# Patient Record
Sex: MALE | Race: WHITE | Employment: OTHER | URBAN - NONMETROPOLITAN AREA
[De-identification: names, ages, dates, MRNs, and addresses within clinical notes are randomized per-mention and may not be internally consistent; named-entity substitution may affect disease eponyms.]

---

## 2017-12-12 ENCOUNTER — APPOINTMENT (OUTPATIENT)
Dept: GENERAL RADIOLOGY | Age: 82
End: 2017-12-12
Payer: MEDICARE

## 2017-12-12 ENCOUNTER — HOSPITAL ENCOUNTER (EMERGENCY)
Age: 82
Discharge: HOME | End: 2017-12-12
Attending: FAMILY MEDICINE
Payer: MEDICARE

## 2017-12-12 VITALS
TEMPERATURE: 95.7 F | HEART RATE: 59 BPM | HEIGHT: 69 IN | RESPIRATION RATE: 16 BRPM | WEIGHT: 170 LBS | BODY MASS INDEX: 25.18 KG/M2 | OXYGEN SATURATION: 100 %

## 2017-12-12 DIAGNOSIS — K59.00 CONSTIPATION, UNSPECIFIED CONSTIPATION TYPE: Primary | ICD-10-CM

## 2017-12-12 LAB — HEMOCCULT STL QL: NEGATIVE

## 2017-12-12 PROCEDURE — 74020 XR ABDOMEN STANDARD: CPT

## 2017-12-12 PROCEDURE — 99283 EMERGENCY DEPT VISIT LOW MDM: CPT

## 2017-12-12 PROCEDURE — 6370000000 HC RX 637 (ALT 250 FOR IP): Performed by: FAMILY MEDICINE

## 2017-12-12 PROCEDURE — 82272 OCCULT BLD FECES 1-3 TESTS: CPT

## 2017-12-12 RX ORDER — POLYETHYLENE GLYCOL 3350 17 G/17G
17 POWDER, FOR SOLUTION ORAL ONCE
Status: COMPLETED | OUTPATIENT
Start: 2017-12-12 | End: 2017-12-12

## 2017-12-12 RX ORDER — SODIUM PHOSPHATE, DIBASIC AND SODIUM PHOSPHATE, MONOBASIC 7; 19 G/133ML; G/133ML
1 ENEMA RECTAL
Status: COMPLETED | OUTPATIENT
Start: 2017-12-12 | End: 2017-12-12

## 2017-12-12 RX ADMIN — Medication 1 ENEMA: at 13:16

## 2017-12-12 RX ADMIN — POLYETHYLENE GLYCOL 3350 17 G: 17 POWDER, FOR SOLUTION ORAL at 13:56

## 2017-12-12 NOTE — ED NOTES
Patient presents to ED for constipation. He states that he hasn't had a bowel movement in over 1 week. He also states that when he had that bowel movement he had to use his finger to help it come out.       Reva Bahena LPN  70/83/62 [id number]

## 2017-12-12 NOTE — ED NOTES
Patient placed on left side for enema.  Commode at bedside, call light within reach     Yuli Blanca RN  12/12/17 [id number]

## 2017-12-12 NOTE — ED PROVIDER NOTES
[facility] EMERGENCY DEPT      CHIEF COMPLAINT       Chief Complaint   Patient presents with    Constipation       Nurses Notes reviewed and I agree except as noted in the HPI. HISTORY OF PRESENT ILLNESS    Zeinab Anne is a 80 y.o. male who presents to the emergency department with constipation. The patient is a poor historian but apparently he usually moves his bowels once a day. His last BM was 3-4 days ago, and was small and hard. The patient does not know why he is suddenly constipated, but states that he used to take a medicine which seemed to keep his bowels loose, but turned his stools black. He was taken off of that medicine, but he cannot verbalize why. The patient also complains of some lower abdominal pain, that feels like pressure. He has treated himself in the last 1-2 days with a little orange pill that he gets over-the-counter. HIstory from the pt's cousin Jesse Flores is not much more useful. He states that the patient recently moved back from several years in [place]. He does not have a list of the patient's medications or medical conditions. Neither the patient or his cousin can recall why he has a surgical scar in his lower abdomen. REVIEW OF SYSTEMS     Constitutional:  Denies fever, chills, weight loss  HENT:  Denies bleeding from nose or in his mouth   Respiratory:  Denies shortness of breath  Cardiovascular:  Denies chest pain or palpitations  GI:  Denies pain, nausea, vomiting,  diarrhea, blood in the stool, gas   : denies dysuria, hematuria  Musculoskeletal:  Denies back pain,     PAST MEDICAL HISTORY    has no past medical history on file. SURGICAL HISTORY      has no past surgical history on file. CURRENT MEDICATIONS       There are no discharge medications for this patient. ALLERGIES     has No Known Allergies. FAMILY HISTORY     has no family status information on file. family history is not on file.     SOCIAL HISTORY          PHYSICAL EXAM INITIAL VITALS:  height is 5' 9\" (1.753 m) and weight is 170 lb (77.1 kg). His oral temperature is 95.7 °F (35.4 °C). His pulse is 59. His respiration is 16 and oxygen saturation is 100%. Constitutional:  Obese elderly man Well developed, Well nourished, No acute distress, Non-toxic appearance. HENT:  Normocephalic, Atraumatic, Bilateral external ears normal, nares clear, oropharynx moist, No oral or tonsillar exudates, airway clear. Neck: Supple, normal range of motion, No posterior midline bony tenderness, no adenopathy  Eyes:  PERRL, EOMI, Conjunctiva normal, No discharge. Respiratory:  Breathing is non-labored on room air, normal breath sounds, no wheezing, rhonchi, rales or cough. Cardiovascular:  Normal heart rate, Normal rhythm, No murmurs, No rubs, No gallops. GI:  Obese contour with well-healed lower abdominal surgical scar, Bowel sounds normal, Soft, mildly tender diffusely without guarding, rebound or masses. No inguinal adenopathy or tenderness. Rectal: No external lesions. No internal masses. No stool is felt in the vault. Smear is grossly negative for blood. Musculoskeletal: extremities have no edema or focal tenderness. Back: No midline bony tenderness or step-off, no CVAT. Integument:  Warm, Dry,  No rash (on exposed areas), no bruising. Neurologic:  Alert & Age-appropriate, cranial nerves 2-12 grossly intact, speech fluent and cogent, was all 4 extremities     DIFFERENTIAL DIAGNOSIS:   Slow transit versus functional constipation, less likely bowel obstruction. DIAGNOSTIC RESULTS     RADIOLOGY: non-plain film images(s) such as CT, Ultrasound and MRI are read by the radiologist.  Plain radiographic images are visualized and preliminarily interpreted by the emergency physician unless otherwise stated below. XR ABDOMEN (complete, including decubitus and/or erect views)   Final Result      There is a nonspecific, nonobstructive bowel gas pattern.  A large amount of stool is

## 2017-12-15 ENCOUNTER — OFFICE VISIT (OUTPATIENT)
Dept: CARDIOLOGY CLINIC | Age: 82
End: 2017-12-15
Payer: MEDICARE

## 2017-12-15 VITALS
SYSTOLIC BLOOD PRESSURE: 188 MMHG | BODY MASS INDEX: 26.22 KG/M2 | HEIGHT: 69 IN | HEART RATE: 53 BPM | WEIGHT: 177 LBS | DIASTOLIC BLOOD PRESSURE: 82 MMHG

## 2017-12-15 DIAGNOSIS — I73.9 PAD (PERIPHERAL ARTERY DISEASE) (HCC): Primary | ICD-10-CM

## 2017-12-15 DIAGNOSIS — Z98.890 HISTORY OF OPEN HEART SURGERY: ICD-10-CM

## 2017-12-15 DIAGNOSIS — R60.0 BILATERAL LOWER EXTREMITY EDEMA: ICD-10-CM

## 2017-12-15 PROCEDURE — G8484 FLU IMMUNIZE NO ADMIN: HCPCS | Performed by: INTERNAL MEDICINE

## 2017-12-15 PROCEDURE — 1123F ACP DISCUSS/DSCN MKR DOCD: CPT | Performed by: INTERNAL MEDICINE

## 2017-12-15 PROCEDURE — G8419 CALC BMI OUT NRM PARAM NOF/U: HCPCS | Performed by: INTERNAL MEDICINE

## 2017-12-15 PROCEDURE — G8427 DOCREV CUR MEDS BY ELIG CLIN: HCPCS | Performed by: INTERNAL MEDICINE

## 2017-12-15 PROCEDURE — 93000 ELECTROCARDIOGRAM COMPLETE: CPT | Performed by: INTERNAL MEDICINE

## 2017-12-15 PROCEDURE — 99205 OFFICE O/P NEW HI 60 MIN: CPT | Performed by: INTERNAL MEDICINE

## 2017-12-15 PROCEDURE — 1036F TOBACCO NON-USER: CPT | Performed by: INTERNAL MEDICINE

## 2017-12-15 PROCEDURE — 4040F PNEUMOC VAC/ADMIN/RCVD: CPT | Performed by: INTERNAL MEDICINE

## 2017-12-15 RX ORDER — GLIPIZIDE 5 MG/1
5 TABLET ORAL DAILY
COMMUNITY

## 2017-12-15 RX ORDER — POTASSIUM CHLORIDE 20 MEQ/1
40 TABLET, EXTENDED RELEASE ORAL DAILY
COMMUNITY
End: 2017-12-15 | Stop reason: SDUPTHER

## 2017-12-15 RX ORDER — FUROSEMIDE 20 MG/1
20 TABLET ORAL 2 TIMES DAILY
COMMUNITY
End: 2017-12-27 | Stop reason: SDUPTHER

## 2017-12-15 RX ORDER — FUROSEMIDE 40 MG/1
40 TABLET ORAL 2 TIMES DAILY
Qty: 60 TABLET | Refills: 3 | Status: SHIPPED | OUTPATIENT
Start: 2017-12-15 | End: 2017-12-15 | Stop reason: SDUPTHER

## 2017-12-15 RX ORDER — OXYCODONE AND ACETAMINOPHEN 7.5; 325 MG/1; MG/1
1 TABLET ORAL EVERY 8 HOURS PRN
Status: ON HOLD | COMMUNITY
End: 2018-06-08

## 2017-12-15 RX ORDER — NEBIVOLOL 10 MG/1
10 TABLET ORAL DAILY
Qty: 90 TABLET | Refills: 1 | Status: SHIPPED | OUTPATIENT
Start: 2017-12-15 | End: 2018-01-01 | Stop reason: SDUPTHER

## 2017-12-15 RX ORDER — ISOSORBIDE MONONITRATE 10 MG/1
10 TABLET ORAL DAILY
COMMUNITY
End: 2017-12-15 | Stop reason: SDUPTHER

## 2017-12-15 RX ORDER — ISOSORBIDE MONONITRATE 10 MG/1
10 TABLET ORAL DAILY
Qty: 90 TABLET | Refills: 1 | Status: ON HOLD | OUTPATIENT
Start: 2017-12-15 | End: 2019-01-01 | Stop reason: HOSPADM

## 2017-12-15 RX ORDER — OMEPRAZOLE 40 MG/1
40 CAPSULE, DELAYED RELEASE ORAL 2 TIMES DAILY
COMMUNITY
End: 2019-01-01 | Stop reason: ALTCHOICE

## 2017-12-15 RX ORDER — FUROSEMIDE 40 MG/1
TABLET ORAL
Qty: 180 TABLET | Refills: 0 | Status: SHIPPED | OUTPATIENT
Start: 2017-12-15 | End: 2017-12-27 | Stop reason: ALTCHOICE

## 2017-12-15 RX ORDER — ACETAMINOPHEN 500 MG/1
500 TABLET, FILM COATED ORAL EVERY 6 HOURS PRN
Status: ON HOLD | COMMUNITY
End: 2018-01-01 | Stop reason: HOSPADM

## 2017-12-15 RX ORDER — CLOPIDOGREL BISULFATE 75 MG/1
75 TABLET ORAL DAILY
COMMUNITY

## 2017-12-15 RX ORDER — RANOLAZINE 1000 MG/1
500 TABLET, EXTENDED RELEASE ORAL 2 TIMES DAILY
COMMUNITY
End: 2017-12-15 | Stop reason: SDUPTHER

## 2017-12-15 RX ORDER — ATORVASTATIN CALCIUM 20 MG/1
20 TABLET, FILM COATED ORAL DAILY
COMMUNITY

## 2017-12-15 RX ORDER — MAGNESIUM OXIDE 400 MG/1
400 TABLET ORAL 2 TIMES DAILY
COMMUNITY

## 2017-12-15 RX ORDER — RANOLAZINE 1000 MG/1
500 TABLET, EXTENDED RELEASE ORAL 2 TIMES DAILY
Qty: 180 TABLET | Refills: 1 | Status: SHIPPED | OUTPATIENT
Start: 2017-12-15 | End: 2018-01-01 | Stop reason: SDUPTHER

## 2017-12-15 RX ORDER — NEBIVOLOL 10 MG/1
10 TABLET ORAL DAILY
COMMUNITY
End: 2017-12-15 | Stop reason: SDUPTHER

## 2017-12-15 ASSESSMENT — ENCOUNTER SYMPTOMS
EYE DISCHARGE: 0
BLOOD IN STOOL: 0
SORE THROAT: 0
ABDOMINAL DISTENTION: 0
CONSTIPATION: 0
BACK PAIN: 0
APNEA: 0
EYE ITCHING: 0
CHEST TIGHTNESS: 0
NAUSEA: 0
SINUS PRESSURE: 0
EYE REDNESS: 0
ABDOMINAL PAIN: 0
DIARRHEA: 0
COUGH: 0
SHORTNESS OF BREATH: 0
EYE PAIN: 0

## 2017-12-15 NOTE — PROGRESS NOTES
(TYLENOL) 500 MG tablet, Take 500 mg by mouth every 6 hours as needed for Pain, Disp: , Rfl:     ranolazine (RANEXA) 1000 MG extended release tablet, Take 500 mg by mouth 2 times daily, Disp: , Rfl:     Past Medical History  Artie Cabot  has a past medical history of Hyperlipidemia; Hypertension; Peripheral vascular disease ([facility] [address] and Type 2 diabetes mellitus without complication ([facility] [address] Social History  Artie Cabot  reports that he has never smoked. He has never used smokeless tobacco. He reports that he does not drink alcohol or use drugs. Family History  Artie Cabot family history is not on file. There is no family history of bicuspid aortic valve, aneurysms, heart transplant, pacemakers, defibrillators, or sudden cardiac death. Past Surgical History   Past Surgical History:   Procedure Laterality Date    CORONARY ARTERY BYPASS GRAFT         Subjective:     Review of Systems   Constitutional: Negative for activity change, appetite change, chills and fatigue. HENT: Negative for congestion, sinus pressure, sneezing and sore throat. Eyes: Negative for pain, discharge, redness and itching. Respiratory: Negative for apnea, cough, chest tightness and shortness of breath. Gastrointestinal: Negative for abdominal distention, abdominal pain, blood in stool, constipation, diarrhea and nausea. Endocrine: Negative for cold intolerance, heat intolerance, polydipsia and polyphagia. Genitourinary: Negative for dysuria, enuresis, flank pain and hematuria. Musculoskeletal: Negative for arthralgias, back pain, joint swelling and neck pain. Neurological: Negative for dizziness, syncope, numbness and headaches. Psychiatric/Behavioral: Negative for agitation, confusion, decreased concentration and dysphoric mood.        Objective:     BP (!) 188/82   Pulse 53   Ht 5' 9\" (1.753 m)   Wt 177 lb (80.3 kg)   BMI 26.14 kg/m²     Wt Readings from Last 3 Encounters:   12/15/17 177 lb (80.3 kg)   12/12/17 170 lb anatomy, requesting OSH records. Continue ASA/Plavix/Lipitor/Ismo/Nebivolol/Ranolazine. No current cardiac symptoms. LE swelling/HTN - OSH records, and TTE for baseline; increase Lasix to 40 mg PO BID. CHF RN to f/u 1 week with labs; salt restriction/water restriction    PAD - Claudication is life-limiting; cannot walk 100-200 feet without pain in legs, L > R. Needs OSH records and JERSON/PVR evaluation. May need angiography to evaluate. Disposition:  1-2 week with CHF RN, then 2-4 weeks with myself.          Electronically signed by Dunia Alvarado MD   12/15/2017 at 10:51 AM

## 2017-12-16 RX ORDER — POTASSIUM CHLORIDE 20 MEQ/1
40 TABLET, EXTENDED RELEASE ORAL DAILY
Qty: 60 TABLET | Refills: 1 | Status: SHIPPED | OUTPATIENT
Start: 2017-12-16 | End: 2017-12-17 | Stop reason: SDUPTHER

## 2017-12-18 RX ORDER — POTASSIUM CHLORIDE 20 MEQ/1
40 TABLET, EXTENDED RELEASE ORAL DAILY
Qty: 180 TABLET | Refills: 1 | Status: SHIPPED | OUTPATIENT
Start: 2017-12-18 | End: 2017-12-27 | Stop reason: SDUPTHER

## 2017-12-26 ENCOUNTER — OFFICE VISIT (OUTPATIENT)
Dept: CARDIOLOGY CLINIC | Age: 82
End: 2017-12-26
Payer: MEDICARE

## 2017-12-26 ENCOUNTER — HOSPITAL ENCOUNTER (OUTPATIENT)
Age: 82
Discharge: HOME | End: 2017-12-26
Payer: MEDICARE

## 2017-12-26 ENCOUNTER — HOSPITAL ENCOUNTER (OUTPATIENT)
Dept: NON INVASIVE DIAGNOSTICS | Age: 82
Discharge: HOME | End: 2017-12-26
Payer: MEDICARE

## 2017-12-26 ENCOUNTER — HOSPITAL ENCOUNTER (OUTPATIENT)
Dept: INTERVENTIONAL RADIOLOGY/VASCULAR | Age: 82
Discharge: HOME | End: 2017-12-26
Payer: MEDICARE

## 2017-12-26 VITALS
HEIGHT: 69 IN | BODY MASS INDEX: 26.66 KG/M2 | HEART RATE: 51 BPM | OXYGEN SATURATION: 100 % | SYSTOLIC BLOOD PRESSURE: 184 MMHG | WEIGHT: 180 LBS | DIASTOLIC BLOOD PRESSURE: 90 MMHG

## 2017-12-26 DIAGNOSIS — I73.9 PAD (PERIPHERAL ARTERY DISEASE) (HCC): ICD-10-CM

## 2017-12-26 DIAGNOSIS — Z98.890 HISTORY OF OPEN HEART SURGERY: ICD-10-CM

## 2017-12-26 DIAGNOSIS — I50.9 CHRONIC CONGESTIVE HEART FAILURE, UNSPECIFIED CONGESTIVE HEART FAILURE TYPE: Primary | ICD-10-CM

## 2017-12-26 LAB
ANION GAP SERPL CALCULATED.3IONS-SCNC: 11 MEQ/L (ref 8–16)
BUN BLDV-MCNC: 32 MG/DL (ref 7–22)
CALCIUM SERPL-MCNC: 9.6 MG/DL (ref 8.5–10.5)
CHLORIDE BLD-SCNC: 103 MEQ/L (ref 98–111)
CO2: 28 MEQ/L (ref 23–33)
CREAT SERPL-MCNC: 1.4 MG/DL (ref 0.4–1.2)
GFR SERPL CREATININE-BSD FRML MDRD: 48 ML/MIN/1.73M2
GLUCOSE BLD-MCNC: 146 MG/DL (ref 70–108)
LV EF: 58 %
LVEF MODALITY: NORMAL
POTASSIUM SERPL-SCNC: 5.2 MEQ/L (ref 3.5–5.2)
SODIUM BLD-SCNC: 142 MEQ/L (ref 135–145)

## 2017-12-26 PROCEDURE — 93922 UPR/L XTREMITY ART 2 LEVELS: CPT

## 2017-12-26 PROCEDURE — 80048 BASIC METABOLIC PNL TOTAL CA: CPT

## 2017-12-26 PROCEDURE — 93306 TTE W/DOPPLER COMPLETE: CPT

## 2017-12-26 PROCEDURE — 99213 OFFICE O/P EST LOW 20 MIN: CPT | Performed by: NURSE PRACTITIONER

## 2017-12-26 PROCEDURE — 36415 COLL VENOUS BLD VENIPUNCTURE: CPT

## 2017-12-26 ASSESSMENT — ENCOUNTER SYMPTOMS
COUGH: 0
NAUSEA: 0
WHEEZING: 0
SHORTNESS OF BREATH: 0
COLOR CHANGE: 1
APNEA: 0
ABDOMINAL DISTENTION: 0
ABDOMINAL PAIN: 0
CHEST TIGHTNESS: 0

## 2017-12-26 NOTE — PATIENT INSTRUCTIONS
Continue:  · Continue current medications  · Daily weights and record  · Fluid restriction of 2 Liters per day  · Limit sodium in diet to around 1500 mg/day  · Monitor BP  · Activity as tolerated     Call the Heart Failure Clinic for any of the following symptoms: [phone]  Weight gain of 2-3 pounds in 1 day or 5 pounds in 1 week  Increased shortness of breath  Shortness of breath while laying down  Cough  Chest pain  Swelling in feet, ankles or legs  Tenderness or bloating in the abdomen  Fatigue   Decreased appetite or nausea   Confusion

## 2017-12-26 NOTE — PROGRESS NOTES
Heart Failure Clinic       Visit Date: 12/26/2017  Cardiologist:  Dr. Leonidas Garces  Primary Care Physician: Dr. Chato Tafoya, CNP    Conrad Cruz is a 80 y.o. male who presents today for:  Chief Complaint   Patient presents with    Congestive Heart Failure       HPI:   Conrad Cruz is a 80 y.o. male who presents to the office for new patient visit in the heart failure clinic. He recently moved here from [place] with the help of his 2 nephews who are with him today. He lives alone, uses a walker. He has meals on wheels for one meal a day. He usually eats soup and a sandwich for lunch, ham and cheese. We looked up how much sodium was in a can of Chicken Noodle soup and found it to be 860 mg per serving. He can perform basic ADLs without feeling SOB. He can walk short distances without dyspnea. He does, however, complain of pain in his legs with ambulation. He sleeps in a bed with 2 pillows. He saw Dr Leonidas Garces on 12/15/17 and had a BP of 188/82. He had doubled his Lasix to 40 mg BID. His BP is still elevated today at 180/76. I retook his BP at the end of the visit and it was still elevated at 184/90. He states he did not take his medications yet today. He denied any headache or dizziness. He was to get a BMP prior to this visit but is actually getting it today as well as JERSON and ECHO. Patient has:  Chest Pain: no  Worsening SOB/orthopnea/PND: no  Edema: yes  Weight gain: no  Compliant checking home weight: yes  Fatigue: no  Abdominal bloating: sometimes  Appetite: good  Difficulty sleeping: no  Cough: no  Compliant checking blood pressure: no  Any refills on CHF medications needed at this time: no    Past Medical History:   Diagnosis Date    Hyperlipidemia     Hypertension     Peripheral vascular disease ([facility] [address]     Type 2 diabetes mellitus without complication ([facility] [address]      Past Surgical History:   Procedure Laterality Date    CORONARY ARTERY BYPASS GRAFT       History reviewed.  No pertinent family pain and nausea. Genitourinary: Negative for difficulty urinating and dysuria. Musculoskeletal: Positive for gait problem (uses a rolling walker). Negative for arthralgias. Leg pain   Skin: Positive for color change. Chronic skin changes and open blisters on both shins   Neurological: Negative for dizziness, numbness and headaches. Psychiatric/Behavioral: Negative for agitation, confusion and sleep disturbance. The patient is not nervous/anxious. OBJECTIVE:   Today's Vitals:  BP (!) 180/76   Pulse 51   Ht 5' 9\" (1.753 m)   Wt 180 lb (81.6 kg)   SpO2 100%   BMI 26.58 kg/m²     Physical Exam   Constitutional: He is oriented to person, place, and time. He appears well-developed and well-nourished. HENT:   Head: Normocephalic and atraumatic. Eyes: Pupils are equal, round, and reactive to light. Neck: Normal range of motion. No JVD present. Cardiovascular: Normal rate and normal heart sounds. No murmur heard. Pulmonary/Chest: Effort normal. No respiratory distress. He has no rales. Abdominal: Soft. He exhibits no distension. There is no tenderness. Musculoskeletal: He exhibits edema (1+ shins bilaterally, chronic skin changes, open round blister on left shin, approx 2 inchx 0.25 inch on right). He exhibits no tenderness. Neurological: He is alert and oriented to person, place, and time. Skin: Skin is warm and dry. Psychiatric: He has a normal mood and affect. His behavior is normal.   Vitals reviewed. Wt Readings from Last 3 Encounters:   12/26/17 180 lb (81.6 kg)   12/15/17 177 lb (80.3 kg)   12/12/17 170 lb (77.1 kg)     BP Readings from Last 3 Encounters:   12/26/17 (!) 180/76   12/15/17 (!) 188/82     Pulse Readings from Last 3 Encounters:   12/26/17 51   12/15/17 53   12/12/17 59     Body mass index is 26.58 kg/m².     ECHO:   12/26/17 - Pending    Results reviewed:  BNP: No results found for: BNP  CBC:   Lab Results   Component Value Date    WBC 7.6 understanding of plan of care using teach back method, and is agreeable to the treatment plan.        Electronically signed by Maria Isabel Kim CNP on 12/26/2017 at 1:25 PM

## 2017-12-27 ENCOUNTER — TELEPHONE (OUTPATIENT)
Dept: CARDIOLOGY CLINIC | Age: 82
End: 2017-12-27

## 2017-12-27 DIAGNOSIS — I50.32 CHRONIC DIASTOLIC CONGESTIVE HEART FAILURE (HCC): Primary | ICD-10-CM

## 2017-12-27 RX ORDER — FUROSEMIDE 20 MG/1
20 TABLET ORAL 2 TIMES DAILY
Qty: 60 TABLET | Refills: 3 | Status: SHIPPED | OUTPATIENT
Start: 2017-12-27 | End: 2018-01-01 | Stop reason: SDUPTHER

## 2017-12-27 RX ORDER — HYDRALAZINE HYDROCHLORIDE 10 MG/1
10 TABLET, FILM COATED ORAL 3 TIMES DAILY
Qty: 90 TABLET | Refills: 3 | Status: ON HOLD | OUTPATIENT
Start: 2017-12-27 | End: 2018-01-05 | Stop reason: HOSPADM

## 2017-12-27 RX ORDER — POTASSIUM CHLORIDE 20 MEQ/1
10 TABLET, EXTENDED RELEASE ORAL DAILY
Qty: 180 TABLET | Refills: 1 | Status: SHIPPED | OUTPATIENT
Start: 2017-12-27 | End: 2018-04-16 | Stop reason: SDUPTHER

## 2017-12-28 ENCOUNTER — TELEPHONE (OUTPATIENT)
Dept: CARDIOLOGY CLINIC | Age: 82
End: 2017-12-28

## 2018-01-01 ENCOUNTER — TELEPHONE (OUTPATIENT)
Dept: UROLOGY | Age: 83
End: 2018-01-01

## 2018-01-01 ENCOUNTER — APPOINTMENT (OUTPATIENT)
Dept: GENERAL RADIOLOGY | Age: 83
End: 2018-01-01
Payer: MEDICARE

## 2018-01-01 ENCOUNTER — APPOINTMENT (OUTPATIENT)
Dept: GENERAL RADIOLOGY | Age: 83
DRG: 690 | End: 2018-01-01
Payer: MEDICARE

## 2018-01-01 ENCOUNTER — OFFICE VISIT (OUTPATIENT)
Dept: CARDIOLOGY CLINIC | Age: 83
End: 2018-01-01
Payer: MEDICARE

## 2018-01-01 ENCOUNTER — APPOINTMENT (OUTPATIENT)
Dept: CT IMAGING | Age: 83
End: 2018-01-01
Payer: MEDICARE

## 2018-01-01 ENCOUNTER — HOSPITAL ENCOUNTER (INPATIENT)
Age: 83
LOS: 7 days | Discharge: NURSING, REHAB OR LONG-TERM CARE | DRG: 690 | End: 2018-11-26
Attending: INTERNAL MEDICINE | Admitting: INTERNAL MEDICINE
Payer: MEDICARE

## 2018-01-01 ENCOUNTER — TELEPHONE (OUTPATIENT)
Dept: CARDIOLOGY CLINIC | Age: 83
End: 2018-01-01

## 2018-01-01 ENCOUNTER — PROCEDURE VISIT (OUTPATIENT)
Dept: UROLOGY | Age: 83
End: 2018-01-01
Payer: MEDICARE

## 2018-01-01 ENCOUNTER — APPOINTMENT (OUTPATIENT)
Dept: NUCLEAR MEDICINE | Age: 83
DRG: 682 | End: 2018-01-01
Payer: MEDICARE

## 2018-01-01 ENCOUNTER — HOSPITAL ENCOUNTER (OUTPATIENT)
Dept: NON INVASIVE DIAGNOSTICS | Age: 83
Discharge: HOME | End: 2018-10-17
Payer: MEDICARE

## 2018-01-01 ENCOUNTER — APPOINTMENT (OUTPATIENT)
Dept: CT IMAGING | Age: 83
DRG: 682 | End: 2018-01-01
Payer: MEDICARE

## 2018-01-01 ENCOUNTER — HOSPITAL ENCOUNTER (OUTPATIENT)
Age: 83
Setting detail: OBSERVATION
Discharge: HOME | End: 2018-08-09
Attending: INTERNAL MEDICINE | Admitting: INTERNAL MEDICINE
Payer: MEDICARE

## 2018-01-01 ENCOUNTER — NURSE ONLY (OUTPATIENT)
Dept: CARDIOLOGY CLINIC | Age: 83
End: 2018-01-01
Payer: MEDICARE

## 2018-01-01 ENCOUNTER — APPOINTMENT (OUTPATIENT)
Dept: ULTRASOUND IMAGING | Age: 83
DRG: 682 | End: 2018-01-01
Payer: MEDICARE

## 2018-01-01 ENCOUNTER — TELEPHONE (OUTPATIENT)
Dept: AUDIOLOGY | Age: 83
End: 2018-01-01

## 2018-01-01 ENCOUNTER — APPOINTMENT (OUTPATIENT)
Dept: ULTRASOUND IMAGING | Age: 83
DRG: 690 | End: 2018-01-01
Payer: MEDICARE

## 2018-01-01 ENCOUNTER — APPOINTMENT (OUTPATIENT)
Dept: GENERAL RADIOLOGY | Age: 83
DRG: 682 | End: 2018-01-01
Payer: MEDICARE

## 2018-01-01 ENCOUNTER — APPOINTMENT (OUTPATIENT)
Dept: ULTRASOUND IMAGING | Age: 83
End: 2018-01-01
Payer: MEDICARE

## 2018-01-01 ENCOUNTER — HOSPITAL ENCOUNTER (INPATIENT)
Age: 83
LOS: 4 days | Discharge: NURSING, REHAB OR LONG-TERM CARE | DRG: 682 | End: 2018-09-06
Attending: INTERNAL MEDICINE | Admitting: INTERNAL MEDICINE
Payer: MEDICARE

## 2018-01-01 ENCOUNTER — HOSPITAL ENCOUNTER (OUTPATIENT)
Dept: AUDIOLOGY | Age: 83
Discharge: HOME | End: 2018-08-07

## 2018-01-01 ENCOUNTER — APPOINTMENT (OUTPATIENT)
Dept: CT IMAGING | Age: 83
DRG: 690 | End: 2018-01-01
Payer: MEDICARE

## 2018-01-01 ENCOUNTER — OFFICE VISIT (OUTPATIENT)
Dept: SURGERY | Age: 83
End: 2018-01-01
Payer: MEDICARE

## 2018-01-01 ENCOUNTER — HOSPITAL ENCOUNTER (INPATIENT)
Age: 83
LOS: 5 days | Discharge: NURSING, REHAB OR LONG-TERM CARE | DRG: 682 | End: 2018-08-21
Attending: EMERGENCY MEDICINE | Admitting: INTERNAL MEDICINE
Payer: MEDICARE

## 2018-01-01 ENCOUNTER — APPOINTMENT (OUTPATIENT)
Dept: INTERVENTIONAL RADIOLOGY/VASCULAR | Age: 83
DRG: 682 | End: 2018-01-01
Payer: MEDICARE

## 2018-01-01 ENCOUNTER — HOSPITAL ENCOUNTER (OUTPATIENT)
Age: 83
Setting detail: OBSERVATION
Discharge: HOME | End: 2018-09-13
Attending: EMERGENCY MEDICINE | Admitting: INTERNAL MEDICINE
Payer: MEDICARE

## 2018-01-01 ENCOUNTER — HOSPITAL ENCOUNTER (OUTPATIENT)
Dept: AUDIOLOGY | Age: 83
Discharge: HOME | End: 2018-09-24

## 2018-01-01 VITALS
RESPIRATION RATE: 20 BRPM | TEMPERATURE: 97.5 F | OXYGEN SATURATION: 99 % | SYSTOLIC BLOOD PRESSURE: 144 MMHG | BODY MASS INDEX: 24.9 KG/M2 | HEART RATE: 63 BPM | WEIGHT: 168.13 LBS | DIASTOLIC BLOOD PRESSURE: 65 MMHG | HEIGHT: 69 IN

## 2018-01-01 VITALS
TEMPERATURE: 97.6 F | HEIGHT: 69 IN | DIASTOLIC BLOOD PRESSURE: 60 MMHG | HEART RATE: 65 BPM | WEIGHT: 155.8 LBS | SYSTOLIC BLOOD PRESSURE: 126 MMHG | RESPIRATION RATE: 17 BRPM | BODY MASS INDEX: 23.08 KG/M2 | OXYGEN SATURATION: 100 %

## 2018-01-01 VITALS
DIASTOLIC BLOOD PRESSURE: 65 MMHG | OXYGEN SATURATION: 98 % | HEART RATE: 86 BPM | RESPIRATION RATE: 16 BRPM | HEIGHT: 69 IN | WEIGHT: 164 LBS | TEMPERATURE: 97.5 F | BODY MASS INDEX: 24.29 KG/M2 | SYSTOLIC BLOOD PRESSURE: 148 MMHG

## 2018-01-01 VITALS
HEIGHT: 69 IN | HEART RATE: 60 BPM | SYSTOLIC BLOOD PRESSURE: 126 MMHG | WEIGHT: 166.2 LBS | DIASTOLIC BLOOD PRESSURE: 54 MMHG | OXYGEN SATURATION: 97 % | BODY MASS INDEX: 24.62 KG/M2

## 2018-01-01 VITALS
HEIGHT: 69 IN | HEART RATE: 60 BPM | WEIGHT: 169 LBS | OXYGEN SATURATION: 99 % | BODY MASS INDEX: 25.03 KG/M2 | DIASTOLIC BLOOD PRESSURE: 70 MMHG | SYSTOLIC BLOOD PRESSURE: 130 MMHG

## 2018-01-01 VITALS
BODY MASS INDEX: 24.29 KG/M2 | HEIGHT: 69 IN | DIASTOLIC BLOOD PRESSURE: 64 MMHG | WEIGHT: 164 LBS | HEART RATE: 61 BPM | SYSTOLIC BLOOD PRESSURE: 122 MMHG | OXYGEN SATURATION: 99 %

## 2018-01-01 VITALS
DIASTOLIC BLOOD PRESSURE: 72 MMHG | HEART RATE: 60 BPM | OXYGEN SATURATION: 98 % | HEIGHT: 69 IN | RESPIRATION RATE: 17 BRPM | TEMPERATURE: 97.9 F | BODY MASS INDEX: 24.48 KG/M2 | SYSTOLIC BLOOD PRESSURE: 142 MMHG | WEIGHT: 165.3 LBS

## 2018-01-01 VITALS
BODY MASS INDEX: 24.12 KG/M2 | DIASTOLIC BLOOD PRESSURE: 60 MMHG | HEART RATE: 62 BPM | RESPIRATION RATE: 20 BRPM | HEIGHT: 70 IN | OXYGEN SATURATION: 98 % | SYSTOLIC BLOOD PRESSURE: 120 MMHG | TEMPERATURE: 97.8 F

## 2018-01-01 VITALS
OXYGEN SATURATION: 97 % | HEIGHT: 69 IN | SYSTOLIC BLOOD PRESSURE: 162 MMHG | HEART RATE: 60 BPM | RESPIRATION RATE: 16 BRPM | DIASTOLIC BLOOD PRESSURE: 74 MMHG | BODY MASS INDEX: 24.97 KG/M2 | WEIGHT: 168.6 LBS | TEMPERATURE: 98 F

## 2018-01-01 VITALS
WEIGHT: 173 LBS | HEART RATE: 64 BPM | SYSTOLIC BLOOD PRESSURE: 122 MMHG | BODY MASS INDEX: 25.62 KG/M2 | DIASTOLIC BLOOD PRESSURE: 62 MMHG | HEIGHT: 69 IN

## 2018-01-01 VITALS
SYSTOLIC BLOOD PRESSURE: 118 MMHG | DIASTOLIC BLOOD PRESSURE: 64 MMHG | WEIGHT: 172 LBS | BODY MASS INDEX: 25.48 KG/M2 | HEIGHT: 69 IN | HEART RATE: 63 BPM

## 2018-01-01 VITALS
BODY MASS INDEX: 24.44 KG/M2 | DIASTOLIC BLOOD PRESSURE: 60 MMHG | SYSTOLIC BLOOD PRESSURE: 130 MMHG | WEIGHT: 165 LBS | HEIGHT: 69 IN

## 2018-01-01 DIAGNOSIS — R41.82 ALTERED MENTAL STATUS, UNSPECIFIED ALTERED MENTAL STATUS TYPE: Primary | ICD-10-CM

## 2018-01-01 DIAGNOSIS — Z95.0 PACEMAKER: Primary | Chronic | ICD-10-CM

## 2018-01-01 DIAGNOSIS — N39.0 URINARY TRACT INFECTION ASSOCIATED WITH INDWELLING URETHRAL CATHETER, INITIAL ENCOUNTER (HCC): ICD-10-CM

## 2018-01-01 DIAGNOSIS — N17.9 ACUTE KIDNEY INJURY SUPERIMPOSED ON CHRONIC KIDNEY DISEASE (HCC): Primary | ICD-10-CM

## 2018-01-01 DIAGNOSIS — L03.119 CELLULITIS OF LOWER EXTREMITY, UNSPECIFIED LATERALITY: Primary | ICD-10-CM

## 2018-01-01 DIAGNOSIS — Z01.818 PRE-OP TESTING: Primary | ICD-10-CM

## 2018-01-01 DIAGNOSIS — T83.89XA EROSION OF URETHRA DUE TO CATHETERIZATION OF URINARY TRACT, INITIAL ENCOUNTER (HCC): ICD-10-CM

## 2018-01-01 DIAGNOSIS — E87.20 LACTIC ACIDOSIS: ICD-10-CM

## 2018-01-01 DIAGNOSIS — C85.98 LYMPHOMA OF LYMPH NODES OF MULTIPLE REGIONS, UNSPECIFIED LYMPHOMA TYPE (HCC): Primary | ICD-10-CM

## 2018-01-01 DIAGNOSIS — K80.20 CALCULUS OF GALLBLADDER WITHOUT CHOLECYSTITIS WITHOUT OBSTRUCTION: Primary | ICD-10-CM

## 2018-01-01 DIAGNOSIS — R33.9 URINARY RETENTION: ICD-10-CM

## 2018-01-01 DIAGNOSIS — R33.9 URINARY RETENTION: Primary | ICD-10-CM

## 2018-01-01 DIAGNOSIS — I50.32 CHF (CONGESTIVE HEART FAILURE), NYHA CLASS II, CHRONIC, DIASTOLIC (HCC): Primary | ICD-10-CM

## 2018-01-01 DIAGNOSIS — Z01.818 PRE-OP TESTING: ICD-10-CM

## 2018-01-01 DIAGNOSIS — R50.9 FEVER, UNSPECIFIED FEVER CAUSE: ICD-10-CM

## 2018-01-01 DIAGNOSIS — N17.9 ACUTE RENAL FAILURE SUPERIMPOSED ON CHRONIC KIDNEY DISEASE, UNSPECIFIED CKD STAGE, UNSPECIFIED ACUTE RENAL FAILURE TYPE (HCC): Primary | ICD-10-CM

## 2018-01-01 DIAGNOSIS — R60.0 BILATERAL LOWER EXTREMITY EDEMA: ICD-10-CM

## 2018-01-01 DIAGNOSIS — T83.511A URINARY TRACT INFECTION ASSOCIATED WITH INDWELLING URETHRAL CATHETER, INITIAL ENCOUNTER (HCC): ICD-10-CM

## 2018-01-01 DIAGNOSIS — E11.22 TYPE 2 DIABETES MELLITUS WITH STAGE 3 CHRONIC KIDNEY DISEASE, WITHOUT LONG-TERM CURRENT USE OF INSULIN (HCC): Chronic | ICD-10-CM

## 2018-01-01 DIAGNOSIS — R11.2 NAUSEA AND VOMITING, INTRACTABILITY OF VOMITING NOT SPECIFIED, UNSPECIFIED VOMITING TYPE: ICD-10-CM

## 2018-01-01 DIAGNOSIS — R41.82 ALTERED MENTAL STATUS, UNSPECIFIED ALTERED MENTAL STATUS TYPE: ICD-10-CM

## 2018-01-01 DIAGNOSIS — I50.32 CHRONIC DIASTOLIC (CONGESTIVE) HEART FAILURE (HCC): ICD-10-CM

## 2018-01-01 DIAGNOSIS — E87.5 HYPERKALEMIA: ICD-10-CM

## 2018-01-01 DIAGNOSIS — I73.9 PAD (PERIPHERAL ARTERY DISEASE) (HCC): ICD-10-CM

## 2018-01-01 DIAGNOSIS — N36.8 EROSION OF URETHRA DUE TO CATHETERIZATION OF URINARY TRACT, INITIAL ENCOUNTER (HCC): ICD-10-CM

## 2018-01-01 DIAGNOSIS — N18.30 TYPE 2 DIABETES MELLITUS WITH STAGE 3 CHRONIC KIDNEY DISEASE, WITHOUT LONG-TERM CURRENT USE OF INSULIN (HCC): Chronic | ICD-10-CM

## 2018-01-01 DIAGNOSIS — R06.09 DOE (DYSPNEA ON EXERTION): ICD-10-CM

## 2018-01-01 DIAGNOSIS — N18.9 ACUTE KIDNEY INJURY SUPERIMPOSED ON CHRONIC KIDNEY DISEASE (HCC): Primary | ICD-10-CM

## 2018-01-01 DIAGNOSIS — R61 DIAPHORESIS: ICD-10-CM

## 2018-01-01 DIAGNOSIS — N18.9 ACUTE RENAL FAILURE SUPERIMPOSED ON CHRONIC KIDNEY DISEASE, UNSPECIFIED CKD STAGE, UNSPECIFIED ACUTE RENAL FAILURE TYPE (HCC): Primary | ICD-10-CM

## 2018-01-01 DIAGNOSIS — R55 SYNCOPE, UNSPECIFIED SYNCOPE TYPE: Primary | ICD-10-CM

## 2018-01-01 LAB
ABO: NORMAL
AEROBIC CULTURE: ABNORMAL
AEROBIC CULTURE: ABNORMAL
ALBUMIN SERPL-MCNC: 2.6 G/DL (ref 3.5–5.1)
ALBUMIN SERPL-MCNC: 2.6 G/DL (ref 3.5–5.1)
ALBUMIN SERPL-MCNC: 2.8 G/DL (ref 3.5–5.1)
ALBUMIN SERPL-MCNC: 2.9 G/DL (ref 3.5–5.1)
ALBUMIN SERPL-MCNC: 3.2 G/DL (ref 3.5–5.1)
ALBUMIN SERPL-MCNC: 3.3 G/DL (ref 3.5–5.1)
ALBUMIN SERPL-MCNC: 3.5 G/DL (ref 3.5–5.1)
ALBUMIN SERPL-MCNC: 3.6 G/DL (ref 3.5–5.1)
ALLEN TEST: ABNORMAL
ALLEN TEST: POSITIVE
ALP BLD-CCNC: 135 U/L (ref 38–126)
ALP BLD-CCNC: 197 U/L (ref 38–126)
ALP BLD-CCNC: 223 U/L (ref 38–126)
ALP BLD-CCNC: 302 U/L (ref 38–126)
ALP BLD-CCNC: 48 U/L (ref 38–126)
ALP BLD-CCNC: 586 U/L (ref 38–126)
ALP BLD-CCNC: 602 U/L (ref 38–126)
ALP BLD-CCNC: 619 U/L (ref 38–126)
ALP BLD-CCNC: 62 U/L (ref 38–126)
ALP BLD-CCNC: 98 U/L (ref 38–126)
ALT SERPL-CCNC: 101 U/L (ref 11–66)
ALT SERPL-CCNC: 130 U/L (ref 11–66)
ALT SERPL-CCNC: 14 U/L (ref 11–66)
ALT SERPL-CCNC: 141 U/L (ref 11–66)
ALT SERPL-CCNC: 145 U/L (ref 11–66)
ALT SERPL-CCNC: 22 U/L (ref 11–66)
ALT SERPL-CCNC: 37 U/L (ref 11–66)
ALT SERPL-CCNC: 6 U/L (ref 11–66)
ALT SERPL-CCNC: 81 U/L (ref 11–66)
ALT SERPL-CCNC: 83 U/L (ref 11–66)
AMMONIA: 33 UMOL/L (ref 11–60)
AMMONIA: 35 UMOL/L (ref 11–60)
AMMONIA: 43 UMOL/L (ref 11–60)
AMORPHOUS: ABNORMAL
AMORPHOUS: ABNORMAL
AMYLASE: 53 U/L (ref 20–104)
ANA SCREEN, REFLEXED: < 1
ANA SCREEN: DETECTED
ANAEROBIC CULTURE: ABNORMAL
ANION GAP SERPL CALCULATED.3IONS-SCNC: 11 MEQ/L (ref 8–16)
ANION GAP SERPL CALCULATED.3IONS-SCNC: 12 MEQ/L (ref 8–16)
ANION GAP SERPL CALCULATED.3IONS-SCNC: 13 MEQ/L (ref 8–16)
ANION GAP SERPL CALCULATED.3IONS-SCNC: 14 MEQ/L (ref 8–16)
ANION GAP SERPL CALCULATED.3IONS-SCNC: 16 MEQ/L (ref 8–16)
ANION GAP SERPL CALCULATED.3IONS-SCNC: 16 MEQ/L (ref 8–16)
ANION GAP SERPL CALCULATED.3IONS-SCNC: 17 MEQ/L (ref 8–16)
ANION GAP SERPL CALCULATED.3IONS-SCNC: 19 MEQ/L (ref 8–16)
ANION GAP SERPL CALCULATED.3IONS-SCNC: 8 MEQ/L (ref 8–16)
ANTIBODY SCREEN: NORMAL
APTT: 20.7 SECONDS (ref 22–38)
AST SERPL-CCNC: 108 U/L (ref 5–40)
AST SERPL-CCNC: 15 U/L (ref 5–40)
AST SERPL-CCNC: 16 U/L (ref 5–40)
AST SERPL-CCNC: 17 U/L (ref 5–40)
AST SERPL-CCNC: 23 U/L (ref 5–40)
AST SERPL-CCNC: 24 U/L (ref 5–40)
AST SERPL-CCNC: 26 U/L (ref 5–40)
AST SERPL-CCNC: 27 U/L (ref 5–40)
AST SERPL-CCNC: 79 U/L (ref 5–40)
AST SERPL-CCNC: 82 U/L (ref 5–40)
BACTERIA: ABNORMAL
BACTERIA: ABNORMAL /HPF
BACTERIA: ABNORMAL /HPF
BACTERIA: NORMAL
BASE EXCESS (CALCULATED): -3.6 MMOL/L (ref -2.5–2.5)
BASE EXCESS (CALCULATED): -4.5 MMOL/L (ref -2.5–2.5)
BASOPHILS # BLD: 0 %
BASOPHILS # BLD: 0.2 %
BASOPHILS # BLD: 0.3 %
BASOPHILS # BLD: 0.3 %
BASOPHILS # BLD: 0.4 %
BASOPHILS # BLD: 0.4 %
BASOPHILS # BLD: 0.6 %
BASOPHILS # BLD: 0.7 %
BASOPHILS # BLD: 0.8 %
BASOPHILS ABSOLUTE: 0 THOU/MM3 (ref 0–0.1)
BETA-HYDROXYBUTYRATE: 3.29 MG/DL (ref 0.2–2.81)
BILIRUB SERPL-MCNC: 0.4 MG/DL (ref 0.3–1.2)
BILIRUB SERPL-MCNC: 0.5 MG/DL (ref 0.3–1.2)
BILIRUBIN DIRECT: < 0.2 MG/DL (ref 0–0.3)
BILIRUBIN URINE: NEGATIVE
BLOOD CULTURE, ROUTINE: NORMAL
BLOOD URINE, POC: ABNORMAL
BLOOD, URINE: ABNORMAL
BLOOD, URINE: NEGATIVE
BLOOD, URINE: NEGATIVE
BUN BLDV-MCNC: 11 MG/DL (ref 7–22)
BUN BLDV-MCNC: 12 MG/DL
BUN BLDV-MCNC: 15 MG/DL (ref 7–22)
BUN BLDV-MCNC: 21 MG/DL (ref 7–22)
BUN BLDV-MCNC: 24 MG/DL (ref 7–22)
BUN BLDV-MCNC: 25 MG/DL (ref 7–22)
BUN BLDV-MCNC: 27 MG/DL (ref 7–22)
BUN BLDV-MCNC: 27 MG/DL (ref 7–22)
BUN BLDV-MCNC: 30 MG/DL (ref 7–22)
BUN BLDV-MCNC: 32 MG/DL (ref 7–22)
BUN BLDV-MCNC: 32 MG/DL (ref 7–22)
BUN BLDV-MCNC: 33 MG/DL (ref 7–22)
BUN BLDV-MCNC: 33 MG/DL (ref 7–22)
BUN BLDV-MCNC: 34 MG/DL (ref 7–22)
BUN BLDV-MCNC: 35 MG/DL (ref 7–22)
BUN BLDV-MCNC: 36 MG/DL (ref 7–22)
BUN BLDV-MCNC: 36 MG/DL (ref 7–22)
BUN BLDV-MCNC: 38 MG/DL (ref 7–22)
BUN BLDV-MCNC: 40 MG/DL (ref 7–22)
BUN BLDV-MCNC: 40 MG/DL (ref 7–22)
BUN BLDV-MCNC: 45 MG/DL (ref 7–22)
BUN BLDV-MCNC: 50 MG/DL (ref 7–22)
BUN BLDV-MCNC: 65 MG/DL (ref 7–22)
BUN BLDV-MCNC: 74 MG/DL (ref 7–22)
BUN BLDV-MCNC: 87 MG/DL (ref 7–22)
CALCIUM IONIZED: 1.49 MMOL/L (ref 1.12–1.32)
CALCIUM SERPL-MCNC: 10 MG/DL (ref 8.5–10.5)
CALCIUM SERPL-MCNC: 10.1 MG/DL (ref 8.5–10.5)
CALCIUM SERPL-MCNC: 10.2 MG/DL (ref 8.5–10.5)
CALCIUM SERPL-MCNC: 10.7 MG/DL (ref 8.5–10.5)
CALCIUM SERPL-MCNC: 11.2 MG/DL (ref 8.5–10.5)
CALCIUM SERPL-MCNC: 11.2 MG/DL (ref 8.5–10.5)
CALCIUM SERPL-MCNC: 11.4 MG/DL (ref 8.5–10.5)
CALCIUM SERPL-MCNC: 11.6 MG/DL (ref 8.5–10.5)
CALCIUM SERPL-MCNC: 8.4 MG/DL (ref 8.5–10.5)
CALCIUM SERPL-MCNC: 8.5 MG/DL (ref 8.5–10.5)
CALCIUM SERPL-MCNC: 8.5 MG/DL (ref 8.5–10.5)
CALCIUM SERPL-MCNC: 8.6 MG/DL (ref 8.5–10.5)
CALCIUM SERPL-MCNC: 8.8 MG/DL (ref 8.5–10.5)
CALCIUM SERPL-MCNC: 8.9 MG/DL
CALCIUM SERPL-MCNC: 8.9 MG/DL (ref 8.5–10.5)
CALCIUM SERPL-MCNC: 8.9 MG/DL (ref 8.5–10.5)
CALCIUM SERPL-MCNC: 9.1 MG/DL (ref 8.5–10.5)
CALCIUM SERPL-MCNC: 9.2 MG/DL (ref 8.5–10.5)
CALCIUM SERPL-MCNC: 9.2 MG/DL (ref 8.5–10.5)
CALCIUM SERPL-MCNC: 9.3 MG/DL (ref 8.5–10.5)
CALCIUM SERPL-MCNC: 9.4 MG/DL (ref 8.5–10.5)
CALCIUM SERPL-MCNC: 9.5 MG/DL (ref 8.5–10.5)
CASTS 2: ABNORMAL /LPF
CASTS 2: ABNORMAL /LPF
CASTS UA: ABNORMAL /LPF
CASTS UA: ABNORMAL /LPF
CASTS: ABNORMAL /LPF
CASTS: NORMAL /LPF
CASTS: NORMAL /LPF
CERULOPLASMIN: 36 MG/DL (ref 17–54)
CHARACTER, URINE: ABNORMAL
CHARACTER, URINE: CLEAR
CHARACTER, URINE: NORMAL
CHLORIDE BLD-SCNC: 100 MEQ/L (ref 98–111)
CHLORIDE BLD-SCNC: 101 MEQ/L (ref 98–111)
CHLORIDE BLD-SCNC: 102 MEQ/L (ref 98–111)
CHLORIDE BLD-SCNC: 102 MMOL/L
CHLORIDE BLD-SCNC: 104 MEQ/L (ref 98–111)
CHLORIDE BLD-SCNC: 105 MEQ/L (ref 98–111)
CHLORIDE BLD-SCNC: 106 MEQ/L (ref 98–111)
CHLORIDE BLD-SCNC: 106 MEQ/L (ref 98–111)
CHLORIDE BLD-SCNC: 107 MEQ/L (ref 98–111)
CHLORIDE BLD-SCNC: 108 MEQ/L (ref 98–111)
CHLORIDE BLD-SCNC: 108 MEQ/L (ref 98–111)
CHLORIDE BLD-SCNC: 109 MEQ/L (ref 98–111)
CHLORIDE BLD-SCNC: 95 MEQ/L (ref 98–111)
CHLORIDE BLD-SCNC: 97 MEQ/L (ref 98–111)
CO2: 17 MEQ/L (ref 23–33)
CO2: 18 MEQ/L (ref 23–33)
CO2: 18 MEQ/L (ref 23–33)
CO2: 19 MEQ/L (ref 23–33)
CO2: 20 MEQ/L (ref 23–33)
CO2: 21 MEQ/L (ref 23–33)
CO2: 22 MEQ/L (ref 23–33)
CO2: 22 MEQ/L (ref 23–33)
CO2: 23 MEQ/L (ref 23–33)
CO2: 24 MEQ/L (ref 23–33)
CO2: 25 MMOL/L
COLLECTED BY:: ABNORMAL
COLLECTED BY:: ABNORMAL
COLOR, URINE: YELLOW
COLOR: YELLOW
CREAT SERPL-MCNC: 1 MG/DL (ref 0.4–1.2)
CREAT SERPL-MCNC: 1.1 MG/DL (ref 0.4–1.2)
CREAT SERPL-MCNC: 1.1 MG/DL (ref 0.4–1.2)
CREAT SERPL-MCNC: 1.2 MG/DL (ref 0.4–1.2)
CREAT SERPL-MCNC: 1.3 MG/DL
CREAT SERPL-MCNC: 1.4 MG/DL (ref 0.4–1.2)
CREAT SERPL-MCNC: 1.5 MG/DL (ref 0.4–1.2)
CREAT SERPL-MCNC: 1.5 MG/DL (ref 0.4–1.2)
CREAT SERPL-MCNC: 1.6 MG/DL (ref 0.4–1.2)
CREAT SERPL-MCNC: 1.7 MG/DL (ref 0.4–1.2)
CREAT SERPL-MCNC: 1.8 MG/DL (ref 0.4–1.2)
CREAT SERPL-MCNC: 1.9 MG/DL (ref 0.4–1.2)
CREAT SERPL-MCNC: 2.4 MG/DL (ref 0.4–1.2)
CREAT SERPL-MCNC: 2.6 MG/DL (ref 0.4–1.2)
CREAT SERPL-MCNC: 3.5 MG/DL (ref 0.4–1.2)
CREAT SERPL-MCNC: 4.1 MG/DL (ref 0.4–1.2)
CREATININE URINE: 25 MG/DL
CRYSTALS, UA: ABNORMAL
CRYSTALS, UA: ABNORMAL
CRYSTALS: ABNORMAL
CRYSTALS: ABNORMAL
CRYSTALS: NORMAL
D-DIMER QUANTITATIVE: 730 NG/ML FEU (ref 0–500)
DEVICE: ABNORMAL
DEVICE: ABNORMAL
EKG ATRIAL RATE: 60 BPM
EKG ATRIAL RATE: 60 BPM
EKG ATRIAL RATE: 62 BPM
EKG ATRIAL RATE: 62 BPM
EKG ATRIAL RATE: 68 BPM
EKG P AXIS: -6 DEGREES
EKG P AXIS: 72 DEGREES
EKG P-R INTERVAL: 284 MS
EKG P-R INTERVAL: 296 MS
EKG P-R INTERVAL: 308 MS
EKG P-R INTERVAL: 318 MS
EKG P-R INTERVAL: 338 MS
EKG Q-T INTERVAL: 438 MS
EKG Q-T INTERVAL: 462 MS
EKG Q-T INTERVAL: 524 MS
EKG Q-T INTERVAL: 526 MS
EKG Q-T INTERVAL: 544 MS
EKG QRS DURATION: 114 MS
EKG QRS DURATION: 192 MS
EKG QRS DURATION: 208 MS
EKG QRS DURATION: 208 MS
EKG QRS DURATION: 96 MS
EKG QTC CALCULATION (BAZETT): 465 MS
EKG QTC CALCULATION (BAZETT): 465 MS
EKG QTC CALCULATION (BAZETT): 526 MS
EKG QTC CALCULATION (BAZETT): 531 MS
EKG QTC CALCULATION (BAZETT): 544 MS
EKG R AXIS: -84 DEGREES
EKG R AXIS: -87 DEGREES
EKG R AXIS: -90 DEGREES
EKG R AXIS: 25 DEGREES
EKG T AXIS: -130 DEGREES
EKG T AXIS: 73 DEGREES
EKG T AXIS: 80 DEGREES
EKG T AXIS: 80 DEGREES
EKG T AXIS: 82 DEGREES
EKG VENTRICULAR RATE: 60 BPM
EKG VENTRICULAR RATE: 60 BPM
EKG VENTRICULAR RATE: 61 BPM
EKG VENTRICULAR RATE: 62 BPM
EKG VENTRICULAR RATE: 68 BPM
EOSINOPHIL # BLD: 0 %
EOSINOPHIL # BLD: 0.8 %
EOSINOPHIL # BLD: 7.3 %
EOSINOPHIL SMEAR: NORMAL
EOSINOPHILS ABSOLUTE: 0 THOU/MM3 (ref 0–0.4)
EOSINOPHILS ABSOLUTE: 0.3 THOU/MM3 (ref 0–0.4)
EPITHELIAL CELLS, UA: ABNORMAL /HPF
EPITHELIAL CELLS, UA: NORMAL /HPF
ERYTHROCYTE [DISTWIDTH] IN BLOOD BY AUTOMATED COUNT: 12.1 % (ref 11.5–14.5)
ERYTHROCYTE [DISTWIDTH] IN BLOOD BY AUTOMATED COUNT: 12.4 % (ref 11.5–14.5)
ERYTHROCYTE [DISTWIDTH] IN BLOOD BY AUTOMATED COUNT: 12.4 % (ref 11.5–14.5)
ERYTHROCYTE [DISTWIDTH] IN BLOOD BY AUTOMATED COUNT: 12.9 % (ref 11.5–14.5)
ERYTHROCYTE [DISTWIDTH] IN BLOOD BY AUTOMATED COUNT: 13.1 % (ref 11.5–14.5)
ERYTHROCYTE [DISTWIDTH] IN BLOOD BY AUTOMATED COUNT: 13.2 % (ref 11.5–14.5)
ERYTHROCYTE [DISTWIDTH] IN BLOOD BY AUTOMATED COUNT: 13.3 % (ref 11.5–14.5)
ERYTHROCYTE [DISTWIDTH] IN BLOOD BY AUTOMATED COUNT: 13.3 % (ref 11.5–14.5)
ERYTHROCYTE [DISTWIDTH] IN BLOOD BY AUTOMATED COUNT: 13.4 % (ref 11.5–14.5)
ERYTHROCYTE [DISTWIDTH] IN BLOOD BY AUTOMATED COUNT: 13.4 % (ref 11.5–14.5)
ERYTHROCYTE [DISTWIDTH] IN BLOOD BY AUTOMATED COUNT: 13.5 % (ref 11.5–14.5)
ERYTHROCYTE [DISTWIDTH] IN BLOOD BY AUTOMATED COUNT: 13.5 % (ref 11.5–14.5)
ERYTHROCYTE [DISTWIDTH] IN BLOOD BY AUTOMATED COUNT: 13.6 % (ref 11.5–14.5)
ERYTHROCYTE [DISTWIDTH] IN BLOOD BY AUTOMATED COUNT: 13.7 % (ref 11.5–14.5)
ERYTHROCYTE [DISTWIDTH] IN BLOOD BY AUTOMATED COUNT: 13.9 % (ref 11.5–14.5)
ERYTHROCYTE [DISTWIDTH] IN BLOOD BY AUTOMATED COUNT: 40.5 FL (ref 35–45)
ERYTHROCYTE [DISTWIDTH] IN BLOOD BY AUTOMATED COUNT: 41.1 FL (ref 35–45)
ERYTHROCYTE [DISTWIDTH] IN BLOOD BY AUTOMATED COUNT: 41.7 FL (ref 35–45)
ERYTHROCYTE [DISTWIDTH] IN BLOOD BY AUTOMATED COUNT: 42.5 FL (ref 35–45)
ERYTHROCYTE [DISTWIDTH] IN BLOOD BY AUTOMATED COUNT: 42.5 FL (ref 35–45)
ERYTHROCYTE [DISTWIDTH] IN BLOOD BY AUTOMATED COUNT: 42.9 FL (ref 35–45)
ERYTHROCYTE [DISTWIDTH] IN BLOOD BY AUTOMATED COUNT: 43.2 FL (ref 35–45)
ERYTHROCYTE [DISTWIDTH] IN BLOOD BY AUTOMATED COUNT: 43.8 FL (ref 35–45)
ERYTHROCYTE [DISTWIDTH] IN BLOOD BY AUTOMATED COUNT: 44.2 FL (ref 35–45)
ERYTHROCYTE [DISTWIDTH] IN BLOOD BY AUTOMATED COUNT: 44.4 FL (ref 35–45)
ERYTHROCYTE [DISTWIDTH] IN BLOOD BY AUTOMATED COUNT: 44.8 FL (ref 35–45)
ERYTHROCYTE [DISTWIDTH] IN BLOOD BY AUTOMATED COUNT: 45.2 FL (ref 35–45)
ERYTHROCYTE [DISTWIDTH] IN BLOOD BY AUTOMATED COUNT: 46 FL (ref 35–45)
ERYTHROCYTE [DISTWIDTH] IN BLOOD BY AUTOMATED COUNT: 46.2 FL (ref 35–45)
ERYTHROCYTE [DISTWIDTH] IN BLOOD BY AUTOMATED COUNT: 46.3 FL (ref 35–45)
ERYTHROCYTE [DISTWIDTH] IN BLOOD BY AUTOMATED COUNT: 46.3 FL (ref 35–45)
ERYTHROCYTE [DISTWIDTH] IN BLOOD BY AUTOMATED COUNT: 46.5 FL (ref 35–45)
ERYTHROCYTE [DISTWIDTH] IN BLOOD BY AUTOMATED COUNT: 46.9 FL (ref 35–45)
ERYTHROCYTE [DISTWIDTH] IN BLOOD BY AUTOMATED COUNT: 48.1 FL (ref 35–45)
ERYTHROCYTE [DISTWIDTH] IN BLOOD BY AUTOMATED COUNT: 48.7 FL (ref 35–45)
F-ACTIN AB IGG: 18 UNITS (ref 0–19)
FERRITIN: 933 NG/ML (ref 22–322)
FERRITIN: 943 NG/ML (ref 22–322)
FIBRINOGEN: 396 MG/100ML (ref 155–475)
FOLATE: > 20 NG/ML (ref 4.8–24.2)
GFR CALCULATED: NORMAL
GFR SERPL CREATININE-BSD FRML MDRD: 14 ML/MIN/1.73M2
GFR SERPL CREATININE-BSD FRML MDRD: 17 ML/MIN/1.73M2
GFR SERPL CREATININE-BSD FRML MDRD: 24 ML/MIN/1.73M2
GFR SERPL CREATININE-BSD FRML MDRD: 26 ML/MIN/1.73M2
GFR SERPL CREATININE-BSD FRML MDRD: 34 ML/MIN/1.73M2
GFR SERPL CREATININE-BSD FRML MDRD: 36 ML/MIN/1.73M2
GFR SERPL CREATININE-BSD FRML MDRD: 39 ML/MIN/1.73M2
GFR SERPL CREATININE-BSD FRML MDRD: 41 ML/MIN/1.73M2
GFR SERPL CREATININE-BSD FRML MDRD: 45 ML/MIN/1.73M2
GFR SERPL CREATININE-BSD FRML MDRD: 45 ML/MIN/1.73M2
GFR SERPL CREATININE-BSD FRML MDRD: 48 ML/MIN/1.73M2
GFR SERPL CREATININE-BSD FRML MDRD: 58 ML/MIN/1.73M2
GFR SERPL CREATININE-BSD FRML MDRD: 64 ML/MIN/1.73M2
GFR SERPL CREATININE-BSD FRML MDRD: 64 ML/MIN/1.73M2
GFR SERPL CREATININE-BSD FRML MDRD: 71 ML/MIN/1.73M2
GLUCOSE BLD-MCNC: 100 MG/DL (ref 70–108)
GLUCOSE BLD-MCNC: 102 MG/DL (ref 70–108)
GLUCOSE BLD-MCNC: 104 MG/DL (ref 70–108)
GLUCOSE BLD-MCNC: 108 MG/DL (ref 70–108)
GLUCOSE BLD-MCNC: 112 MG/DL (ref 70–108)
GLUCOSE BLD-MCNC: 115 MG/DL (ref 70–108)
GLUCOSE BLD-MCNC: 118 MG/DL (ref 70–108)
GLUCOSE BLD-MCNC: 119 MG/DL (ref 70–108)
GLUCOSE BLD-MCNC: 125 MG/DL (ref 70–108)
GLUCOSE BLD-MCNC: 125 MG/DL (ref 70–108)
GLUCOSE BLD-MCNC: 126 MG/DL (ref 70–108)
GLUCOSE BLD-MCNC: 131 MG/DL (ref 70–108)
GLUCOSE BLD-MCNC: 136 MG/DL (ref 70–108)
GLUCOSE BLD-MCNC: 138 MG/DL (ref 70–108)
GLUCOSE BLD-MCNC: 138 MG/DL (ref 70–108)
GLUCOSE BLD-MCNC: 139 MG/DL (ref 70–108)
GLUCOSE BLD-MCNC: 144 MG/DL (ref 70–108)
GLUCOSE BLD-MCNC: 147 MG/DL (ref 70–108)
GLUCOSE BLD-MCNC: 148 MG/DL (ref 70–108)
GLUCOSE BLD-MCNC: 149 MG/DL (ref 70–108)
GLUCOSE BLD-MCNC: 151 MG/DL (ref 70–108)
GLUCOSE BLD-MCNC: 151 MG/DL (ref 70–108)
GLUCOSE BLD-MCNC: 152 MG/DL (ref 70–108)
GLUCOSE BLD-MCNC: 156 MG/DL (ref 70–108)
GLUCOSE BLD-MCNC: 156 MG/DL (ref 70–108)
GLUCOSE BLD-MCNC: 163 MG/DL (ref 70–108)
GLUCOSE BLD-MCNC: 164 MG/DL (ref 70–108)
GLUCOSE BLD-MCNC: 164 MG/DL (ref 70–108)
GLUCOSE BLD-MCNC: 167 MG/DL (ref 70–108)
GLUCOSE BLD-MCNC: 169 MG/DL (ref 70–108)
GLUCOSE BLD-MCNC: 169 MG/DL (ref 70–108)
GLUCOSE BLD-MCNC: 172 MG/DL (ref 70–108)
GLUCOSE BLD-MCNC: 173 MG/DL (ref 70–108)
GLUCOSE BLD-MCNC: 174 MG/DL (ref 70–108)
GLUCOSE BLD-MCNC: 176 MG/DL (ref 70–108)
GLUCOSE BLD-MCNC: 177 MG/DL (ref 70–108)
GLUCOSE BLD-MCNC: 177 MG/DL (ref 70–108)
GLUCOSE BLD-MCNC: 178 MG/DL
GLUCOSE BLD-MCNC: 182 MG/DL (ref 70–108)
GLUCOSE BLD-MCNC: 185 MG/DL (ref 70–108)
GLUCOSE BLD-MCNC: 191 MG/DL (ref 70–108)
GLUCOSE BLD-MCNC: 191 MG/DL (ref 70–108)
GLUCOSE BLD-MCNC: 192 MG/DL (ref 70–108)
GLUCOSE BLD-MCNC: 193 MG/DL (ref 70–108)
GLUCOSE BLD-MCNC: 195 MG/DL (ref 70–108)
GLUCOSE BLD-MCNC: 195 MG/DL (ref 70–108)
GLUCOSE BLD-MCNC: 198 MG/DL (ref 70–108)
GLUCOSE BLD-MCNC: 199 MG/DL (ref 70–108)
GLUCOSE BLD-MCNC: 200 MG/DL (ref 70–108)
GLUCOSE BLD-MCNC: 201 MG/DL (ref 70–108)
GLUCOSE BLD-MCNC: 204 MG/DL (ref 70–108)
GLUCOSE BLD-MCNC: 205 MG/DL (ref 70–108)
GLUCOSE BLD-MCNC: 206 MG/DL (ref 70–108)
GLUCOSE BLD-MCNC: 208 MG/DL (ref 70–108)
GLUCOSE BLD-MCNC: 210 MG/DL (ref 70–108)
GLUCOSE BLD-MCNC: 210 MG/DL (ref 70–108)
GLUCOSE BLD-MCNC: 217 MG/DL (ref 70–108)
GLUCOSE BLD-MCNC: 219 MG/DL (ref 70–108)
GLUCOSE BLD-MCNC: 222 MG/DL (ref 70–108)
GLUCOSE BLD-MCNC: 224 MG/DL (ref 70–108)
GLUCOSE BLD-MCNC: 226 MG/DL (ref 70–108)
GLUCOSE BLD-MCNC: 227 MG/DL (ref 70–108)
GLUCOSE BLD-MCNC: 233 MG/DL (ref 70–108)
GLUCOSE BLD-MCNC: 246 MG/DL (ref 70–108)
GLUCOSE BLD-MCNC: 254 MG/DL (ref 70–108)
GLUCOSE BLD-MCNC: 255 MG/DL (ref 70–108)
GLUCOSE BLD-MCNC: 257 MG/DL (ref 70–108)
GLUCOSE BLD-MCNC: 272 MG/DL (ref 70–108)
GLUCOSE BLD-MCNC: 278 MG/DL (ref 70–108)
GLUCOSE BLD-MCNC: 278 MG/DL (ref 70–108)
GLUCOSE BLD-MCNC: 279 MG/DL (ref 70–108)
GLUCOSE BLD-MCNC: 280 MG/DL (ref 70–108)
GLUCOSE BLD-MCNC: 282 MG/DL (ref 70–108)
GLUCOSE BLD-MCNC: 290 MG/DL (ref 70–108)
GLUCOSE BLD-MCNC: 313 MG/DL (ref 70–108)
GLUCOSE BLD-MCNC: 323 MG/DL (ref 70–108)
GLUCOSE BLD-MCNC: 57 MG/DL (ref 70–108)
GLUCOSE BLD-MCNC: 92 MG/DL (ref 70–108)
GLUCOSE BLD-MCNC: 94 MG/DL (ref 70–108)
GLUCOSE BLD-MCNC: 97 MG/DL (ref 70–108)
GLUCOSE BLD-MCNC: 99 MG/DL (ref 70–108)
GLUCOSE URINE: NEGATIVE MG/DL
GLUCOSE, URINE: >= 1000 MG/DL
GLUCOSE, URINE: NEGATIVE MG/DL
GRAM STAIN RESULT: ABNORMAL
HAV AB SERPL IA-ACNC: NEGATIVE
HAV IGM SER IA-ACNC: NEGATIVE
HBV SURFACE AB TITR SER: NEGATIVE {TITER}
HCO3: 20 MMOL/L (ref 23–28)
HCO3: 20 MMOL/L (ref 23–28)
HCT VFR BLD CALC: 23.5 % (ref 42–52)
HCT VFR BLD CALC: 23.7 % (ref 42–52)
HCT VFR BLD CALC: 23.7 % (ref 42–52)
HCT VFR BLD CALC: 24.2 % (ref 42–52)
HCT VFR BLD CALC: 25.3 % (ref 42–52)
HCT VFR BLD CALC: 25.5 % (ref 42–52)
HCT VFR BLD CALC: 26.2 % (ref 42–52)
HCT VFR BLD CALC: 26.4 % (ref 42–52)
HCT VFR BLD CALC: 26.7 % (ref 42–52)
HCT VFR BLD CALC: 26.7 % (ref 42–52)
HCT VFR BLD CALC: 27.1 % (ref 42–52)
HCT VFR BLD CALC: 29 % (ref 42–52)
HCT VFR BLD CALC: 29.6 % (ref 42–52)
HCT VFR BLD CALC: 29.7 % (ref 42–52)
HCT VFR BLD CALC: 30.5 % (ref 42–52)
HCT VFR BLD CALC: 31.7 % (ref 42–52)
HCT VFR BLD CALC: 32 % (ref 42–52)
HCT VFR BLD CALC: 33 % (ref 42–52)
HCT VFR BLD CALC: 33.6 % (ref 42–52)
HCT VFR BLD CALC: 33.7 % (ref 42–52)
HCT VFR BLD CALC: 33.8 % (ref 42–52)
HEMOCCULT STL QL: NEGATIVE
HEMOGLOBIN: 10 GM/DL (ref 14–18)
HEMOGLOBIN: 10.1 GM/DL (ref 14–18)
HEMOGLOBIN: 10.7 GM/DL (ref 14–18)
HEMOGLOBIN: 10.8 GM/DL (ref 14–18)
HEMOGLOBIN: 10.9 GM/DL (ref 14–18)
HEMOGLOBIN: 11.1 GM/DL (ref 14–18)
HEMOGLOBIN: 11.2 GM/DL (ref 14–18)
HEMOGLOBIN: 11.5 GM/DL (ref 14–18)
HEMOGLOBIN: 7.7 GM/DL (ref 14–18)
HEMOGLOBIN: 8 GM/DL (ref 14–18)
HEMOGLOBIN: 8 GM/DL (ref 14–18)
HEMOGLOBIN: 8.3 GM/DL (ref 14–18)
HEMOGLOBIN: 8.6 GM/DL (ref 14–18)
HEMOGLOBIN: 8.7 GM/DL (ref 14–18)
HEMOGLOBIN: 8.8 GM/DL (ref 14–18)
HEMOGLOBIN: 8.9 GM/DL (ref 14–18)
HEMOGLOBIN: 9.1 GM/DL (ref 14–18)
HEMOGLOBIN: 9.3 GM/DL (ref 14–18)
HEMOGLOBIN: 9.4 GM/DL (ref 14–18)
HEMOGLOBIN: 9.4 GM/DL (ref 14–18)
HEMOGLOBIN: 9.5 GM/DL (ref 14–18)
HEPATITIS B CORE IGM ANTIBODY: NEGATIVE
HEPATITIS B SURFACE ANTIGEN: NEGATIVE
HEPATITIS C ANTIBODY: NEGATIVE
IMMATURE GRANS (ABS): 0.01 THOU/MM3 (ref 0–0.07)
IMMATURE GRANS (ABS): 0.02 THOU/MM3 (ref 0–0.07)
IMMATURE GRANS (ABS): 0.04 THOU/MM3 (ref 0–0.07)
IMMATURE GRANS (ABS): 0.06 THOU/MM3 (ref 0–0.07)
IMMATURE GRANS (ABS): 0.16 THOU/MM3 (ref 0–0.07)
IMMATURE GRANULOCYTES: 0.1 %
IMMATURE GRANULOCYTES: 0.2 %
IMMATURE GRANULOCYTES: 0.3 %
IMMATURE GRANULOCYTES: 0.4 %
IMMATURE GRANULOCYTES: 0.6 %
IMMATURE GRANULOCYTES: 1 %
IMMATURE GRANULOCYTES: 1.6 %
INR BLD: 0.97 (ref 0.85–1.13)
INR BLD: 1.02 (ref 0.85–1.13)
INR BLD: 1.07 (ref 0.85–1.13)
IRON SATURATION: 11 % (ref 20–50)
IRON: 15 UG/DL (ref 65–195)
IRON: 18 UG/DL (ref 65–195)
IRON: 33 UG/DL (ref 65–195)
KETONES, URINE: ABNORMAL
KETONES, URINE: NEGATIVE
LACTIC ACID, SEPSIS: 1.4 MMOL/L (ref 0.5–1.9)
LACTIC ACID, SEPSIS: 2.3 MMOL/L (ref 0.5–1.9)
LACTIC ACID, SEPSIS: 2.6 MMOL/L (ref 0.5–1.9)
LACTIC ACID, SEPSIS: 2.9 MMOL/L (ref 0.5–1.9)
LACTIC ACID, SEPSIS: 3.4 MMOL/L (ref 0.5–1.9)
LACTIC ACID: 0.8 MMOL/L (ref 0.5–2.2)
LACTIC ACID: 1.2 MMOL/L (ref 0.5–2.2)
LACTIC ACID: 1.9 MMOL/L (ref 0.5–2.2)
LACTIC ACID: 2.4 MMOL/L (ref 0.5–2.2)
LACTIC ACID: 2.7 MMOL/L (ref 0.5–2.2)
LACTIC ACID: 3.5 MMOL/L (ref 0.5–2.2)
LACTIC ACID: 3.9 MMOL/L (ref 0.5–2.2)
LD: 202 U/L (ref 100–190)
LEUKOCYTE CLUMPS, URINE: ABNORMAL
LEUKOCYTE ESTERASE, URINE: ABNORMAL
LEUKOCYTE ESTERASE, URINE: NEGATIVE
LIPASE: 18.8 U/L (ref 5.6–51.3)
LIPASE: 21.7 U/L (ref 5.6–51.3)
LYMPHOCYTES # BLD: 13.5 %
LYMPHOCYTES # BLD: 14.8 %
LYMPHOCYTES # BLD: 15.3 %
LYMPHOCYTES # BLD: 16 %
LYMPHOCYTES # BLD: 16.9 %
LYMPHOCYTES # BLD: 18.8 %
LYMPHOCYTES # BLD: 20.9 %
LYMPHOCYTES # BLD: 21.7 %
LYMPHOCYTES # BLD: 29.9 %
LYMPHOCYTES # BLD: 5.5 %
LYMPHOCYTES # BLD: 6.5 %
LYMPHOCYTES # BLD: 8.7 %
LYMPHOCYTES ABSOLUTE: 0.3 THOU/MM3 (ref 1–4.8)
LYMPHOCYTES ABSOLUTE: 0.3 THOU/MM3 (ref 1–4.8)
LYMPHOCYTES ABSOLUTE: 0.4 THOU/MM3 (ref 1–4.8)
LYMPHOCYTES ABSOLUTE: 0.5 THOU/MM3 (ref 1–4.8)
LYMPHOCYTES ABSOLUTE: 0.7 THOU/MM3 (ref 1–4.8)
LYMPHOCYTES ABSOLUTE: 0.7 THOU/MM3 (ref 1–4.8)
LYMPHOCYTES ABSOLUTE: 0.8 THOU/MM3 (ref 1–4.8)
LYMPHOCYTES ABSOLUTE: 1.1 THOU/MM3 (ref 1–4.8)
LYMPHOCYTES ABSOLUTE: 1.1 THOU/MM3 (ref 1–4.8)
LYMPHOCYTES ABSOLUTE: 1.7 THOU/MM3 (ref 1–4.8)
LYMPHOCYTES ABSOLUTE: 1.7 THOU/MM3 (ref 1–4.8)
LYMPHOCYTES ABSOLUTE: 1.9 THOU/MM3 (ref 1–4.8)
MAGNESIUM: 1.6 MG/DL (ref 1.6–2.4)
MAGNESIUM: 1.9 MG/DL (ref 1.6–2.4)
MAGNESIUM: 2.1 MG/DL (ref 1.6–2.4)
MAGNESIUM: 2.8 MG/DL (ref 1.6–2.4)
MCH RBC QN AUTO: 29.9 PG (ref 26–33)
MCH RBC QN AUTO: 30.1 PG (ref 26–33)
MCH RBC QN AUTO: 30.1 PG (ref 26–33)
MCH RBC QN AUTO: 30.3 PG (ref 26–33)
MCH RBC QN AUTO: 30.6 PG (ref 26–33)
MCH RBC QN AUTO: 30.7 PG (ref 26–33)
MCH RBC QN AUTO: 30.9 PG (ref 26–33)
MCH RBC QN AUTO: 31 PG (ref 26–33)
MCH RBC QN AUTO: 31.1 PG (ref 26–33)
MCH RBC QN AUTO: 31.1 PG (ref 26–33)
MCH RBC QN AUTO: 31.2 PG (ref 26–33)
MCH RBC QN AUTO: 31.2 PG (ref 26–33)
MCH RBC QN AUTO: 31.3 PG (ref 26–33)
MCH RBC QN AUTO: 31.5 PG (ref 26–33)
MCH RBC QN AUTO: 31.5 PG (ref 26–33)
MCH RBC QN AUTO: 31.6 PG (ref 26–33)
MCH RBC QN AUTO: 31.7 PG (ref 26–33)
MCH RBC QN AUTO: 31.9 PG (ref 26–33)
MCH RBC QN AUTO: 32.1 PG (ref 26–33)
MCH RBC QN AUTO: 32.2 PG (ref 26–33)
MCHC RBC AUTO-ENTMCNC: 31.3 GM/DL (ref 32.2–35.5)
MCHC RBC AUTO-ENTMCNC: 31.8 GM/DL (ref 32.2–35.5)
MCHC RBC AUTO-ENTMCNC: 32.4 GM/DL (ref 32.2–35.5)
MCHC RBC AUTO-ENTMCNC: 32.7 GM/DL (ref 32.2–35.5)
MCHC RBC AUTO-ENTMCNC: 32.8 GM/DL (ref 32.2–35.5)
MCHC RBC AUTO-ENTMCNC: 33 GM/DL (ref 32.2–35.5)
MCHC RBC AUTO-ENTMCNC: 33.1 GM/DL (ref 32.2–35.5)
MCHC RBC AUTO-ENTMCNC: 33.1 GM/DL (ref 32.2–35.5)
MCHC RBC AUTO-ENTMCNC: 33.2 GM/DL (ref 32.2–35.5)
MCHC RBC AUTO-ENTMCNC: 33.2 GM/DL (ref 32.2–35.5)
MCHC RBC AUTO-ENTMCNC: 33.8 GM/DL (ref 32.2–35.5)
MCHC RBC AUTO-ENTMCNC: 33.8 GM/DL (ref 32.2–35.5)
MCHC RBC AUTO-ENTMCNC: 34 GM/DL (ref 32.2–35.5)
MCHC RBC AUTO-ENTMCNC: 34 GM/DL (ref 32.2–35.5)
MCHC RBC AUTO-ENTMCNC: 34.5 GM/DL (ref 32.2–35.5)
MCHC RBC AUTO-ENTMCNC: 34.7 GM/DL (ref 32.2–35.5)
MCHC RBC AUTO-ENTMCNC: 34.9 GM/DL (ref 32.2–35.5)
MCHC RBC AUTO-ENTMCNC: 35 GM/DL (ref 32.2–35.5)
MCHC RBC AUTO-ENTMCNC: 35.1 GM/DL (ref 32.2–35.5)
MCHC RBC AUTO-ENTMCNC: 35.2 GM/DL (ref 32.2–35.5)
MCV RBC AUTO: 88.7 FL (ref 80–94)
MCV RBC AUTO: 89 FL (ref 80–94)
MCV RBC AUTO: 89.2 FL (ref 80–94)
MCV RBC AUTO: 90.1 FL (ref 80–94)
MCV RBC AUTO: 90.5 FL (ref 80–94)
MCV RBC AUTO: 90.7 FL (ref 80–94)
MCV RBC AUTO: 90.9 FL (ref 80–94)
MCV RBC AUTO: 91.9 FL (ref 80–94)
MCV RBC AUTO: 91.9 FL (ref 80–94)
MCV RBC AUTO: 92.1 FL (ref 80–94)
MCV RBC AUTO: 92.7 FL (ref 80–94)
MCV RBC AUTO: 93.3 FL (ref 80–94)
MCV RBC AUTO: 93.3 FL (ref 80–94)
MCV RBC AUTO: 93.7 FL (ref 80–94)
MCV RBC AUTO: 93.8 FL (ref 80–94)
MCV RBC AUTO: 94.4 FL (ref 80–94)
MCV RBC AUTO: 95.3 FL (ref 80–94)
MCV RBC AUTO: 96.3 FL (ref 80–94)
MCV RBC AUTO: 97.7 FL (ref 80–94)
MCV RBC AUTO: 98.7 FL (ref 80–94)
MISCELLANEOUS 2: ABNORMAL
MISCELLANEOUS 2: ABNORMAL
MISCELLANEOUS LAB TEST RESULT: ABNORMAL
MISCELLANEOUS LAB TEST RESULT: ABNORMAL
MISCELLANEOUS LAB TEST RESULT: NORMAL
MITOCHONDRIAL ANTIBODY: 14.9 UNITS (ref 0–20)
MONOCYTES # BLD: 10.2 %
MONOCYTES # BLD: 11.3 %
MONOCYTES # BLD: 13.8 %
MONOCYTES # BLD: 5.1 %
MONOCYTES # BLD: 7.4 %
MONOCYTES # BLD: 7.7 %
MONOCYTES # BLD: 7.9 %
MONOCYTES # BLD: 8 %
MONOCYTES # BLD: 8.1 %
MONOCYTES # BLD: 8.8 %
MONOCYTES # BLD: 9.6 %
MONOCYTES # BLD: 9.8 %
MONOCYTES ABSOLUTE: 0.2 THOU/MM3 (ref 0.4–1.3)
MONOCYTES ABSOLUTE: 0.3 THOU/MM3 (ref 0.4–1.3)
MONOCYTES ABSOLUTE: 0.3 THOU/MM3 (ref 0.4–1.3)
MONOCYTES ABSOLUTE: 0.5 THOU/MM3 (ref 0.4–1.3)
MONOCYTES ABSOLUTE: 0.6 THOU/MM3 (ref 0.4–1.3)
MONOCYTES ABSOLUTE: 0.8 THOU/MM3 (ref 0.4–1.3)
MUCUS: ABNORMAL
MUCUS: ABNORMAL
NITRITE, URINE: NEGATIVE
NUCLEATED RED BLOOD CELLS: 0 /100 WBC
O2 SATURATION: 95 %
O2 SATURATION: 98 %
ORGANISM: ABNORMAL
ORGANISM: ABNORMAL
OSMOLALITY CALCULATION: 280.2 MOSMOL/KG (ref 275–300)
OSMOLALITY CALCULATION: 285.1 MOSMOL/KG (ref 275–300)
OSMOLALITY CALCULATION: 286.7 MOSMOL/KG (ref 275–300)
OSMOLALITY CALCULATION: 289.7 MOSMOL/KG (ref 275–300)
OSMOLALITY CALCULATION: 297.3 MOSMOL/KG (ref 275–300)
PCO2: 32 MMHG (ref 35–45)
PCO2: 33 MMHG (ref 35–45)
PH BLOOD GAS: 7.39 (ref 7.35–7.45)
PH BLOOD GAS: 7.41 (ref 7.35–7.45)
PH UA: 5
PH UA: 5.5
PH UA: 6
PH UA: 7
PH UA: 7
PH UA: 7.5
PH UA: 8.5
PH, URINE: 6
PHOSPHORUS: 3.5 MG/DL (ref 2.4–4.7)
PLATELET # BLD: 120 THOU/MM3 (ref 130–400)
PLATELET # BLD: 120 THOU/MM3 (ref 130–400)
PLATELET # BLD: 143 THOU/MM3 (ref 130–400)
PLATELET # BLD: 161 THOU/MM3 (ref 130–400)
PLATELET # BLD: 162 THOU/MM3 (ref 130–400)
PLATELET # BLD: 167 THOU/MM3 (ref 130–400)
PLATELET # BLD: 173 THOU/MM3 (ref 130–400)
PLATELET # BLD: 179 THOU/MM3 (ref 130–400)
PLATELET # BLD: 192 THOU/MM3 (ref 130–400)
PLATELET # BLD: 197 THOU/MM3 (ref 130–400)
PLATELET # BLD: 214 THOU/MM3 (ref 130–400)
PLATELET # BLD: 224 THOU/MM3 (ref 130–400)
PLATELET # BLD: 244 THOU/MM3 (ref 130–400)
PLATELET # BLD: 245 THOU/MM3 (ref 130–400)
PLATELET # BLD: 253 THOU/MM3 (ref 130–400)
PLATELET # BLD: 263 THOU/MM3 (ref 130–400)
PLATELET # BLD: 276 THOU/MM3 (ref 130–400)
PLATELET # BLD: 83 THOU/MM3 (ref 130–400)
PLATELET # BLD: 94 THOU/MM3 (ref 130–400)
PLATELET # BLD: 98 THOU/MM3 (ref 130–400)
PMV BLD AUTO: 10.2 FL (ref 9.4–12.4)
PMV BLD AUTO: 10.3 FL (ref 9.4–12.4)
PMV BLD AUTO: 8.5 FL (ref 9.4–12.4)
PMV BLD AUTO: 8.8 FL (ref 9.4–12.4)
PMV BLD AUTO: 8.8 FL (ref 9.4–12.4)
PMV BLD AUTO: 8.9 FL (ref 9.4–12.4)
PMV BLD AUTO: 9 FL (ref 9.4–12.4)
PMV BLD AUTO: 9 FL (ref 9.4–12.4)
PMV BLD AUTO: 9.1 FL (ref 9.4–12.4)
PMV BLD AUTO: 9.3 FL (ref 9.4–12.4)
PMV BLD AUTO: 9.4 FL (ref 9.4–12.4)
PMV BLD AUTO: 9.4 FL (ref 9.4–12.4)
PMV BLD AUTO: 9.5 FL (ref 9.4–12.4)
PMV BLD AUTO: 9.5 FL (ref 9.4–12.4)
PMV BLD AUTO: 9.6 FL (ref 9.4–12.4)
PMV BLD AUTO: 9.8 FL (ref 9.4–12.4)
PO2: 102 MMHG (ref 71–104)
PO2: 74 MMHG (ref 71–104)
POST VOID RESIDUAL (PVR): 297 ML
POTASSIUM REFLEX MAGNESIUM: 3.3 MEQ/L (ref 3.5–5.2)
POTASSIUM REFLEX MAGNESIUM: 3.9 MEQ/L (ref 3.5–5.2)
POTASSIUM REFLEX MAGNESIUM: 3.9 MEQ/L (ref 3.5–5.2)
POTASSIUM REFLEX MAGNESIUM: 4 MEQ/L (ref 3.5–5.2)
POTASSIUM REFLEX MAGNESIUM: 4.1 MEQ/L (ref 3.5–5.2)
POTASSIUM REFLEX MAGNESIUM: 4.3 MEQ/L (ref 3.5–5.2)
POTASSIUM REFLEX MAGNESIUM: 4.3 MEQ/L (ref 3.5–5.2)
POTASSIUM REFLEX MAGNESIUM: 4.4 MEQ/L (ref 3.5–5.2)
POTASSIUM REFLEX MAGNESIUM: 4.4 MEQ/L (ref 3.5–5.2)
POTASSIUM REFLEX MAGNESIUM: 4.5 MEQ/L (ref 3.5–5.2)
POTASSIUM REFLEX MAGNESIUM: 4.7 MEQ/L (ref 3.5–5.2)
POTASSIUM SERPL-SCNC: 3.8 MEQ/L (ref 3.5–5.2)
POTASSIUM SERPL-SCNC: 3.9 MEQ/L (ref 3.5–5.2)
POTASSIUM SERPL-SCNC: 4 MEQ/L (ref 3.5–5.2)
POTASSIUM SERPL-SCNC: 4 MEQ/L (ref 3.5–5.2)
POTASSIUM SERPL-SCNC: 4.1 MEQ/L (ref 3.5–5.2)
POTASSIUM SERPL-SCNC: 4.1 MEQ/L (ref 3.5–5.2)
POTASSIUM SERPL-SCNC: 4.3 MEQ/L (ref 3.5–5.2)
POTASSIUM SERPL-SCNC: 4.4 MEQ/L (ref 3.5–5.2)
POTASSIUM SERPL-SCNC: 4.4 MEQ/L (ref 3.5–5.2)
POTASSIUM SERPL-SCNC: 4.6 MEQ/L (ref 3.5–5.2)
POTASSIUM SERPL-SCNC: 4.6 MMOL/L
POTASSIUM SERPL-SCNC: 4.8 MEQ/L (ref 3.5–5.2)
POTASSIUM SERPL-SCNC: 4.8 MEQ/L (ref 3.5–5.2)
POTASSIUM SERPL-SCNC: 5.1 MEQ/L (ref 3.5–5.2)
POTASSIUM SERPL-SCNC: 5.3 MEQ/L (ref 3.5–5.2)
POTASSIUM SERPL-SCNC: 5.9 MEQ/L (ref 3.5–5.2)
PRO-BNP: 1011 PG/ML (ref 0–1800)
PRO-BNP: 1219 PG/ML (ref 0–1800)
PRO-BNP: 3620 PG/ML (ref 0–1800)
PROCALCITONIN: 0.32 NG/ML (ref 0.01–0.09)
PROCALCITONIN: 0.75 NG/ML (ref 0.01–0.09)
PROCALCITONIN: 13.14 NG/ML (ref 0.01–0.09)
PROCALCITONIN: 2.25 NG/ML (ref 0.01–0.09)
PROCALCITONIN: 6.24 NG/ML (ref 0.01–0.09)
PROTEIN UA: 30
PROTEIN UA: 30 MG/DL
PROTEIN UA: 300
PROTEIN UA: 300 MG/DL
PROTEIN UA: NEGATIVE
PROTEIN UA: NEGATIVE MG/DL
PROTEIN UA: NEGATIVE MG/DL
PROTEIN, URINE: 100 MG/DL
RBC # BLD: 2.52 MILL/MM3 (ref 4.7–6.1)
RBC # BLD: 2.54 MILL/MM3 (ref 4.7–6.1)
RBC # BLD: 2.61 MILL/MM3 (ref 4.7–6.1)
RBC # BLD: 2.62 MILL/MM3 (ref 4.7–6.1)
RBC # BLD: 2.7 MILL/MM3 (ref 4.7–6.1)
RBC # BLD: 2.75 MILL/MM3 (ref 4.7–6.1)
RBC # BLD: 2.86 MILL/MM3 (ref 4.7–6.1)
RBC # BLD: 2.9 MILL/MM3 (ref 4.7–6.1)
RBC # BLD: 2.91 MILL/MM3 (ref 4.7–6.1)
RBC # BLD: 2.95 MILL/MM3 (ref 4.7–6.1)
RBC # BLD: 3.01 MILL/MM3 (ref 4.7–6.1)
RBC # BLD: 3.01 MILL/MM3 (ref 4.7–6.1)
RBC # BLD: 3.03 MILL/MM3 (ref 4.7–6.1)
RBC # BLD: 3.25 MILL/MM3 (ref 4.7–6.1)
RBC # BLD: 3.49 MILL/MM3 (ref 4.7–6.1)
RBC # BLD: 3.52 MILL/MM3 (ref 4.7–6.1)
RBC # BLD: 3.56 MILL/MM3 (ref 4.7–6.1)
RBC # BLD: 3.58 MILL/MM3 (ref 4.7–6.1)
RBC # BLD: 3.59 MILL/MM3 (ref 4.7–6.1)
RBC # BLD: 3.74 MILL/MM3 (ref 4.7–6.1)
RBC URINE: > 200 /HPF
RBC URINE: ABNORMAL /HPF
RBC URINE: NORMAL /HPF
RENAL EPITHELIAL, UA: ABNORMAL
RENAL EPITHELIAL, UA: NORMAL
RH FACTOR: NORMAL
SCAN OF BLOOD SMEAR: NORMAL
SCAN OF BLOOD SMEAR: NORMAL
SEDIMENTATION RATE, ERYTHROCYTE: 69 MM/HR (ref 0–10)
SEG NEUTROPHILS: 58.8 %
SEG NEUTROPHILS: 62.2 %
SEG NEUTROPHILS: 66.9 %
SEG NEUTROPHILS: 69.6 %
SEG NEUTROPHILS: 71.1 %
SEG NEUTROPHILS: 73 %
SEG NEUTROPHILS: 75.8 %
SEG NEUTROPHILS: 76.5 %
SEG NEUTROPHILS: 76.8 %
SEG NEUTROPHILS: 80.8 %
SEG NEUTROPHILS: 85.1 %
SEG NEUTROPHILS: 88.8 %
SEGMENTED NEUTROPHILS ABSOLUTE COUNT: 2.3 THOU/MM3 (ref 1.8–7.7)
SEGMENTED NEUTROPHILS ABSOLUTE COUNT: 2.7 THOU/MM3 (ref 1.8–7.7)
SEGMENTED NEUTROPHILS ABSOLUTE COUNT: 2.7 THOU/MM3 (ref 1.8–7.7)
SEGMENTED NEUTROPHILS ABSOLUTE COUNT: 3.3 THOU/MM3 (ref 1.8–7.7)
SEGMENTED NEUTROPHILS ABSOLUTE COUNT: 3.6 THOU/MM3 (ref 1.8–7.7)
SEGMENTED NEUTROPHILS ABSOLUTE COUNT: 3.6 THOU/MM3 (ref 1.8–7.7)
SEGMENTED NEUTROPHILS ABSOLUTE COUNT: 4.1 THOU/MM3 (ref 1.8–7.7)
SEGMENTED NEUTROPHILS ABSOLUTE COUNT: 4.4 THOU/MM3 (ref 1.8–7.7)
SEGMENTED NEUTROPHILS ABSOLUTE COUNT: 5.1 THOU/MM3 (ref 1.8–7.7)
SEGMENTED NEUTROPHILS ABSOLUTE COUNT: 5.4 THOU/MM3 (ref 1.8–7.7)
SEGMENTED NEUTROPHILS ABSOLUTE COUNT: 5.8 THOU/MM3 (ref 1.8–7.7)
SEGMENTED NEUTROPHILS ABSOLUTE COUNT: 7.2 THOU/MM3 (ref 1.8–7.7)
SODIUM BLD-SCNC: 130 MEQ/L (ref 135–145)
SODIUM BLD-SCNC: 133 MEQ/L (ref 135–145)
SODIUM BLD-SCNC: 135 MEQ/L (ref 135–145)
SODIUM BLD-SCNC: 135 MMOL/L
SODIUM BLD-SCNC: 136 MEQ/L (ref 135–145)
SODIUM BLD-SCNC: 137 MEQ/L (ref 135–145)
SODIUM BLD-SCNC: 138 MEQ/L (ref 135–145)
SODIUM BLD-SCNC: 139 MEQ/L (ref 135–145)
SODIUM BLD-SCNC: 140 MEQ/L (ref 135–145)
SODIUM BLD-SCNC: 141 MEQ/L (ref 135–145)
SODIUM BLD-SCNC: 141 MEQ/L (ref 135–145)
SODIUM BLD-SCNC: 142 MEQ/L (ref 135–145)
SODIUM URINE: 107 MEQ/L
SOLUBLE TRANSFERRIN RECEPT: 2.8 MG/L (ref 2.2–5)
SOURCE, BLOOD GAS: ABNORMAL
SOURCE, BLOOD GAS: ABNORMAL
SPECIFIC GRAVITY UA: 1.01 (ref 1–1.03)
SPECIFIC GRAVITY UA: 1.02 (ref 1–1.03)
SPECIFIC GRAVITY, URINE: 1.01 (ref 1–1.03)
SPECIFIC GRAVITY, URINE: 1.02 (ref 1–1.03)
SPECIMEN: NORMAL
TOTAL IRON BINDING CAPACITY: 156 UG/DL (ref 171–450)
TOTAL IRON BINDING CAPACITY: 169 UG/DL (ref 171–450)
TOTAL PROTEIN: 5.6 G/DL (ref 6.1–8)
TOTAL PROTEIN: 6.2 G/DL (ref 6.1–8)
TOTAL PROTEIN: 6.3 G/DL (ref 6.1–8)
TOTAL PROTEIN: 6.5 G/DL (ref 6.1–8)
TOTAL PROTEIN: 6.9 G/DL (ref 6.1–8)
TOTAL PROTEIN: 7.3 G/DL (ref 6.1–8)
TOTAL PROTEIN: 7.8 G/DL (ref 6.1–8)
TOTAL PROTEIN: 8.2 G/DL (ref 6.1–8)
TRANSFERRIN: 132 MG/DL (ref 200–400)
TROPONIN T: 0.02 NG/ML
TROPONIN T: < 0.01 NG/ML
TSH SERPL DL<=0.05 MIU/L-ACNC: 1.39 UIU/ML (ref 0.4–4.2)
URIC ACID: 5.9 MG/DL (ref 3.7–7)
URINE CULTURE REFLEX: ABNORMAL
URINE CULTURE REFLEX: ABNORMAL
URINE CULTURE REFLEX: NORMAL
URINE CULTURE, ROUTINE: NORMAL
URINE CULTURE, ROUTINE: NORMAL
UROBILINOGEN, URINE: 0.2 EU/DL
VANCOMYCIN RANDOM: 6.8 UG/ML (ref 0.1–39.9)
VITAMIN B-12: 463 PG/ML (ref 211–911)
VITAMIN B-12: 496 PG/ML (ref 211–911)
VITAMIN B-12: 681 PG/ML (ref 211–911)
VITAMIN B-12: 764 PG/ML (ref 211–911)
WBC # BLD: 10 THOU/MM3 (ref 4.8–10.8)
WBC # BLD: 3.3 THOU/MM3 (ref 4.8–10.8)
WBC # BLD: 3.3 THOU/MM3 (ref 4.8–10.8)
WBC # BLD: 4.3 THOU/MM3 (ref 4.8–10.8)
WBC # BLD: 4.7 THOU/MM3 (ref 4.8–10.8)
WBC # BLD: 4.9 THOU/MM3 (ref 4.8–10.8)
WBC # BLD: 4.9 THOU/MM3 (ref 4.8–10.8)
WBC # BLD: 5.2 THOU/MM3 (ref 4.8–10.8)
WBC # BLD: 5.3 THOU/MM3 (ref 4.8–10.8)
WBC # BLD: 6 THOU/MM3 (ref 4.8–10.8)
WBC # BLD: 6.2 THOU/MM3 (ref 4.8–10.8)
WBC # BLD: 7.1 THOU/MM3 (ref 4.8–10.8)
WBC # BLD: 7.2 THOU/MM3 (ref 4.8–10.8)
WBC # BLD: 7.3 THOU/MM3 (ref 4.8–10.8)
WBC # BLD: 8.1 THOU/MM3 (ref 4.8–10.8)
WBC # BLD: 8.4 THOU/MM3 (ref 4.8–10.8)
WBC # BLD: 9.8 THOU/MM3 (ref 4.8–10.8)
WBC # BLD: 9.9 THOU/MM3 (ref 4.8–10.8)
WBC UA: > 100 /HPF
WBC UA: > 200 /HPF
WBC UA: ABNORMAL /HPF
WBC UA: NORMAL /HPF
YEAST: ABNORMAL
YEAST: NORMAL

## 2018-01-01 PROCEDURE — 99221 1ST HOSP IP/OBS SF/LOW 40: CPT | Performed by: NURSE PRACTITIONER

## 2018-01-01 PROCEDURE — 81001 URINALYSIS AUTO W/SCOPE: CPT

## 2018-01-01 PROCEDURE — 99222 1ST HOSP IP/OBS MODERATE 55: CPT | Performed by: INTERNAL MEDICINE

## 2018-01-01 PROCEDURE — 80048 BASIC METABOLIC PNL TOTAL CA: CPT

## 2018-01-01 PROCEDURE — 82948 REAGENT STRIP/BLOOD GLUCOSE: CPT

## 2018-01-01 PROCEDURE — 86039 ANTINUCLEAR ANTIBODIES (ANA): CPT

## 2018-01-01 PROCEDURE — 97162 PT EVAL MOD COMPLEX 30 MIN: CPT

## 2018-01-01 PROCEDURE — 96374 THER/PROPH/DIAG INJ IV PUSH: CPT

## 2018-01-01 PROCEDURE — 92610 EVALUATE SWALLOWING FUNCTION: CPT

## 2018-01-01 PROCEDURE — 2580000003 HC RX 258: Performed by: INTERNAL MEDICINE

## 2018-01-01 PROCEDURE — 85027 COMPLETE CBC AUTOMATED: CPT

## 2018-01-01 PROCEDURE — 3430000000 HC RX DIAGNOSTIC RADIOPHARMACEUTICAL: Performed by: INTERNAL MEDICINE

## 2018-01-01 PROCEDURE — 51702 INSERT TEMP BLADDER CATH: CPT

## 2018-01-01 PROCEDURE — G0378 HOSPITAL OBSERVATION PER HR: HCPCS

## 2018-01-01 PROCEDURE — 2580000003 HC RX 258: Performed by: NURSE PRACTITIONER

## 2018-01-01 PROCEDURE — 4040F PNEUMOC VAC/ADMIN/RCVD: CPT | Performed by: NURSE PRACTITIONER

## 2018-01-01 PROCEDURE — 6360000002 HC RX W HCPCS: Performed by: NURSE PRACTITIONER

## 2018-01-01 PROCEDURE — 36415 COLL VENOUS BLD VENIPUNCTURE: CPT

## 2018-01-01 PROCEDURE — 99285 EMERGENCY DEPT VISIT HI MDM: CPT

## 2018-01-01 PROCEDURE — 6370000000 HC RX 637 (ALT 250 FOR IP): Performed by: INTERNAL MEDICINE

## 2018-01-01 PROCEDURE — 82272 OCCULT BLD FECES 1-3 TESTS: CPT

## 2018-01-01 PROCEDURE — 6370000000 HC RX 637 (ALT 250 FOR IP): Performed by: FAMILY MEDICINE

## 2018-01-01 PROCEDURE — 99213 OFFICE O/P EST LOW 20 MIN: CPT | Performed by: NURSE PRACTITIONER

## 2018-01-01 PROCEDURE — 81003 URINALYSIS AUTO W/O SCOPE: CPT | Performed by: UROLOGY

## 2018-01-01 PROCEDURE — 93017 CV STRESS TEST TRACING ONLY: CPT

## 2018-01-01 PROCEDURE — G8598 ASA/ANTIPLAT THER USED: HCPCS | Performed by: INTERNAL MEDICINE

## 2018-01-01 PROCEDURE — 84145 PROCALCITONIN (PCT): CPT

## 2018-01-01 PROCEDURE — 89190 NASAL SMEAR FOR EOSINOPHILS: CPT

## 2018-01-01 PROCEDURE — 84484 ASSAY OF TROPONIN QUANT: CPT

## 2018-01-01 PROCEDURE — 1111F DSCHRG MED/CURRENT MED MERGE: CPT | Performed by: NURSE PRACTITIONER

## 2018-01-01 PROCEDURE — 1111F DSCHRG MED/CURRENT MED MERGE: CPT | Performed by: SURGERY

## 2018-01-01 PROCEDURE — 87086 URINE CULTURE/COLONY COUNT: CPT

## 2018-01-01 PROCEDURE — 1101F PT FALLS ASSESS-DOCD LE1/YR: CPT | Performed by: INTERNAL MEDICINE

## 2018-01-01 PROCEDURE — 6360000004 HC RX CONTRAST MEDICATION: Performed by: INTERNAL MEDICINE

## 2018-01-01 PROCEDURE — 85018 HEMOGLOBIN: CPT

## 2018-01-01 PROCEDURE — G8997 SWALLOW GOAL STATUS: HCPCS

## 2018-01-01 PROCEDURE — 1200000003 HC TELEMETRY R&B

## 2018-01-01 PROCEDURE — 6370000000 HC RX 637 (ALT 250 FOR IP): Performed by: NURSE PRACTITIONER

## 2018-01-01 PROCEDURE — G8987 SELF CARE CURRENT STATUS: HCPCS

## 2018-01-01 PROCEDURE — 85025 COMPLETE CBC W/AUTO DIFF WBC: CPT

## 2018-01-01 PROCEDURE — 85730 THROMBOPLASTIN TIME PARTIAL: CPT

## 2018-01-01 PROCEDURE — 74176 CT ABD & PELVIS W/O CONTRAST: CPT

## 2018-01-01 PROCEDURE — 1101F PT FALLS ASSESS-DOCD LE1/YR: CPT | Performed by: NURSE PRACTITIONER

## 2018-01-01 PROCEDURE — 93005 ELECTROCARDIOGRAM TRACING: CPT | Performed by: EMERGENCY MEDICINE

## 2018-01-01 PROCEDURE — 6360000002 HC RX W HCPCS: Performed by: EMERGENCY MEDICINE

## 2018-01-01 PROCEDURE — G8988 SELF CARE GOAL STATUS: HCPCS

## 2018-01-01 PROCEDURE — 71045 X-RAY EXAM CHEST 1 VIEW: CPT

## 2018-01-01 PROCEDURE — 97161 PT EVAL LOW COMPLEX 20 MIN: CPT

## 2018-01-01 PROCEDURE — 6360000002 HC RX W HCPCS: Performed by: INTERNAL MEDICINE

## 2018-01-01 PROCEDURE — 96372 THER/PROPH/DIAG INJ SC/IM: CPT

## 2018-01-01 PROCEDURE — 80053 COMPREHEN METABOLIC PANEL: CPT

## 2018-01-01 PROCEDURE — 2709999900 HC NON-CHARGEABLE SUPPLY

## 2018-01-01 PROCEDURE — G8979 MOBILITY GOAL STATUS: HCPCS

## 2018-01-01 PROCEDURE — 93280 PM DEVICE PROGR EVAL DUAL: CPT | Performed by: INTERNAL MEDICINE

## 2018-01-01 PROCEDURE — 51798 US URINE CAPACITY MEASURE: CPT

## 2018-01-01 PROCEDURE — 82010 KETONE BODYS QUAN: CPT

## 2018-01-01 PROCEDURE — 85014 HEMATOCRIT: CPT

## 2018-01-01 PROCEDURE — 86038 ANTINUCLEAR ANTIBODIES: CPT

## 2018-01-01 PROCEDURE — 71275 CT ANGIOGRAPHY CHEST: CPT

## 2018-01-01 PROCEDURE — 83735 ASSAY OF MAGNESIUM: CPT

## 2018-01-01 PROCEDURE — 81003 URINALYSIS AUTO W/O SCOPE: CPT

## 2018-01-01 PROCEDURE — 87184 SC STD DISK METHOD PER PLATE: CPT

## 2018-01-01 PROCEDURE — 83690 ASSAY OF LIPASE: CPT

## 2018-01-01 PROCEDURE — 84466 ASSAY OF TRANSFERRIN: CPT

## 2018-01-01 PROCEDURE — 83605 ASSAY OF LACTIC ACID: CPT

## 2018-01-01 PROCEDURE — 82746 ASSAY OF FOLIC ACID SERUM: CPT

## 2018-01-01 PROCEDURE — 87186 SC STD MICRODIL/AGAR DIL: CPT

## 2018-01-01 PROCEDURE — 82248 BILIRUBIN DIRECT: CPT

## 2018-01-01 PROCEDURE — 83880 ASSAY OF NATRIURETIC PEPTIDE: CPT

## 2018-01-01 PROCEDURE — 1111F DSCHRG MED/CURRENT MED MERGE: CPT | Performed by: INTERNAL MEDICINE

## 2018-01-01 PROCEDURE — 87077 CULTURE AEROBIC IDENTIFY: CPT

## 2018-01-01 PROCEDURE — 1036F TOBACCO NON-USER: CPT | Performed by: NURSE PRACTITIONER

## 2018-01-01 PROCEDURE — 82728 ASSAY OF FERRITIN: CPT

## 2018-01-01 PROCEDURE — 96375 TX/PRO/DX INJ NEW DRUG ADDON: CPT

## 2018-01-01 PROCEDURE — 82607 VITAMIN B-12: CPT

## 2018-01-01 PROCEDURE — 83550 IRON BINDING TEST: CPT

## 2018-01-01 PROCEDURE — 2060000000 HC ICU INTERMEDIATE R&B

## 2018-01-01 PROCEDURE — 1123F ACP DISCUSS/DSCN MKR DOCD: CPT | Performed by: SURGERY

## 2018-01-01 PROCEDURE — 99239 HOSP IP/OBS DSCHRG MGMT >30: CPT | Performed by: INTERNAL MEDICINE

## 2018-01-01 PROCEDURE — 82803 BLOOD GASES ANY COMBINATION: CPT

## 2018-01-01 PROCEDURE — 99232 SBSQ HOSP IP/OBS MODERATE 35: CPT | Performed by: FAMILY MEDICINE

## 2018-01-01 PROCEDURE — G8427 DOCREV CUR MEDS BY ELIG CLIN: HCPCS | Performed by: INTERNAL MEDICINE

## 2018-01-01 PROCEDURE — 92526 ORAL FUNCTION THERAPY: CPT

## 2018-01-01 PROCEDURE — 82330 ASSAY OF CALCIUM: CPT

## 2018-01-01 PROCEDURE — 93010 ELECTROCARDIOGRAM REPORT: CPT | Performed by: INTERNAL MEDICINE

## 2018-01-01 PROCEDURE — 36600 WITHDRAWAL OF ARTERIAL BLOOD: CPT

## 2018-01-01 PROCEDURE — 6370000000 HC RX 637 (ALT 250 FOR IP): Performed by: EMERGENCY MEDICINE

## 2018-01-01 PROCEDURE — 99213 OFFICE O/P EST LOW 20 MIN: CPT | Performed by: SURGERY

## 2018-01-01 PROCEDURE — 86901 BLOOD TYPING SEROLOGIC RH(D): CPT

## 2018-01-01 PROCEDURE — 99232 SBSQ HOSP IP/OBS MODERATE 35: CPT | Performed by: INTERNAL MEDICINE

## 2018-01-01 PROCEDURE — 97530 THERAPEUTIC ACTIVITIES: CPT

## 2018-01-01 PROCEDURE — 1036F TOBACCO NON-USER: CPT | Performed by: INTERNAL MEDICINE

## 2018-01-01 PROCEDURE — 85610 PROTHROMBIN TIME: CPT

## 2018-01-01 PROCEDURE — 87070 CULTURE OTHR SPECIMN AEROBIC: CPT

## 2018-01-01 PROCEDURE — 97116 GAIT TRAINING THERAPY: CPT

## 2018-01-01 PROCEDURE — 94760 N-INVAS EAR/PLS OXIMETRY 1: CPT

## 2018-01-01 PROCEDURE — 85379 FIBRIN DEGRADATION QUANT: CPT

## 2018-01-01 PROCEDURE — 74230 X-RAY XM SWLNG FUNCJ C+: CPT

## 2018-01-01 PROCEDURE — 99233 SBSQ HOSP IP/OBS HIGH 50: CPT | Performed by: FAMILY MEDICINE

## 2018-01-01 PROCEDURE — 99225 PR SBSQ OBSERVATION CARE/DAY 25 MINUTES: CPT | Performed by: INTERNAL MEDICINE

## 2018-01-01 PROCEDURE — 97110 THERAPEUTIC EXERCISES: CPT

## 2018-01-01 PROCEDURE — 87040 BLOOD CULTURE FOR BACTERIA: CPT

## 2018-01-01 PROCEDURE — 2709999900 HC NON-CHARGEABLE SUPPLY: Performed by: INTERNAL MEDICINE

## 2018-01-01 PROCEDURE — 78227 HEPATOBIL SYST IMAGE W/DRUG: CPT

## 2018-01-01 PROCEDURE — 87205 SMEAR GRAM STAIN: CPT

## 2018-01-01 PROCEDURE — G8420 CALC BMI NORM PARAMETERS: HCPCS | Performed by: NURSE PRACTITIONER

## 2018-01-01 PROCEDURE — G8996 SWALLOW CURRENT STATUS: HCPCS

## 2018-01-01 PROCEDURE — 0D758DZ DILATION OF ESOPHAGUS WITH INTRALUMINAL DEVICE, VIA NATURAL OR ARTIFICIAL OPENING ENDOSCOPIC: ICD-10-PCS | Performed by: INTERNAL MEDICINE

## 2018-01-01 PROCEDURE — 83615 LACTATE (LD) (LDH) ENZYME: CPT

## 2018-01-01 PROCEDURE — 1123F ACP DISCUSS/DSCN MKR DOCD: CPT | Performed by: NURSE PRACTITIONER

## 2018-01-01 PROCEDURE — 6360000002 HC RX W HCPCS

## 2018-01-01 PROCEDURE — G8598 ASA/ANTIPLAT THER USED: HCPCS | Performed by: NURSE PRACTITIONER

## 2018-01-01 PROCEDURE — APPSS30 APP SPLIT SHARED TIME 16-30 MINUTES: Performed by: NURSE PRACTITIONER

## 2018-01-01 PROCEDURE — G8978 MOBILITY CURRENT STATUS: HCPCS

## 2018-01-01 PROCEDURE — 93010 ELECTROCARDIOGRAM REPORT: CPT | Performed by: NUCLEAR MEDICINE

## 2018-01-01 PROCEDURE — 97166 OT EVAL MOD COMPLEX 45 MIN: CPT

## 2018-01-01 PROCEDURE — 4040F PNEUMOC VAC/ADMIN/RCVD: CPT | Performed by: INTERNAL MEDICINE

## 2018-01-01 PROCEDURE — P9016 RBC LEUKOCYTES REDUCED: HCPCS

## 2018-01-01 PROCEDURE — 85651 RBC SED RATE NONAUTOMATED: CPT

## 2018-01-01 PROCEDURE — 84300 ASSAY OF URINE SODIUM: CPT

## 2018-01-01 PROCEDURE — 52000 CYSTOURETHROSCOPY: CPT | Performed by: UROLOGY

## 2018-01-01 PROCEDURE — 51798 US URINE CAPACITY MEASURE: CPT | Performed by: UROLOGY

## 2018-01-01 PROCEDURE — 2580000003 HC RX 258: Performed by: FAMILY MEDICINE

## 2018-01-01 PROCEDURE — 1200000000 HC SEMI PRIVATE

## 2018-01-01 PROCEDURE — 76705 ECHO EXAM OF ABDOMEN: CPT

## 2018-01-01 PROCEDURE — 99223 1ST HOSP IP/OBS HIGH 75: CPT | Performed by: INTERNAL MEDICINE

## 2018-01-01 PROCEDURE — 99212 OFFICE O/P EST SF 10 MIN: CPT | Performed by: PHYSICIAN ASSISTANT

## 2018-01-01 PROCEDURE — 99220 PR INITIAL OBSERVATION CARE/DAY 70 MINUTES: CPT | Performed by: INTERNAL MEDICINE

## 2018-01-01 PROCEDURE — 99223 1ST HOSP IP/OBS HIGH 75: CPT | Performed by: NURSE PRACTITIONER

## 2018-01-01 PROCEDURE — 97535 SELF CARE MNGMENT TRAINING: CPT

## 2018-01-01 PROCEDURE — G8419 CALC BMI OUT NRM PARAM NOF/U: HCPCS | Performed by: INTERNAL MEDICINE

## 2018-01-01 PROCEDURE — 86900 BLOOD TYPING SEROLOGIC ABO: CPT

## 2018-01-01 PROCEDURE — 76770 US EXAM ABDO BACK WALL COMP: CPT

## 2018-01-01 PROCEDURE — 2500000003 HC RX 250 WO HCPCS: Performed by: INTERNAL MEDICINE

## 2018-01-01 PROCEDURE — A9537 TC99M MEBROFENIN: HCPCS | Performed by: NURSE PRACTITIONER

## 2018-01-01 PROCEDURE — 2580000003 HC RX 258: Performed by: EMERGENCY MEDICINE

## 2018-01-01 PROCEDURE — 82570 ASSAY OF URINE CREATININE: CPT

## 2018-01-01 PROCEDURE — 99152 MOD SED SAME PHYS/QHP 5/>YRS: CPT | Performed by: INTERNAL MEDICINE

## 2018-01-01 PROCEDURE — 1123F ACP DISCUSS/DSCN MKR DOCD: CPT | Performed by: INTERNAL MEDICINE

## 2018-01-01 PROCEDURE — 3609012700 HC EGD DILATION SAVORY: Performed by: INTERNAL MEDICINE

## 2018-01-01 PROCEDURE — 2500000003 HC RX 250 WO HCPCS: Performed by: FAMILY MEDICINE

## 2018-01-01 PROCEDURE — 93970 EXTREMITY STUDY: CPT

## 2018-01-01 PROCEDURE — 99232 SBSQ HOSP IP/OBS MODERATE 35: CPT | Performed by: SURGERY

## 2018-01-01 PROCEDURE — 9990000010 HC NO CHARGE VISIT: Performed by: AUDIOLOGIST

## 2018-01-01 PROCEDURE — 1036F TOBACCO NON-USER: CPT | Performed by: SURGERY

## 2018-01-01 PROCEDURE — G8427 DOCREV CUR MEDS BY ELIG CLIN: HCPCS | Performed by: NURSE PRACTITIONER

## 2018-01-01 PROCEDURE — 73070 X-RAY EXAM OF ELBOW: CPT

## 2018-01-01 PROCEDURE — 71250 CT THORAX DX C-: CPT

## 2018-01-01 PROCEDURE — G8420 CALC BMI NORM PARAMETERS: HCPCS | Performed by: INTERNAL MEDICINE

## 2018-01-01 PROCEDURE — 93005 ELECTROCARDIOGRAM TRACING: CPT | Performed by: NURSE PRACTITIONER

## 2018-01-01 PROCEDURE — 93005 ELECTROCARDIOGRAM TRACING: CPT | Performed by: PHYSICIAN ASSISTANT

## 2018-01-01 PROCEDURE — 6360000004 HC RX CONTRAST MEDICATION: Performed by: NURSE PRACTITIONER

## 2018-01-01 PROCEDURE — 80074 ACUTE HEPATITIS PANEL: CPT

## 2018-01-01 PROCEDURE — 92611 MOTION FLUOROSCOPY/SWALLOW: CPT

## 2018-01-01 PROCEDURE — G8484 FLU IMMUNIZE NO ADMIN: HCPCS | Performed by: NURSE PRACTITIONER

## 2018-01-01 PROCEDURE — G8598 ASA/ANTIPLAT THER USED: HCPCS | Performed by: SURGERY

## 2018-01-01 PROCEDURE — 87075 CULTR BACTERIA EXCEPT BLOOD: CPT

## 2018-01-01 PROCEDURE — 84550 ASSAY OF BLOOD/URIC ACID: CPT

## 2018-01-01 PROCEDURE — 84443 ASSAY THYROID STIM HORMONE: CPT

## 2018-01-01 PROCEDURE — 99217 PR OBSERVATION CARE DISCHARGE MANAGEMENT: CPT | Performed by: INTERNAL MEDICINE

## 2018-01-01 PROCEDURE — 82140 ASSAY OF AMMONIA: CPT

## 2018-01-01 PROCEDURE — 6360000002 HC RX W HCPCS: Performed by: PHYSICIAN ASSISTANT

## 2018-01-01 PROCEDURE — 78452 HT MUSCLE IMAGE SPECT MULT: CPT

## 2018-01-01 PROCEDURE — 82390 ASSAY OF CERULOPLASMIN: CPT

## 2018-01-01 PROCEDURE — 99999 PR OFFICE/OUTPT VISIT,PROCEDURE ONLY: CPT | Performed by: UROLOGY

## 2018-01-01 PROCEDURE — 95819 EEG AWAKE AND ASLEEP: CPT

## 2018-01-01 PROCEDURE — 86706 HEP B SURFACE ANTIBODY: CPT

## 2018-01-01 PROCEDURE — 3430000000 HC RX DIAGNOSTIC RADIOPHARMACEUTICAL: Performed by: NURSE PRACTITIONER

## 2018-01-01 PROCEDURE — 88112 CYTOPATH CELL ENHANCE TECH: CPT

## 2018-01-01 PROCEDURE — 80076 HEPATIC FUNCTION PANEL: CPT

## 2018-01-01 PROCEDURE — 99999 PR OFFICE/OUTPT VISIT,PROCEDURE ONLY: CPT | Performed by: INTERNAL MEDICINE

## 2018-01-01 PROCEDURE — 4040F PNEUMOC VAC/ADMIN/RCVD: CPT | Performed by: SURGERY

## 2018-01-01 PROCEDURE — 36430 TRANSFUSION BLD/BLD COMPNT: CPT

## 2018-01-01 PROCEDURE — 0DJ08ZZ INSPECTION OF UPPER INTESTINAL TRACT, VIA NATURAL OR ARTIFICIAL OPENING ENDOSCOPIC: ICD-10-PCS | Performed by: INTERNAL MEDICINE

## 2018-01-01 PROCEDURE — 97165 OT EVAL LOW COMPLEX 30 MIN: CPT

## 2018-01-01 PROCEDURE — A6250 SKIN SEAL PROTECT MOISTURIZR: HCPCS

## 2018-01-01 PROCEDURE — 83540 ASSAY OF IRON: CPT

## 2018-01-01 PROCEDURE — 83516 IMMUNOASSAY NONANTIBODY: CPT

## 2018-01-01 PROCEDURE — 99214 OFFICE O/P EST MOD 30 MIN: CPT | Performed by: INTERNAL MEDICINE

## 2018-01-01 PROCEDURE — 99232 SBSQ HOSP IP/OBS MODERATE 35: CPT | Performed by: NURSE PRACTITIONER

## 2018-01-01 PROCEDURE — 70450 CT HEAD/BRAIN W/O DYE: CPT

## 2018-01-01 PROCEDURE — G8428 CUR MEDS NOT DOCUMENT: HCPCS | Performed by: NURSE PRACTITIONER

## 2018-01-01 PROCEDURE — 70490 CT SOFT TISSUE NECK W/O DYE: CPT

## 2018-01-01 PROCEDURE — 99226 PR SBSQ OBSERVATION CARE/DAY 35 MINUTES: CPT | Performed by: INTERNAL MEDICINE

## 2018-01-01 PROCEDURE — A9500 TC99M SESTAMIBI: HCPCS | Performed by: INTERNAL MEDICINE

## 2018-01-01 PROCEDURE — G8427 DOCREV CUR MEDS BY ELIG CLIN: HCPCS | Performed by: SURGERY

## 2018-01-01 PROCEDURE — 86850 RBC ANTIBODY SCREEN: CPT

## 2018-01-01 PROCEDURE — 99221 1ST HOSP IP/OBS SF/LOW 40: CPT | Performed by: SURGERY

## 2018-01-01 PROCEDURE — 80202 ASSAY OF VANCOMYCIN: CPT

## 2018-01-01 PROCEDURE — 2580000003 HC RX 258: Performed by: PHYSICIAN ASSISTANT

## 2018-01-01 PROCEDURE — 1101F PT FALLS ASSESS-DOCD LE1/YR: CPT | Performed by: SURGERY

## 2018-01-01 PROCEDURE — 86708 HEPATITIS A ANTIBODY: CPT

## 2018-01-01 PROCEDURE — G8420 CALC BMI NORM PARAMETERS: HCPCS | Performed by: SURGERY

## 2018-01-01 PROCEDURE — 99213 OFFICE O/P EST LOW 20 MIN: CPT | Performed by: INTERNAL MEDICINE

## 2018-01-01 PROCEDURE — 85385 FIBRINOGEN ANTIGEN: CPT

## 2018-01-01 PROCEDURE — 84238 ASSAY NONENDOCRINE RECEPTOR: CPT

## 2018-01-01 PROCEDURE — 99222 1ST HOSP IP/OBS MODERATE 55: CPT | Performed by: PSYCHIATRY & NEUROLOGY

## 2018-01-01 PROCEDURE — 82150 ASSAY OF AMYLASE: CPT

## 2018-01-01 PROCEDURE — 84100 ASSAY OF PHOSPHORUS: CPT

## 2018-01-01 PROCEDURE — 86923 COMPATIBILITY TEST ELECTRIC: CPT

## 2018-01-01 PROCEDURE — 93005 ELECTROCARDIOGRAM TRACING: CPT | Performed by: INTERNAL MEDICINE

## 2018-01-01 RX ORDER — ACETAMINOPHEN 500 MG
1000 TABLET ORAL 3 TIMES DAILY PRN
Status: DISCONTINUED | OUTPATIENT
Start: 2018-01-01 | End: 2018-01-01 | Stop reason: HOSPADM

## 2018-01-01 RX ORDER — ASPIRIN 81 MG/1
324 TABLET, CHEWABLE ORAL ONCE
Status: COMPLETED | OUTPATIENT
Start: 2018-01-01 | End: 2018-01-01

## 2018-01-01 RX ORDER — POLYETHYLENE GLYCOL 3350 17 G/17G
17 POWDER, FOR SOLUTION ORAL DAILY PRN
COMMUNITY

## 2018-01-01 RX ORDER — ISOSORBIDE MONONITRATE 30 MG/1
30 TABLET, EXTENDED RELEASE ORAL DAILY
Status: DISCONTINUED | OUTPATIENT
Start: 2018-01-01 | End: 2018-01-01 | Stop reason: HOSPADM

## 2018-01-01 RX ORDER — GLIPIZIDE 5 MG/1
5 TABLET ORAL
Status: DISCONTINUED | OUTPATIENT
Start: 2018-01-01 | End: 2018-01-01

## 2018-01-01 RX ORDER — ONDANSETRON 2 MG/ML
4 INJECTION INTRAMUSCULAR; INTRAVENOUS ONCE
Status: COMPLETED | OUTPATIENT
Start: 2018-01-01 | End: 2018-01-01

## 2018-01-01 RX ORDER — SODIUM CHLORIDE 9 MG/ML
INJECTION, SOLUTION INTRAVENOUS CONTINUOUS
Status: DISCONTINUED | OUTPATIENT
Start: 2018-01-01 | End: 2018-01-01

## 2018-01-01 RX ORDER — SODIUM CHLORIDE 9 MG/ML
INJECTION, SOLUTION INTRAVENOUS CONTINUOUS
Status: DISCONTINUED | OUTPATIENT
Start: 2018-01-01 | End: 2018-01-01 | Stop reason: HOSPADM

## 2018-01-01 RX ORDER — IBUPROFEN 200 MG
TABLET ORAL 2 TIMES DAILY
Status: DISCONTINUED | OUTPATIENT
Start: 2018-01-01 | End: 2018-01-01 | Stop reason: HOSPADM

## 2018-01-01 RX ORDER — RANOLAZINE 500 MG/1
1000 TABLET, EXTENDED RELEASE ORAL 2 TIMES DAILY
Status: DISCONTINUED | OUTPATIENT
Start: 2018-01-01 | End: 2018-01-01 | Stop reason: HOSPADM

## 2018-01-01 RX ORDER — GUAIFENESIN 600 MG/1
600 TABLET, EXTENDED RELEASE ORAL 2 TIMES DAILY
Status: DISCONTINUED | OUTPATIENT
Start: 2018-01-01 | End: 2018-01-01 | Stop reason: HOSPADM

## 2018-01-01 RX ORDER — FERROUS SULFATE 325(65) MG
325 TABLET ORAL 2 TIMES DAILY WITH MEALS
Status: DISCONTINUED | OUTPATIENT
Start: 2018-01-01 | End: 2018-01-01 | Stop reason: HOSPADM

## 2018-01-01 RX ORDER — ISOSORBIDE MONONITRATE 30 MG/1
30 TABLET, EXTENDED RELEASE ORAL DAILY
Status: DISCONTINUED | OUTPATIENT
Start: 2018-01-01 | End: 2018-01-01

## 2018-01-01 RX ORDER — NICOTINE POLACRILEX 4 MG
15 LOZENGE BUCCAL PRN
Status: DISCONTINUED | OUTPATIENT
Start: 2018-01-01 | End: 2018-01-01 | Stop reason: HOSPADM

## 2018-01-01 RX ORDER — FAMOTIDINE 20 MG/1
20 TABLET, FILM COATED ORAL DAILY
Status: DISCONTINUED | OUTPATIENT
Start: 2018-01-01 | End: 2018-01-01

## 2018-01-01 RX ORDER — TRAMADOL HYDROCHLORIDE 50 MG/1
50 TABLET ORAL ONCE
Status: COMPLETED | OUTPATIENT
Start: 2018-01-01 | End: 2018-01-01

## 2018-01-01 RX ORDER — ACETAMINOPHEN 500 MG
1000 TABLET ORAL ONCE
Status: COMPLETED | OUTPATIENT
Start: 2018-01-01 | End: 2018-01-01

## 2018-01-01 RX ORDER — LIDOCAINE 50 MG/G
OINTMENT TOPICAL DAILY PRN
Status: DISCONTINUED | OUTPATIENT
Start: 2018-01-01 | End: 2018-01-01 | Stop reason: HOSPADM

## 2018-01-01 RX ORDER — SODIUM CHLORIDE 0.9 % (FLUSH) 0.9 %
10 SYRINGE (ML) INJECTION EVERY 12 HOURS SCHEDULED
Status: DISCONTINUED | OUTPATIENT
Start: 2018-01-01 | End: 2018-01-01 | Stop reason: HOSPADM

## 2018-01-01 RX ORDER — FAMOTIDINE 20 MG/1
20 TABLET, FILM COATED ORAL 2 TIMES DAILY
Status: DISCONTINUED | OUTPATIENT
Start: 2018-01-01 | End: 2018-01-01

## 2018-01-01 RX ORDER — MULTIVITAMIN WITH FOLIC ACID 400 MCG
1 TABLET ORAL DAILY
Status: DISCONTINUED | OUTPATIENT
Start: 2018-01-01 | End: 2018-01-01 | Stop reason: HOSPADM

## 2018-01-01 RX ORDER — SULFAMETHOXAZOLE AND TRIMETHOPRIM 800; 160 MG/1; MG/1
1 TABLET ORAL 2 TIMES DAILY
Qty: 20 TABLET | Refills: 0 | Status: SHIPPED | OUTPATIENT
Start: 2018-01-01 | End: 2018-01-01

## 2018-01-01 RX ORDER — POTASSIUM CHLORIDE 20 MEQ/1
20 TABLET, EXTENDED RELEASE ORAL
Status: DISCONTINUED | OUTPATIENT
Start: 2018-01-01 | End: 2018-01-01 | Stop reason: HOSPADM

## 2018-01-01 RX ORDER — NEBIVOLOL 5 MG/1
15 TABLET ORAL DAILY
Status: DISCONTINUED | OUTPATIENT
Start: 2018-01-01 | End: 2018-01-01 | Stop reason: HOSPADM

## 2018-01-01 RX ORDER — ONDANSETRON 2 MG/ML
4 INJECTION INTRAMUSCULAR; INTRAVENOUS EVERY 6 HOURS
Status: DISCONTINUED | OUTPATIENT
Start: 2018-01-01 | End: 2018-01-01 | Stop reason: SDUPTHER

## 2018-01-01 RX ORDER — GUAIFENESIN 600 MG/1
1200 TABLET, EXTENDED RELEASE ORAL 2 TIMES DAILY PRN
COMMUNITY
End: 2019-01-01

## 2018-01-01 RX ORDER — FAMOTIDINE 20 MG/1
20 TABLET, FILM COATED ORAL DAILY
COMMUNITY

## 2018-01-01 RX ORDER — FUROSEMIDE 40 MG/1
40 TABLET ORAL DAILY
Status: DISCONTINUED | OUTPATIENT
Start: 2018-01-01 | End: 2018-01-01 | Stop reason: HOSPADM

## 2018-01-01 RX ORDER — NEBIVOLOL 5 MG/1
10 TABLET ORAL DAILY
Status: DISCONTINUED | OUTPATIENT
Start: 2018-01-01 | End: 2018-01-01 | Stop reason: HOSPADM

## 2018-01-01 RX ORDER — FERROUS SULFATE 325(65) MG
325 TABLET ORAL 2 TIMES DAILY WITH MEALS
Status: DISCONTINUED | OUTPATIENT
Start: 2018-01-01 | End: 2018-01-01

## 2018-01-01 RX ORDER — OXYCODONE AND ACETAMINOPHEN 7.5; 325 MG/1; MG/1
1 TABLET ORAL EVERY 8 HOURS PRN
COMMUNITY
End: 2018-01-01 | Stop reason: ALTCHOICE

## 2018-01-01 RX ORDER — SODIUM BICARBONATE 650 MG/1
650 TABLET ORAL 2 TIMES DAILY
Status: DISCONTINUED | OUTPATIENT
Start: 2018-01-01 | End: 2018-01-01 | Stop reason: HOSPADM

## 2018-01-01 RX ORDER — POTASSIUM CHLORIDE 20 MEQ/1
40 TABLET, EXTENDED RELEASE ORAL PRN
Status: DISCONTINUED | OUTPATIENT
Start: 2018-01-01 | End: 2018-01-01 | Stop reason: HOSPADM

## 2018-01-01 RX ORDER — NEBIVOLOL 5 MG/1
10 TABLET ORAL DAILY
Status: DISCONTINUED | OUTPATIENT
Start: 2018-01-01 | End: 2018-01-01

## 2018-01-01 RX ORDER — GUAIFENESIN 600 MG/1
600 TABLET, EXTENDED RELEASE ORAL 2 TIMES DAILY
Qty: 30 TABLET | Refills: 0
Start: 2018-01-01 | End: 2018-01-01

## 2018-01-01 RX ORDER — 0.9 % SODIUM CHLORIDE 0.9 %
250 INTRAVENOUS SOLUTION INTRAVENOUS ONCE
Status: COMPLETED | OUTPATIENT
Start: 2018-01-01 | End: 2018-01-01

## 2018-01-01 RX ORDER — PANTOPRAZOLE SODIUM 40 MG/1
40 TABLET, DELAYED RELEASE ORAL
Status: DISCONTINUED | OUTPATIENT
Start: 2018-01-01 | End: 2018-01-01 | Stop reason: HOSPADM

## 2018-01-01 RX ORDER — MECLIZINE HYDROCHLORIDE 25 MG/1
25 TABLET ORAL EVERY 12 HOURS PRN
Status: ON HOLD | COMMUNITY
End: 2019-01-01

## 2018-01-01 RX ORDER — ONDANSETRON 2 MG/ML
4 INJECTION INTRAMUSCULAR; INTRAVENOUS EVERY 6 HOURS PRN
Status: DISCONTINUED | OUTPATIENT
Start: 2018-01-01 | End: 2018-01-01 | Stop reason: HOSPADM

## 2018-01-01 RX ORDER — GREEN TEA/HOODIA GORDONII 315-12.5MG
1 CAPSULE ORAL 2 TIMES DAILY
Qty: 60 TABLET | Refills: 0
Start: 2018-01-01 | End: 2018-01-01

## 2018-01-01 RX ORDER — POTASSIUM CHLORIDE 7.45 MG/ML
10 INJECTION INTRAVENOUS PRN
Status: DISCONTINUED | OUTPATIENT
Start: 2018-01-01 | End: 2018-01-01 | Stop reason: HOSPADM

## 2018-01-01 RX ORDER — AMLODIPINE BESYLATE 10 MG/1
10 TABLET ORAL DAILY
Status: DISCONTINUED | OUTPATIENT
Start: 2018-01-01 | End: 2018-01-01 | Stop reason: HOSPADM

## 2018-01-01 RX ORDER — ACETAMINOPHEN 325 MG/1
650 TABLET ORAL EVERY 4 HOURS PRN
Status: DISCONTINUED | OUTPATIENT
Start: 2018-01-01 | End: 2018-01-01 | Stop reason: HOSPADM

## 2018-01-01 RX ORDER — FENTANYL CITRATE 50 UG/ML
INJECTION, SOLUTION INTRAMUSCULAR; INTRAVENOUS PRN
Status: DISCONTINUED | OUTPATIENT
Start: 2018-01-01 | End: 2018-01-01 | Stop reason: HOSPADM

## 2018-01-01 RX ORDER — SODIUM CHLORIDE 0.9 % (FLUSH) 0.9 %
10 SYRINGE (ML) INJECTION PRN
Status: DISCONTINUED | OUTPATIENT
Start: 2018-01-01 | End: 2018-01-01 | Stop reason: HOSPADM

## 2018-01-01 RX ORDER — FUROSEMIDE 40 MG/1
40 TABLET ORAL 2 TIMES DAILY
Status: DISCONTINUED | OUTPATIENT
Start: 2018-01-01 | End: 2018-01-01 | Stop reason: DRUGHIGH

## 2018-01-01 RX ORDER — TAMSULOSIN HYDROCHLORIDE 0.4 MG/1
0.4 CAPSULE ORAL DAILY
Status: DISCONTINUED | OUTPATIENT
Start: 2018-01-01 | End: 2018-01-01 | Stop reason: HOSPADM

## 2018-01-01 RX ORDER — NEBIVOLOL HYDROCHLORIDE 10 MG/1
10 TABLET ORAL DAILY
Qty: 90 TABLET | Refills: 0 | Status: ON HOLD | OUTPATIENT
Start: 2018-01-01 | End: 2019-01-01 | Stop reason: HOSPADM

## 2018-01-01 RX ORDER — GLIPIZIDE 5 MG/1
5 TABLET ORAL DAILY
Status: DISCONTINUED | OUTPATIENT
Start: 2018-01-01 | End: 2018-01-01 | Stop reason: HOSPADM

## 2018-01-01 RX ORDER — AMLODIPINE BESYLATE 10 MG/1
10 TABLET ORAL DAILY
Status: DISCONTINUED | OUTPATIENT
Start: 2018-01-01 | End: 2018-01-01 | Stop reason: DRUGHIGH

## 2018-01-01 RX ORDER — POTASSIUM CHLORIDE 20 MEQ/1
10 TABLET, EXTENDED RELEASE ORAL DAILY
Status: DISCONTINUED | OUTPATIENT
Start: 2018-01-01 | End: 2018-01-01

## 2018-01-01 RX ORDER — GABAPENTIN 100 MG/1
100 CAPSULE ORAL 3 TIMES DAILY
Status: DISCONTINUED | OUTPATIENT
Start: 2018-01-01 | End: 2018-01-01 | Stop reason: HOSPADM

## 2018-01-01 RX ORDER — ONDANSETRON 4 MG/1
4 TABLET, ORALLY DISINTEGRATING ORAL EVERY 6 HOURS PRN
Status: DISCONTINUED | OUTPATIENT
Start: 2018-01-01 | End: 2018-01-01 | Stop reason: HOSPADM

## 2018-01-01 RX ORDER — ATORVASTATIN CALCIUM 20 MG/1
20 TABLET, FILM COATED ORAL NIGHTLY
Status: DISCONTINUED | OUTPATIENT
Start: 2018-01-01 | End: 2018-01-01

## 2018-01-01 RX ORDER — FUROSEMIDE 20 MG/1
40 TABLET ORAL 2 TIMES DAILY
Qty: 60 TABLET | Refills: 3 | Status: ON HOLD | OUTPATIENT
Start: 2018-01-01 | End: 2018-01-01 | Stop reason: HOSPADM

## 2018-01-01 RX ORDER — ONDANSETRON 2 MG/ML
4 INJECTION INTRAMUSCULAR; INTRAVENOUS EVERY 6 HOURS PRN
Status: DISCONTINUED | OUTPATIENT
Start: 2018-01-01 | End: 2018-01-01 | Stop reason: CLARIF

## 2018-01-01 RX ORDER — ASPIRIN 81 MG/1
81 TABLET ORAL DAILY
Status: DISCONTINUED | OUTPATIENT
Start: 2018-01-01 | End: 2018-01-01 | Stop reason: HOSPADM

## 2018-01-01 RX ORDER — TAMSULOSIN HYDROCHLORIDE 0.4 MG/1
0.4 CAPSULE ORAL DAILY
Qty: 30 CAPSULE | Refills: 3 | Status: SHIPPED | OUTPATIENT
Start: 2018-01-01

## 2018-01-01 RX ORDER — POLYVINYL ALCOHOL 14 MG/ML
1 SOLUTION/ DROPS OPHTHALMIC PRN
Status: DISCONTINUED | OUTPATIENT
Start: 2018-01-01 | End: 2018-01-01 | Stop reason: HOSPADM

## 2018-01-01 RX ORDER — POLYVINYL ALCOHOL 14 MG/ML
1 SOLUTION/ DROPS OPHTHALMIC PRN
Qty: 1 BOTTLE | Refills: 4
Start: 2018-01-01 | End: 2018-01-01

## 2018-01-01 RX ORDER — TORSEMIDE 10 MG/1
10 TABLET ORAL 2 TIMES DAILY
Qty: 60 TABLET | Refills: 3 | Status: ON HOLD
Start: 2018-01-01 | End: 2019-01-01 | Stop reason: HOSPADM

## 2018-01-01 RX ORDER — IBUPROFEN 200 MG
TABLET ORAL 2 TIMES DAILY
Status: DISCONTINUED | OUTPATIENT
Start: 2018-01-01 | End: 2018-01-01 | Stop reason: SDUPTHER

## 2018-01-01 RX ORDER — AMLODIPINE BESYLATE 5 MG/1
5 TABLET ORAL DAILY
Status: DISCONTINUED | OUTPATIENT
Start: 2018-01-01 | End: 2018-01-01 | Stop reason: HOSPADM

## 2018-01-01 RX ORDER — POLYMYXIN B SULFATE AND TRIMETHOPRIM 1; 10000 MG/ML; [USP'U]/ML
1 SOLUTION OPHTHALMIC
Status: DISCONTINUED | OUTPATIENT
Start: 2018-01-01 | End: 2018-01-01

## 2018-01-01 RX ORDER — NEBIVOLOL 5 MG/1
5 TABLET ORAL DAILY
Status: DISCONTINUED | OUTPATIENT
Start: 2018-01-01 | End: 2018-01-01 | Stop reason: HOSPADM

## 2018-01-01 RX ORDER — PANTOPRAZOLE SODIUM 40 MG/1
40 TABLET, DELAYED RELEASE ORAL
Status: DISCONTINUED | OUTPATIENT
Start: 2018-01-01 | End: 2018-01-01

## 2018-01-01 RX ORDER — GLIPIZIDE 2.5 MG/1
5 TABLET, EXTENDED RELEASE ORAL
Status: DISCONTINUED | OUTPATIENT
Start: 2018-01-01 | End: 2018-01-01 | Stop reason: CLARIF

## 2018-01-01 RX ORDER — SODIUM CHLORIDE 0.9 % (FLUSH) 0.9 %
10 SYRINGE (ML) INJECTION EVERY 12 HOURS SCHEDULED
Status: DISCONTINUED | OUTPATIENT
Start: 2018-01-01 | End: 2018-01-01

## 2018-01-01 RX ORDER — ISOSORBIDE MONONITRATE 30 MG/1
15 TABLET, EXTENDED RELEASE ORAL DAILY
Status: DISCONTINUED | OUTPATIENT
Start: 2018-01-01 | End: 2018-01-01 | Stop reason: HOSPADM

## 2018-01-01 RX ORDER — FUROSEMIDE 40 MG/1
40 TABLET ORAL 2 TIMES DAILY
Status: DISCONTINUED | OUTPATIENT
Start: 2018-01-01 | End: 2018-01-01

## 2018-01-01 RX ORDER — GABAPENTIN 100 MG/1
100 CAPSULE ORAL 3 TIMES DAILY
COMMUNITY

## 2018-01-01 RX ORDER — GUAIFENESIN 100 MG/5ML
200 SOLUTION ORAL EVERY 4 HOURS PRN
Status: DISCONTINUED | OUTPATIENT
Start: 2018-01-01 | End: 2018-01-01 | Stop reason: HOSPADM

## 2018-01-01 RX ORDER — ATORVASTATIN CALCIUM 20 MG/1
20 TABLET, FILM COATED ORAL DAILY
Status: DISCONTINUED | OUTPATIENT
Start: 2018-01-01 | End: 2018-01-01 | Stop reason: HOSPADM

## 2018-01-01 RX ORDER — ASPIRIN 81 MG/1
81 TABLET, CHEWABLE ORAL DAILY
Status: DISCONTINUED | OUTPATIENT
Start: 2018-01-01 | End: 2018-01-01 | Stop reason: HOSPADM

## 2018-01-01 RX ORDER — CEFDINIR 300 MG/1
300 CAPSULE ORAL 2 TIMES DAILY
Qty: 14 CAPSULE | Refills: 0
Start: 2018-01-01 | End: 2018-01-01

## 2018-01-01 RX ORDER — TAMSULOSIN HYDROCHLORIDE 0.4 MG/1
0.4 CAPSULE ORAL DAILY
Status: DISCONTINUED | OUTPATIENT
Start: 2018-01-01 | End: 2018-01-01

## 2018-01-01 RX ORDER — DOCUSATE SODIUM 100 MG/1
100 CAPSULE, LIQUID FILLED ORAL 2 TIMES DAILY
Status: ON HOLD | COMMUNITY
End: 2019-01-01 | Stop reason: HOSPADM

## 2018-01-01 RX ORDER — SODIUM CHLORIDE 0.9 % (FLUSH) 0.9 %
10 SYRINGE (ML) INJECTION PRN
Status: DISCONTINUED | OUTPATIENT
Start: 2018-01-01 | End: 2018-01-01

## 2018-01-01 RX ORDER — CLOPIDOGREL BISULFATE 75 MG/1
75 TABLET ORAL DAILY
Status: DISCONTINUED | OUTPATIENT
Start: 2018-01-01 | End: 2018-01-01 | Stop reason: ALTCHOICE

## 2018-01-01 RX ORDER — SODIUM BICARBONATE 650 MG/1
650 TABLET ORAL 2 TIMES DAILY
Qty: 60 TABLET | Refills: 1
Start: 2018-01-01 | End: 2019-01-01

## 2018-01-01 RX ORDER — DEXTROSE MONOHYDRATE 25 G/50ML
12.5 INJECTION, SOLUTION INTRAVENOUS PRN
Status: DISCONTINUED | OUTPATIENT
Start: 2018-01-01 | End: 2018-01-01 | Stop reason: HOSPADM

## 2018-01-01 RX ORDER — ATORVASTATIN CALCIUM 20 MG/1
20 TABLET, FILM COATED ORAL NIGHTLY
Status: DISCONTINUED | OUTPATIENT
Start: 2018-01-01 | End: 2018-01-01 | Stop reason: HOSPADM

## 2018-01-01 RX ORDER — MULTIVITAMIN/IRON/FOLIC ACID 18MG-0.4MG
750 TABLET ORAL 2 TIMES DAILY
Status: DISCONTINUED | OUTPATIENT
Start: 2018-01-01 | End: 2018-01-01 | Stop reason: HOSPADM

## 2018-01-01 RX ORDER — HYDROCODONE BITARTRATE AND ACETAMINOPHEN 5; 325 MG/1; MG/1
1 TABLET ORAL EVERY 6 HOURS PRN
Status: DISCONTINUED | OUTPATIENT
Start: 2018-01-01 | End: 2018-01-01 | Stop reason: HOSPADM

## 2018-01-01 RX ORDER — DEXTROSE MONOHYDRATE 50 MG/ML
100 INJECTION, SOLUTION INTRAVENOUS PRN
Status: DISCONTINUED | OUTPATIENT
Start: 2018-01-01 | End: 2018-01-01 | Stop reason: HOSPADM

## 2018-01-01 RX ORDER — 0.9 % SODIUM CHLORIDE 0.9 %
1000 INTRAVENOUS SOLUTION INTRAVENOUS ONCE
Status: COMPLETED | OUTPATIENT
Start: 2018-01-01 | End: 2018-01-01

## 2018-01-01 RX ORDER — ACETAMINOPHEN 325 MG/1
650 TABLET ORAL EVERY 6 HOURS PRN
Status: DISCONTINUED | OUTPATIENT
Start: 2018-01-01 | End: 2018-01-01 | Stop reason: HOSPADM

## 2018-01-01 RX ORDER — 0.9 % SODIUM CHLORIDE 0.9 %
355 INTRAVENOUS SOLUTION INTRAVENOUS ONCE
Status: COMPLETED | OUTPATIENT
Start: 2018-01-01 | End: 2018-01-01

## 2018-01-01 RX ORDER — MAGNESIUM OXIDE 400 MG/1
400 TABLET ORAL 2 TIMES DAILY
Status: DISCONTINUED | OUTPATIENT
Start: 2018-01-01 | End: 2018-01-01 | Stop reason: SDUPTHER

## 2018-01-01 RX ORDER — GLIPIZIDE 5 MG/1
5 TABLET ORAL
Status: DISCONTINUED | OUTPATIENT
Start: 2018-01-01 | End: 2018-01-01 | Stop reason: HOSPADM

## 2018-01-01 RX ORDER — ASPIRIN 81 MG/1
81 TABLET, CHEWABLE ORAL DAILY
Status: DISCONTINUED | OUTPATIENT
Start: 2018-01-01 | End: 2018-01-01 | Stop reason: ALTCHOICE

## 2018-01-01 RX ORDER — FUROSEMIDE 40 MG/1
40 TABLET ORAL 2 TIMES DAILY
Status: DISCONTINUED | OUTPATIENT
Start: 2018-01-01 | End: 2018-01-01 | Stop reason: HOSPADM

## 2018-01-01 RX ORDER — CLOPIDOGREL BISULFATE 75 MG/1
75 TABLET ORAL DAILY
Status: DISCONTINUED | OUTPATIENT
Start: 2018-01-01 | End: 2018-01-01 | Stop reason: HOSPADM

## 2018-01-01 RX ORDER — POLYETHYLENE GLYCOL 3350 17 G/17G
17 POWDER, FOR SOLUTION ORAL DAILY
Status: DISCONTINUED | OUTPATIENT
Start: 2018-01-01 | End: 2018-01-01 | Stop reason: HOSPADM

## 2018-01-01 RX ORDER — SODIUM POLYSTYRENE SULFONATE 15 G/60ML
15 SUSPENSION ORAL; RECTAL ONCE
Status: COMPLETED | OUTPATIENT
Start: 2018-01-01 | End: 2018-01-01

## 2018-01-01 RX ORDER — MAGNESIUM OXIDE 400 MG/1
800 TABLET ORAL 2 TIMES DAILY
Status: DISCONTINUED | OUTPATIENT
Start: 2018-01-01 | End: 2018-01-01 | Stop reason: ALTCHOICE

## 2018-01-01 RX ORDER — MIDAZOLAM HYDROCHLORIDE 1 MG/ML
INJECTION INTRAMUSCULAR; INTRAVENOUS PRN
Status: DISCONTINUED | OUTPATIENT
Start: 2018-01-01 | End: 2018-01-01 | Stop reason: HOSPADM

## 2018-01-01 RX ORDER — HYDROCODONE BITARTRATE AND ACETAMINOPHEN 5; 325 MG/1; MG/1
1 TABLET ORAL EVERY 8 HOURS PRN
Status: ON HOLD | COMMUNITY
End: 2018-01-01 | Stop reason: HOSPADM

## 2018-01-01 RX ORDER — 0.9 % SODIUM CHLORIDE 0.9 %
2000 INTRAVENOUS SOLUTION INTRAVENOUS ONCE
Status: COMPLETED | OUTPATIENT
Start: 2018-01-01 | End: 2018-01-01

## 2018-01-01 RX ORDER — POTASSIUM CHLORIDE 20MEQ/15ML
40 LIQUID (ML) ORAL PRN
Status: DISCONTINUED | OUTPATIENT
Start: 2018-01-01 | End: 2018-01-01 | Stop reason: HOSPADM

## 2018-01-01 RX ORDER — FAMOTIDINE 20 MG/1
20 TABLET, FILM COATED ORAL 2 TIMES DAILY
Status: DISCONTINUED | OUTPATIENT
Start: 2018-01-01 | End: 2018-01-01 | Stop reason: HOSPADM

## 2018-01-01 RX ORDER — RANOLAZINE 1000 MG/1
TABLET, FILM COATED, EXTENDED RELEASE ORAL
Qty: 180 TABLET | Refills: 0 | Status: SHIPPED | OUTPATIENT
Start: 2018-01-01

## 2018-01-01 RX ORDER — LEVOFLOXACIN 5 MG/ML
750 INJECTION, SOLUTION INTRAVENOUS ONCE
Status: COMPLETED | OUTPATIENT
Start: 2018-01-01 | End: 2018-01-01

## 2018-01-01 RX ORDER — FUROSEMIDE 20 MG/1
20 TABLET ORAL 2 TIMES DAILY
Status: DISCONTINUED | OUTPATIENT
Start: 2018-01-01 | End: 2018-01-01 | Stop reason: HOSPADM

## 2018-01-01 RX ORDER — NEBIVOLOL 5 MG/1
5 TABLET ORAL DAILY
Status: DISCONTINUED | OUTPATIENT
Start: 2018-01-01 | End: 2018-01-01

## 2018-01-01 RX ORDER — 0.9 % SODIUM CHLORIDE 0.9 %
30 INTRAVENOUS SOLUTION INTRAVENOUS ONCE
Status: COMPLETED | OUTPATIENT
Start: 2018-01-01 | End: 2018-01-01

## 2018-01-01 RX ORDER — MORPHINE SULFATE 2 MG/ML
2 INJECTION, SOLUTION INTRAMUSCULAR; INTRAVENOUS ONCE
Status: COMPLETED | OUTPATIENT
Start: 2018-01-01 | End: 2018-01-01

## 2018-01-01 RX ORDER — POLYETHYLENE GLYCOL 3350 17 G/17G
17 POWDER, FOR SOLUTION ORAL DAILY
Status: DISCONTINUED | OUTPATIENT
Start: 2018-01-01 | End: 2018-01-01

## 2018-01-01 RX ORDER — ONDANSETRON 2 MG/ML
4 INJECTION INTRAMUSCULAR; INTRAVENOUS EVERY 6 HOURS
Status: DISCONTINUED | OUTPATIENT
Start: 2018-01-01 | End: 2018-01-01

## 2018-01-01 RX ORDER — POTASSIUM CHLORIDE 20 MEQ/1
10 TABLET, EXTENDED RELEASE ORAL DAILY
Status: DISCONTINUED | OUTPATIENT
Start: 2018-01-01 | End: 2018-01-01 | Stop reason: HOSPADM

## 2018-01-01 RX ORDER — ACETAMINOPHEN 500 MG
500 TABLET ORAL EVERY 8 HOURS PRN
COMMUNITY

## 2018-01-01 RX ORDER — CLOPIDOGREL BISULFATE 75 MG/1
75 TABLET ORAL DAILY
Status: DISCONTINUED | OUTPATIENT
Start: 2018-01-01 | End: 2018-01-01

## 2018-01-01 RX ORDER — CEFDINIR 300 MG/1
300 CAPSULE ORAL 2 TIMES DAILY
COMMUNITY
End: 2019-01-01 | Stop reason: ALTCHOICE

## 2018-01-01 RX ORDER — MECLIZINE HCL 12.5 MG/1
25 TABLET ORAL 2 TIMES DAILY PRN
Status: DISCONTINUED | OUTPATIENT
Start: 2018-01-01 | End: 2018-01-01 | Stop reason: HOSPADM

## 2018-01-01 RX ORDER — ONDANSETRON 2 MG/ML
4 INJECTION INTRAMUSCULAR; INTRAVENOUS ONCE
Status: DISCONTINUED | OUTPATIENT
Start: 2018-01-01 | End: 2018-01-01 | Stop reason: HOSPADM

## 2018-01-01 RX ORDER — DEXTROSE MONOHYDRATE 25 G/50ML
12.5 INJECTION, SOLUTION INTRAVENOUS ONCE
Status: COMPLETED | OUTPATIENT
Start: 2018-01-01 | End: 2018-01-01

## 2018-01-01 RX ORDER — ONDANSETRON 4 MG/1
4 TABLET, FILM COATED ORAL EVERY 8 HOURS PRN
COMMUNITY

## 2018-01-01 RX ORDER — 0.9 % SODIUM CHLORIDE 0.9 %
500 INTRAVENOUS SOLUTION INTRAVENOUS ONCE
Status: COMPLETED | OUTPATIENT
Start: 2018-01-01 | End: 2018-01-01

## 2018-01-01 RX ADMIN — RANOLAZINE 1000 MG: 500 TABLET, FILM COATED, EXTENDED RELEASE ORAL at 20:45

## 2018-01-01 RX ADMIN — BACITRACIN ZINC NEOMYCIN SULFATE POLYMYXIN B SULFATE: 400; 3.5; 5 OINTMENT TOPICAL at 20:35

## 2018-01-01 RX ADMIN — FUROSEMIDE 20 MG: 20 TABLET ORAL at 08:35

## 2018-01-01 RX ADMIN — FUROSEMIDE 20 MG: 20 TABLET ORAL at 20:19

## 2018-01-01 RX ADMIN — SODIUM CHLORIDE 1000 ML: 9 INJECTION, SOLUTION INTRAVENOUS at 14:25

## 2018-01-01 RX ADMIN — ASPIRIN 81 MG 81 MG: 81 TABLET ORAL at 12:22

## 2018-01-01 RX ADMIN — POTASSIUM CHLORIDE 10 MEQ: 20 TABLET, EXTENDED RELEASE ORAL at 09:27

## 2018-01-01 RX ADMIN — SODIUM BICARBONATE 650 MG: 650 TABLET ORAL at 09:00

## 2018-01-01 RX ADMIN — Medication 500000 UNITS: at 12:39

## 2018-01-01 RX ADMIN — Medication 10 ML: at 20:34

## 2018-01-01 RX ADMIN — ATORVASTATIN CALCIUM 20 MG: 20 TABLET, FILM COATED ORAL at 08:06

## 2018-01-01 RX ADMIN — POLYVINYL ALCOHOL 1 DROP: 14 SOLUTION/ DROPS OPHTHALMIC at 03:52

## 2018-01-01 RX ADMIN — SODIUM CHLORIDE 250 ML: 9 INJECTION, SOLUTION INTRAVENOUS at 12:40

## 2018-01-01 RX ADMIN — GABAPENTIN 100 MG: 100 CAPSULE ORAL at 09:23

## 2018-01-01 RX ADMIN — ASPIRIN 81 MG 81 MG: 81 TABLET ORAL at 09:34

## 2018-01-01 RX ADMIN — Medication 500000 UNITS: at 17:18

## 2018-01-01 RX ADMIN — POLYMYXIN B SULFATE, TRIMETHOPRIM SULFATE 1 DROP: 10000; 1 SOLUTION/ DROPS OPHTHALMIC at 00:05

## 2018-01-01 RX ADMIN — DIAPER RASH SKIN PROTECTENT: at 21:57

## 2018-01-01 RX ADMIN — CLOPIDOGREL BISULFATE 75 MG: 75 TABLET ORAL at 08:44

## 2018-01-01 RX ADMIN — TAMSULOSIN HYDROCHLORIDE 0.4 MG: 0.4 CAPSULE ORAL at 08:35

## 2018-01-01 RX ADMIN — INSULIN LISPRO 4 UNITS: 100 INJECTION, SOLUTION INTRAVENOUS; SUBCUTANEOUS at 17:01

## 2018-01-01 RX ADMIN — DIAPER RASH SKIN PROTECTENT: at 10:35

## 2018-01-01 RX ADMIN — CLOPIDOGREL BISULFATE 75 MG: 75 TABLET ORAL at 12:22

## 2018-01-01 RX ADMIN — POLYMYXIN B SULFATE, TRIMETHOPRIM SULFATE 1 DROP: 10000; 1 SOLUTION/ DROPS OPHTHALMIC at 20:17

## 2018-01-01 RX ADMIN — GLIPIZIDE 5 MG: 5 TABLET ORAL at 11:48

## 2018-01-01 RX ADMIN — THERA TABS 1 TABLET: TAB at 10:07

## 2018-01-01 RX ADMIN — INSULIN LISPRO 1 UNITS: 100 INJECTION, SOLUTION INTRAVENOUS; SUBCUTANEOUS at 21:51

## 2018-01-01 RX ADMIN — TAMSULOSIN HYDROCHLORIDE 0.4 MG: 0.4 CAPSULE ORAL at 11:43

## 2018-01-01 RX ADMIN — Medication 5 ML: at 10:17

## 2018-01-01 RX ADMIN — SODIUM BICARBONATE 650 MG: 650 TABLET ORAL at 20:35

## 2018-01-01 RX ADMIN — FERROUS SULFATE TAB 325 MG (65 MG ELEMENTAL FE) 325 MG: 325 (65 FE) TAB at 18:58

## 2018-01-01 RX ADMIN — FERROUS SULFATE TAB 325 MG (65 MG ELEMENTAL FE) 325 MG: 325 (65 FE) TAB at 09:00

## 2018-01-01 RX ADMIN — RANOLAZINE 1000 MG: 500 TABLET, FILM COATED, EXTENDED RELEASE ORAL at 08:34

## 2018-01-01 RX ADMIN — GABAPENTIN 100 MG: 100 CAPSULE ORAL at 21:28

## 2018-01-01 RX ADMIN — MAGNESIUM GLUCONATE 500 MG ORAL TABLET 400 MG: 500 TABLET ORAL at 10:06

## 2018-01-01 RX ADMIN — FERROUS SULFATE TAB 325 MG (65 MG ELEMENTAL FE) 325 MG: 325 (65 FE) TAB at 10:09

## 2018-01-01 RX ADMIN — ATORVASTATIN CALCIUM 20 MG: 20 TABLET, FILM COATED ORAL at 10:31

## 2018-01-01 RX ADMIN — FERROUS SULFATE TAB 325 MG (65 MG ELEMENTAL FE) 325 MG: 325 (65 FE) TAB at 18:08

## 2018-01-01 RX ADMIN — FERROUS SULFATE TAB 325 MG (65 MG ELEMENTAL FE) 325 MG: 325 (65 FE) TAB at 08:51

## 2018-01-01 RX ADMIN — AMLODIPINE BESYLATE 5 MG: 5 TABLET ORAL at 08:36

## 2018-01-01 RX ADMIN — INSULIN LISPRO 12 UNITS: 100 INJECTION, SOLUTION INTRAVENOUS; SUBCUTANEOUS at 12:35

## 2018-01-01 RX ADMIN — FERROUS SULFATE TAB 325 MG (65 MG ELEMENTAL FE) 325 MG: 325 (65 FE) TAB at 08:40

## 2018-01-01 RX ADMIN — AMLODIPINE BESYLATE 10 MG: 10 TABLET ORAL at 09:09

## 2018-01-01 RX ADMIN — GLIPIZIDE 5 MG: 5 TABLET ORAL at 08:02

## 2018-01-01 RX ADMIN — GLIPIZIDE 5 MG: 5 TABLET ORAL at 10:15

## 2018-01-01 RX ADMIN — RANOLAZINE 1000 MG: 500 TABLET, FILM COATED, EXTENDED RELEASE ORAL at 22:09

## 2018-01-01 RX ADMIN — RANOLAZINE 1000 MG: 500 TABLET, FILM COATED, EXTENDED RELEASE ORAL at 08:35

## 2018-01-01 RX ADMIN — SODIUM CHLORIDE 1000 ML: 9 INJECTION, SOLUTION INTRAVENOUS at 02:56

## 2018-01-01 RX ADMIN — Medication 10 ML: at 22:09

## 2018-01-01 RX ADMIN — POTASSIUM CHLORIDE 20 MEQ: 1500 TABLET, EXTENDED RELEASE ORAL at 08:35

## 2018-01-01 RX ADMIN — SODIUM CHLORIDE: 9 INJECTION, SOLUTION INTRAVENOUS at 15:37

## 2018-01-01 RX ADMIN — ASPIRIN 81 MG 81 MG: 81 TABLET ORAL at 10:14

## 2018-01-01 RX ADMIN — AMLODIPINE BESYLATE 5 MG: 5 TABLET ORAL at 08:02

## 2018-01-01 RX ADMIN — THERA TABS 1 TABLET: TAB at 09:23

## 2018-01-01 RX ADMIN — TRAMADOL HYDROCHLORIDE 50 MG: 50 TABLET, FILM COATED ORAL at 21:28

## 2018-01-01 RX ADMIN — GUAIFENESIN 600 MG: 600 TABLET, EXTENDED RELEASE ORAL at 12:45

## 2018-01-01 RX ADMIN — ISOSORBIDE MONONITRATE 30 MG: 30 TABLET ORAL at 08:35

## 2018-01-01 RX ADMIN — ASPIRIN 81 MG: 81 TABLET, COATED ORAL at 08:35

## 2018-01-01 RX ADMIN — INSULIN LISPRO 2 UNITS: 100 INJECTION, SOLUTION INTRAVENOUS; SUBCUTANEOUS at 20:35

## 2018-01-01 RX ADMIN — BACITRACIN, NEOMYCIN, POLYMYXIN B 1 G: 400; 3.5; 5 OINTMENT TOPICAL at 08:50

## 2018-01-01 RX ADMIN — RANOLAZINE 1000 MG: 500 TABLET, FILM COATED, EXTENDED RELEASE ORAL at 01:31

## 2018-01-01 RX ADMIN — FERROUS SULFATE TAB 325 MG (65 MG ELEMENTAL FE) 325 MG: 325 (65 FE) TAB at 18:56

## 2018-01-01 RX ADMIN — CEFTRIAXONE SODIUM 1 G: 1 INJECTION, POWDER, FOR SOLUTION INTRAMUSCULAR; INTRAVENOUS at 22:09

## 2018-01-01 RX ADMIN — MAGNESIUM GLUCONATE 500 MG ORAL TABLET 400 MG: 500 TABLET ORAL at 20:50

## 2018-01-01 RX ADMIN — AMLODIPINE BESYLATE 10 MG: 10 TABLET ORAL at 09:23

## 2018-01-01 RX ADMIN — MAGNESIUM GLUCONATE 500 MG ORAL TABLET 800 MG: 500 TABLET ORAL at 10:10

## 2018-01-01 RX ADMIN — THERA TABS 1 TABLET: TAB at 08:04

## 2018-01-01 RX ADMIN — FERROUS SULFATE TAB 325 MG (65 MG ELEMENTAL FE) 325 MG: 325 (65 FE) TAB at 08:50

## 2018-01-01 RX ADMIN — ATORVASTATIN CALCIUM 20 MG: 20 TABLET, FILM COATED ORAL at 21:50

## 2018-01-01 RX ADMIN — FUROSEMIDE 40 MG: 40 TABLET ORAL at 10:11

## 2018-01-01 RX ADMIN — MAGNESIUM GLUCONATE 500 MG ORAL TABLET 400 MG: 500 TABLET ORAL at 11:48

## 2018-01-01 RX ADMIN — ISOSORBIDE MONONITRATE 15 MG: 30 TABLET ORAL at 10:18

## 2018-01-01 RX ADMIN — Medication: at 11:00

## 2018-01-01 RX ADMIN — CLOPIDOGREL BISULFATE 75 MG: 75 TABLET ORAL at 08:55

## 2018-01-01 RX ADMIN — RANOLAZINE 1000 MG: 500 TABLET, FILM COATED, EXTENDED RELEASE ORAL at 20:58

## 2018-01-01 RX ADMIN — INSULIN LISPRO 3 UNITS: 100 INJECTION, SOLUTION INTRAVENOUS; SUBCUTANEOUS at 16:52

## 2018-01-01 RX ADMIN — DEXTROSE MONOHYDRATE 12.5 G: 25 INJECTION, SOLUTION INTRAVENOUS at 14:23

## 2018-01-01 RX ADMIN — POLYETHYLENE GLYCOL 3350 17 G: 17 POWDER, FOR SOLUTION ORAL at 09:28

## 2018-01-01 RX ADMIN — FERROUS SULFATE TAB 325 MG (65 MG ELEMENTAL FE) 325 MG: 325 (65 FE) TAB at 23:31

## 2018-01-01 RX ADMIN — POLYMYXIN B SULFATE, TRIMETHOPRIM SULFATE 1 DROP: 10000; 1 SOLUTION/ DROPS OPHTHALMIC at 08:42

## 2018-01-01 RX ADMIN — ENOXAPARIN SODIUM 30 MG: 30 INJECTION SUBCUTANEOUS at 20:20

## 2018-01-01 RX ADMIN — FERROUS SULFATE TAB 325 MG (65 MG ELEMENTAL FE) 325 MG: 325 (65 FE) TAB at 08:44

## 2018-01-01 RX ADMIN — Medication: at 10:17

## 2018-01-01 RX ADMIN — SODIUM CHLORIDE: 9 INJECTION, SOLUTION INTRAVENOUS at 21:35

## 2018-01-01 RX ADMIN — FERROUS SULFATE TAB 325 MG (65 MG ELEMENTAL FE) 325 MG: 325 (65 FE) TAB at 09:35

## 2018-01-01 RX ADMIN — AMLODIPINE BESYLATE 5 MG: 5 TABLET ORAL at 09:00

## 2018-01-01 RX ADMIN — NEBIVOLOL HYDROCHLORIDE 10 MG: 5 TABLET ORAL at 08:51

## 2018-01-01 RX ADMIN — GUAIFENESIN 600 MG: 600 TABLET, EXTENDED RELEASE ORAL at 15:01

## 2018-01-01 RX ADMIN — MAGNESIUM GLUCONATE 500 MG ORAL TABLET 800 MG: 500 TABLET ORAL at 20:34

## 2018-01-01 RX ADMIN — BARIUM SULFATE 60 ML: 0.81 POWDER, FOR SUSPENSION ORAL at 09:37

## 2018-01-01 RX ADMIN — ATORVASTATIN CALCIUM 20 MG: 20 TABLET, FILM COATED ORAL at 09:01

## 2018-01-01 RX ADMIN — Medication 10 ML: at 10:16

## 2018-01-01 RX ADMIN — CLOPIDOGREL BISULFATE 75 MG: 75 TABLET ORAL at 10:15

## 2018-01-01 RX ADMIN — Medication 500000 UNITS: at 09:04

## 2018-01-01 RX ADMIN — Medication 750 MG: at 13:12

## 2018-01-01 RX ADMIN — POTASSIUM CHLORIDE 20 MEQ: 1500 TABLET, EXTENDED RELEASE ORAL at 10:11

## 2018-01-01 RX ADMIN — CEFTRIAXONE SODIUM 1 G: 1 INJECTION, POWDER, FOR SOLUTION INTRAMUSCULAR; INTRAVENOUS at 21:28

## 2018-01-01 RX ADMIN — POLYMYXIN B SULFATE, TRIMETHOPRIM SULFATE 1 DROP: 10000; 1 SOLUTION/ DROPS OPHTHALMIC at 23:23

## 2018-01-01 RX ADMIN — THERA TABS 1 TABLET: TAB at 09:36

## 2018-01-01 RX ADMIN — AMLODIPINE BESYLATE 10 MG: 10 TABLET ORAL at 10:09

## 2018-01-01 RX ADMIN — RANOLAZINE 1000 MG: 500 TABLET, FILM COATED, EXTENDED RELEASE ORAL at 20:50

## 2018-01-01 RX ADMIN — BACITRACIN ZINC NEOMYCIN SULFATE POLYMYXIN B SULFATE: 400; 3.5; 5 OINTMENT TOPICAL at 10:32

## 2018-01-01 RX ADMIN — BACITRACIN, NEOMYCIN, POLYMYXIN B 1 G: 400; 3.5; 5 OINTMENT TOPICAL at 15:01

## 2018-01-01 RX ADMIN — SODIUM CHLORIDE 1.42 MCG: 9 INJECTION, SOLUTION INTRAVENOUS at 08:45

## 2018-01-01 RX ADMIN — FERROUS SULFATE TAB 325 MG (65 MG ELEMENTAL FE) 325 MG: 325 (65 FE) TAB at 17:05

## 2018-01-01 RX ADMIN — ATORVASTATIN CALCIUM 20 MG: 20 TABLET, FILM COATED ORAL at 11:49

## 2018-01-01 RX ADMIN — Medication 500000 UNITS: at 09:37

## 2018-01-01 RX ADMIN — POTASSIUM CHLORIDE 10 MEQ: 20 TABLET, EXTENDED RELEASE ORAL at 11:49

## 2018-01-01 RX ADMIN — Medication 500000 UNITS: at 14:41

## 2018-01-01 RX ADMIN — GUAIFENESIN 200 MG: 200 SOLUTION ORAL at 14:20

## 2018-01-01 RX ADMIN — FERROUS SULFATE TAB 325 MG (65 MG ELEMENTAL FE) 325 MG: 325 (65 FE) TAB at 08:33

## 2018-01-01 RX ADMIN — FERROUS SULFATE TAB 325 MG (65 MG ELEMENTAL FE) 325 MG: 325 (65 FE) TAB at 09:09

## 2018-01-01 RX ADMIN — POLYETHYLENE GLYCOL 3350 17 G: 17 POWDER, FOR SOLUTION ORAL at 11:47

## 2018-01-01 RX ADMIN — FUROSEMIDE 40 MG: 40 TABLET ORAL at 08:52

## 2018-01-01 RX ADMIN — FUROSEMIDE 40 MG: 40 TABLET ORAL at 09:23

## 2018-01-01 RX ADMIN — CEFTRIAXONE SODIUM 1 G: 1 INJECTION, POWDER, FOR SOLUTION INTRAMUSCULAR; INTRAVENOUS at 15:01

## 2018-01-01 RX ADMIN — Medication 500000 UNITS: at 13:03

## 2018-01-01 RX ADMIN — SODIUM CHLORIDE: 9 INJECTION, SOLUTION INTRAVENOUS at 23:23

## 2018-01-01 RX ADMIN — CLOPIDOGREL BISULFATE 75 MG: 75 TABLET ORAL at 09:10

## 2018-01-01 RX ADMIN — CEFTRIAXONE SODIUM 1 G: 1 INJECTION, POWDER, FOR SOLUTION INTRAMUSCULAR; INTRAVENOUS at 20:35

## 2018-01-01 RX ADMIN — RANOLAZINE 1000 MG: 500 TABLET, FILM COATED, EXTENDED RELEASE ORAL at 21:46

## 2018-01-01 RX ADMIN — AMLODIPINE BESYLATE 5 MG: 5 TABLET ORAL at 10:15

## 2018-01-01 RX ADMIN — Medication: at 21:29

## 2018-01-01 RX ADMIN — GUAIFENESIN 600 MG: 600 TABLET, EXTENDED RELEASE ORAL at 09:36

## 2018-01-01 RX ADMIN — ACETAMINOPHEN 650 MG: 325 TABLET ORAL at 04:49

## 2018-01-01 RX ADMIN — MAGNESIUM GLUCONATE 500 MG ORAL TABLET 400 MG: 500 TABLET ORAL at 00:47

## 2018-01-01 RX ADMIN — CLOPIDOGREL BISULFATE 75 MG: 75 TABLET ORAL at 08:40

## 2018-01-01 RX ADMIN — Medication 750 MG: at 03:06

## 2018-01-01 RX ADMIN — ACETAMINOPHEN 650 MG: 325 TABLET ORAL at 08:52

## 2018-01-01 RX ADMIN — Medication 500000 UNITS: at 16:42

## 2018-01-01 RX ADMIN — POTASSIUM CHLORIDE 20 MEQ: 1500 TABLET, EXTENDED RELEASE ORAL at 08:02

## 2018-01-01 RX ADMIN — FAMOTIDINE 20 MG: 20 TABLET ORAL at 09:10

## 2018-01-01 RX ADMIN — NEBIVOLOL HYDROCHLORIDE 5 MG: 5 TABLET ORAL at 18:01

## 2018-01-01 RX ADMIN — FERROUS SULFATE TAB 325 MG (65 MG ELEMENTAL FE) 325 MG: 325 (65 FE) TAB at 17:01

## 2018-01-01 RX ADMIN — RANOLAZINE 1000 MG: 500 TABLET, FILM COATED, EXTENDED RELEASE ORAL at 08:59

## 2018-01-01 RX ADMIN — RANOLAZINE 1000 MG: 500 TABLET, FILM COATED, EXTENDED RELEASE ORAL at 08:04

## 2018-01-01 RX ADMIN — Medication 10 ML: at 20:20

## 2018-01-01 RX ADMIN — Medication 1 UNITS: at 01:16

## 2018-01-01 RX ADMIN — MAGNESIUM GLUCONATE 500 MG ORAL TABLET 800 MG: 500 TABLET ORAL at 08:02

## 2018-01-01 RX ADMIN — SODIUM CHLORIDE: 9 INJECTION, SOLUTION INTRAVENOUS at 16:45

## 2018-01-01 RX ADMIN — PANTOPRAZOLE SODIUM 40 MG: 40 TABLET, DELAYED RELEASE ORAL at 10:14

## 2018-01-01 RX ADMIN — NEBIVOLOL HYDROCHLORIDE 5 MG: 5 TABLET ORAL at 18:36

## 2018-01-01 RX ADMIN — METFORMIN HYDROCHLORIDE 1500 MG: 500 TABLET ORAL at 10:10

## 2018-01-01 RX ADMIN — Medication 500000 UNITS: at 20:37

## 2018-01-01 RX ADMIN — Medication 35 MILLICURIE: at 14:20

## 2018-01-01 RX ADMIN — ASPIRIN 81 MG 81 MG: 81 TABLET ORAL at 09:01

## 2018-01-01 RX ADMIN — Medication: at 20:57

## 2018-01-01 RX ADMIN — METFORMIN HYDROCHLORIDE 1500 MG: 500 TABLET ORAL at 10:07

## 2018-01-01 RX ADMIN — INSULIN LISPRO 1 UNITS: 100 INJECTION, SOLUTION INTRAVENOUS; SUBCUTANEOUS at 21:29

## 2018-01-01 RX ADMIN — Medication 4 UNITS: at 12:46

## 2018-01-01 RX ADMIN — ACETAMINOPHEN 1000 MG: 500 TABLET, FILM COATED ORAL at 18:24

## 2018-01-01 RX ADMIN — FUROSEMIDE 20 MG: 20 TABLET ORAL at 08:36

## 2018-01-01 RX ADMIN — GLIPIZIDE 5 MG: 5 TABLET ORAL at 10:07

## 2018-01-01 RX ADMIN — RANOLAZINE 1000 MG: 500 TABLET, FILM COATED, EXTENDED RELEASE ORAL at 10:07

## 2018-01-01 RX ADMIN — SODIUM CHLORIDE: 9 INJECTION, SOLUTION INTRAVENOUS at 16:41

## 2018-01-01 RX ADMIN — ISOSORBIDE MONONITRATE 30 MG: 30 TABLET ORAL at 11:48

## 2018-01-01 RX ADMIN — Medication 10 ML: at 11:44

## 2018-01-01 RX ADMIN — Medication 500000 UNITS: at 12:43

## 2018-01-01 RX ADMIN — Medication 10 ML: at 08:02

## 2018-01-01 RX ADMIN — BACITRACIN ZINC NEOMYCIN SULFATE POLYMYXIN B SULFATE: 400; 3.5; 5 OINTMENT TOPICAL at 09:34

## 2018-01-01 RX ADMIN — ASPIRIN 81 MG 81 MG: 81 TABLET ORAL at 10:15

## 2018-01-01 RX ADMIN — CEFEPIME HYDROCHLORIDE 2 G: 2 INJECTION, POWDER, FOR SOLUTION INTRAVENOUS at 21:13

## 2018-01-01 RX ADMIN — METFORMIN HYDROCHLORIDE 1500 MG: 500 TABLET ORAL at 08:35

## 2018-01-01 RX ADMIN — SODIUM BICARBONATE 650 MG: 650 TABLET ORAL at 22:09

## 2018-01-01 RX ADMIN — TAMSULOSIN HYDROCHLORIDE 0.4 MG: 0.4 CAPSULE ORAL at 08:04

## 2018-01-01 RX ADMIN — ATORVASTATIN CALCIUM 20 MG: 20 TABLET, FILM COATED ORAL at 20:53

## 2018-01-01 RX ADMIN — ASPIRIN 81 MG: 81 TABLET, COATED ORAL at 09:05

## 2018-01-01 RX ADMIN — DIAPER RASH SKIN PROTECTENT: at 17:17

## 2018-01-01 RX ADMIN — SODIUM CHLORIDE: 9 INJECTION, SOLUTION INTRAVENOUS at 04:31

## 2018-01-01 RX ADMIN — Medication 2 UNITS: at 10:07

## 2018-01-01 RX ADMIN — SODIUM CHLORIDE 500 ML: 9 INJECTION, SOLUTION INTRAVENOUS at 12:37

## 2018-01-01 RX ADMIN — POLYVINYL ALCOHOL 1 DROP: 14 SOLUTION/ DROPS OPHTHALMIC at 08:29

## 2018-01-01 RX ADMIN — Medication 3 UNITS: at 17:23

## 2018-01-01 RX ADMIN — ASPIRIN 81 MG: 81 TABLET, COATED ORAL at 09:08

## 2018-01-01 RX ADMIN — POLYMYXIN B SULFATE, TRIMETHOPRIM SULFATE 1 DROP: 10000; 1 SOLUTION/ DROPS OPHTHALMIC at 05:04

## 2018-01-01 RX ADMIN — TAMSULOSIN HYDROCHLORIDE 0.4 MG: 0.4 CAPSULE ORAL at 10:05

## 2018-01-01 RX ADMIN — FERROUS SULFATE TAB 325 MG (65 MG ELEMENTAL FE) 325 MG: 325 (65 FE) TAB at 16:21

## 2018-01-01 RX ADMIN — NEBIVOLOL HYDROCHLORIDE 5 MG: 5 TABLET ORAL at 18:16

## 2018-01-01 RX ADMIN — THERA TABS 1 TABLET: TAB at 08:50

## 2018-01-01 RX ADMIN — MAGNESIUM GLUCONATE 500 MG ORAL TABLET 400 MG: 500 TABLET ORAL at 09:23

## 2018-01-01 RX ADMIN — SODIUM BICARBONATE 650 MG: 650 TABLET ORAL at 09:35

## 2018-01-01 RX ADMIN — ATORVASTATIN CALCIUM 20 MG: 20 TABLET, FILM COATED ORAL at 23:31

## 2018-01-01 RX ADMIN — SODIUM CHLORIDE: 9 INJECTION, SOLUTION INTRAVENOUS at 00:49

## 2018-01-01 RX ADMIN — FERROUS SULFATE TAB 325 MG (65 MG ELEMENTAL FE) 325 MG: 325 (65 FE) TAB at 11:49

## 2018-01-01 RX ADMIN — RANOLAZINE 1000 MG: 500 TABLET, FILM COATED, EXTENDED RELEASE ORAL at 10:31

## 2018-01-01 RX ADMIN — CEFEPIME HYDROCHLORIDE 2 G: 2 INJECTION, POWDER, FOR SOLUTION INTRAVENOUS at 21:48

## 2018-01-01 RX ADMIN — RANOLAZINE 1000 MG: 500 TABLET, FILM COATED, EXTENDED RELEASE ORAL at 09:08

## 2018-01-01 RX ADMIN — POLYVINYL ALCOHOL 1 DROP: 14 SOLUTION/ DROPS OPHTHALMIC at 09:34

## 2018-01-01 RX ADMIN — GUAIFENESIN 600 MG: 600 TABLET, EXTENDED RELEASE ORAL at 08:34

## 2018-01-01 RX ADMIN — RANOLAZINE 1000 MG: 500 TABLET, FILM COATED, EXTENDED RELEASE ORAL at 11:47

## 2018-01-01 RX ADMIN — AMLODIPINE BESYLATE 10 MG: 10 TABLET ORAL at 08:50

## 2018-01-01 RX ADMIN — THERA TABS 1 TABLET: TAB at 10:31

## 2018-01-01 RX ADMIN — FERROUS SULFATE TAB 325 MG (65 MG ELEMENTAL FE) 325 MG: 325 (65 FE) TAB at 10:15

## 2018-01-01 RX ADMIN — BACITRACIN ZINC NEOMYCIN SULFATE POLYMYXIN B SULFATE: 400; 3.5; 5 OINTMENT TOPICAL at 08:29

## 2018-01-01 RX ADMIN — RANOLAZINE 1000 MG: 500 TABLET, FILM COATED, EXTENDED RELEASE ORAL at 08:51

## 2018-01-01 RX ADMIN — PANTOPRAZOLE SODIUM 40 MG: 40 TABLET, DELAYED RELEASE ORAL at 06:23

## 2018-01-01 RX ADMIN — ATORVASTATIN CALCIUM 20 MG: 20 TABLET, FILM COATED ORAL at 10:09

## 2018-01-01 RX ADMIN — METFORMIN HYDROCHLORIDE 1500 MG: 500 TABLET ORAL at 08:02

## 2018-01-01 RX ADMIN — FUROSEMIDE 40 MG: 40 TABLET ORAL at 11:49

## 2018-01-01 RX ADMIN — FERROUS SULFATE TAB 325 MG (65 MG ELEMENTAL FE) 325 MG: 325 (65 FE) TAB at 08:04

## 2018-01-01 RX ADMIN — FUROSEMIDE 20 MG: 20 TABLET ORAL at 08:44

## 2018-01-01 RX ADMIN — FERROUS SULFATE TAB 325 MG (65 MG ELEMENTAL FE) 325 MG: 325 (65 FE) TAB at 18:43

## 2018-01-01 RX ADMIN — NEBIVOLOL HYDROCHLORIDE 10 MG: 5 TABLET ORAL at 10:06

## 2018-01-01 RX ADMIN — FUROSEMIDE 20 MG: 20 TABLET ORAL at 18:08

## 2018-01-01 RX ADMIN — ASPIRIN 81 MG 324 MG: 81 TABLET ORAL at 16:06

## 2018-01-01 RX ADMIN — POTASSIUM CHLORIDE 10 MEQ: 20 TABLET, EXTENDED RELEASE ORAL at 10:15

## 2018-01-01 RX ADMIN — ISOSORBIDE MONONITRATE 30 MG: 30 TABLET ORAL at 23:32

## 2018-01-01 RX ADMIN — RANOLAZINE 1000 MG: 500 TABLET, FILM COATED, EXTENDED RELEASE ORAL at 20:34

## 2018-01-01 RX ADMIN — FUROSEMIDE 20 MG: 20 TABLET ORAL at 10:15

## 2018-01-01 RX ADMIN — PANTOPRAZOLE SODIUM 40 MG: 40 TABLET, DELAYED RELEASE ORAL at 05:35

## 2018-01-01 RX ADMIN — INSULIN LISPRO 1 UNITS: 100 INJECTION, SOLUTION INTRAVENOUS; SUBCUTANEOUS at 21:27

## 2018-01-01 RX ADMIN — SODIUM CHLORIDE: 9 INJECTION, SOLUTION INTRAVENOUS at 02:06

## 2018-01-01 RX ADMIN — TAMSULOSIN HYDROCHLORIDE 0.4 MG: 0.4 CAPSULE ORAL at 08:36

## 2018-01-01 RX ADMIN — INSULIN LISPRO 9 UNITS: 100 INJECTION, SOLUTION INTRAVENOUS; SUBCUTANEOUS at 12:41

## 2018-01-01 RX ADMIN — CLOPIDOGREL BISULFATE 75 MG: 75 TABLET ORAL at 11:48

## 2018-01-01 RX ADMIN — INSULIN LISPRO 6 UNITS: 100 INJECTION, SOLUTION INTRAVENOUS; SUBCUTANEOUS at 13:11

## 2018-01-01 RX ADMIN — FUROSEMIDE 20 MG: 20 TABLET ORAL at 17:54

## 2018-01-01 RX ADMIN — ONDANSETRON 4 MG: 2 INJECTION INTRAMUSCULAR; INTRAVENOUS at 15:51

## 2018-01-01 RX ADMIN — NEBIVOLOL HYDROCHLORIDE 10 MG: 5 TABLET ORAL at 09:08

## 2018-01-01 RX ADMIN — DIAPER RASH SKIN PROTECTENT: at 20:45

## 2018-01-01 RX ADMIN — MAGNESIUM GLUCONATE 500 MG ORAL TABLET 800 MG: 500 TABLET ORAL at 08:35

## 2018-01-01 RX ADMIN — INSULIN LISPRO 3 UNITS: 100 INJECTION, SOLUTION INTRAVENOUS; SUBCUTANEOUS at 08:30

## 2018-01-01 RX ADMIN — RANOLAZINE 1000 MG: 500 TABLET, FILM COATED, EXTENDED RELEASE ORAL at 21:17

## 2018-01-01 RX ADMIN — ATORVASTATIN CALCIUM 20 MG: 20 TABLET, FILM COATED ORAL at 00:31

## 2018-01-01 RX ADMIN — SODIUM CHLORIDE: 9 INJECTION, SOLUTION INTRAVENOUS at 00:17

## 2018-01-01 RX ADMIN — METFORMIN HYDROCHLORIDE 1500 MG: 500 TABLET ORAL at 09:23

## 2018-01-01 RX ADMIN — SODIUM CHLORIDE: 9 INJECTION, SOLUTION INTRAVENOUS at 15:55

## 2018-01-01 RX ADMIN — SODIUM CHLORIDE 2000 ML: 9 INJECTION, SOLUTION INTRAVENOUS at 15:35

## 2018-01-01 RX ADMIN — PANTOPRAZOLE SODIUM 40 MG: 40 TABLET, DELAYED RELEASE ORAL at 09:08

## 2018-01-01 RX ADMIN — POLYMYXIN B SULFATE, TRIMETHOPRIM SULFATE 1 DROP: 10000; 1 SOLUTION/ DROPS OPHTHALMIC at 04:09

## 2018-01-01 RX ADMIN — GLIPIZIDE 5 MG: 5 TABLET ORAL at 08:35

## 2018-01-01 RX ADMIN — FERROUS SULFATE TAB 325 MG (65 MG ELEMENTAL FE) 325 MG: 325 (65 FE) TAB at 10:05

## 2018-01-01 RX ADMIN — FERROUS SULFATE TAB 325 MG (65 MG ELEMENTAL FE) 325 MG: 325 (65 FE) TAB at 21:14

## 2018-01-01 RX ADMIN — RANOLAZINE 1000 MG: 500 TABLET, FILM COATED, EXTENDED RELEASE ORAL at 08:02

## 2018-01-01 RX ADMIN — FERROUS SULFATE TAB 325 MG (65 MG ELEMENTAL FE) 325 MG: 325 (65 FE) TAB at 21:28

## 2018-01-01 RX ADMIN — BACITRACIN ZINC NEOMYCIN SULFATE POLYMYXIN B SULFATE: 400; 3.5; 5 OINTMENT TOPICAL at 09:03

## 2018-01-01 RX ADMIN — FUROSEMIDE 40 MG: 40 TABLET ORAL at 09:00

## 2018-01-01 RX ADMIN — SODIUM CHLORIDE: 9 INJECTION, SOLUTION INTRAVENOUS at 03:16

## 2018-01-01 RX ADMIN — ATORVASTATIN CALCIUM 20 MG: 20 TABLET, FILM COATED ORAL at 09:36

## 2018-01-01 RX ADMIN — RANOLAZINE 1000 MG: 500 TABLET, FILM COATED, EXTENDED RELEASE ORAL at 20:35

## 2018-01-01 RX ADMIN — INSULIN LISPRO 1 UNITS: 100 INJECTION, SOLUTION INTRAVENOUS; SUBCUTANEOUS at 20:46

## 2018-01-01 RX ADMIN — ATORVASTATIN CALCIUM 20 MG: 20 TABLET, FILM COATED ORAL at 21:17

## 2018-01-01 RX ADMIN — ASPIRIN 81 MG: 81 TABLET, COATED ORAL at 08:55

## 2018-01-01 RX ADMIN — POTASSIUM CHLORIDE 20 MEQ: 1500 TABLET, EXTENDED RELEASE ORAL at 09:05

## 2018-01-01 RX ADMIN — SODIUM BICARBONATE 650 MG: 650 TABLET ORAL at 20:57

## 2018-01-01 RX ADMIN — INSULIN LISPRO 3 UNITS: 100 INJECTION, SOLUTION INTRAVENOUS; SUBCUTANEOUS at 08:45

## 2018-01-01 RX ADMIN — TAMSULOSIN HYDROCHLORIDE 0.4 MG: 0.4 CAPSULE ORAL at 10:15

## 2018-01-01 RX ADMIN — NEBIVOLOL HYDROCHLORIDE 10 MG: 5 TABLET ORAL at 12:11

## 2018-01-01 RX ADMIN — FERROUS SULFATE TAB 325 MG (65 MG ELEMENTAL FE) 325 MG: 325 (65 FE) TAB at 16:30

## 2018-01-01 RX ADMIN — GLIPIZIDE 5 MG: 5 TABLET ORAL at 09:23

## 2018-01-01 RX ADMIN — RANOLAZINE 1000 MG: 500 TABLET, FILM COATED, EXTENDED RELEASE ORAL at 10:11

## 2018-01-01 RX ADMIN — NEBIVOLOL HYDROCHLORIDE 10 MG: 5 TABLET ORAL at 08:05

## 2018-01-01 RX ADMIN — SODIUM CHLORIDE: 9 INJECTION, SOLUTION INTRAVENOUS at 23:21

## 2018-01-01 RX ADMIN — Medication 3 UNITS: at 08:51

## 2018-01-01 RX ADMIN — GUAIFENESIN 600 MG: 600 TABLET, EXTENDED RELEASE ORAL at 22:09

## 2018-01-01 RX ADMIN — FERROUS SULFATE TAB 325 MG (65 MG ELEMENTAL FE) 325 MG: 325 (65 FE) TAB at 17:18

## 2018-01-01 RX ADMIN — MORPHINE SULFATE 2 MG: 2 INJECTION, SOLUTION INTRAMUSCULAR; INTRAVENOUS at 15:51

## 2018-01-01 RX ADMIN — HYDROCODONE BITARTRATE AND ACETAMINOPHEN 1 TABLET: 5; 325 TABLET ORAL at 08:35

## 2018-01-01 RX ADMIN — LEVOFLOXACIN 750 MG: 5 INJECTION, SOLUTION INTRAVENOUS at 16:09

## 2018-01-01 RX ADMIN — Medication 500000 UNITS: at 08:33

## 2018-01-01 RX ADMIN — ASPIRIN 81 MG: 81 TABLET, COATED ORAL at 08:02

## 2018-01-01 RX ADMIN — GABAPENTIN 100 MG: 100 CAPSULE ORAL at 11:49

## 2018-01-01 RX ADMIN — ACETAMINOPHEN 1000 MG: 500 TABLET, FILM COATED ORAL at 15:35

## 2018-01-01 RX ADMIN — ASPIRIN 81 MG 81 MG: 81 TABLET ORAL at 08:40

## 2018-01-01 RX ADMIN — BACITRACIN ZINC NEOMYCIN SULFATE POLYMYXIN B SULFATE: 400; 3.5; 5 OINTMENT TOPICAL at 22:09

## 2018-01-01 RX ADMIN — POLYMYXIN B SULFATE, TRIMETHOPRIM SULFATE 1 DROP: 10000; 1 SOLUTION/ DROPS OPHTHALMIC at 18:03

## 2018-01-01 RX ADMIN — AMLODIPINE BESYLATE 10 MG: 10 TABLET ORAL at 08:35

## 2018-01-01 RX ADMIN — SODIUM CHLORIDE: 9 INJECTION, SOLUTION INTRAVENOUS at 18:01

## 2018-01-01 RX ADMIN — FAMOTIDINE 20 MG: 20 TABLET ORAL at 09:34

## 2018-01-01 RX ADMIN — TAMSULOSIN HYDROCHLORIDE 0.4 MG: 0.4 CAPSULE ORAL at 11:47

## 2018-01-01 RX ADMIN — BARIUM SULFATE 60 ML: 400 SUSPENSION ORAL at 09:38

## 2018-01-01 RX ADMIN — ACETAMINOPHEN 650 MG: 325 TABLET ORAL at 18:01

## 2018-01-01 RX ADMIN — FUROSEMIDE 20 MG: 20 TABLET ORAL at 16:45

## 2018-01-01 RX ADMIN — AMLODIPINE BESYLATE 10 MG: 10 TABLET ORAL at 09:01

## 2018-01-01 RX ADMIN — TAMSULOSIN HYDROCHLORIDE 0.4 MG: 0.4 CAPSULE ORAL at 09:00

## 2018-01-01 RX ADMIN — TAMSULOSIN HYDROCHLORIDE 0.4 MG: 0.4 CAPSULE ORAL at 10:33

## 2018-01-01 RX ADMIN — MAGNESIUM GLUCONATE 500 MG ORAL TABLET 800 MG: 500 TABLET ORAL at 21:56

## 2018-01-01 RX ADMIN — VANCOMYCIN HYDROCHLORIDE 1250 MG: 5 INJECTION, POWDER, LYOPHILIZED, FOR SOLUTION INTRAVENOUS at 10:11

## 2018-01-01 RX ADMIN — NEBIVOLOL HYDROCHLORIDE 10 MG: 5 TABLET ORAL at 08:36

## 2018-01-01 RX ADMIN — POLYMYXIN B SULFATE, TRIMETHOPRIM SULFATE 1 DROP: 10000; 1 SOLUTION/ DROPS OPHTHALMIC at 14:41

## 2018-01-01 RX ADMIN — BARIUM SULFATE 20 ML: 400 PASTE ORAL at 09:51

## 2018-01-01 RX ADMIN — CEFTRIAXONE SODIUM 1 G: 1 INJECTION, POWDER, FOR SOLUTION INTRAMUSCULAR; INTRAVENOUS at 20:57

## 2018-01-01 RX ADMIN — Medication 10 ML: at 10:11

## 2018-01-01 RX ADMIN — FUROSEMIDE 40 MG: 40 TABLET ORAL at 21:17

## 2018-01-01 RX ADMIN — Medication 10 ML: at 17:06

## 2018-01-01 RX ADMIN — BARIUM SULFATE 40 ML: 0.81 POWDER, FOR SUSPENSION ORAL at 09:50

## 2018-01-01 RX ADMIN — AMLODIPINE BESYLATE 5 MG: 5 TABLET ORAL at 08:55

## 2018-01-01 RX ADMIN — GLIPIZIDE 5 MG: 5 TABLET ORAL at 04:52

## 2018-01-01 RX ADMIN — GABAPENTIN 100 MG: 100 CAPSULE ORAL at 20:50

## 2018-01-01 RX ADMIN — SODIUM BICARBONATE 650 MG: 650 TABLET ORAL at 08:33

## 2018-01-01 RX ADMIN — BACITRACIN, NEOMYCIN, POLYMYXIN B 1 G: 400; 3.5; 5 OINTMENT TOPICAL at 21:55

## 2018-01-01 RX ADMIN — IOPAMIDOL 80 ML: 755 INJECTION, SOLUTION INTRAVENOUS at 14:13

## 2018-01-01 RX ADMIN — BARIUM SULFATE 40 ML: 400 SUSPENSION ORAL at 09:50

## 2018-01-01 RX ADMIN — ISOSORBIDE MONONITRATE 15 MG: 30 TABLET ORAL at 17:54

## 2018-01-01 RX ADMIN — ASPIRIN 81 MG 81 MG: 81 TABLET ORAL at 08:36

## 2018-01-01 RX ADMIN — THERA TABS 1 TABLET: TAB at 08:35

## 2018-01-01 RX ADMIN — SODIUM CHLORIDE: 9 INJECTION, SOLUTION INTRAVENOUS at 20:00

## 2018-01-01 RX ADMIN — CLOPIDOGREL BISULFATE 75 MG: 75 TABLET ORAL at 10:14

## 2018-01-01 RX ADMIN — FERROUS SULFATE TAB 325 MG (65 MG ELEMENTAL FE) 325 MG: 325 (65 FE) TAB at 17:54

## 2018-01-01 RX ADMIN — CLOPIDOGREL BISULFATE 75 MG: 75 TABLET ORAL at 08:36

## 2018-01-01 RX ADMIN — RANOLAZINE 1000 MG: 500 TABLET, FILM COATED, EXTENDED RELEASE ORAL at 21:50

## 2018-01-01 RX ADMIN — AMLODIPINE BESYLATE 5 MG: 5 TABLET ORAL at 10:11

## 2018-01-01 RX ADMIN — ISOSORBIDE MONONITRATE 30 MG: 30 TABLET ORAL at 10:07

## 2018-01-01 RX ADMIN — Medication 10 ML: at 09:25

## 2018-01-01 RX ADMIN — ISOSORBIDE MONONITRATE 30 MG: 30 TABLET ORAL at 09:22

## 2018-01-01 RX ADMIN — ENOXAPARIN SODIUM 40 MG: 40 INJECTION SUBCUTANEOUS at 21:29

## 2018-01-01 RX ADMIN — METFORMIN HYDROCHLORIDE 1500 MG: 500 TABLET ORAL at 09:02

## 2018-01-01 RX ADMIN — RANOLAZINE 1000 MG: 500 TABLET, FILM COATED, EXTENDED RELEASE ORAL at 21:28

## 2018-01-01 RX ADMIN — TAMSULOSIN HYDROCHLORIDE 0.4 MG: 0.4 CAPSULE ORAL at 09:23

## 2018-01-01 RX ADMIN — MAGNESIUM GLUCONATE 500 MG ORAL TABLET 800 MG: 500 TABLET ORAL at 21:17

## 2018-01-01 RX ADMIN — FERROUS SULFATE TAB 325 MG (65 MG ELEMENTAL FE) 325 MG: 325 (65 FE) TAB at 16:42

## 2018-01-01 RX ADMIN — AMLODIPINE BESYLATE 10 MG: 10 TABLET ORAL at 11:47

## 2018-01-01 RX ADMIN — ISOSORBIDE MONONITRATE 30 MG: 30 TABLET ORAL at 09:00

## 2018-01-01 RX ADMIN — SODIUM CHLORIDE 1000 ML: 9 INJECTION, SOLUTION INTRAVENOUS at 15:43

## 2018-01-01 RX ADMIN — CEFEPIME HYDROCHLORIDE 2 G: 2 INJECTION, POWDER, FOR SOLUTION INTRAVENOUS at 21:54

## 2018-01-01 RX ADMIN — AMLODIPINE BESYLATE 10 MG: 10 TABLET ORAL at 10:31

## 2018-01-01 RX ADMIN — INSULIN LISPRO 6 UNITS: 100 INJECTION, SOLUTION INTRAVENOUS; SUBCUTANEOUS at 17:18

## 2018-01-01 RX ADMIN — SODIUM CHLORIDE: 9 INJECTION, SOLUTION INTRAVENOUS at 08:23

## 2018-01-01 RX ADMIN — CLOPIDOGREL BISULFATE 75 MG: 75 TABLET ORAL at 08:38

## 2018-01-01 RX ADMIN — SODIUM CHLORIDE 355 ML: 9 INJECTION, SOLUTION INTRAVENOUS at 16:45

## 2018-01-01 RX ADMIN — RANOLAZINE 1000 MG: 500 TABLET, FILM COATED, EXTENDED RELEASE ORAL at 09:01

## 2018-01-01 RX ADMIN — GUAIFENESIN 600 MG: 600 TABLET, EXTENDED RELEASE ORAL at 21:50

## 2018-01-01 RX ADMIN — AMLODIPINE BESYLATE 5 MG: 5 TABLET ORAL at 08:44

## 2018-01-01 RX ADMIN — Medication 500000 UNITS: at 18:03

## 2018-01-01 RX ADMIN — FAMOTIDINE 20 MG: 20 TABLET ORAL at 10:31

## 2018-01-01 RX ADMIN — TAMSULOSIN HYDROCHLORIDE 0.4 MG: 0.4 CAPSULE ORAL at 08:50

## 2018-01-01 RX ADMIN — GLIPIZIDE 5 MG: 5 TABLET ORAL at 17:05

## 2018-01-01 RX ADMIN — Medication: at 08:29

## 2018-01-01 RX ADMIN — NEBIVOLOL HYDROCHLORIDE 10 MG: 5 TABLET ORAL at 08:44

## 2018-01-01 RX ADMIN — Medication 2 UNITS: at 08:09

## 2018-01-01 RX ADMIN — AMLODIPINE BESYLATE 10 MG: 10 TABLET ORAL at 09:35

## 2018-01-01 RX ADMIN — Medication 500000 UNITS: at 22:09

## 2018-01-01 RX ADMIN — BACITRACIN ZINC NEOMYCIN SULFATE POLYMYXIN B SULFATE: 400; 3.5; 5 OINTMENT TOPICAL at 20:57

## 2018-01-01 RX ADMIN — Medication: at 20:35

## 2018-01-01 RX ADMIN — GLIPIZIDE 5 MG: 5 TABLET ORAL at 10:30

## 2018-01-01 RX ADMIN — ASPIRIN 81 MG: 81 TABLET, COATED ORAL at 10:11

## 2018-01-01 RX ADMIN — THERA TABS 1 TABLET: TAB at 11:48

## 2018-01-01 RX ADMIN — Medication 10 ML: at 21:50

## 2018-01-01 RX ADMIN — Medication 10 ML: at 10:06

## 2018-01-01 RX ADMIN — MAGNESIUM GLUCONATE 500 MG ORAL TABLET 800 MG: 500 TABLET ORAL at 21:50

## 2018-01-01 RX ADMIN — FERROUS SULFATE TAB 325 MG (65 MG ELEMENTAL FE) 325 MG: 325 (65 FE) TAB at 20:19

## 2018-01-01 RX ADMIN — GLIPIZIDE 5 MG: 5 TABLET ORAL at 08:44

## 2018-01-01 RX ADMIN — NEBIVOLOL HYDROCHLORIDE 10 MG: 5 TABLET ORAL at 09:23

## 2018-01-01 RX ADMIN — Medication 10 ML: at 21:56

## 2018-01-01 RX ADMIN — BACITRACIN, NEOMYCIN, POLYMYXIN B 1 G: 400; 3.5; 5 OINTMENT TOPICAL at 20:45

## 2018-01-01 RX ADMIN — GUAIFENESIN 600 MG: 600 TABLET, EXTENDED RELEASE ORAL at 20:57

## 2018-01-01 RX ADMIN — FUROSEMIDE 40 MG: 40 TABLET ORAL at 08:02

## 2018-01-01 RX ADMIN — MEBROFENIN 9.4 MILLICURIE: 45 INJECTION, POWDER, LYOPHILIZED, FOR SOLUTION INTRAVENOUS at 07:20

## 2018-01-01 RX ADMIN — MAGNESIUM GLUCONATE 500 MG ORAL TABLET 800 MG: 500 TABLET ORAL at 09:00

## 2018-01-01 RX ADMIN — FUROSEMIDE 40 MG: 40 TABLET ORAL at 08:35

## 2018-01-01 RX ADMIN — Medication 10 ML: at 08:35

## 2018-01-01 RX ADMIN — FERROUS SULFATE TAB 325 MG (65 MG ELEMENTAL FE) 325 MG: 325 (65 FE) TAB at 08:35

## 2018-01-01 RX ADMIN — CLOPIDOGREL BISULFATE 75 MG: 75 TABLET ORAL at 09:09

## 2018-01-01 RX ADMIN — ISOSORBIDE MONONITRATE 15 MG: 30 TABLET ORAL at 10:15

## 2018-01-01 RX ADMIN — FERROUS SULFATE TAB 325 MG (65 MG ELEMENTAL FE) 325 MG: 325 (65 FE) TAB at 08:02

## 2018-01-01 RX ADMIN — ENOXAPARIN SODIUM 40 MG: 40 INJECTION SUBCUTANEOUS at 20:51

## 2018-01-01 RX ADMIN — FERROUS SULFATE TAB 325 MG (65 MG ELEMENTAL FE) 325 MG: 325 (65 FE) TAB at 08:36

## 2018-01-01 RX ADMIN — ATORVASTATIN CALCIUM 20 MG: 20 TABLET, FILM COATED ORAL at 09:23

## 2018-01-01 RX ADMIN — GLIPIZIDE 5 MG: 5 TABLET ORAL at 09:09

## 2018-01-01 RX ADMIN — RANOLAZINE 1000 MG: 500 TABLET, FILM COATED, EXTENDED RELEASE ORAL at 21:14

## 2018-01-01 RX ADMIN — Medication 10 ML: at 20:55

## 2018-01-01 RX ADMIN — Medication 2 UNITS: at 13:21

## 2018-01-01 RX ADMIN — ATORVASTATIN CALCIUM 20 MG: 20 TABLET, FILM COATED ORAL at 09:09

## 2018-01-01 RX ADMIN — POLYMYXIN B SULFATE, TRIMETHOPRIM SULFATE 1 DROP: 10000; 1 SOLUTION/ DROPS OPHTHALMIC at 17:01

## 2018-01-01 RX ADMIN — MAGNESIUM GLUCONATE 500 MG ORAL TABLET 800 MG: 500 TABLET ORAL at 23:32

## 2018-01-01 RX ADMIN — BACITRACIN ZINC NEOMYCIN SULFATE POLYMYXIN B SULFATE: 400; 3.5; 5 OINTMENT TOPICAL at 08:05

## 2018-01-01 RX ADMIN — Medication 5 ML: at 09:02

## 2018-01-01 RX ADMIN — CEFTRIAXONE SODIUM 1 G: 1 INJECTION, POWDER, FOR SOLUTION INTRAMUSCULAR; INTRAVENOUS at 16:45

## 2018-01-01 RX ADMIN — ASPIRIN 81 MG 81 MG: 81 TABLET ORAL at 20:19

## 2018-01-01 RX ADMIN — Medication: at 08:42

## 2018-01-01 RX ADMIN — GUAIFENESIN 600 MG: 600 TABLET, EXTENDED RELEASE ORAL at 20:35

## 2018-01-01 RX ADMIN — ATORVASTATIN CALCIUM 20 MG: 20 TABLET, FILM COATED ORAL at 10:05

## 2018-01-01 RX ADMIN — ISOSORBIDE MONONITRATE 30 MG: 30 TABLET ORAL at 08:55

## 2018-01-01 RX ADMIN — POLYMYXIN B SULFATE, TRIMETHOPRIM SULFATE 1 DROP: 10000; 1 SOLUTION/ DROPS OPHTHALMIC at 10:17

## 2018-01-01 RX ADMIN — FERROUS SULFATE TAB 325 MG (65 MG ELEMENTAL FE) 325 MG: 325 (65 FE) TAB at 16:34

## 2018-01-01 RX ADMIN — POLYMYXIN B SULFATE, TRIMETHOPRIM SULFATE 1 DROP: 10000; 1 SOLUTION/ DROPS OPHTHALMIC at 13:11

## 2018-01-01 RX ADMIN — NEBIVOLOL HYDROCHLORIDE 10 MG: 5 TABLET ORAL at 11:45

## 2018-01-01 RX ADMIN — ASPIRIN 81 MG 81 MG: 81 TABLET ORAL at 11:48

## 2018-01-01 RX ADMIN — PANTOPRAZOLE SODIUM 40 MG: 40 TABLET, DELAYED RELEASE ORAL at 06:49

## 2018-01-01 RX ADMIN — Medication 500000 UNITS: at 20:57

## 2018-01-01 RX ADMIN — CEFTRIAXONE SODIUM 1 G: 1 INJECTION, POWDER, FOR SOLUTION INTRAMUSCULAR; INTRAVENOUS at 21:48

## 2018-01-01 RX ADMIN — SODIUM CHLORIDE: 9 INJECTION, SOLUTION INTRAVENOUS at 11:00

## 2018-01-01 RX ADMIN — VANCOMYCIN HYDROCHLORIDE 1000 MG: 1 INJECTION, POWDER, LYOPHILIZED, FOR SOLUTION INTRAVENOUS at 21:02

## 2018-01-01 RX ADMIN — ISOSORBIDE MONONITRATE 30 MG: 30 TABLET ORAL at 09:09

## 2018-01-01 RX ADMIN — ASPIRIN 81 MG 81 MG: 81 TABLET ORAL at 08:44

## 2018-01-01 RX ADMIN — INSULIN LISPRO 5 UNITS: 100 INJECTION, SOLUTION INTRAVENOUS; SUBCUTANEOUS at 20:56

## 2018-01-01 RX ADMIN — ATORVASTATIN CALCIUM 20 MG: 20 TABLET, FILM COATED ORAL at 21:28

## 2018-01-01 RX ADMIN — FERROUS SULFATE TAB 325 MG (65 MG ELEMENTAL FE) 325 MG: 325 (65 FE) TAB at 09:23

## 2018-01-01 RX ADMIN — NEBIVOLOL HYDROCHLORIDE 15 MG: 5 TABLET ORAL at 08:34

## 2018-01-01 RX ADMIN — GLIPIZIDE 5 MG: 5 TABLET ORAL at 08:33

## 2018-01-01 RX ADMIN — BARIUM SULFATE 20 ML: 400 PASTE ORAL at 09:37

## 2018-01-01 RX ADMIN — NEBIVOLOL HYDROCHLORIDE 10 MG: 5 TABLET ORAL at 09:02

## 2018-01-01 RX ADMIN — GUAIFENESIN 600 MG: 600 TABLET, EXTENDED RELEASE ORAL at 21:28

## 2018-01-01 RX ADMIN — ASPIRIN 81 MG 81 MG: 81 TABLET ORAL at 08:38

## 2018-01-01 RX ADMIN — NEBIVOLOL HYDROCHLORIDE 5 MG: 5 TABLET ORAL at 16:21

## 2018-01-01 RX ADMIN — Medication 10.7 MILLICURIE: at 13:00

## 2018-01-01 RX ADMIN — SODIUM CHLORIDE: 9 INJECTION, SOLUTION INTRAVENOUS at 18:20

## 2018-01-01 RX ADMIN — ENOXAPARIN SODIUM 40 MG: 40 INJECTION SUBCUTANEOUS at 01:15

## 2018-01-01 RX ADMIN — PANTOPRAZOLE SODIUM 40 MG: 40 TABLET, DELAYED RELEASE ORAL at 05:24

## 2018-01-01 RX ADMIN — THERA TABS 1 TABLET: TAB at 09:00

## 2018-01-01 RX ADMIN — FUROSEMIDE 40 MG: 40 TABLET ORAL at 20:51

## 2018-01-01 RX ADMIN — SODIUM POLYSTYRENE SULFONATE 15 G: 15 SUSPENSION ORAL; RECTAL at 15:39

## 2018-01-01 RX ADMIN — SODIUM CHLORIDE: 9 INJECTION, SOLUTION INTRAVENOUS at 23:31

## 2018-01-01 RX ADMIN — MAGNESIUM GLUCONATE 500 MG ORAL TABLET 800 MG: 500 TABLET ORAL at 08:51

## 2018-01-01 RX ADMIN — GUAIFENESIN 600 MG: 600 TABLET, EXTENDED RELEASE ORAL at 08:04

## 2018-01-01 RX ADMIN — GABAPENTIN 100 MG: 100 CAPSULE ORAL at 14:53

## 2018-01-01 RX ADMIN — METFORMIN HYDROCHLORIDE 1500 MG: 500 TABLET ORAL at 11:48

## 2018-01-01 RX ADMIN — INSULIN LISPRO 4 UNITS: 100 INJECTION, SOLUTION INTRAVENOUS; SUBCUTANEOUS at 10:34

## 2018-01-01 RX ADMIN — POLYMYXIN B SULFATE, TRIMETHOPRIM SULFATE 1 DROP: 10000; 1 SOLUTION/ DROPS OPHTHALMIC at 20:37

## 2018-01-01 RX ADMIN — GUAIFENESIN 600 MG: 600 TABLET, EXTENDED RELEASE ORAL at 20:45

## 2018-01-01 RX ADMIN — RANOLAZINE 1000 MG: 500 TABLET, FILM COATED, EXTENDED RELEASE ORAL at 09:35

## 2018-01-01 RX ADMIN — RANOLAZINE 1000 MG: 500 TABLET, FILM COATED, EXTENDED RELEASE ORAL at 10:05

## 2018-01-01 RX ADMIN — GUAIFENESIN 600 MG: 600 TABLET, EXTENDED RELEASE ORAL at 10:31

## 2018-01-01 RX ADMIN — FERROUS SULFATE TAB 325 MG (65 MG ELEMENTAL FE) 325 MG: 325 (65 FE) TAB at 10:31

## 2018-01-01 RX ADMIN — NEBIVOLOL HYDROCHLORIDE 10 MG: 5 TABLET ORAL at 10:32

## 2018-01-01 RX ADMIN — ACETAMINOPHEN 650 MG: 325 TABLET ORAL at 23:32

## 2018-01-01 RX ADMIN — ATORVASTATIN CALCIUM 20 MG: 20 TABLET, FILM COATED ORAL at 12:45

## 2018-01-01 RX ADMIN — Medication 10 ML: at 08:39

## 2018-01-01 RX ADMIN — RANOLAZINE 1000 MG: 500 TABLET, FILM COATED, EXTENDED RELEASE ORAL at 09:23

## 2018-01-01 RX ADMIN — AMLODIPINE BESYLATE 10 MG: 10 TABLET ORAL at 08:04

## 2018-01-01 RX ADMIN — INSULIN LISPRO 6 UNITS: 100 INJECTION, SOLUTION INTRAVENOUS; SUBCUTANEOUS at 09:30

## 2018-01-01 RX ADMIN — CLOPIDOGREL BISULFATE 75 MG: 75 TABLET ORAL at 09:34

## 2018-01-01 RX ADMIN — TAMSULOSIN HYDROCHLORIDE 0.4 MG: 0.4 CAPSULE ORAL at 09:35

## 2018-01-01 RX ADMIN — FUROSEMIDE 40 MG: 40 TABLET ORAL at 10:10

## 2018-01-01 RX ADMIN — FERROUS SULFATE TAB 325 MG (65 MG ELEMENTAL FE) 325 MG: 325 (65 FE) TAB at 16:45

## 2018-01-01 RX ADMIN — ATORVASTATIN CALCIUM 20 MG: 20 TABLET, FILM COATED ORAL at 08:35

## 2018-01-01 RX ADMIN — METFORMIN HYDROCHLORIDE 1500 MG: 500 TABLET ORAL at 08:51

## 2018-01-01 RX ADMIN — ISOSORBIDE MONONITRATE 30 MG: 30 TABLET ORAL at 08:02

## 2018-01-01 RX ADMIN — ISOSORBIDE MONONITRATE 30 MG: 30 TABLET ORAL at 10:11

## 2018-01-01 RX ADMIN — Medication: at 10:30

## 2018-01-01 RX ADMIN — Medication 10 ML: at 20:53

## 2018-01-01 RX ADMIN — FUROSEMIDE 40 MG: 40 TABLET ORAL at 21:47

## 2018-01-01 RX ADMIN — AMLODIPINE BESYLATE 5 MG: 5 TABLET ORAL at 08:35

## 2018-01-01 RX ADMIN — GLIPIZIDE 5 MG: 5 TABLET ORAL at 08:36

## 2018-01-01 RX ADMIN — FAMOTIDINE 20 MG: 20 TABLET ORAL at 08:36

## 2018-01-01 RX ADMIN — GABAPENTIN 100 MG: 100 CAPSULE ORAL at 21:46

## 2018-01-01 RX ADMIN — NEBIVOLOL HYDROCHLORIDE 10 MG: 5 TABLET ORAL at 11:48

## 2018-01-01 RX ADMIN — THERA TABS 1 TABLET: TAB at 10:05

## 2018-01-01 RX ADMIN — SODIUM CHLORIDE: 9 INJECTION, SOLUTION INTRAVENOUS at 04:54

## 2018-01-01 RX ADMIN — INSULIN LISPRO 9 UNITS: 100 INJECTION, SOLUTION INTRAVENOUS; SUBCUTANEOUS at 12:58

## 2018-01-01 RX ADMIN — TAMSULOSIN HYDROCHLORIDE 0.4 MG: 0.4 CAPSULE ORAL at 10:06

## 2018-01-01 RX ADMIN — ENOXAPARIN SODIUM 40 MG: 40 INJECTION SUBCUTANEOUS at 21:47

## 2018-01-01 RX ADMIN — ATORVASTATIN CALCIUM 20 MG: 20 TABLET, FILM COATED ORAL at 20:19

## 2018-01-01 RX ADMIN — INSULIN LISPRO 2 UNITS: 100 INJECTION, SOLUTION INTRAVENOUS; SUBCUTANEOUS at 12:23

## 2018-01-01 RX ADMIN — SODIUM CHLORIDE: 9 INJECTION, SOLUTION INTRAVENOUS at 06:06

## 2018-01-01 RX ADMIN — GUAIFENESIN 600 MG: 600 TABLET, EXTENDED RELEASE ORAL at 09:00

## 2018-01-01 RX ADMIN — Medication 1 UNITS: at 13:11

## 2018-01-01 RX ADMIN — RANOLAZINE 1000 MG: 500 TABLET, FILM COATED, EXTENDED RELEASE ORAL at 21:56

## 2018-01-01 RX ADMIN — MAGNESIUM GLUCONATE 500 MG ORAL TABLET 400 MG: 500 TABLET ORAL at 21:46

## 2018-01-01 RX ADMIN — Medication 4 MG: at 12:37

## 2018-01-01 RX ADMIN — BACITRACIN ZINC NEOMYCIN SULFATE POLYMYXIN B SULFATE: 400; 3.5; 5 OINTMENT TOPICAL at 21:28

## 2018-01-01 RX ADMIN — INSULIN LISPRO 1 UNITS: 100 INJECTION, SOLUTION INTRAVENOUS; SUBCUTANEOUS at 20:36

## 2018-01-01 RX ADMIN — ATORVASTATIN CALCIUM 20 MG: 20 TABLET, FILM COATED ORAL at 23:03

## 2018-01-01 RX ADMIN — SODIUM BICARBONATE 650 MG: 650 TABLET ORAL at 12:21

## 2018-01-01 RX ADMIN — PANTOPRAZOLE SODIUM 40 MG: 40 TABLET, DELAYED RELEASE ORAL at 05:05

## 2018-01-01 RX ADMIN — Medication 2 UNITS: at 16:20

## 2018-01-01 ASSESSMENT — ENCOUNTER SYMPTOMS
ABDOMINAL DISTENTION: 1
CONSTIPATION: 0
ABDOMINAL DISTENTION: 0
NAUSEA: 1
CHEST TIGHTNESS: 0
SORE THROAT: 0
SHORTNESS OF BREATH: 0
EYE DISCHARGE: 0
SORE THROAT: 0
ABDOMINAL DISTENTION: 0
COUGH: 0
NAUSEA: 0
ABDOMINAL PAIN: 1
SINUS PRESSURE: 0
ABDOMINAL PAIN: 1
WHEEZING: 0
BLOOD IN STOOL: 0
WHEEZING: 0
BLOOD IN STOOL: 0
DIARRHEA: 0
NAUSEA: 0
EYE ITCHING: 0
RHINORRHEA: 0
STRIDOR: 0
CHEST TIGHTNESS: 0
WHEEZING: 0
APNEA: 0
BACK PAIN: 0
RHINORRHEA: 0
SHORTNESS OF BREATH: 0
COLOR CHANGE: 0
EYE PAIN: 0
ABDOMINAL PAIN: 0
ALLERGIC/IMMUNOLOGIC NEGATIVE: 1
NAUSEA: 0
COUGH: 0
WHEEZING: 0
ABDOMINAL PAIN: 0
PHOTOPHOBIA: 0
EYE REDNESS: 0
VOICE CHANGE: 0
CONSTIPATION: 1
COUGH: 0
COLOR CHANGE: 0
NAUSEA: 0
DIARRHEA: 0
RHINORRHEA: 0
TROUBLE SWALLOWING: 0
CONSTIPATION: 0
FACIAL SWELLING: 0
RHINORRHEA: 0
BACK PAIN: 0
EYE REDNESS: 0
CHOKING: 0
COUGH: 0
COUGH: 0
COUGH: 1
COUGH: 1
WHEEZING: 0
EYE DISCHARGE: 0
WHEEZING: 0
VOMITING: 0
VOMITING: 0
EYE DISCHARGE: 0
SORE THROAT: 0
ABDOMINAL PAIN: 0
EYE REDNESS: 0
ABDOMINAL PAIN: 0
ANAL BLEEDING: 0
ABDOMINAL PAIN: 0
CHEST TIGHTNESS: 0
BACK PAIN: 0
EYE REDNESS: 0
RECTAL PAIN: 0
SHORTNESS OF BREATH: 0
DIARRHEA: 0
EYE DISCHARGE: 0
RHINORRHEA: 0
COLOR CHANGE: 0
SHORTNESS OF BREATH: 0
SHORTNESS OF BREATH: 0
BACK PAIN: 0
CHEST TIGHTNESS: 0
SORE THROAT: 0
ABDOMINAL PAIN: 0
NAUSEA: 0
APNEA: 0
CONSTIPATION: 0
VOMITING: 0
APNEA: 0
DIARRHEA: 1
SORE THROAT: 0
COUGH: 0
COLOR CHANGE: 0
ABDOMINAL DISTENTION: 0
SHORTNESS OF BREATH: 0
DIARRHEA: 0
NAUSEA: 0
CHEST TIGHTNESS: 0
VOMITING: 0
WHEEZING: 0
WHEEZING: 0
SHORTNESS OF BREATH: 0
BACK PAIN: 0
NAUSEA: 0
SHORTNESS OF BREATH: 0
ABDOMINAL DISTENTION: 0
VOMITING: 1
APNEA: 0
DIARRHEA: 0
SHORTNESS OF BREATH: 0

## 2018-01-01 ASSESSMENT — PAIN SCALES - GENERAL
PAINLEVEL_OUTOF10: 0
PAINLEVEL_OUTOF10: 2
PAINLEVEL_OUTOF10: 0
PAINLEVEL_OUTOF10: 0
PAINLEVEL_OUTOF10: 7
PAINLEVEL_OUTOF10: 0
PAINLEVEL_OUTOF10: 8
PAINLEVEL_OUTOF10: 0
PAINLEVEL_OUTOF10: 0
PAINLEVEL_OUTOF10: 6
PAINLEVEL_OUTOF10: 0
PAINLEVEL_OUTOF10: 3
PAINLEVEL_OUTOF10: 3
PAINLEVEL_OUTOF10: 0
PAINLEVEL_OUTOF10: 6
PAINLEVEL_OUTOF10: 0
PAINLEVEL_OUTOF10: 6
PAINLEVEL_OUTOF10: 0
PAINLEVEL_OUTOF10: 3
PAINLEVEL_OUTOF10: 0
PAINLEVEL_OUTOF10: 7
PAINLEVEL_OUTOF10: 0
PAINLEVEL_OUTOF10: 6
PAINLEVEL_OUTOF10: 0
PAINLEVEL_OUTOF10: 6
PAINLEVEL_OUTOF10: 0
PAINLEVEL_OUTOF10: 5
PAINLEVEL_OUTOF10: 0
PAINLEVEL_OUTOF10: 6
PAINLEVEL_OUTOF10: 9

## 2018-01-01 ASSESSMENT — PAIN DESCRIPTION - PAIN TYPE
TYPE: ACUTE PAIN
TYPE_2: ACUTE PAIN
TYPE: ACUTE PAIN
TYPE_2: ACUTE PAIN
TYPE: ACUTE PAIN
TYPE_2: ACUTE PAIN
TYPE_2: ACUTE PAIN
TYPE: CHRONIC PAIN
TYPE: ACUTE PAIN

## 2018-01-01 ASSESSMENT — PAIN SCALES - WONG BAKER
WONGBAKER_NUMERICALRESPONSE: 4
WONGBAKER_NUMERICALRESPONSE: 6
WONGBAKER_NUMERICALRESPONSE: 4

## 2018-01-01 ASSESSMENT — PAIN DESCRIPTION - DESCRIPTORS
DESCRIPTORS: DISCOMFORT
DESCRIPTORS_2: SORE
DESCRIPTORS: SORE
DESCRIPTORS: SHARP
DESCRIPTORS_2: SORE
DESCRIPTORS: SHARP
DESCRIPTORS_2: SORE
DESCRIPTORS: ACHING

## 2018-01-01 ASSESSMENT — PAIN DESCRIPTION - LOCATION
LOCATION_2: THROAT
LOCATION_2: COCCYX
LOCATION: PENIS
LOCATION: KNEE
LOCATION_2: COCCYX
LOCATION: PENIS
LOCATION: HEAD;NECK
LOCATION: PENIS
LOCATION: KNEE
LOCATION: PENIS
LOCATION_2: COCCYX
LOCATION: BUTTOCKS

## 2018-01-01 ASSESSMENT — PAIN DESCRIPTION - INTENSITY
RATING_2: 7
RATING_2: 8

## 2018-01-01 ASSESSMENT — PAIN DESCRIPTION - ONSET: ONSET: ON-GOING

## 2018-01-01 ASSESSMENT — PAIN - FUNCTIONAL ASSESSMENT: PAIN_FUNCTIONAL_ASSESSMENT: 0-10

## 2018-01-01 ASSESSMENT — PAIN DESCRIPTION - ORIENTATION
ORIENTATION: RIGHT;LEFT
ORIENTATION: LEFT

## 2018-01-01 ASSESSMENT — PAIN DESCRIPTION - FREQUENCY: FREQUENCY: CONTINUOUS

## 2018-01-02 ENCOUNTER — TELEPHONE (OUTPATIENT)
Dept: CARDIOLOGY CLINIC | Age: 83
End: 2018-01-02

## 2018-01-02 NOTE — TELEPHONE ENCOUNTER
----- Message from Melissa Pierce MD sent at 1/2/2018  9:06 AM EST -----  Call patient. Hold diuretics. Drink to thirst.  BMP in 3-5 days. F/u next week with Caroline.

## 2018-01-03 ENCOUNTER — APPOINTMENT (OUTPATIENT)
Dept: CT IMAGING | Age: 83
DRG: 641 | End: 2018-01-03
Payer: MEDICARE

## 2018-01-03 ENCOUNTER — APPOINTMENT (OUTPATIENT)
Dept: GENERAL RADIOLOGY | Age: 83
DRG: 641 | End: 2018-01-03
Payer: MEDICARE

## 2018-01-03 ENCOUNTER — HOSPITAL ENCOUNTER (INPATIENT)
Age: 83
LOS: 2 days | Discharge: HOME HEALTH | DRG: 641 | End: 2018-01-05
Attending: FAMILY MEDICINE | Admitting: INTERNAL MEDICINE
Payer: MEDICARE

## 2018-01-03 DIAGNOSIS — R79.89 ELEVATED BRAIN NATRIURETIC PEPTIDE (BNP) LEVEL: ICD-10-CM

## 2018-01-03 DIAGNOSIS — N18.9 CHRONIC KIDNEY DISEASE, UNSPECIFIED CKD STAGE: ICD-10-CM

## 2018-01-03 DIAGNOSIS — R42 DIZZINESS: Primary | ICD-10-CM

## 2018-01-03 DIAGNOSIS — D64.9 ANEMIA, UNSPECIFIED TYPE: ICD-10-CM

## 2018-01-03 DIAGNOSIS — R00.1 BRADYCARDIA: ICD-10-CM

## 2018-01-03 PROBLEM — R55 POSTURAL DIZZINESS WITH NEAR SYNCOPE: Status: ACTIVE | Noted: 2018-01-03

## 2018-01-03 PROBLEM — E11.9 TYPE 2 DIABETES MELLITUS WITHOUT COMPLICATION (HCC): Status: ACTIVE | Noted: 2018-01-03

## 2018-01-03 PROBLEM — I10 HYPERTENSION: Status: ACTIVE | Noted: 2018-01-03

## 2018-01-03 LAB
ABO: NORMAL
ANION GAP SERPL CALCULATED.3IONS-SCNC: 15 MEQ/L (ref 8–16)
ANTIBODY SCREEN: NORMAL
BASOPHILS # BLD: 0.4 %
BASOPHILS ABSOLUTE: 0 THOU/MM3 (ref 0–0.1)
BUN BLDV-MCNC: 52 MG/DL (ref 7–22)
CALCIUM SERPL-MCNC: 9.5 MG/DL (ref 8.5–10.5)
CHLORIDE BLD-SCNC: 102 MEQ/L (ref 98–111)
CO2: 23 MEQ/L (ref 23–33)
CREAT SERPL-MCNC: 1.6 MG/DL (ref 0.4–1.2)
EKG ATRIAL RATE: 44 BPM
EKG P AXIS: 79 DEGREES
EKG P-R INTERVAL: 304 MS
EKG Q-T INTERVAL: 538 MS
EKG QRS DURATION: 102 MS
EKG QTC CALCULATION (BAZETT): 459 MS
EKG R AXIS: 25 DEGREES
EKG T AXIS: 128 DEGREES
EKG VENTRICULAR RATE: 44 BPM
EOSINOPHIL # BLD: 0.2 %
EOSINOPHILS ABSOLUTE: 0 THOU/MM3 (ref 0–0.4)
FLU A ANTIGEN: NEGATIVE
FLU B ANTIGEN: NEGATIVE
GFR SERPL CREATININE-BSD FRML MDRD: 42 ML/MIN/1.73M2
GLUCOSE BLD-MCNC: 164 MG/DL (ref 70–108)
HCT VFR BLD CALC: 29.6 % (ref 42–52)
HEMOCCULT STL QL: NEGATIVE
HEMOGLOBIN: 10 GM/DL (ref 14–18)
INR BLD: 0.97 (ref 0.85–1.13)
LYMPHOCYTES # BLD: 20.2 %
LYMPHOCYTES ABSOLUTE: 1.5 THOU/MM3 (ref 1–4.8)
MCH RBC QN AUTO: 30.8 PG (ref 27–31)
MCHC RBC AUTO-ENTMCNC: 33.9 GM/DL (ref 33–37)
MCV RBC AUTO: 90.8 FL (ref 80–94)
MONOCYTES # BLD: 5.2 %
MONOCYTES ABSOLUTE: 0.4 THOU/MM3 (ref 0.4–1.3)
NUCLEATED RED BLOOD CELLS: 0 /100 WBC
OSMOLALITY CALCULATION: 297.1 MOSMOL/KG (ref 275–300)
PDW BLD-RTO: 13.4 % (ref 11.5–14.5)
PLATELET # BLD: 204 THOU/MM3 (ref 130–400)
PMV BLD AUTO: 7.3 MCM (ref 7.4–10.4)
POTASSIUM SERPL-SCNC: 5.1 MEQ/L (ref 3.5–5.2)
PRO-BNP: 2368 PG/ML (ref 0–1800)
RBC # BLD: 3.26 MILL/MM3 (ref 4.7–6.1)
RH FACTOR: NORMAL
SEG NEUTROPHILS: 74 %
SEGMENTED NEUTROPHILS ABSOLUTE COUNT: 5.6 THOU/MM3 (ref 1.8–7.7)
SODIUM BLD-SCNC: 140 MEQ/L (ref 135–145)
TROPONIN T: < 0.01 NG/ML
TROPONIN T: < 0.01 NG/ML
TSH SERPL DL<=0.05 MIU/L-ACNC: 1.08 UIU/ML (ref 0.4–4.2)
WBC # BLD: 7.6 THOU/MM3 (ref 4.8–10.8)

## 2018-01-03 PROCEDURE — 86900 BLOOD TYPING SEROLOGIC ABO: CPT

## 2018-01-03 PROCEDURE — 2580000003 HC RX 258: Performed by: INTERNAL MEDICINE

## 2018-01-03 PROCEDURE — 84484 ASSAY OF TROPONIN QUANT: CPT

## 2018-01-03 PROCEDURE — 99285 EMERGENCY DEPT VISIT HI MDM: CPT

## 2018-01-03 PROCEDURE — 70450 CT HEAD/BRAIN W/O DYE: CPT

## 2018-01-03 PROCEDURE — 87804 INFLUENZA ASSAY W/OPTIC: CPT

## 2018-01-03 PROCEDURE — 36415 COLL VENOUS BLD VENIPUNCTURE: CPT

## 2018-01-03 PROCEDURE — 6370000000 HC RX 637 (ALT 250 FOR IP): Performed by: INTERNAL MEDICINE

## 2018-01-03 PROCEDURE — 85610 PROTHROMBIN TIME: CPT

## 2018-01-03 PROCEDURE — 2140000000 HC CCU INTERMEDIATE R&B

## 2018-01-03 PROCEDURE — 86901 BLOOD TYPING SEROLOGIC RH(D): CPT

## 2018-01-03 PROCEDURE — 99222 1ST HOSP IP/OBS MODERATE 55: CPT | Performed by: INTERNAL MEDICINE

## 2018-01-03 PROCEDURE — 93005 ELECTROCARDIOGRAM TRACING: CPT

## 2018-01-03 PROCEDURE — 82272 OCCULT BLD FECES 1-3 TESTS: CPT

## 2018-01-03 PROCEDURE — 71045 X-RAY EXAM CHEST 1 VIEW: CPT

## 2018-01-03 PROCEDURE — 84443 ASSAY THYROID STIM HORMONE: CPT

## 2018-01-03 PROCEDURE — 86850 RBC ANTIBODY SCREEN: CPT

## 2018-01-03 PROCEDURE — 83880 ASSAY OF NATRIURETIC PEPTIDE: CPT

## 2018-01-03 PROCEDURE — 80048 BASIC METABOLIC PNL TOTAL CA: CPT

## 2018-01-03 PROCEDURE — 85025 COMPLETE CBC W/AUTO DIFF WBC: CPT

## 2018-01-03 PROCEDURE — 6360000002 HC RX W HCPCS: Performed by: INTERNAL MEDICINE

## 2018-01-03 RX ORDER — ATORVASTATIN CALCIUM 20 MG/1
20 TABLET, FILM COATED ORAL DAILY
Status: DISCONTINUED | OUTPATIENT
Start: 2018-01-04 | End: 2018-01-05 | Stop reason: HOSPADM

## 2018-01-03 RX ORDER — CLOPIDOGREL BISULFATE 75 MG/1
75 TABLET ORAL DAILY
Status: DISCONTINUED | OUTPATIENT
Start: 2018-01-04 | End: 2018-01-05 | Stop reason: HOSPADM

## 2018-01-03 RX ORDER — FUROSEMIDE 20 MG/1
20 TABLET ORAL 2 TIMES DAILY
Status: DISCONTINUED | OUTPATIENT
Start: 2018-01-03 | End: 2018-01-04

## 2018-01-03 RX ORDER — SODIUM CHLORIDE 0.9 % (FLUSH) 0.9 %
10 SYRINGE (ML) INJECTION EVERY 12 HOURS SCHEDULED
Status: DISCONTINUED | OUTPATIENT
Start: 2018-01-03 | End: 2018-01-05 | Stop reason: HOSPADM

## 2018-01-03 RX ORDER — SODIUM CHLORIDE 0.9 % (FLUSH) 0.9 %
10 SYRINGE (ML) INJECTION PRN
Status: DISCONTINUED | OUTPATIENT
Start: 2018-01-03 | End: 2018-01-05 | Stop reason: HOSPADM

## 2018-01-03 RX ORDER — RANOLAZINE 500 MG/1
500 TABLET, EXTENDED RELEASE ORAL 2 TIMES DAILY
Status: DISCONTINUED | OUTPATIENT
Start: 2018-01-03 | End: 2018-01-05 | Stop reason: HOSPADM

## 2018-01-03 RX ORDER — ASPIRIN 81 MG/1
81 TABLET ORAL DAILY
Status: DISCONTINUED | OUTPATIENT
Start: 2018-01-04 | End: 2018-01-05 | Stop reason: HOSPADM

## 2018-01-03 RX ORDER — ISOSORBIDE MONONITRATE 30 MG/1
30 TABLET, EXTENDED RELEASE ORAL DAILY
Status: DISCONTINUED | OUTPATIENT
Start: 2018-01-04 | End: 2018-01-05 | Stop reason: HOSPADM

## 2018-01-03 RX ORDER — HYDRALAZINE HYDROCHLORIDE 10 MG/1
10 TABLET, FILM COATED ORAL 3 TIMES DAILY
Status: DISCONTINUED | OUTPATIENT
Start: 2018-01-03 | End: 2018-01-04

## 2018-01-03 RX ORDER — HYDRALAZINE HYDROCHLORIDE 20 MG/ML
10 INJECTION INTRAMUSCULAR; INTRAVENOUS
Status: DISCONTINUED | OUTPATIENT
Start: 2018-01-03 | End: 2018-01-03

## 2018-01-03 RX ORDER — HYDRALAZINE HYDROCHLORIDE 20 MG/ML
10 INJECTION INTRAMUSCULAR; INTRAVENOUS
Status: DISCONTINUED | OUTPATIENT
Start: 2018-01-03 | End: 2018-01-05 | Stop reason: HOSPADM

## 2018-01-03 RX ORDER — ONDANSETRON 2 MG/ML
4 INJECTION INTRAMUSCULAR; INTRAVENOUS EVERY 6 HOURS PRN
Status: DISCONTINUED | OUTPATIENT
Start: 2018-01-03 | End: 2018-01-05 | Stop reason: HOSPADM

## 2018-01-03 RX ORDER — GLIPIZIDE 5 MG/1
5 TABLET ORAL DAILY
Status: DISCONTINUED | OUTPATIENT
Start: 2018-01-04 | End: 2018-01-05 | Stop reason: HOSPADM

## 2018-01-03 RX ORDER — SODIUM CHLORIDE 450 MG/100ML
INJECTION, SOLUTION INTRAVENOUS CONTINUOUS
Status: DISCONTINUED | OUTPATIENT
Start: 2018-01-03 | End: 2018-01-04

## 2018-01-03 RX ADMIN — SODIUM CHLORIDE: 4.5 INJECTION, SOLUTION INTRAVENOUS at 22:06

## 2018-01-03 RX ADMIN — HYDRALAZINE HYDROCHLORIDE 10 MG: 20 INJECTION INTRAMUSCULAR; INTRAVENOUS at 22:06

## 2018-01-03 RX ADMIN — HYDRALAZINE HYDROCHLORIDE 10 MG: 10 TABLET, FILM COATED ORAL at 22:40

## 2018-01-03 RX ADMIN — RANOLAZINE 500 MG: 500 TABLET, FILM COATED, EXTENDED RELEASE ORAL at 22:40

## 2018-01-03 RX ADMIN — FUROSEMIDE 20 MG: 20 TABLET ORAL at 22:40

## 2018-01-03 RX ADMIN — Medication 800 MG: at 22:40

## 2018-01-03 RX ADMIN — Medication 10 ML: at 22:40

## 2018-01-03 ASSESSMENT — ENCOUNTER SYMPTOMS
EYE DISCHARGE: 0
DIARRHEA: 0
SHORTNESS OF BREATH: 0
SORE THROAT: 0
COUGH: 0
VOMITING: 1
RHINORRHEA: 0
EYE REDNESS: 0
ABDOMINAL PAIN: 0
BACK PAIN: 0
NAUSEA: 0
WHEEZING: 0

## 2018-01-03 NOTE — ED NOTES
Patient resting in bed. Respirations easy and unlabored. No distress noted. Call light within reach.         Keri Lugo RN  01/03/18 1390

## 2018-01-03 NOTE — ED TRIAGE NOTES
Presents to ED with c/o bradycardia and near syncopal episode. Home health nurse was with the patient and he almost passed out. HR was checked and it was 40s. Patient c/o dizziness at this time. Respirations easy and unlabored. HR 44. Dr. Tom Cedillo notified. Patient vomited 2 times in route.

## 2018-01-04 ENCOUNTER — TELEPHONE (OUTPATIENT)
Dept: CARDIOLOGY CLINIC | Age: 83
End: 2018-01-04

## 2018-01-04 ENCOUNTER — APPOINTMENT (OUTPATIENT)
Dept: MRI IMAGING | Age: 83
DRG: 641 | End: 2018-01-04
Payer: MEDICARE

## 2018-01-04 PROBLEM — E86.0 DEHYDRATION: Status: ACTIVE | Noted: 2018-01-04

## 2018-01-04 LAB
ANION GAP SERPL CALCULATED.3IONS-SCNC: 14 MEQ/L (ref 8–16)
BACTERIA: ABNORMAL /HPF
BILIRUBIN URINE: NEGATIVE
BLOOD, URINE: NEGATIVE
BUN BLDV-MCNC: 45 MG/DL (ref 7–22)
CALCIUM SERPL-MCNC: 9.6 MG/DL (ref 8.5–10.5)
CASTS 2: ABNORMAL /LPF
CASTS UA: ABNORMAL /LPF
CHARACTER, URINE: ABNORMAL
CHLORIDE BLD-SCNC: 101 MEQ/L (ref 98–111)
CO2: 24 MEQ/L (ref 23–33)
COLOR: YELLOW
CREAT SERPL-MCNC: 1.3 MG/DL (ref 0.4–1.2)
CRYSTALS, UA: ABNORMAL
EPITHELIAL CELLS, UA: ABNORMAL /HPF
GFR SERPL CREATININE-BSD FRML MDRD: 53 ML/MIN/1.73M2
GLUCOSE BLD-MCNC: 105 MG/DL (ref 70–108)
GLUCOSE URINE: NEGATIVE MG/DL
HCT VFR BLD CALC: 31.1 % (ref 42–52)
HEMOGLOBIN: 11.2 GM/DL (ref 14–18)
KETONES, URINE: NEGATIVE
LEUKOCYTE ESTERASE, URINE: NEGATIVE
MCH RBC QN AUTO: 32.3 PG (ref 27–31)
MCHC RBC AUTO-ENTMCNC: 35.9 GM/DL (ref 33–37)
MCV RBC AUTO: 89.8 FL (ref 80–94)
MISCELLANEOUS 2: ABNORMAL
NITRITE, URINE: NEGATIVE
PDW BLD-RTO: 13.1 % (ref 11.5–14.5)
PH UA: 6
PLATELET # BLD: 207 THOU/MM3 (ref 130–400)
PMV BLD AUTO: 7.3 MCM (ref 7.4–10.4)
POTASSIUM SERPL-SCNC: 4.8 MEQ/L (ref 3.5–5.2)
PROTEIN UA: NEGATIVE
RBC # BLD: 3.46 MILL/MM3 (ref 4.7–6.1)
RBC URINE: ABNORMAL /HPF
RENAL EPITHELIAL, UA: ABNORMAL
SODIUM BLD-SCNC: 139 MEQ/L (ref 135–145)
SPECIFIC GRAVITY, URINE: 1.01 (ref 1–1.03)
TROPONIN T: < 0.01 NG/ML
TROPONIN T: < 0.01 NG/ML
UROBILINOGEN, URINE: 0.2 EU/DL
WBC # BLD: 7.8 THOU/MM3 (ref 4.8–10.8)
WBC UA: ABNORMAL /HPF
YEAST: ABNORMAL

## 2018-01-04 PROCEDURE — 80048 BASIC METABOLIC PNL TOTAL CA: CPT

## 2018-01-04 PROCEDURE — 85027 COMPLETE CBC AUTOMATED: CPT

## 2018-01-04 PROCEDURE — 36415 COLL VENOUS BLD VENIPUNCTURE: CPT

## 2018-01-04 PROCEDURE — 99232 SBSQ HOSP IP/OBS MODERATE 35: CPT | Performed by: INTERNAL MEDICINE

## 2018-01-04 PROCEDURE — 97162 PT EVAL MOD COMPLEX 30 MIN: CPT

## 2018-01-04 PROCEDURE — 6360000002 HC RX W HCPCS: Performed by: INTERNAL MEDICINE

## 2018-01-04 PROCEDURE — 6370000000 HC RX 637 (ALT 250 FOR IP): Performed by: INTERNAL MEDICINE

## 2018-01-04 PROCEDURE — 2580000003 HC RX 258: Performed by: INTERNAL MEDICINE

## 2018-01-04 PROCEDURE — 97110 THERAPEUTIC EXERCISES: CPT

## 2018-01-04 PROCEDURE — G8979 MOBILITY GOAL STATUS: HCPCS

## 2018-01-04 PROCEDURE — 2140000000 HC CCU INTERMEDIATE R&B

## 2018-01-04 PROCEDURE — 81001 URINALYSIS AUTO W/SCOPE: CPT

## 2018-01-04 PROCEDURE — 97165 OT EVAL LOW COMPLEX 30 MIN: CPT

## 2018-01-04 PROCEDURE — 70551 MRI BRAIN STEM W/O DYE: CPT

## 2018-01-04 PROCEDURE — 84484 ASSAY OF TROPONIN QUANT: CPT

## 2018-01-04 PROCEDURE — 97535 SELF CARE MNGMENT TRAINING: CPT

## 2018-01-04 PROCEDURE — 70547 MR ANGIOGRAPHY NECK W/O DYE: CPT

## 2018-01-04 PROCEDURE — G8978 MOBILITY CURRENT STATUS: HCPCS

## 2018-01-04 RX ORDER — OXYCODONE AND ACETAMINOPHEN 7.5; 325 MG/1; MG/1
1 TABLET ORAL EVERY 8 HOURS PRN
Status: DISCONTINUED | OUTPATIENT
Start: 2018-01-04 | End: 2018-01-05 | Stop reason: HOSPADM

## 2018-01-04 RX ORDER — HYDRALAZINE HYDROCHLORIDE 10 MG/1
10 TABLET, FILM COATED ORAL ONCE
Status: COMPLETED | OUTPATIENT
Start: 2018-01-04 | End: 2018-01-04

## 2018-01-04 RX ORDER — HYDRALAZINE HYDROCHLORIDE 25 MG/1
25 TABLET, FILM COATED ORAL EVERY 6 HOURS SCHEDULED
Status: DISCONTINUED | OUTPATIENT
Start: 2018-01-04 | End: 2018-01-05

## 2018-01-04 RX ORDER — ACETAMINOPHEN 325 MG/1
650 TABLET ORAL EVERY 4 HOURS PRN
Status: DISCONTINUED | OUTPATIENT
Start: 2018-01-04 | End: 2018-01-05 | Stop reason: HOSPADM

## 2018-01-04 RX ORDER — FUROSEMIDE 20 MG/1
20 TABLET ORAL DAILY
Status: DISCONTINUED | OUTPATIENT
Start: 2018-01-05 | End: 2018-01-05 | Stop reason: HOSPADM

## 2018-01-04 RX ORDER — HEPARIN SODIUM 5000 [USP'U]/ML
5000 INJECTION, SOLUTION INTRAVENOUS; SUBCUTANEOUS 2 TIMES DAILY
Status: DISCONTINUED | OUTPATIENT
Start: 2018-01-04 | End: 2018-01-05 | Stop reason: HOSPADM

## 2018-01-04 RX ORDER — NEBIVOLOL 5 MG/1
10 TABLET ORAL DAILY
Status: DISCONTINUED | OUTPATIENT
Start: 2018-01-04 | End: 2018-01-05 | Stop reason: HOSPADM

## 2018-01-04 RX ORDER — TRAMADOL HYDROCHLORIDE 50 MG/1
50 TABLET ORAL EVERY 6 HOURS PRN
Status: DISCONTINUED | OUTPATIENT
Start: 2018-01-04 | End: 2018-01-05 | Stop reason: HOSPADM

## 2018-01-04 RX ADMIN — RANOLAZINE 500 MG: 500 TABLET, FILM COATED, EXTENDED RELEASE ORAL at 10:07

## 2018-01-04 RX ADMIN — HYDRALAZINE HYDROCHLORIDE 25 MG: 25 TABLET, FILM COATED ORAL at 05:47

## 2018-01-04 RX ADMIN — HYDRALAZINE HYDROCHLORIDE 10 MG: 10 TABLET, FILM COATED ORAL at 00:40

## 2018-01-04 RX ADMIN — ISOSORBIDE MONONITRATE 30 MG: 30 TABLET ORAL at 10:07

## 2018-01-04 RX ADMIN — HYDRALAZINE HYDROCHLORIDE 25 MG: 25 TABLET, FILM COATED ORAL at 17:11

## 2018-01-04 RX ADMIN — HEPARIN SODIUM 5000 UNITS: 5000 INJECTION, SOLUTION INTRAVENOUS; SUBCUTANEOUS at 20:54

## 2018-01-04 RX ADMIN — Medication 10 ML: at 10:09

## 2018-01-04 RX ADMIN — FUROSEMIDE 20 MG: 20 TABLET ORAL at 10:08

## 2018-01-04 RX ADMIN — SODIUM CHLORIDE: 4.5 INJECTION, SOLUTION INTRAVENOUS at 12:34

## 2018-01-04 RX ADMIN — RANOLAZINE 500 MG: 500 TABLET, FILM COATED, EXTENDED RELEASE ORAL at 20:54

## 2018-01-04 RX ADMIN — HYDRALAZINE HYDROCHLORIDE 10 MG: 20 INJECTION INTRAMUSCULAR; INTRAVENOUS at 04:00

## 2018-01-04 RX ADMIN — CLOPIDOGREL 75 MG: 75 TABLET, FILM COATED ORAL at 10:08

## 2018-01-04 RX ADMIN — Medication 10 ML: at 20:55

## 2018-01-04 RX ADMIN — NEBIVOLOL HYDROCHLORIDE 10 MG: 5 TABLET ORAL at 17:14

## 2018-01-04 RX ADMIN — Medication 800 MG: at 10:07

## 2018-01-04 RX ADMIN — Medication 800 MG: at 20:54

## 2018-01-04 RX ADMIN — HYDRALAZINE HYDROCHLORIDE 10 MG: 20 INJECTION INTRAMUSCULAR; INTRAVENOUS at 20:54

## 2018-01-04 RX ADMIN — HYDRALAZINE HYDROCHLORIDE 25 MG: 25 TABLET, FILM COATED ORAL at 23:31

## 2018-01-04 RX ADMIN — ASPIRIN 81 MG: 81 TABLET, COATED ORAL at 10:07

## 2018-01-04 RX ADMIN — ATORVASTATIN CALCIUM 20 MG: 20 TABLET, FILM COATED ORAL at 20:54

## 2018-01-04 RX ADMIN — GLIPIZIDE 5 MG: 5 TABLET ORAL at 10:08

## 2018-01-04 RX ADMIN — HYDRALAZINE HYDROCHLORIDE 25 MG: 25 TABLET, FILM COATED ORAL at 10:06

## 2018-01-04 RX ADMIN — ACETAMINOPHEN 650 MG: 325 TABLET ORAL at 23:31

## 2018-01-04 ASSESSMENT — PAIN SCALES - GENERAL
PAINLEVEL_OUTOF10: 3
PAINLEVEL_OUTOF10: 6

## 2018-01-04 NOTE — ED NOTES
Patient resting in bed. Alert and oriented with easy respirations. Patient position changed for comfort. Vitals as noted. No needs voiced at this time. Updated on plan of care. Side rails up x2. Call light in reach.         Fredy Rodriges RN  01/03/18 [id number]

## 2018-01-04 NOTE — PLAN OF CARE
Problem: Falls - Risk of  Goal: Absence of falls  Outcome: Ongoing  Patient free of falls. Call light within reach. Bed in lowest position. Nonskid socks on. Bed alarm on. Patient able to ambulate with the assist of one with a walker. Hourly rounding continues. Problem: Discharge Planning:  Goal: Discharged to appropriate level of care  Discharged to appropriate level of care  Outcome: Ongoing  Patient plans to be discharged back home to [facility]. Patient has home health nurse visit him once a day. Appropriate for his level of care. Problem: Cardiac Output - Decreased:  Goal: Hemodynamic stability will improve  Hemodynamic stability will improve  Outcome: Ongoing  Patient's vitals stable. Patient remains in normal sinus/sinus samm. Blood pressure high but slowly decreasing with hydralazine. .45 running at 75ml/hr due to increased creatinine. Will continue to monitor with cardiac telemetry. Problem: Pain:  Goal: Pain level will decrease  Pain level will decrease  Outcome: Ongoing  Patient denies any pain during this shift. Will continue to monitor. Comments: Care plan reviewed with patient. Patient verbalized understanding of the plan of care and contribute to goal setting.

## 2018-01-04 NOTE — PROGRESS NOTES
[facility]  INPATIENT OCCUPATIONAL THERAPY  [facility] CCU-STEPDOWN 3B  EVALUATION    Time:  Time In: 1346  Time Out: 1220  Timed Code Treatment Minutes: 10 Minutes  Minutes: 25          Date: 2018  Patient Name: Cleve Bennett,   Gender: male      MRN: [id number]  : 1935  (80 y.o.)  Referring Practitioner: Dr. Maricarmen Clark   Diagnosis: Postural dizzines near syncope   Additional Pertinent Hx: 80 y.o. male who presents to the Emergency Department via EMS for the evaluation of dizziness. The patient states he started feeling dizzy today when the nurse was evaluating him. He states he feels a little weak, but he is mostly dizzy. Patient states he vomited today and felt better afterwards. He states he can't hear out of his right ear and he has bilateral feet swelling. He denies headache, vision changes, speech difficulty, chest pain, shortness of breath, or abdominal pain. He denies a history of his heart rate being low. He has had bypass surgery, but denies having stents in his heart. The patient states he walks with a walker. CT head negative     Restrictions/Precautions:  Restrictions/Precautions: General Precautions, Fall Risk                      Past Medical History:   Diagnosis Date    Hyperlipidemia     Hypertension     Peripheral vascular disease ([address]     Type 2 diabetes mellitus without complication ([address]      Past Surgical History:   Procedure Laterality Date    CORONARY ARTERY BYPASS GRAFT             Subjective  Chart Reviewed: Yes (Orders, imaging, notes )  Patient assessed for rehabilitation services?: Yes    Subjective: Pt pleasant and cooperative. BP in sitting 145/64. BP upon standing dropped to 116/58. RN notified.      General:  Overall Orientation Status: Within Normal Limits    Vision: Within Functional Limits    Hearing: Within functional limits         Pain:  Pain Assessment  Patient Currently in Pain: Denies       Social/Functional:  Lives With: Alone  Type of Home: Apartment  Home Layout: One level  Home Access: Level entry  Home Equipment: Rolling walker, 4 wheeled walker, Cane     Bathroom Shower/Tub: Walk-in shower, Shower chair with back  [employer]: Handicap height  Bathroom Equipment: Grab bars in shower, Grab bars around toilet       ADL Assistance: Independent  Homemaking Assistance: [address] [place]: Independent  Transfer Assistance: Independent    Active : No  Additional Comments: Pt states he amb with rollator, has nurse that comes out every Wednesday; pt has 2 cousins that live locally that assist with transportation    Objective                                                                                                        RUE Tone: Normotonic  LUE Tone: Normotonic                    ADL  Grooming: Contact guard assistance  LE Dressing: Moderate assistance  Additional Comments: Education provided on sock aid and LHAE avail           Transfers  Sit to stand: Contact guard assistance  Stand to sit: Contact guard assistance  Transfer Comments: cues needed for hand placement   Toilet Transfers  Toilet - Technique: Ambulating  Equipment Used: Standard toilet  Toilet Transfer: Contact guard assistance    Balance  Sitting Balance: Stand by assistance  Standing Balance: Contact guard assistance     Time: 1 min      Functional Mobility  Functional - Mobility Device: Rolling Walker  Activity: To/from bathroom  Assist Level: Contact guard assistance  Functional Mobility Comments: slight recurvatum on R knee       Activity Tolerance:  Activity Tolerance: Patient Tolerated treatment well    Treatment Initiated:  Pt completed toilet transfer and grooming tasks with CGA, tolerating standing at sink with CGA and no LOB. Pt doffed socks with mod A. Education provided on sock aid available and pt interested in trialing. Made plans to complete further education next session with Cintia Weber.      Assessment:     Performance deficits /

## 2018-01-04 NOTE — CARE COORDINATION
1/4/18, 12:42 PM  [facility]       Admitted from: ED 1/3/2018/ [place] day: 1   Location: [address] [place] [address]- Reason for admit: Postural dizziness with near syncope [R42, R55] Status: inpt  Admit order signed?: yes  PMH:  has a past medical history of Hyperlipidemia; Hypertension; Peripheral vascular disease ([facility] [address] and Type 2 diabetes mellitus without complication ([facility] [address]   Medications:  Scheduled Meds:   hydrALAZINE  25 mg Oral 4 times per day    aspirin  81 mg Oral Daily    atorvastatin  20 mg Oral Daily    clopidogrel  75 mg Oral Daily    furosemide  20 mg Oral BID    glipiZIDE  5 mg Oral Daily    isosorbide mononitrate  30 mg Oral Daily    magnesium oxide  800 mg Oral BID    sodium chloride flush  10 mL Intravenous 2 times per day    ranolazine  500 mg Oral BID     Continuous Infusions:   sodium chloride 75 mL/hr at 01/04/18 1234      Pertinent Info/Orders/Treatment Plan: IV fluids, Echo showed EF 55-60%, CT head (-), Flu (-), pt up with walker, PT/OT  Diet: DIET CARB CONTROL; No Added Salt (3-4 GM)   Vital Signs: BP (!) 112/54   Pulse 55   Temp 97.6 °F (36.4 °C) (Oral)   Resp 16   Ht 5' 9\" (1.753 m)   Wt 171 lb 9.6 oz (77.8 kg)   SpO2 99%   BMI 25.34 kg/m²   DVT Prophylaxis: SCDs  Influenza Vaccination Screening Completed: yes  Pneumonia Vaccination Screening Completed: yes  Core measures: monitoring  PCP: Mariely Kowalski CNP  Readmission: no  Risk Score: 22     Discharge Planning  Current Residence:  Assisted living  Living Arrangements:  Alone   Support Systems:  Family Members  Current Services PTA:     Potential Assistance Needed:  Meals On Wheels  Potential Assistance Purchasing Medications:  No  Does patient want to participate in local refill/ meds to beds program?  No  Type of Home Care Services:  Meals on Wheels, Nursing Services  Patient [facility] to be [facility] to:  [facility]  Expected Discharge date:  01/05/18  Follow Up Appointment: Best Day/ Time: Tuesday

## 2018-01-04 NOTE — H&P
History & Physical        Patient:  Juan Nieto  YOB: 1935    MRN: [id number]     Acct: [de-identified]    PCP: Ebony Collins CNP    Date of Admission: 1/3/2018    Date of Service: Pt seen/examined on  and Admitted to Inpatient with expected LOS greater than two midnights due to medical therapy. Chief Complaint:  Dizziness. History Of Present Illness:      80 y.o. male who was noted by the nurse at his nursing home to develop near syncope. Patient only complained of dizziness. He has known bilateral leg swelling and chronically takes Lasix, hydrochlorothiazide and other blood pressure medications. Additionally patient's admits to drinking mostly coffee, and occasionally pop. Patient related that he had an emesis earlier, after which the dizziness seemed to improve somewhat. In the emergency department his blood pressures were noted to be elevated, varying between 160/70 and 190/90. Patient's blood counts showed a hematocrit of 29, WBC of 7000. Pro BNP was greater than 2300, not abnormal.  Patient's serum chemistry had a potassium of 5.1. The BUN was 52 and creatinine 1.6. Less than 2 months ago patient's BUN was 17 and creatinine 0.8. Past Medical History:          Diagnosis Date    Hyperlipidemia     Hypertension     Peripheral vascular disease ([facility] [address]     Type 2 diabetes mellitus without complication ([facility] [address]        Past Surgical History:          Procedure Laterality Date    CORONARY ARTERY BYPASS GRAFT         Medications Prior to Admission:      Prior to Admission medications    Medication Sig Start Date End Date Taking?  Authorizing Provider   hydrALAZINE (APRESOLINE) 10 MG tablet Take 1 tablet by mouth 3 times daily 12/27/17  Yes Caroline Quintero CNP   potassium chloride (KLOR-CON M) 20 MEQ extended release tablet Take 0.5 tablets by mouth daily 12/27/17  Yes Caroline Quintero CNP   furosemide (LASIX) 20 MG tablet Take 1 tablet by mouth 2 times daily 12/27/17

## 2018-01-04 NOTE — PROGRESS NOTES
rhythm with normal S1/S2 without murmurs, rubs or gallops. Abdomen: Soft, non-tender, non-distended with normal bowel sounds. Musculoskeletal: No clubbing, cyanosis or edema bilaterally. Full range of motion without deformity. Skin: Skin color, texture, turgor normal.  No rashes or lesions. Neurologic:  Neurovascularly intact without any focal sensory/motor deficits. Cranial nerves: II-XII intact, grossly non-focal.  Finger to nose nl; no facial assymetry; no drift. Gait steady. No weakness  Psychiatric: Alert and oriented, thought content appropriate, normal insight        Labs:   Recent Labs      01/03/18   1522  01/04/18   0347   WBC  7.6  7.8   HGB  10.0*  11.2*   HCT  29.6*  31.1*   PLT  204  207     Recent Labs      01/03/18   1522  01/04/18   0347   NA  140  139   K  5.1  4.8   CL  102  101   CO2  23  24   BUN  52*  45*   CREATININE  1.6*  1.3*   CALCIUM  9.5  9.6     No results for input(s): AST, ALT, BILIDIR, BILITOT, ALKPHOS in the last 72 hours. Recent Labs      01/03/18   1801   INR  0.97     No results for input(s): Zonia Long in the last 72 hours. Urinalysis:    Lab Results   Component Value Date    NITRU NEGATIVE 01/04/2018    WBCUA NONE SEEN 01/04/2018    BACTERIA NONE 01/04/2018    RBCUA 0-2 01/04/2018    BLOODU NEGATIVE 01/04/2018    GLUCOSEU NEGATIVE 01/04/2018       Radiology:  CT HEAD WO CONTRAST   Final Result      Senescent changes are present without acute intracranial abnormality identified. **This report has been created using voice recognition software. It may contain minor errors which are inherent in voice recognition technology. **      Final report electronically signed by Dr. Stella Lewis on 1/3/2018 4:47 PM      XR CHEST PORTABLE   Final Result   No acute cardiopulmonary disease            **This report has been created using voice recognition software. It may contain minor errors which are inherent in voice recognition technology. **      Final report electronically signed by Dr. Del Yanez on 1/3/2018 2:46 PM      MRI Brain WO Contrast    (Results Pending)   MRA NECK WO CONTRAST    (Results Pending)       Diet: DIET CARB CONTROL; No Added Salt (3-4 GM)    DVT prophylaxis: [] Lovenox                                 [] SCDs                                 [x] SQ Heparin                                 [] Encourage ambulation           [] Already on Anticoagulation     Disposition:    [x] Home       [] TCU       [] Rehab       [] Psych       [] SNF       [] [facility]       [] Other-    Code Status: Full Code    PT/OT Eval Status:  ordered      Assessment/Plan:    Anticipated Discharge in : 24 hr    C/Reece Leung 1106 Problems    Diagnosis Date Noted    Dehydration [E86.0] 01/04/2018    Postural dizziness with near syncope [R42, R55] 01/03/2018    Type 2 diabetes mellitus without complication ([facility] [address] [O26.4] 01/03/2018    Hypertension [I10] 01/03/2018    Prerenal azotemia [R79.89] 01/03/2018       1. Vertigo- will do mri in view of risk of vascular dz  2. PT/OT  3.  Likely home am        Electronically signed by Melly Hodges MD on 1/4/2018 at 2:32 PM

## 2018-01-04 NOTE — PROGRESS NOTES
Characteristics: Pt admitted due to dizziness. Pt tolerates session fair, limited by weakness and impaired endurance. Pt demonstrates decreased strength, bed mobility, transfers, balance, activity tolerance and gait indicating the need for skilled PT services. Decision Making: High Complexitybased on patient assessment and decision making process of determining plan of care and establishing reasonable expectations for measurable functional outcomes    REQUIRES PT FOLLOW UP: Yes  Discharge Recommendations: Continue to assess pending progress, Patient would benefit from continued therapy after discharge    Patient Education:  Patient Education: POC    Equipment Recommendations:  Equipment Needed: No    Safety:  Type of devices: Call light within reach, Patient at risk for falls, Left in chair, All fall risk precautions in place, Gait belt  Restraints  Initially in place: No    Plan:  Times per week: 5 X GM  Times per day: Daily  Specific instructions for Next Treatment: B LE strengthening, bed mobility, transfer training, gait training. Current Treatment Recommendations: Functional Mobility Training, Strengthening, Neuromuscular Re-education, Home Exercise Program, Transfer Training, Balance Training, Endurance Training, Gait Training, Safety Education & Training    Goals:  Patient goals : To go home. Short term goals  Time Frame for Short term goals: 2 weeks  Short term goal 1: Pt to transfer supine <--> sit SBA to enable pt to get in/out of bed. Short term goal 2: Pt to transfer sit <--> stand SBA for increased functional mobility. Short term goal 3: Pt to ambulate 75 feet with RW SBA for household ambulation. Long term goals  Time Frame for Long term goals : NA due to short length of stay. Evaluation Complexity: Based on the findings of patient history, examination, clinical presentation, and decision making during this evaluation, the evaluation of Lincoln Organ  is of medium complexity.     PT

## 2018-01-05 VITALS
WEIGHT: 170.3 LBS | HEART RATE: 55 BPM | RESPIRATION RATE: 12 BRPM | DIASTOLIC BLOOD PRESSURE: 65 MMHG | BODY MASS INDEX: 25.22 KG/M2 | HEIGHT: 69 IN | SYSTOLIC BLOOD PRESSURE: 141 MMHG | OXYGEN SATURATION: 99 % | TEMPERATURE: 98.4 F

## 2018-01-05 LAB
ANION GAP SERPL CALCULATED.3IONS-SCNC: 12 MEQ/L (ref 8–16)
AVERAGE GLUCOSE: 96 MG/DL (ref 70–126)
BUN BLDV-MCNC: 39 MG/DL (ref 7–22)
CALCIUM SERPL-MCNC: 9.1 MG/DL (ref 8.5–10.5)
CHLORIDE BLD-SCNC: 104 MEQ/L (ref 98–111)
CO2: 23 MEQ/L (ref 23–33)
CREAT SERPL-MCNC: 1.3 MG/DL (ref 0.4–1.2)
GFR SERPL CREATININE-BSD FRML MDRD: 53 ML/MIN/1.73M2
GLUCOSE BLD-MCNC: 183 MG/DL (ref 70–108)
HBA1C MFR BLD: 5.2 % (ref 4.4–6.4)
POTASSIUM SERPL-SCNC: 4.1 MEQ/L (ref 3.5–5.2)
SODIUM BLD-SCNC: 139 MEQ/L (ref 135–145)

## 2018-01-05 PROCEDURE — 6370000000 HC RX 637 (ALT 250 FOR IP): Performed by: INTERNAL MEDICINE

## 2018-01-05 PROCEDURE — 99238 HOSP IP/OBS DSCHRG MGMT 30/<: CPT | Performed by: INTERNAL MEDICINE

## 2018-01-05 PROCEDURE — 80048 BASIC METABOLIC PNL TOTAL CA: CPT

## 2018-01-05 PROCEDURE — 97110 THERAPEUTIC EXERCISES: CPT

## 2018-01-05 PROCEDURE — 97116 GAIT TRAINING THERAPY: CPT

## 2018-01-05 PROCEDURE — 97535 SELF CARE MNGMENT TRAINING: CPT

## 2018-01-05 PROCEDURE — 36415 COLL VENOUS BLD VENIPUNCTURE: CPT

## 2018-01-05 PROCEDURE — 6360000002 HC RX W HCPCS: Performed by: INTERNAL MEDICINE

## 2018-01-05 PROCEDURE — 83036 HEMOGLOBIN GLYCOSYLATED A1C: CPT

## 2018-01-05 RX ORDER — AMLODIPINE BESYLATE 5 MG/1
5 TABLET ORAL DAILY
Status: DISCONTINUED | OUTPATIENT
Start: 2018-01-05 | End: 2018-01-05 | Stop reason: HOSPADM

## 2018-01-05 RX ORDER — AMLODIPINE BESYLATE 5 MG/1
5 TABLET ORAL DAILY
Qty: 30 TABLET | Refills: 3 | Status: SHIPPED | OUTPATIENT
Start: 2018-01-06 | End: 2018-01-17 | Stop reason: SDUPTHER

## 2018-01-05 RX ADMIN — ATORVASTATIN CALCIUM 20 MG: 20 TABLET, FILM COATED ORAL at 10:13

## 2018-01-05 RX ADMIN — HYDRALAZINE HYDROCHLORIDE 25 MG: 25 TABLET, FILM COATED ORAL at 06:55

## 2018-01-05 RX ADMIN — NEBIVOLOL HYDROCHLORIDE 10 MG: 5 TABLET ORAL at 10:13

## 2018-01-05 RX ADMIN — FUROSEMIDE 20 MG: 20 TABLET ORAL at 10:13

## 2018-01-05 RX ADMIN — CLOPIDOGREL 75 MG: 75 TABLET, FILM COATED ORAL at 10:13

## 2018-01-05 RX ADMIN — RANOLAZINE 500 MG: 500 TABLET, FILM COATED, EXTENDED RELEASE ORAL at 10:13

## 2018-01-05 RX ADMIN — ISOSORBIDE MONONITRATE 30 MG: 30 TABLET ORAL at 10:13

## 2018-01-05 RX ADMIN — HEPARIN SODIUM 5000 UNITS: 5000 INJECTION, SOLUTION INTRAVENOUS; SUBCUTANEOUS at 10:14

## 2018-01-05 RX ADMIN — ASPIRIN 81 MG: 81 TABLET, COATED ORAL at 10:13

## 2018-01-05 RX ADMIN — AMLODIPINE BESYLATE 5 MG: 5 TABLET ORAL at 14:40

## 2018-01-05 RX ADMIN — HYDRALAZINE HYDROCHLORIDE 10 MG: 20 INJECTION INTRAMUSCULAR; INTRAVENOUS at 11:31

## 2018-01-05 RX ADMIN — GLIPIZIDE 5 MG: 5 TABLET ORAL at 10:13

## 2018-01-05 RX ADMIN — Medication 800 MG: at 10:14

## 2018-01-05 ASSESSMENT — PAIN SCALES - GENERAL: PAINLEVEL_OUTOF10: 7

## 2018-01-05 NOTE — PLAN OF CARE
Problem: Discharge Planning:  Goal: Discharged to appropriate level of care  Discharged to appropriate level of care   Outcome: Ongoing  Patient plans return to [facility] with current services. See SW note for details.

## 2018-01-05 NOTE — DISCHARGE INSTR - DIET

## 2018-01-05 NOTE — PROGRESS NOTES
[address] [address]  INPATIENT PHYSICAL THERAPY  DAILY NOTE  STRZ CCU-STEPDOWN 3B    Time In: 926  Time Out: 1566  Timed Code Treatment Minutes: 24 Minutes  Minutes: 24          Date: 2018  Patient Name: Yakelin Dyer,  Gender:  male        MRN: [id number]  : 1935  (80 y.o.)     Referring Practitioner: Clair Ernandez MD  Diagnosis: Postural dizziness with near syncope  Additional Pertinent Hx: 80 y.o. male who was noted by the nurse at his nursing home to develop near syncope. Patient only complained of dizziness. He has known bilateral leg swelling and chronically takes Lasix, hydrochlorothiazide and other blood pressure medications. Additionally patient's admits to drinking mostly coffee, and occasionally pop. Patient related that he had an emesis earlier, after which the dizziness seemed to improve somewhat. In the emergency department his blood pressures were noted to be elevated, varying between 160/70 and 190/90, HR in 40's. Past Medical History:   Diagnosis Date    Hyperlipidemia     Hypertension     Peripheral vascular disease ([facility] [address]     Type 2 diabetes mellitus without complication ([facility] [address]      Past Surgical History:   Procedure Laterality Date    CORONARY ARTERY BYPASS GRAFT         Restrictions/Precautions:  Restrictions/Precautions: General Precautions, Fall Risk         Subjective:     Subjective: RN approved session. Patient resting in bed, agreeable for therapy. Pain:  Denies.           Social/Functional:  Lives With: Alone  Type of Home: Apartment  Home Layout: One level  Home Access: Level entry  Home Equipment: Rolling walker, 4 wheeled walker, Cane     Objective:  Supine to Sit: Stand by assistance (head of bed slightly elevated, used bedrail)  Sit to Supine: Stand by assistance (patient able to lift BLE onto bed this session)    Transfers  Sit to Stand: Contact guard assistance (from bed, cues for hand placement)  Stand to sit: Contact guard

## 2018-01-05 NOTE — DISCHARGE SUMMARY
stenosis, dissection or aneurysm is identified. **This report has been created using voice recognition software. It may contain minor errors which are inherent in voice recognition technology. **      Final report electronically signed by Dr. Finesse Ribeiro on 1/4/2018 5:15 PM      MRI Brain WO Contrast   Final Result       1. Senescent changes are present without acute intracranial abnormality identified. 2. There is partial fluid opacification of the bilateral mastoid air cells which may relate to mastoid effusions. **This report has been created using voice recognition software. It may contain minor errors which are inherent in voice recognition technology. **      Final report electronically signed by Dr. Finesse Ribeiro on 1/4/2018 5:04 PM      CT HEAD WO CONTRAST   Final Result      Senescent changes are present without acute intracranial abnormality identified. **This report has been created using voice recognition software. It may contain minor errors which are inherent in voice recognition technology. **      Final report electronically signed by Dr. Finesse Ribeiro on 1/3/2018 4:47 PM      XR CHEST PORTABLE   Final Result   No acute cardiopulmonary disease            **This report has been created using voice recognition software. It may contain minor errors which are inherent in voice recognition technology. **      Final report electronically signed by Dr. Angelica Sr on 1/3/2018 2:46 PM             Consults:     IP CONSULT TO SOCIAL WORK    Disposition:    [x] Home       [] TCU       [] Rehab       [] Psych       [] SNF       [] [facility]       [] Other-    Condition at Discharge: Stable    Code Status:  Full Code     Patient Instructions:    Discharge lab work: Activity: activity as tolerated  Diet: DIET CARB CONTROL; No Added Salt (3-4 GM)      Follow-up visits:    [facility], [address] [place] [address]

## 2018-01-05 NOTE — CARE COORDINATION
DISCHARGE BARRIERS  1/5/18, 8:21 AM    Reason for Referral: \"Pt from [facility] [place]\"  Mental Status: Alert and oriented  Decision Making: Makes own decisions  Family/Social/Home Environment: Assessment completed with patient- Patient lives at [facility] alone. Patient states a nurse comes in to set up his medications every Wednesday. Patient states he receives PT/OT at [facility]. Patient states he receives Meals on Wheels weekly. Patient states he is able to ambulate independently with use of a rollator. Current Services: Nursing and PT/OT  Current Equipment: Rolling walker, cane, two wheeled walker, rollator walker, shower chair with back, raised toilet seat, grab bars in bathroom  Payment Source: [employer] Medicare and Medicaid [place]  Concerns or Barriers to Discharge: None  Collabrative List of ECF/HH were provided: Patient current with nursing services and PT/OT    Teach Back Method used with patient regarding care plan and discharge planning  Patient verbalize understanding of the plan of care and contribute to goal setting. Anticipated Needs/Discharge Plan: Patient plans return to [facility] with current services. Patient states his cousin Effie Donnelly will tranport at discharge. Patient states he does not know which State mental [facility] facility agency he is current with. Patient states to discuss with Angelika Soto. Spoke to [address] [place] [address] who states patient is current with [facility]. [address] [place] [address] states his cousin Gali Reardon will transport at discharge. Spoke to Scout Vega at [facility] who states patient is current with skilled nursing, PT/OT, and home health aide. Scout Vega states services will continue at discharge.      Electronically signed by LAURIE Navarro on 1/5/2018 at 8:21 AM

## 2018-01-05 NOTE — PROGRESS NOTES
[address]  INPATIENT OCCUPATIONAL THERAPY  SUSANNE CCU-STEPDOWN 3B  DAILY NOTE    Time:  Time In: 3  Time Out: 4362  Timed Code Treatment Minutes: 32 Minutes  Minutes: 27          Date: 2018  Patient Name: Cleve Bennett,   Gender: male      Room: -33/033-A  MRN: [id number]  : 1935  (80 y.o.)  Referring Practitioner: Dr. Maricarmen Clark   Diagnosis: Postural dizzines near syncope   Additional Pertinent Hx: 80 y.o. male who presents to the Emergency Department via EMS for the evaluation of dizziness. The patient states he started feeling dizzy today when the nurse was evaluating him. He states he feels a little weak, but he is mostly dizzy. Patient states he vomited today and felt better afterwards. He states he can't hear out of his right ear and he has bilateral feet swelling. He denies headache, vision changes, speech difficulty, chest pain, shortness of breath, or abdominal pain. He denies a history of his heart rate being low. He has had bypass surgery, but denies having stents in his heart. The patient states he walks with a walker. CT head negative     Restrictions/Precautions:  Restrictions/Precautions: General Precautions, Fall Risk                      Past Medical History:   Diagnosis Date    Hyperlipidemia     Hypertension     Peripheral vascular disease ([address]     Type 2 diabetes mellitus without complication ([address]      Past Surgical History:   Procedure Laterality Date    CORONARY ARTERY BYPASS GRAFT             Subjective       Subjective: Pt pleasant and cooperative.  SSupine in bed upon arrival, agreeable to OT treatment  Comments: RN ok'd session              Pain:  Pain Assessment  Patient Currently in Pain: Yes  Pain Level: 7  Pre Treatment Pain Screening  Comments / Details: neck pain    Objective  Overall Cognitive Status: WNL       ADL  Grooming: Stand by assistance (close SBA at sink to complete oral care and shave)  LE Dressing: Minimal assistance (Min A to

## 2018-01-05 NOTE — PLAN OF CARE
Problem: Falls - Risk of  Goal: Absence of falls  Outcome: Met This Shift  Patient is without falls for day shift. Problem: Discharge Planning:  Goal: Discharged to appropriate level of care  Discharged to appropriate level of care   Outcome: Ongoing  Patient is planning on returning to the same ECF that he came from . Problem: Cardiac Output - Decreased:  Goal: Hemodynamic stability will improve  Hemodynamic stability will improve   Outcome: Ongoing  Patient's blood pressure seems to be responding to the changes made in his medication    Problem: Pain:  Goal: Pain level will decrease  Pain level will decrease   Outcome: Met This Shift  Patient had pain at the beginning of shift. Patient took tylenol from [facility]. Patient stated that he was satisfied with  The response of pain medication. Comments: Patient participated in care plan and goal setting.

## 2018-01-11 ENCOUNTER — TELEPHONE (OUTPATIENT)
Dept: CARDIOLOGY CLINIC | Age: 83
End: 2018-01-11

## 2018-01-17 ENCOUNTER — OFFICE VISIT (OUTPATIENT)
Dept: CARDIOLOGY CLINIC | Age: 83
End: 2018-01-17
Payer: MEDICARE

## 2018-01-17 VITALS
HEART RATE: 60 BPM | WEIGHT: 177.8 LBS | BODY MASS INDEX: 26.33 KG/M2 | DIASTOLIC BLOOD PRESSURE: 67 MMHG | SYSTOLIC BLOOD PRESSURE: 149 MMHG | HEIGHT: 69 IN

## 2018-01-17 DIAGNOSIS — I73.9 CLAUDICATION, CLASS III (HCC): ICD-10-CM

## 2018-01-17 DIAGNOSIS — I50.32 CHRONIC DIASTOLIC CONGESTIVE HEART FAILURE (HCC): ICD-10-CM

## 2018-01-17 DIAGNOSIS — I10 ESSENTIAL HYPERTENSION: Primary | ICD-10-CM

## 2018-01-17 PROCEDURE — 99214 OFFICE O/P EST MOD 30 MIN: CPT | Performed by: INTERNAL MEDICINE

## 2018-01-17 PROCEDURE — 1036F TOBACCO NON-USER: CPT | Performed by: INTERNAL MEDICINE

## 2018-01-17 PROCEDURE — G8484 FLU IMMUNIZE NO ADMIN: HCPCS | Performed by: INTERNAL MEDICINE

## 2018-01-17 PROCEDURE — 1123F ACP DISCUSS/DSCN MKR DOCD: CPT | Performed by: INTERNAL MEDICINE

## 2018-01-17 PROCEDURE — 1111F DSCHRG MED/CURRENT MED MERGE: CPT | Performed by: INTERNAL MEDICINE

## 2018-01-17 PROCEDURE — G8427 DOCREV CUR MEDS BY ELIG CLIN: HCPCS | Performed by: INTERNAL MEDICINE

## 2018-01-17 PROCEDURE — G8419 CALC BMI OUT NRM PARAM NOF/U: HCPCS | Performed by: INTERNAL MEDICINE

## 2018-01-17 PROCEDURE — 4040F PNEUMOC VAC/ADMIN/RCVD: CPT | Performed by: INTERNAL MEDICINE

## 2018-01-17 RX ORDER — AMLODIPINE BESYLATE 5 MG/1
10 TABLET ORAL DAILY
Qty: 30 TABLET | Refills: 3 | Status: SHIPPED | OUTPATIENT
Start: 2018-01-17 | End: 2018-04-16 | Stop reason: ALTCHOICE

## 2018-01-20 ASSESSMENT — ENCOUNTER SYMPTOMS
DIARRHEA: 0
SINUS PRESSURE: 0
BLOOD IN STOOL: 0
EYE DISCHARGE: 0
COUGH: 0
EYE ITCHING: 0
EYE PAIN: 0
SORE THROAT: 0
BACK PAIN: 0
ABDOMINAL PAIN: 0
ABDOMINAL DISTENTION: 0
CHEST TIGHTNESS: 0
EYE REDNESS: 0
NAUSEA: 0
SHORTNESS OF BREATH: 0
CONSTIPATION: 0
APNEA: 0

## 2018-01-24 ENCOUNTER — TELEPHONE (OUTPATIENT)
Dept: CARDIOLOGY CLINIC | Age: 83
End: 2018-01-24

## 2018-01-25 ENCOUNTER — NURSE ONLY (OUTPATIENT)
Dept: CARDIOLOGY CLINIC | Age: 83
End: 2018-01-25
Payer: MEDICARE

## 2018-01-25 DIAGNOSIS — Z95.0 PACEMAKER: ICD-10-CM

## 2018-01-25 PROCEDURE — 93280 PM DEVICE PROGR EVAL DUAL: CPT | Performed by: INTERNAL MEDICINE

## 2018-01-25 NOTE — PROGRESS NOTES
DR ISIDRO PT  [employer] DUAL PACEMAKER INITIAL CHECK IN OFFICE  WAS IMPLANTED ON 01/12/18 @ [facility]  BATTERY 13.5 YRS REMAINING  A PACED 71%  V PACED 59%  P WAVES 5.5  RV WAVES NOT MEASURED  ATRIAL THRESHOLD 1.0 @ 0.40  RV THRESHOLD 0.7 @ 0.40  ATRIAL IMPEDENCE 695  RV IMPEDENCE 573    DDDR   SITE WITH STERI STRIPS INTACT. OLD ,DRIED SEROUS SANGUINOUS DRAINAGE NOTED TO OLD BANDAGE NOTED  NOTED 2 DIME SIZED AREAS WITH SCABS NOTED. PT SAID THAT IS WHERE THE TAPE WAS LOCATED. REVIEWED ALL POST OP INSTRUCTIONS WITH THIS PT AND GAVE HIM POST CARE INSTRUCTION SHEET.  ALSO MADE HIM A 2 WEEK DARLIN ISIDRO

## 2018-02-02 RX ORDER — SODIUM CHLORIDE 450 MG/100ML
INJECTION, SOLUTION INTRAVENOUS CONTINUOUS
Status: CANCELLED | OUTPATIENT
Start: 2018-02-02

## 2018-02-02 RX ORDER — FENTANYL CITRATE 50 UG/ML
50 INJECTION, SOLUTION INTRAMUSCULAR; INTRAVENOUS ONCE
Status: CANCELLED | OUTPATIENT
Start: 2018-02-02 | End: 2018-02-02

## 2018-02-02 RX ORDER — MIDAZOLAM HYDROCHLORIDE 1 MG/ML
1 INJECTION INTRAMUSCULAR; INTRAVENOUS ONCE
Status: CANCELLED | OUTPATIENT
Start: 2018-02-02 | End: 2018-02-02

## 2018-02-05 ENCOUNTER — HOSPITAL ENCOUNTER (OUTPATIENT)
Dept: INTERVENTIONAL RADIOLOGY/VASCULAR | Age: 83
Discharge: HOME | End: 2018-02-05
Payer: MEDICARE

## 2018-02-05 ENCOUNTER — HOSPITAL ENCOUNTER (OUTPATIENT)
Dept: INPATIENT UNIT | Age: 83
Discharge: HOME | End: 2018-02-05
Attending: INTERNAL MEDICINE | Admitting: INTERNAL MEDICINE
Payer: MEDICARE

## 2018-02-05 ENCOUNTER — TELEPHONE (OUTPATIENT)
Dept: CARDIOLOGY CLINIC | Age: 83
End: 2018-02-05

## 2018-02-05 VITALS
HEIGHT: 69 IN | DIASTOLIC BLOOD PRESSURE: 59 MMHG | TEMPERATURE: 97.8 F | SYSTOLIC BLOOD PRESSURE: 171 MMHG | RESPIRATION RATE: 16 BRPM | BODY MASS INDEX: 25.18 KG/M2 | HEART RATE: 60 BPM | OXYGEN SATURATION: 100 % | WEIGHT: 170 LBS

## 2018-02-05 DIAGNOSIS — I73.9 CLAUDICATION, CLASS III (HCC): ICD-10-CM

## 2018-02-05 DIAGNOSIS — I10 ESSENTIAL HYPERTENSION: ICD-10-CM

## 2018-02-05 LAB
ABO: NORMAL
ANION GAP SERPL CALCULATED.3IONS-SCNC: 15 MEQ/L (ref 8–16)
ANTIBODY SCREEN: NORMAL
BUN BLDV-MCNC: 33 MG/DL (ref 7–22)
CALCIUM SERPL-MCNC: 9.6 MG/DL (ref 8.5–10.5)
CHLORIDE BLD-SCNC: 100 MEQ/L (ref 98–111)
CO2: 24 MEQ/L (ref 23–33)
CREAT SERPL-MCNC: 1.4 MG/DL (ref 0.4–1.2)
CREAT SERPL-MCNC: 1.5 MG/DL (ref 0.4–1.2)
GFR SERPL CREATININE-BSD FRML MDRD: 45 ML/MIN/1.73M2
GFR SERPL CREATININE-BSD FRML MDRD: 48 ML/MIN/1.73M2
GLUCOSE BLD-MCNC: 155 MG/DL (ref 70–108)
HCT VFR BLD CALC: 30.5 % (ref 42–52)
HEMOGLOBIN: 10.4 GM/DL (ref 14–18)
MCH RBC QN AUTO: 30.4 PG (ref 27–31)
MCHC RBC AUTO-ENTMCNC: 34 GM/DL (ref 33–37)
MCV RBC AUTO: 89.5 FL (ref 80–94)
PDW BLD-RTO: 12.9 % (ref 11.5–14.5)
PLATELET # BLD: 224 THOU/MM3 (ref 130–400)
PMV BLD AUTO: 6.7 FL (ref 7.4–10.4)
POTASSIUM REFLEX MAGNESIUM: 4.9 MEQ/L (ref 3.5–5.2)
RBC # BLD: 3.41 MILL/MM3 (ref 4.7–6.1)
RH FACTOR: NORMAL
SODIUM BLD-SCNC: 139 MEQ/L (ref 135–145)
WBC # BLD: 9.5 THOU/MM3 (ref 4.8–10.8)

## 2018-02-05 PROCEDURE — 86900 BLOOD TYPING SEROLOGIC ABO: CPT

## 2018-02-05 PROCEDURE — 2500000003 HC RX 250 WO HCPCS

## 2018-02-05 PROCEDURE — 36415 COLL VENOUS BLD VENIPUNCTURE: CPT

## 2018-02-05 PROCEDURE — 6360000002 HC RX W HCPCS

## 2018-02-05 PROCEDURE — 86850 RBC ANTIBODY SCREEN: CPT

## 2018-02-05 PROCEDURE — 82565 ASSAY OF CREATININE: CPT

## 2018-02-05 PROCEDURE — 86901 BLOOD TYPING SEROLOGIC RH(D): CPT

## 2018-02-05 PROCEDURE — 85027 COMPLETE CBC AUTOMATED: CPT

## 2018-02-05 PROCEDURE — 80048 BASIC METABOLIC PNL TOTAL CA: CPT

## 2018-02-05 PROCEDURE — C1769 GUIDE WIRE: HCPCS

## 2018-02-05 PROCEDURE — 2580000003 HC RX 258: Performed by: RADIOLOGY

## 2018-02-05 PROCEDURE — C1894 INTRO/SHEATH, NON-LASER: HCPCS

## 2018-02-05 RX ORDER — SODIUM CHLORIDE 450 MG/100ML
INJECTION, SOLUTION INTRAVENOUS CONTINUOUS
Status: DISCONTINUED | OUTPATIENT
Start: 2018-02-05 | End: 2018-02-05 | Stop reason: HOSPADM

## 2018-02-05 RX ORDER — SODIUM CHLORIDE 0.9 % (FLUSH) 0.9 %
10 SYRINGE (ML) INJECTION EVERY 12 HOURS SCHEDULED
Status: DISCONTINUED | OUTPATIENT
Start: 2018-02-05 | End: 2018-02-05 | Stop reason: HOSPADM

## 2018-02-05 RX ORDER — FENTANYL CITRATE 50 UG/ML
50 INJECTION, SOLUTION INTRAMUSCULAR; INTRAVENOUS ONCE
Status: DISCONTINUED | OUTPATIENT
Start: 2018-02-05 | End: 2018-02-05 | Stop reason: HOSPADM

## 2018-02-05 RX ORDER — MIDAZOLAM HYDROCHLORIDE 1 MG/ML
1 INJECTION INTRAMUSCULAR; INTRAVENOUS ONCE
Status: DISCONTINUED | OUTPATIENT
Start: 2018-02-05 | End: 2018-02-05 | Stop reason: HOSPADM

## 2018-02-05 RX ORDER — SODIUM CHLORIDE 9 MG/ML
INJECTION, SOLUTION INTRAVENOUS CONTINUOUS
Status: DISCONTINUED | OUTPATIENT
Start: 2018-02-05 | End: 2018-02-05 | Stop reason: HOSPADM

## 2018-02-05 RX ADMIN — SODIUM CHLORIDE: 4.5 INJECTION, SOLUTION INTRAVENOUS at 09:59

## 2018-02-05 NOTE — TELEPHONE ENCOUNTER
Peripheral angiogram rescheduled to 02-16-18 @ 1:00pm  rosalba adams notified. States still has instructions.

## 2018-02-14 ENCOUNTER — OFFICE VISIT (OUTPATIENT)
Dept: CARDIOLOGY CLINIC | Age: 83
End: 2018-02-14
Payer: MEDICARE

## 2018-02-14 VITALS
HEIGHT: 69 IN | HEART RATE: 59 BPM | WEIGHT: 174.2 LBS | BODY MASS INDEX: 25.8 KG/M2 | SYSTOLIC BLOOD PRESSURE: 120 MMHG | DIASTOLIC BLOOD PRESSURE: 62 MMHG

## 2018-02-14 DIAGNOSIS — I73.9 PAD (PERIPHERAL ARTERY DISEASE) (HCC): Primary | ICD-10-CM

## 2018-02-14 PROCEDURE — G8484 FLU IMMUNIZE NO ADMIN: HCPCS | Performed by: INTERNAL MEDICINE

## 2018-02-14 PROCEDURE — G8419 CALC BMI OUT NRM PARAM NOF/U: HCPCS | Performed by: INTERNAL MEDICINE

## 2018-02-14 PROCEDURE — 99212 OFFICE O/P EST SF 10 MIN: CPT | Performed by: INTERNAL MEDICINE

## 2018-02-14 PROCEDURE — 1123F ACP DISCUSS/DSCN MKR DOCD: CPT | Performed by: INTERNAL MEDICINE

## 2018-02-14 PROCEDURE — 4040F PNEUMOC VAC/ADMIN/RCVD: CPT | Performed by: INTERNAL MEDICINE

## 2018-02-14 PROCEDURE — G8427 DOCREV CUR MEDS BY ELIG CLIN: HCPCS | Performed by: INTERNAL MEDICINE

## 2018-02-14 PROCEDURE — 1036F TOBACCO NON-USER: CPT | Performed by: INTERNAL MEDICINE

## 2018-02-14 ASSESSMENT — ENCOUNTER SYMPTOMS
NAUSEA: 0
EYE ITCHING: 0
COUGH: 0
EYE PAIN: 0
EYE REDNESS: 0
CONSTIPATION: 0
DIARRHEA: 0
BACK PAIN: 0
ABDOMINAL DISTENTION: 0
SORE THROAT: 0
APNEA: 0
SINUS PRESSURE: 0
CHEST TIGHTNESS: 0
EYE DISCHARGE: 0
ABDOMINAL PAIN: 0
BLOOD IN STOOL: 0
SHORTNESS OF BREATH: 0

## 2018-02-14 NOTE — PROGRESS NOTES
Wt Readings from Last 3 Encounters:   02/14/18 174 lb 3.2 oz (79 kg)   02/05/18 170 lb (77.1 kg)   01/17/18 177 lb 12.8 oz (80.6 kg)     BP Readings from Last 3 Encounters:   02/14/18 120/62   02/05/18 (!) 171/59   01/17/18 (!) 149/67       Physical Exam   Constitutional: He is oriented to person, place, and time. He appears well-developed and well-nourished. HENT:   Head: Normocephalic and atraumatic. Eyes: EOM are normal. Pupils are equal, round, and reactive to light. Neck: Normal range of motion. Neck supple. No JVD present. Cardiovascular: Normal rate, regular rhythm, normal heart sounds and intact distal pulses. No murmur heard. Pulmonary/Chest: Effort normal and breath sounds normal. No respiratory distress. He has no wheezes. He has no rales. Abdominal: Soft. Bowel sounds are normal. He exhibits no distension. There is no tenderness. Musculoskeletal: Normal range of motion. He exhibits edema. Neurological: He is alert and oriented to person, place, and time. No cranial nerve deficit. Coordination normal.   Skin: Skin is warm and dry. Psychiatric: He has a normal mood and affect. Nursing note and vitals reviewed.       No results found for: CKTOTAL, CKMB, CKMBINDEX    Lab Results   Component Value Date    WBC 9.5 02/05/2018    RBC 3.41 02/05/2018    RBC 4.12 11/07/2011    HGB 10.4 02/05/2018    HCT 30.5 02/05/2018    MCV 89.5 02/05/2018    MCH 30.4 02/05/2018    MCHC 34.0 02/05/2018    RDW 12.9 02/05/2018     02/05/2018    MPV 6.7 02/05/2018       Lab Results   Component Value Date     02/05/2018    K 4.9 02/05/2018     02/05/2018    CO2 24 02/05/2018    BUN 33 02/05/2018    CREATININE 1.5 02/05/2018    CREATININE 1.4 02/05/2018    CALCIUM 9.6 02/05/2018    LABGLOM 45 02/05/2018    LABGLOM 48 02/05/2018    GLUCOSE 155 02/05/2018    GLUCOSE 221 11/07/2011       No results found for: ALKPHOS, ALT, AST, PROT, BILITOT, BILIDIR, IBILI, LABALBU    No results found for: MG    Lab Results   Component Value Date    INR 0.97 01/03/2018         Lab Results   Component Value Date    LABA1C 5.2 01/05/2018       No results found for: TRIG, HDL, LDLCALC, LDLDIRECT, LABVLDL    Lab Results   Component Value Date    TSH 1.080 01/03/2018         Testing Reviewed:      I have individually reviewed the below cardiac tests    ECHO:   Results for orders placed during the hospital encounter of 12/26/17   ECHO Complete 2D W Doppler W Color    Narrative Transthoracic Echocardiography Report (TTE)     Demographics      Patient Name  [facility]        Gender             Male                 Max Valverde      MR #          [id number]         Race                                                     Ethnicity      Account #     [de-identified]         Room Number      Accession     [id number]          Date of Study      12/26/2017   Number      Date of Birth 1935        Referring          Rashad Sy MD                                   Physician          [address] [place] [address] CNP      Age           80 year(s)        Jose Alfredo Blevins, RDCS,                                                      RDMS, RVT                                      Interpreting       Rashad Sy MD                                   Physician     Procedure    Type of Study      TTE procedure:ECHOCARDIOGRAM COMPLETE 2D W DOPPLER W COLOR. Procedure Date  Date: 12/26/2017 Start: 01:24 PM    Study Location: Echo Lab  Technical Quality: Adequate visualization    Indications:Coronary artery disease. Additional Medical History:CAD/CABG, diabetic, PVD, HTN, HLD    Patient Status: Routine    Height: 69 inches Weight: 180 pounds BSA: 1.98 m^2 BMI: 26.58 kg/m^2    BP: 180/76 mmHg     Conclusions      Summary   Technically difficult examination. Ejection fraction was estimated at 55-60%. There were no regional left ventricular wall motion abnormalities and wall   thickness was within normal limits.    Left atrial size was severely dilated. The mitral valve structure demonstrated posterior leaflet calcification   and restriction of motion. There was no mitral stenosis. There was trace to mild mitral regurgitation. There was trace to mild tricuspid regurgitation. IVC size is within normal limits with normal respiratory phasic changes. Assuming an RAP = 5 mmHg, the estimated RVSP = 41 mmHg. Signature      ----------------------------------------------------------------   Electronically signed by Arely Gracia MD (Interpreting   physician) on 12/26/2017 at 06:44 PM   ----------------------------------------------------------------      Findings      Mitral Valve   The mitral valve structure demonstrated posterior leaflet calcification   and restriction of motion. DOPPLER: The transmitral velocity was within   the normal range with no evidence for mitral stenosis. There was trace to   mild mitral regurgitation. Aortic Valve   The aortic valve was trileaflet, thickened, calcified, and demonstrated   reduced excursion. DOPPLER: Transaortic velocity was within the normal   range with no evidence of aortic stenosis. There was trace aortic   regurgitation. Tricuspid Valve   The tricuspid valve structure was normal with normal leaflet separation. DOPPLER: There was no evidence of tricuspid stenosis. There was trace to   mild tricuspid regurgitation. Assuming an RAP = 5 mmHg, the estimated RVSP   = 41 mmHg. Pulmonic Valve   The pulmonic valve leaflets were not well seen. DOPPLER: The transpulmonic   velocity was within the normal range with no evidence for regurgitation. Left Atrium   Left atrial size was severely dilated. Left Ventricle   Normal left ventricle size and systolic function. Ejection fraction was   estimated at 55-60%. There were no regional left ventricular wall motion   abnormalities and wall thickness was within normal limits.       Right Atrium   Right atrial size was normal. Right Ventricle   The right ventricular size was normal with normal systolic function and   wall thickness. Pericardial Effusion   The pericardium was normal in appearance with no evidence of a pericardial   effusion. Pleural Effusion   No evidence of pleural effusion. Aorta / Great Vessels   -Aortic root dimension = 2.9 cm   -IVC size is within normal limits with normal respiratory phasic changes.      M-Mode/2D Measurements & Calculations      LV Diastolic    LV Systolic Dimension:    AV Cusp Separation: 1.5 cmLA   Dimension: 4.8  3.2 cm                    Dimension: 4.6 cmAO Root   cm              LV Volume Diastolic: 838  Dimension: 2.7 cmLA Area: 26.5   LV FS:33.3 %    ml                        cm^2   LV PW           LV Volume Systolic: 41 ml   Diastolic: 1 cm LV EDV/LV EDV Index: 108   Septum          ml/55 m^2LV ESV/LV ESV   Diastolic: 0.9  Index: 41 MB/56 m^2       RV Diastolic Dimension: 2.9 cm   cm              EF Calculated: 62 %                                             LA/Aorta: 1.7                                                LA volume/Index: 96.4 ml /49m^2     Doppler Measurements & Calculations      MV Peak E-Wave: 125   AV Peak Velocity:    LVOT Peak Velocity: 104 cm/s   cm/s                  148 cm/s             LVOT Mean Velocity: 63.3 cm/s   MV Peak A-Wave: 75    AV Peak Gradient:    LVOT Peak Gradient: 4 mmHgLVOT   cm/s                  8.76 mmHg            Mean Gradient: 2 mmHg   MV E/A Ratio: 1.67    AV Mean Velocity:   MV Peak Gradient:     93.2 cm/s            TV Peak E-Wave: 50.3 cm/s   6.25 mmHg             AV Mean Gradient: 4  TV Peak A-Wave: 36 cm/s                         mmHg   MV Deceleration Time: AV VTI: 34.4 cm      TV Peak Gradient: 1.01 mmHg   211 msec                                   TR Velocity:299 cm/s   MV P1/2t: 71 msec                          TR Gradient:35.76 mmHg   MVA by PHT:3.1 cm^2   LVOT VTI: 22.2 cm    PV Peak Velocity: 99.2 cm/s AV P1/2t: 767 msec   PV Peak Gradient: 3.94 mmHg   MV E' Septal          IVRT: 127 msec   Velocity: 5.26 cm/s   MV A' Septal   Velocity: 5.07 cm/s   AV DVI (VTI): 0.65AV   MV E' Lateral         DVI (Vmax):0.7   Velocity: 7.99 cm/s   MV A' Lateral   Velocity: 4.48 cm/s   E/E' septal: 23.76   E/E' lateral: 15.64   MR Velocity: 423 cm/s     [id number]       Assessment/Plan   PAD - angiogram later this week. Continue DAPT and OMT. Continue all medications for chronic problems as previously prescribed.       Disposition:  F/u after angiogram    Electronically signed by Jonathan Pool MD   2/14/2018 at 1:22 PM

## 2018-02-16 ENCOUNTER — HOSPITAL ENCOUNTER (OUTPATIENT)
Dept: INPATIENT UNIT | Age: 83
Discharge: HOME | End: 2018-02-17
Attending: INTERNAL MEDICINE | Admitting: INTERNAL MEDICINE
Payer: MEDICARE

## 2018-02-16 ENCOUNTER — HOSPITAL ENCOUNTER (OUTPATIENT)
Dept: INTERVENTIONAL RADIOLOGY/VASCULAR | Age: 83
Discharge: HOME | End: 2018-02-16
Payer: MEDICARE

## 2018-02-16 ENCOUNTER — PREP FOR PROCEDURE (OUTPATIENT)
Dept: CARDIOLOGY CLINIC | Age: 83
End: 2018-02-16

## 2018-02-16 DIAGNOSIS — I73.9 PVD (PERIPHERAL VASCULAR DISEASE) (HCC): ICD-10-CM

## 2018-02-16 LAB
ABO: NORMAL
ANION GAP SERPL CALCULATED.3IONS-SCNC: 13 MEQ/L (ref 8–16)
ANTIBODY SCREEN: NORMAL
BUN BLDV-MCNC: 37 MG/DL (ref 7–22)
CALCIUM SERPL-MCNC: 8.8 MG/DL (ref 8.5–10.5)
CHLORIDE BLD-SCNC: 105 MEQ/L (ref 98–111)
CO2: 20 MEQ/L (ref 23–33)
CREAT SERPL-MCNC: 1.4 MG/DL (ref 0.4–1.2)
EKG ATRIAL RATE: 60 BPM
EKG P AXIS: 55 DEGREES
EKG P-R INTERVAL: 328 MS
EKG Q-T INTERVAL: 554 MS
EKG QRS DURATION: 214 MS
EKG QTC CALCULATION (BAZETT): 554 MS
EKG R AXIS: -88 DEGREES
EKG T AXIS: 87 DEGREES
EKG VENTRICULAR RATE: 60 BPM
GFR SERPL CREATININE-BSD FRML MDRD: 48 ML/MIN/1.73M2
GLUCOSE BLD-MCNC: 130 MG/DL (ref 70–108)
GLUCOSE BLD-MCNC: 135 MG/DL (ref 70–108)
GLUCOSE BLD-MCNC: 160 MG/DL (ref 70–108)
HCT VFR BLD CALC: 26.6 % (ref 42–52)
HEMOGLOBIN: 9.4 GM/DL (ref 14–18)
INR BLD: 1 (ref 0.85–1.13)
MCH RBC QN AUTO: 31.9 PG (ref 27–31)
MCHC RBC AUTO-ENTMCNC: 35.5 GM/DL (ref 33–37)
MCV RBC AUTO: 90 FL (ref 80–94)
PDW BLD-RTO: 13.5 % (ref 11.5–14.5)
PLATELET # BLD: 204 THOU/MM3 (ref 130–400)
PMV BLD AUTO: 7.3 FL (ref 7.4–10.4)
POTASSIUM REFLEX MAGNESIUM: 4.9 MEQ/L (ref 3.5–5.2)
RBC # BLD: 2.96 MILL/MM3 (ref 4.7–6.1)
RH FACTOR: NORMAL
SODIUM BLD-SCNC: 138 MEQ/L (ref 135–145)
WBC # BLD: 6.4 THOU/MM3 (ref 4.8–10.8)

## 2018-02-16 PROCEDURE — 6360000004 HC RX CONTRAST MEDICATION: Performed by: INTERNAL MEDICINE

## 2018-02-16 PROCEDURE — 75625 CONTRAST EXAM ABDOMINL AORTA: CPT | Performed by: INTERNAL MEDICINE

## 2018-02-16 PROCEDURE — 75716 ARTERY X-RAYS ARMS/LEGS: CPT | Performed by: INTERNAL MEDICINE

## 2018-02-16 PROCEDURE — 93005 ELECTROCARDIOGRAM TRACING: CPT | Performed by: INTERNAL MEDICINE

## 2018-02-16 PROCEDURE — 2580000003 HC RX 258: Performed by: INTERNAL MEDICINE

## 2018-02-16 PROCEDURE — 6370000000 HC RX 637 (ALT 250 FOR IP)

## 2018-02-16 PROCEDURE — 86900 BLOOD TYPING SEROLOGIC ABO: CPT

## 2018-02-16 PROCEDURE — 86901 BLOOD TYPING SEROLOGIC RH(D): CPT

## 2018-02-16 PROCEDURE — C1769 GUIDE WIRE: HCPCS

## 2018-02-16 PROCEDURE — 6370000000 HC RX 637 (ALT 250 FOR IP): Performed by: INTERNAL MEDICINE

## 2018-02-16 PROCEDURE — 80048 BASIC METABOLIC PNL TOTAL CA: CPT

## 2018-02-16 PROCEDURE — 2500000003 HC RX 250 WO HCPCS

## 2018-02-16 PROCEDURE — 36200 PLACE CATHETER IN AORTA: CPT | Performed by: INTERNAL MEDICINE

## 2018-02-16 PROCEDURE — C1894 INTRO/SHEATH, NON-LASER: HCPCS

## 2018-02-16 PROCEDURE — 36415 COLL VENOUS BLD VENIPUNCTURE: CPT

## 2018-02-16 PROCEDURE — 85610 PROTHROMBIN TIME: CPT

## 2018-02-16 PROCEDURE — 6360000002 HC RX W HCPCS

## 2018-02-16 PROCEDURE — 82948 REAGENT STRIP/BLOOD GLUCOSE: CPT

## 2018-02-16 PROCEDURE — 86850 RBC ANTIBODY SCREEN: CPT

## 2018-02-16 PROCEDURE — 6360000002 HC RX W HCPCS: Performed by: INTERNAL MEDICINE

## 2018-02-16 PROCEDURE — 85027 COMPLETE CBC AUTOMATED: CPT

## 2018-02-16 RX ORDER — POTASSIUM CHLORIDE 20 MEQ/1
10 TABLET, EXTENDED RELEASE ORAL DAILY
Status: DISCONTINUED | OUTPATIENT
Start: 2018-02-16 | End: 2018-02-17 | Stop reason: HOSPADM

## 2018-02-16 RX ORDER — SODIUM CHLORIDE 0.9 % (FLUSH) 0.9 %
10 SYRINGE (ML) INJECTION PRN
Status: DISCONTINUED | OUTPATIENT
Start: 2018-02-16 | End: 2018-02-17 | Stop reason: HOSPADM

## 2018-02-16 RX ORDER — SODIUM CHLORIDE 9 MG/ML
INJECTION, SOLUTION INTRAVENOUS CONTINUOUS
Status: DISCONTINUED | OUTPATIENT
Start: 2018-02-16 | End: 2018-02-16

## 2018-02-16 RX ORDER — ACETAMINOPHEN 325 MG/1
650 TABLET ORAL EVERY 4 HOURS PRN
Status: DISCONTINUED | OUTPATIENT
Start: 2018-02-16 | End: 2018-02-17 | Stop reason: HOSPADM

## 2018-02-16 RX ORDER — ASPIRIN 81 MG/1
81 TABLET ORAL DAILY
Status: DISCONTINUED | OUTPATIENT
Start: 2018-02-16 | End: 2018-02-17 | Stop reason: HOSPADM

## 2018-02-16 RX ORDER — ASPIRIN 325 MG
325 TABLET ORAL ONCE
Status: DISCONTINUED | OUTPATIENT
Start: 2018-02-16 | End: 2018-02-16

## 2018-02-16 RX ORDER — ISOSORBIDE MONONITRATE 30 MG/1
30 TABLET, EXTENDED RELEASE ORAL DAILY
Status: DISCONTINUED | OUTPATIENT
Start: 2018-02-16 | End: 2018-02-17 | Stop reason: HOSPADM

## 2018-02-16 RX ORDER — FENTANYL CITRATE 50 UG/ML
50 INJECTION, SOLUTION INTRAMUSCULAR; INTRAVENOUS ONCE
Status: COMPLETED | OUTPATIENT
Start: 2018-02-16 | End: 2018-02-16

## 2018-02-16 RX ORDER — MIDAZOLAM HYDROCHLORIDE 1 MG/ML
1 INJECTION INTRAMUSCULAR; INTRAVENOUS ONCE
Status: COMPLETED | OUTPATIENT
Start: 2018-02-16 | End: 2018-02-16

## 2018-02-16 RX ORDER — ACETAMINOPHEN 500 MG
500 TABLET ORAL EVERY 6 HOURS PRN
Status: DISCONTINUED | OUTPATIENT
Start: 2018-02-16 | End: 2018-02-16 | Stop reason: SDUPTHER

## 2018-02-16 RX ORDER — GLIPIZIDE 5 MG/1
5 TABLET ORAL DAILY
Status: CANCELLED | OUTPATIENT
Start: 2018-02-16

## 2018-02-16 RX ORDER — SODIUM CHLORIDE 0.9 % (FLUSH) 0.9 %
10 SYRINGE (ML) INJECTION EVERY 12 HOURS SCHEDULED
Status: DISCONTINUED | OUTPATIENT
Start: 2018-02-16 | End: 2018-02-17 | Stop reason: HOSPADM

## 2018-02-16 RX ORDER — CLOPIDOGREL BISULFATE 75 MG/1
75 TABLET ORAL DAILY
Status: DISCONTINUED | OUTPATIENT
Start: 2018-02-16 | End: 2018-02-17 | Stop reason: HOSPADM

## 2018-02-16 RX ORDER — FUROSEMIDE 20 MG/1
20 TABLET ORAL 2 TIMES DAILY
Status: DISCONTINUED | OUTPATIENT
Start: 2018-02-16 | End: 2018-02-17 | Stop reason: HOSPADM

## 2018-02-16 RX ORDER — ATORVASTATIN CALCIUM 20 MG/1
20 TABLET, FILM COATED ORAL DAILY
Status: DISCONTINUED | OUTPATIENT
Start: 2018-02-16 | End: 2018-02-17 | Stop reason: HOSPADM

## 2018-02-16 RX ORDER — DIAPER,BRIEF,INFANT-TODD,DISP
EACH MISCELLANEOUS ONCE
Status: COMPLETED | OUTPATIENT
Start: 2018-02-16 | End: 2018-02-16

## 2018-02-16 RX ORDER — SODIUM CHLORIDE 0.9 % (FLUSH) 0.9 %
10 SYRINGE (ML) INJECTION PRN
Status: DISCONTINUED | OUTPATIENT
Start: 2018-02-16 | End: 2018-02-16 | Stop reason: SDUPTHER

## 2018-02-16 RX ORDER — LANOLIN ALCOHOL/MO/W.PET/CERES
800 CREAM (GRAM) TOPICAL 2 TIMES DAILY
Status: DISCONTINUED | OUTPATIENT
Start: 2018-02-16 | End: 2018-02-17 | Stop reason: HOSPADM

## 2018-02-16 RX ORDER — RANOLAZINE 500 MG/1
500 TABLET, EXTENDED RELEASE ORAL 2 TIMES DAILY
Status: DISCONTINUED | OUTPATIENT
Start: 2018-02-16 | End: 2018-02-17 | Stop reason: HOSPADM

## 2018-02-16 RX ORDER — AMLODIPINE BESYLATE 10 MG/1
10 TABLET ORAL DAILY
Status: DISCONTINUED | OUTPATIENT
Start: 2018-02-16 | End: 2018-02-17 | Stop reason: HOSPADM

## 2018-02-16 RX ORDER — SODIUM CHLORIDE 9 MG/ML
INJECTION, SOLUTION INTRAVENOUS CONTINUOUS
Status: DISCONTINUED | OUTPATIENT
Start: 2018-02-16 | End: 2018-02-17 | Stop reason: HOSPADM

## 2018-02-16 RX ORDER — ONDANSETRON 2 MG/ML
4 INJECTION INTRAMUSCULAR; INTRAVENOUS EVERY 6 HOURS PRN
Status: DISCONTINUED | OUTPATIENT
Start: 2018-02-16 | End: 2018-02-17 | Stop reason: HOSPADM

## 2018-02-16 RX ORDER — SODIUM CHLORIDE 0.9 % (FLUSH) 0.9 %
10 SYRINGE (ML) INJECTION EVERY 12 HOURS SCHEDULED
Status: DISCONTINUED | OUTPATIENT
Start: 2018-02-16 | End: 2018-02-16 | Stop reason: SDUPTHER

## 2018-02-16 RX ORDER — NITROGLYCERIN 0.4 MG/1
0.4 TABLET SUBLINGUAL EVERY 5 MIN PRN
Status: DISCONTINUED | OUTPATIENT
Start: 2018-02-16 | End: 2018-02-17 | Stop reason: HOSPADM

## 2018-02-16 RX ORDER — NEBIVOLOL 5 MG/1
10 TABLET ORAL DAILY
Status: DISCONTINUED | OUTPATIENT
Start: 2018-02-16 | End: 2018-02-17 | Stop reason: HOSPADM

## 2018-02-16 RX ORDER — OXYCODONE AND ACETAMINOPHEN 7.5; 325 MG/1; MG/1
1 TABLET ORAL EVERY 8 HOURS PRN
Status: DISCONTINUED | OUTPATIENT
Start: 2018-02-16 | End: 2018-02-17 | Stop reason: HOSPADM

## 2018-02-16 RX ORDER — PANTOPRAZOLE SODIUM 40 MG/1
40 TABLET, DELAYED RELEASE ORAL
Status: DISCONTINUED | OUTPATIENT
Start: 2018-02-17 | End: 2018-02-17 | Stop reason: HOSPADM

## 2018-02-16 RX ORDER — ATROPINE SULFATE 0.4 MG/ML
0.5 AMPUL (ML) INJECTION
Status: ACTIVE | OUTPATIENT
Start: 2018-02-16 | End: 2018-02-16

## 2018-02-16 RX ADMIN — POTASSIUM CHLORIDE 10 MEQ: 1500 TABLET, EXTENDED RELEASE ORAL at 22:28

## 2018-02-16 RX ADMIN — FENTANYL CITRATE 50 MCG: 50 INJECTION INTRAMUSCULAR; INTRAVENOUS at 13:40

## 2018-02-16 RX ADMIN — BACITRACIN ZINC 1 G: 500 OINTMENT TOPICAL at 14:16

## 2018-02-16 RX ADMIN — IOVERSOL 75 ML: 678 INJECTION INTRA-ARTERIAL; INTRAVENOUS at 14:07

## 2018-02-16 RX ADMIN — AMLODIPINE BESYLATE 10 MG: 10 TABLET ORAL at 22:30

## 2018-02-16 RX ADMIN — SODIUM CHLORIDE: 9 INJECTION, SOLUTION INTRAVENOUS at 12:54

## 2018-02-16 RX ADMIN — MIDAZOLAM 1 MG: 1 INJECTION INTRAMUSCULAR; INTRAVENOUS at 13:40

## 2018-02-16 RX ADMIN — Medication 800 MG: at 22:27

## 2018-02-16 RX ADMIN — ATORVASTATIN CALCIUM 20 MG: 20 TABLET, FILM COATED ORAL at 22:24

## 2018-02-16 RX ADMIN — FUROSEMIDE 20 MG: 20 TABLET ORAL at 22:25

## 2018-02-16 ASSESSMENT — PAIN SCALES - GENERAL
PAINLEVEL_OUTOF10: 0

## 2018-02-16 NOTE — H&P
is an appropriate candidate to undergo the planned procedure sedation and anesthesia. (Refer to nursing sedation/analgesia documentation record)  [x]Formulation and discussion of sedation/procedure plan, risks, and expectations with patient and/or responsible adult completed. [x]Patient examined immediately prior to the procedure.  (Refer to nursing sedation/analgesia documentation record)    Melissa Pierce MD   Electronically signed 2/16/2018 at 1:20 PM

## 2018-02-16 NOTE — PLAN OF CARE
Problem: Discharge Planning:  Goal: Participates in care planning  Participates in care planning  Outcome: Ongoing    Goal: Discharged to appropriate level of care  Discharged to appropriate level of care  Outcome: Ongoing      Problem: Airway Clearance - Ineffective:  Goal: Ability to maintain a clear airway will improve  Ability to maintain a clear airway will improve  Outcome: Ongoing      Problem: Tissue Perfusion, Altered:  Goal: Circulatory function within specified parameters  Circulatory function within specified parameters  Outcome: Ongoing

## 2018-02-16 NOTE — PLAN OF CARE
Problem: Discharge Planning:  Goal: Discharged to appropriate level of care  Discharged to appropriate level of care   Transferred to [facility] with belongings.

## 2018-02-17 ENCOUNTER — TELEPHONE (OUTPATIENT)
Dept: CARDIOLOGY CLINIC | Age: 83
End: 2018-02-17

## 2018-02-17 VITALS
SYSTOLIC BLOOD PRESSURE: 164 MMHG | RESPIRATION RATE: 18 BRPM | DIASTOLIC BLOOD PRESSURE: 74 MMHG | TEMPERATURE: 96.5 F | BODY MASS INDEX: 25.79 KG/M2 | WEIGHT: 174.1 LBS | OXYGEN SATURATION: 100 % | HEIGHT: 69 IN | HEART RATE: 61 BPM

## 2018-02-17 LAB — GLUCOSE BLD-MCNC: 95 MG/DL (ref 70–108)

## 2018-02-17 PROCEDURE — 99217 PR OBSERVATION CARE DISCHARGE MANAGEMENT: CPT | Performed by: NURSE PRACTITIONER

## 2018-02-17 PROCEDURE — 96361 HYDRATE IV INFUSION ADD-ON: CPT

## 2018-02-17 PROCEDURE — 82948 REAGENT STRIP/BLOOD GLUCOSE: CPT

## 2018-02-17 PROCEDURE — 96366 THER/PROPH/DIAG IV INF ADDON: CPT

## 2018-02-17 PROCEDURE — 6370000000 HC RX 637 (ALT 250 FOR IP): Performed by: INTERNAL MEDICINE

## 2018-02-17 PROCEDURE — 2580000003 HC RX 258: Performed by: INTERNAL MEDICINE

## 2018-02-17 PROCEDURE — 96360 HYDRATION IV INFUSION INIT: CPT

## 2018-02-17 RX ADMIN — ISOSORBIDE MONONITRATE 10 MG: 30 TABLET, EXTENDED RELEASE ORAL at 08:24

## 2018-02-17 RX ADMIN — PANTOPRAZOLE SODIUM 40 MG: 40 TABLET, DELAYED RELEASE ORAL at 04:43

## 2018-02-17 RX ADMIN — RANOLAZINE 500 MG: 500 TABLET, FILM COATED, EXTENDED RELEASE ORAL at 08:26

## 2018-02-17 RX ADMIN — RANOLAZINE 500 MG: 500 TABLET, FILM COATED, EXTENDED RELEASE ORAL at 00:33

## 2018-02-17 RX ADMIN — CLOPIDOGREL BISULFATE 75 MG: 75 TABLET, FILM COATED ORAL at 08:23

## 2018-02-17 RX ADMIN — SODIUM CHLORIDE: 9 INJECTION, SOLUTION INTRAVENOUS at 03:08

## 2018-02-17 RX ADMIN — ASPIRIN 81 MG: 81 TABLET ORAL at 08:23

## 2018-02-17 RX ADMIN — NEBIVOLOL HYDROCHLORIDE 10 MG: 5 TABLET ORAL at 08:25

## 2018-02-17 RX ADMIN — FUROSEMIDE 20 MG: 20 TABLET ORAL at 08:23

## 2018-02-17 RX ADMIN — Medication 500 MG: at 08:25

## 2018-02-17 ASSESSMENT — PAIN SCALES - GENERAL
PAINLEVEL_OUTOF10: 0
PAINLEVEL_OUTOF10: 0

## 2018-02-17 NOTE — PLAN OF CARE
Problem: Falls - Risk of  Goal: Absence of falls  Outcome: Ongoing  Patient absent of falls this shift. RN visually checks on patient hourly with rounds. Patient was educated on how to use call light to get out of bed to reduce the risk of falls. Call light within reach, bed in lowest position. Problem: Discharge Planning:  Goal: Participates in care planning  Participates in care planning   Outcome: Ongoing  Patient plans to return home at discharge. Patient following plan of care to be discharged at appropriate length of stay. Problem: Airway Clearance - Ineffective:  Goal: Ability to maintain a clear airway will improve  Ability to maintain a clear airway will improve   Outcome: Ongoing  Pt maintains patent airway, RN monitors for complications. Pt has adequate oxygen levels and maintains normal respirations and rate this shift. Problem: Tissue Perfusion, Altered:  Goal: Circulatory function within specified parameters  Circulatory function within specified parameters   Outcome: Ongoing  Pt hemodynamically stable and tolerating PO/IV fluids. BP WNL and NO issues this shift noted. Neurovascular checks WNL    Comments: Care plan reviewed with patient. Patient verbalize understanding of the plan of care and contribute to goal setting.

## 2018-02-17 NOTE — PLAN OF CARE
Problem: Falls - Risk of  Goal: Absence of falls  Outcome: Met This Shift  Free from falls this shift. Continues on falling star program. Side rails up x 2-3. Bed in low and locked position. Bed exit alarm on and call light in reach. Hourly checks in place. Will continue to monitor. Problem: Discharge Planning:  Goal: Participates in care planning  Participates in care planning   Outcome: Met This Shift  Patient is able to participate in care planning. Goal: Discharged to appropriate level of care  Discharged to appropriate level of care   Outcome: Met This Shift  Patient discharged home with cousin per Jess Shabazz CNP. Problem: Airway Clearance - Ineffective:  Goal: Ability to maintain a clear airway will improve  Ability to maintain a clear airway will improve   Outcome: Met This Shift  Patient maintained clear airway. Problem: Tissue Perfusion, Altered:  Goal: Circulatory function within specified parameters  Circulatory function within specified parameters   Outcome: Met This Shift  Patient CPM.     Comments: Care plan reviewed with patient. Patient verbalizes understanding of the plan of care and contribute to goal setting.

## 2018-02-26 ENCOUNTER — OFFICE VISIT (OUTPATIENT)
Dept: CARDIOLOGY CLINIC | Age: 83
End: 2018-02-26
Payer: MEDICARE

## 2018-02-26 VITALS
HEIGHT: 69 IN | DIASTOLIC BLOOD PRESSURE: 66 MMHG | OXYGEN SATURATION: 99 % | BODY MASS INDEX: 24.88 KG/M2 | SYSTOLIC BLOOD PRESSURE: 108 MMHG | HEART RATE: 64 BPM | WEIGHT: 168 LBS

## 2018-02-26 DIAGNOSIS — I50.32 CHF (CONGESTIVE HEART FAILURE), NYHA CLASS II, CHRONIC, DIASTOLIC (HCC): Primary | ICD-10-CM

## 2018-02-26 PROCEDURE — G8427 DOCREV CUR MEDS BY ELIG CLIN: HCPCS | Performed by: NURSE PRACTITIONER

## 2018-02-26 PROCEDURE — 1123F ACP DISCUSS/DSCN MKR DOCD: CPT | Performed by: NURSE PRACTITIONER

## 2018-02-26 PROCEDURE — G8420 CALC BMI NORM PARAMETERS: HCPCS | Performed by: NURSE PRACTITIONER

## 2018-02-26 PROCEDURE — G8484 FLU IMMUNIZE NO ADMIN: HCPCS | Performed by: NURSE PRACTITIONER

## 2018-02-26 PROCEDURE — 4040F PNEUMOC VAC/ADMIN/RCVD: CPT | Performed by: NURSE PRACTITIONER

## 2018-02-26 PROCEDURE — 99213 OFFICE O/P EST LOW 20 MIN: CPT | Performed by: NURSE PRACTITIONER

## 2018-02-26 PROCEDURE — 1036F TOBACCO NON-USER: CPT | Performed by: NURSE PRACTITIONER

## 2018-02-26 ASSESSMENT — ENCOUNTER SYMPTOMS
WHEEZING: 0
ABDOMINAL PAIN: 0
COLOR CHANGE: 0
NAUSEA: 0
APNEA: 0
SHORTNESS OF BREATH: 1
ABDOMINAL DISTENTION: 0
CHEST TIGHTNESS: 0
COUGH: 0

## 2018-02-26 NOTE — PROGRESS NOTES
abdominal distention, abdominal pain and nausea. Genitourinary: Negative for difficulty urinating and dysuria. Musculoskeletal: Positive for arthralgias (back and knee ) and gait problem (rolling walker ). Skin: Negative for color change. Neurological: Negative for dizziness, numbness and headaches. Psychiatric/Behavioral: Negative for agitation, confusion and sleep disturbance. The patient is not nervous/anxious. OBJECTIVE:   Today's Vitals:  /66   Pulse 64   Ht 5' 9\" (1.753 m)   Wt 168 lb (76.2 kg)   SpO2 99%   BMI 24.81 kg/m²     Physical Exam   Constitutional: He is oriented to person, place, and time. He appears well-developed and well-nourished. HENT:   Head: Normocephalic and atraumatic. Eyes: Pupils are equal, round, and reactive to light. Neck: Normal range of motion. No JVD present. Cardiovascular: Normal rate and normal heart sounds. No murmur heard. +PPM placed on the right    Pulmonary/Chest: Effort normal. No respiratory distress. He has no rales. Abdominal: Soft. He exhibits no distension. There is no tenderness. Musculoskeletal: He exhibits edema (+2 left leg. +1 right leg). He exhibits no tenderness. Neurological: He is alert and oriented to person, place, and time. Skin: Skin is warm and dry. Psychiatric: He has a normal mood and affect. His behavior is normal.   Vitals reviewed. Wt Readings from Last 3 Encounters:   02/26/18 168 lb (76.2 kg)   02/17/18 174 lb 1.6 oz (79 kg)   02/14/18 174 lb 3.2 oz (79 kg)     BP Readings from Last 3 Encounters:   02/26/18 108/66   02/17/18 (!) 164/74   02/14/18 120/62     Pulse Readings from Last 3 Encounters:   02/26/18 64   02/17/18 61   02/14/18 59     Body mass index is 24.81 kg/m².     ECHO:   12/26/17  Summary   Technically difficult examination.   Ejection fraction was estimated at 55-60%.   There were no regional left ventricular wall motion abnormalities and wall   thickness was within normal limits.   Left atrial size was severely dilated.   The mitral valve structure demonstrated posterior leaflet calcification   and restriction of motion. There was no mitral stenosis.   There was trace to mild mitral regurgitation.  Katiuska Desanctis was trace to mild tricuspid regurgitation. Mindy Bhat size is within normal limits with normal respiratory phasic changes.   Assuming an RAP = 5 mmHg, the estimated RVSP = 41 mmHg.      Signature      ----------------------------------------------------------------   Electronically signed by Renetta Duong MD (Interpreting  [employer]) on 12/26/2017 at 06:44 PM    Results reviewed:  BNP: No results found for: BNP  CBC:   Lab Results   Component Value Date    WBC 6.4 02/16/2018    RBC 2.96 02/16/2018    RBC 4.12 11/07/2011    HGB 9.4 02/16/2018    HCT 26.6 02/16/2018     02/16/2018     CMP:    Lab Results   Component Value Date     02/16/2018    K 4.9 02/16/2018     02/16/2018    CO2 20 02/16/2018    BUN 37 02/16/2018    CREATININE 1.4 02/16/2018    LABGLOM 48 02/16/2018    GLUCOSE 135 02/16/2018    GLUCOSE 221 11/07/2011    CALCIUM 8.8 02/16/2018     Hepatic Function Panel:  No results found for: ALKPHOS, ALT, AST, PROT, BILITOT, BILIDIR, IBILI, LABALBU  Magnesium:  No results found for: MG  PT/INR:    Lab Results   Component Value Date    INR 1.00 02/16/2018     Lipids:  No results found for: TRIG, HDL, LDLCALC, LDLDIRECT, LABVLDL    ASSESSMENT AND PLAN:   The patient's condition/symptoms are Stable: No clinical evidence of fluid overload today. Continue current medical regimen without changes at present time.     1. CHF (congestive heart failure), NYHA class II, chronic, diastolic (HCC)         Continue:  · Continue current medications - Lasix, Isosorbide, Bystolic (consider changing to Toprol), Norvasc. We again discussed diet in detail as he is still consuming a large amount of higher sodium foods.  He and his cousin state they will start to pay more attention to the labels. We also discussed increasing activity as tolerated. · Daily weights  · Fluid restriction of 2 Liters per day  · Limit sodium in diet to around 1500 mg/day  · Monitor BP  · Activity as tolerated     Patient was instructed to call the [facility] for changes in the following symptoms:   Weight gain of 2-3 pounds in 1 day or 5 pounds in 1 week  Increased shortness of breath  Shortness of breath while laying down  Cough  Chest pain  Swelling in feet, ankles or legs  Tenderness or bloating in the abdomen  Fatigue   Decreased appetite or nausea   Confusion      Return in about 3 months (around 5/26/2018). or sooner if needed     Patient given educational materials - see patient instructions. We discussed the importance of weighing oneself and recording daily. We also discussed the importance of a low sodium diet, higher sodium foods to avoid and better low sodium food options. Patient verbalizes understanding of plan of care using teach back method, and is agreeable to the treatment plan.        Electronically signed by Myron Love CNP on 2/26/2018 at 1:53 PM

## 2018-02-27 NOTE — PROCEDURES
[address] [place] [address]                              CARDIAC CATHETERIZATION    PATIENT NAME: Randy Landaverde              :        1935  MED REC NO:   [id number]                           ROOM:  ACCOUNT NO:   [de-identified]                           ADMIT DATE: 2018  PROVIDER:     Kevin Harley MD    DATE OF PROCEDURE:  2018    INDICATIONS:  Severe life limiting claudication, left lower extremity. PROCEDURE:  After informed consent was obtained from the patient, the  patient was brought to the Interventional Radiology suite and prepped in  sterile fashion. Preprocedure timeout was completed. The patient's  informed consent was signed. A right femoral artery was chosen as the  primary point of access. After infiltration of the right inguinal region  with 2% lidocaine using micropuncture and modified Seldinger technique and  fluoroscopic guidance, we were able to insert a 5-[place] sheath in the  right femoral artery. We used a 5-[place] Visio catheter to perform  diagnostic peripheral angiography. AORTA:  The aorta did not demonstrate any significant aneurysmal disease. There are mild luminal irregularities. RENAL ARTERIES:  Renal arteries are not well passed out, but appeared to be  patent bilaterally. COMMON ILIAC ARTERIES:  Bilateral common iliac arteries are patent without  any significant obstruction. There is right internal iliac artery stenosis  of approximately 80%. Left internal iliac is patent. EXTERNAL ILIAC ARTERIES:  The external iliac arteries are patent  bilaterally. COMMON FEMORAL ARTERY:  Bilateral common femoral arteries are patent. There is a 40% stenosis on the left side, 40% to 50% stenosis on the left  common femoral artery. SUPERFICIAL FEMORAL ARTERY:  Bilateral SFAs are patent.   There is  significant luminal irregularities followed by 40% to 50% stenosis  throughout both SFAs. There is approximately 40% to 50% stenosis in the  mid SFA on the left. The right SFA has a 40% to 50% stenosis as well, but  otherwise is diffusely diseased, but patent overall. Both profunda  arteries bilaterally are patent with diffuse disease. POPLITEAL ARTERY:  Right popliteal artery at the region of the Ja[place]'s  canal has a previously placed stent. There is ISR within the stent  approximately 60% to 70% in 2 focal regions. The left popliteal region has  no significant obstructive disease. There still continued to be mild  luminal irregularities and 30% to 40% disease. BELOW THE KNEE ARTERIES:  Below the knee popliteal is patent, but there is  again moderate disease 40% to 50% below the knee before the  trifurcation, similarly on the other side as well. The AT are  bilaterally occluded. The tibioperoneal trunk is patent, but diffusely  diseased on the left side. The right side tibioperoneal trunk is also  patent, but diffusely diseased. Gives rise to the PT on both sides. All  below the knee vessels are extremely diseased and patent. There is  2-vessel runoff to the foot on the left. There is only single vessel  runoff to the foot on the right. There are multiple collaterals   down to the pedal arch. PEDAL VESSELS:  There is significant collateralization and supply of the  pedal vessels to the foot. There is an arch that reconstitutes as well on  both sides. ACCESS:  The right femoral artery catheter was removed and manual  pressure was used for hemostasis. IMMEDIATE COMPLICATIONS:  None. MEDICATIONS:  See EMR. SUMMARY:  Right SFA/popliteal stent with moderate to severe ISR; left sided below the knee artery disease   that is not amenable for revascularization at this time. PLAN:  1. Continue optimal medical therapy for PAD. 2.  Risk factor management. 3.  Bedrest for the sheath removal.  4.  IV fluids.   5.  Followup with myself in 2 to 4 weeks postprocedure. All of the above was explained to the patient and the patient's family. They were agreeable and amenable to the plan.         Janene Pollack MD    D: 02/27/2018 10:19:50       T: 02/27/2018 11:43:26     [email or web address]  Job#: [id number]     Doc#: [id number]    CC:

## 2018-04-02 ENCOUNTER — OFFICE VISIT (OUTPATIENT)
Dept: CARDIOLOGY CLINIC | Age: 83
End: 2018-04-02
Payer: MEDICARE

## 2018-04-02 VITALS
SYSTOLIC BLOOD PRESSURE: 136 MMHG | HEIGHT: 69 IN | BODY MASS INDEX: 24.11 KG/M2 | WEIGHT: 162.8 LBS | DIASTOLIC BLOOD PRESSURE: 68 MMHG | HEART RATE: 68 BPM

## 2018-04-02 DIAGNOSIS — Z98.890 HISTORY OF OPEN HEART SURGERY: ICD-10-CM

## 2018-04-02 DIAGNOSIS — I10 ESSENTIAL HYPERTENSION: ICD-10-CM

## 2018-04-02 DIAGNOSIS — I50.32 CHF (CONGESTIVE HEART FAILURE), NYHA CLASS II, CHRONIC, DIASTOLIC (HCC): ICD-10-CM

## 2018-04-02 DIAGNOSIS — Z95.0 PACEMAKER: ICD-10-CM

## 2018-04-02 DIAGNOSIS — E78.5 DYSLIPIDEMIA: Primary | ICD-10-CM

## 2018-04-02 DIAGNOSIS — I73.9 PAD (PERIPHERAL ARTERY DISEASE) (HCC): ICD-10-CM

## 2018-04-02 PROCEDURE — G8427 DOCREV CUR MEDS BY ELIG CLIN: HCPCS | Performed by: PHYSICIAN ASSISTANT

## 2018-04-02 PROCEDURE — G8420 CALC BMI NORM PARAMETERS: HCPCS | Performed by: PHYSICIAN ASSISTANT

## 2018-04-02 PROCEDURE — 1036F TOBACCO NON-USER: CPT | Performed by: PHYSICIAN ASSISTANT

## 2018-04-02 PROCEDURE — 99213 OFFICE O/P EST LOW 20 MIN: CPT | Performed by: PHYSICIAN ASSISTANT

## 2018-04-02 PROCEDURE — 1123F ACP DISCUSS/DSCN MKR DOCD: CPT | Performed by: PHYSICIAN ASSISTANT

## 2018-04-02 PROCEDURE — 4040F PNEUMOC VAC/ADMIN/RCVD: CPT | Performed by: PHYSICIAN ASSISTANT

## 2018-04-11 PROBLEM — E86.0 DEHYDRATION: Status: RESOLVED | Noted: 2018-01-04 | Resolved: 2018-04-11

## 2018-04-13 ENCOUNTER — APPOINTMENT (OUTPATIENT)
Dept: GENERAL RADIOLOGY | Age: 83
End: 2018-04-13
Payer: MEDICARE

## 2018-04-13 ENCOUNTER — HOSPITAL ENCOUNTER (EMERGENCY)
Age: 83
Discharge: HOME | End: 2018-04-13
Attending: FAMILY MEDICINE
Payer: MEDICARE

## 2018-04-13 ENCOUNTER — APPOINTMENT (OUTPATIENT)
Dept: CT IMAGING | Age: 83
End: 2018-04-13
Payer: MEDICARE

## 2018-04-13 VITALS
SYSTOLIC BLOOD PRESSURE: 178 MMHG | TEMPERATURE: 97.7 F | HEART RATE: 60 BPM | RESPIRATION RATE: 18 BRPM | WEIGHT: 162 LBS | BODY MASS INDEX: 23.99 KG/M2 | OXYGEN SATURATION: 100 % | HEIGHT: 69 IN | DIASTOLIC BLOOD PRESSURE: 75 MMHG

## 2018-04-13 DIAGNOSIS — S69.91XA INJURY OF RIGHT HAND, INITIAL ENCOUNTER: Primary | ICD-10-CM

## 2018-04-13 PROCEDURE — 73110 X-RAY EXAM OF WRIST: CPT

## 2018-04-13 PROCEDURE — 70486 CT MAXILLOFACIAL W/O DYE: CPT

## 2018-04-13 PROCEDURE — 73130 X-RAY EXAM OF HAND: CPT

## 2018-04-13 PROCEDURE — 72125 CT NECK SPINE W/O DYE: CPT

## 2018-04-13 PROCEDURE — 99284 EMERGENCY DEPT VISIT MOD MDM: CPT

## 2018-04-13 PROCEDURE — 70450 CT HEAD/BRAIN W/O DYE: CPT

## 2018-04-13 ASSESSMENT — ENCOUNTER SYMPTOMS
ABDOMINAL PAIN: 0
CONSTIPATION: 0
NAUSEA: 0
VOMITING: 0
SORE THROAT: 0
RHINORRHEA: 0
BACK PAIN: 0
CHEST TIGHTNESS: 0
COUGH: 0
DIARRHEA: 0
COLOR CHANGE: 1
BLOOD IN STOOL: 0
WHEEZING: 0
SHORTNESS OF BREATH: 0

## 2018-04-13 ASSESSMENT — PAIN DESCRIPTION - PAIN TYPE: TYPE: ACUTE PAIN

## 2018-04-13 ASSESSMENT — PAIN DESCRIPTION - DESCRIPTORS: DESCRIPTORS: ACHING;THROBBING

## 2018-04-13 ASSESSMENT — PAIN DESCRIPTION - LOCATION: LOCATION: HAND;WRIST

## 2018-04-13 ASSESSMENT — PAIN SCALES - GENERAL: PAINLEVEL_OUTOF10: 6

## 2018-04-13 ASSESSMENT — PAIN DESCRIPTION - ORIENTATION: ORIENTATION: RIGHT

## 2018-04-16 ENCOUNTER — TELEPHONE (OUTPATIENT)
Dept: CARDIOLOGY CLINIC | Age: 83
End: 2018-04-16

## 2018-04-16 RX ORDER — AMLODIPINE BESYLATE 10 MG/1
10 TABLET ORAL DAILY
COMMUNITY
End: 2018-04-17 | Stop reason: SDUPTHER

## 2018-04-16 RX ORDER — POTASSIUM CHLORIDE 20 MEQ/1
10 TABLET, EXTENDED RELEASE ORAL DAILY
Qty: 180 TABLET | Refills: 1 | Status: ON HOLD | OUTPATIENT
Start: 2018-04-16 | End: 2019-01-01 | Stop reason: HOSPADM

## 2018-04-17 RX ORDER — AMLODIPINE BESYLATE 10 MG/1
10 TABLET ORAL DAILY
Qty: 30 TABLET | Refills: 11 | Status: SHIPPED | OUTPATIENT
Start: 2018-04-17

## 2018-04-17 RX ORDER — AMLODIPINE BESYLATE 10 MG/1
10 TABLET ORAL DAILY
Qty: 90 TABLET | Refills: 3 | Status: CANCELLED | OUTPATIENT
Start: 2018-04-17

## 2018-04-20 ENCOUNTER — HOSPITAL ENCOUNTER (OUTPATIENT)
Dept: AUDIOLOGY | Age: 83
Discharge: HOME | End: 2018-04-20
Payer: MEDICARE

## 2018-04-20 PROCEDURE — 92567 TYMPANOMETRY: CPT | Performed by: AUDIOLOGIST

## 2018-04-20 PROCEDURE — 92557 COMPREHENSIVE HEARING TEST: CPT | Performed by: AUDIOLOGIST

## 2018-04-25 ENCOUNTER — OFFICE VISIT (OUTPATIENT)
Dept: CARDIOLOGY CLINIC | Age: 83
End: 2018-04-25
Payer: MEDICARE

## 2018-04-25 ENCOUNTER — NURSE ONLY (OUTPATIENT)
Dept: CARDIOLOGY CLINIC | Age: 83
End: 2018-04-25
Payer: MEDICARE

## 2018-04-25 VITALS
BODY MASS INDEX: 24.44 KG/M2 | SYSTOLIC BLOOD PRESSURE: 122 MMHG | WEIGHT: 165 LBS | HEART RATE: 79 BPM | HEIGHT: 69 IN | DIASTOLIC BLOOD PRESSURE: 60 MMHG | OXYGEN SATURATION: 98 %

## 2018-04-25 DIAGNOSIS — Z95.0 PACEMAKER: Primary | ICD-10-CM

## 2018-04-25 DIAGNOSIS — I50.32 CHF (CONGESTIVE HEART FAILURE), NYHA CLASS II, CHRONIC, DIASTOLIC (HCC): Primary | ICD-10-CM

## 2018-04-25 PROCEDURE — 99213 OFFICE O/P EST LOW 20 MIN: CPT | Performed by: NURSE PRACTITIONER

## 2018-04-25 PROCEDURE — G8427 DOCREV CUR MEDS BY ELIG CLIN: HCPCS | Performed by: NURSE PRACTITIONER

## 2018-04-25 PROCEDURE — G8420 CALC BMI NORM PARAMETERS: HCPCS | Performed by: NURSE PRACTITIONER

## 2018-04-25 PROCEDURE — 4040F PNEUMOC VAC/ADMIN/RCVD: CPT | Performed by: NURSE PRACTITIONER

## 2018-04-25 PROCEDURE — 1123F ACP DISCUSS/DSCN MKR DOCD: CPT | Performed by: NURSE PRACTITIONER

## 2018-04-25 PROCEDURE — 93288 INTERROG EVL PM/LDLS PM IP: CPT | Performed by: INTERNAL MEDICINE

## 2018-04-25 PROCEDURE — 1036F TOBACCO NON-USER: CPT | Performed by: NURSE PRACTITIONER

## 2018-04-25 ASSESSMENT — ENCOUNTER SYMPTOMS
SHORTNESS OF BREATH: 0
WHEEZING: 0
COUGH: 0
ABDOMINAL PAIN: 0
APNEA: 0
ABDOMINAL DISTENTION: 0
NAUSEA: 0
CHEST TIGHTNESS: 0
COLOR CHANGE: 0

## 2018-05-01 LAB
BUN BLDV-MCNC: 35 MG/DL
CALCIUM SERPL-MCNC: 9.2 MG/DL
CHLORIDE BLD-SCNC: 99 MMOL/L
CHOLESTEROL, TOTAL: 110 MG/DL
CHOLESTEROL/HDL RATIO: NORMAL
CO2: 23 MMOL/L
CREAT SERPL-MCNC: 1.4 MG/DL
GFR CALCULATED: NORMAL
GLUCOSE BLD-MCNC: 135 MG/DL
HDLC SERPL-MCNC: 58 MG/DL (ref 35–70)
LDL CHOLESTEROL CALCULATED: 30 MG/DL (ref 0–160)
MAGNESIUM: 1.9 MG/DL
POTASSIUM SERPL-SCNC: 4.9 MMOL/L
SODIUM BLD-SCNC: 137 MMOL/L
TRIGL SERPL-MCNC: 112 MG/DL
VLDLC SERPL CALC-MCNC: 22 MG/DL

## 2018-05-10 ENCOUNTER — TELEPHONE (OUTPATIENT)
Dept: AUDIOLOGY | Age: 83
End: 2018-05-10

## 2018-05-17 ENCOUNTER — HOSPITAL ENCOUNTER (OUTPATIENT)
Dept: AUDIOLOGY | Age: 83
Discharge: HOME | End: 2018-05-17
Payer: MEDICAID

## 2018-05-17 PROCEDURE — V5160 DISPENSING FEE BINAURAL: HCPCS | Performed by: AUDIOLOGIST

## 2018-05-17 PROCEDURE — V5261 HEARING AID, DIGIT, BIN, BTE: HCPCS | Performed by: AUDIOLOGIST

## 2018-05-24 ENCOUNTER — HOSPITAL ENCOUNTER (OUTPATIENT)
Dept: AUDIOLOGY | Age: 83
Discharge: HOME | End: 2018-05-24

## 2018-05-24 PROCEDURE — 9990000010 HC NO CHARGE VISIT: Performed by: AUDIOLOGIST

## 2018-06-05 ENCOUNTER — APPOINTMENT (OUTPATIENT)
Dept: GENERAL RADIOLOGY | Age: 83
DRG: 812 | End: 2018-06-05
Payer: MEDICARE

## 2018-06-05 ENCOUNTER — APPOINTMENT (OUTPATIENT)
Dept: CT IMAGING | Age: 83
DRG: 812 | End: 2018-06-05
Payer: MEDICARE

## 2018-06-05 ENCOUNTER — HOSPITAL ENCOUNTER (INPATIENT)
Age: 83
LOS: 3 days | Discharge: NURSING, REHAB OR LONG-TERM CARE | DRG: 812 | End: 2018-06-08
Attending: FAMILY MEDICINE | Admitting: INTERNAL MEDICINE
Payer: MEDICARE

## 2018-06-05 ENCOUNTER — HOSPITAL ENCOUNTER (EMERGENCY)
Age: 83
Discharge: NURSING, REHAB OR LONG-TERM CARE | DRG: 812 | End: 2018-06-05
Attending: INTERNAL MEDICINE
Payer: MEDICARE

## 2018-06-05 VITALS
OXYGEN SATURATION: 100 % | WEIGHT: 170 LBS | BODY MASS INDEX: 25.18 KG/M2 | TEMPERATURE: 97.8 F | SYSTOLIC BLOOD PRESSURE: 117 MMHG | HEIGHT: 69 IN | RESPIRATION RATE: 16 BRPM | DIASTOLIC BLOOD PRESSURE: 56 MMHG | HEART RATE: 60 BPM

## 2018-06-05 DIAGNOSIS — M25.552 LEFT HIP PAIN: ICD-10-CM

## 2018-06-05 DIAGNOSIS — S70.02XA CONTUSION OF LEFT HIP, INITIAL ENCOUNTER: ICD-10-CM

## 2018-06-05 DIAGNOSIS — E87.20 LACTIC ACIDOSIS: Primary | ICD-10-CM

## 2018-06-05 DIAGNOSIS — R50.9 FEVER, UNSPECIFIED FEVER CAUSE: ICD-10-CM

## 2018-06-05 DIAGNOSIS — D64.9 ANEMIA, UNSPECIFIED TYPE: ICD-10-CM

## 2018-06-05 DIAGNOSIS — N18.9 CHRONIC RENAL INSUFFICIENCY, UNSPECIFIED STAGE: ICD-10-CM

## 2018-06-05 DIAGNOSIS — M25.552 LEFT HIP PAIN: Primary | ICD-10-CM

## 2018-06-05 LAB
ALBUMIN SERPL-MCNC: 3.3 G/DL (ref 3.5–5.1)
ALLEN TEST: POSITIVE
ALP BLD-CCNC: 63 U/L (ref 38–126)
ALT SERPL-CCNC: 38 U/L (ref 11–66)
ANION GAP SERPL CALCULATED.3IONS-SCNC: 18 MEQ/L (ref 8–16)
AST SERPL-CCNC: 40 U/L (ref 5–40)
BACTERIA: ABNORMAL /HPF
BASE EXCESS (CALCULATED): -3.9 MMOL/L (ref -2.5–2.5)
BASOPHILS # BLD: 0.5 %
BASOPHILS ABSOLUTE: 0 THOU/MM3 (ref 0–0.1)
BILIRUB SERPL-MCNC: 0.4 MG/DL (ref 0.3–1.2)
BILIRUBIN URINE: NEGATIVE
BLOOD, URINE: NEGATIVE
BUN BLDV-MCNC: 48 MG/DL (ref 7–22)
CALCIUM SERPL-MCNC: 8.6 MG/DL (ref 8.5–10.5)
CASTS 2: ABNORMAL /LPF
CASTS UA: ABNORMAL /LPF
CHARACTER, URINE: CLEAR
CHLORIDE BLD-SCNC: 104 MEQ/L (ref 98–111)
CO2: 18 MEQ/L (ref 23–33)
COLLECTED BY:: ABNORMAL
COLOR: YELLOW
CREAT SERPL-MCNC: 1.7 MG/DL (ref 0.4–1.2)
CRYSTALS, UA: ABNORMAL
DEVICE: ABNORMAL
EKG ATRIAL RATE: 82 BPM
EKG P AXIS: 0 DEGREES
EKG P-R INTERVAL: 174 MS
EKG Q-T INTERVAL: 356 MS
EKG QRS DURATION: 78 MS
EKG QTC CALCULATION (BAZETT): 415 MS
EKG R AXIS: 48 DEGREES
EKG T AXIS: -129 DEGREES
EKG VENTRICULAR RATE: 82 BPM
EOSINOPHIL # BLD: 0.2 %
EOSINOPHILS ABSOLUTE: 0 THOU/MM3 (ref 0–0.4)
EPITHELIAL CELLS, UA: ABNORMAL /HPF
GFR SERPL CREATININE-BSD FRML MDRD: 39 ML/MIN/1.73M2
GLUCOSE BLD-MCNC: 266 MG/DL (ref 70–108)
GLUCOSE URINE: 100 MG/DL
HCO3: 20 MMOL/L (ref 23–28)
HCT VFR BLD CALC: 24.8 % (ref 42–52)
HEMOGLOBIN: 8.4 GM/DL (ref 14–18)
INR BLD: 1.17 (ref 0.85–1.13)
KETONES, URINE: NEGATIVE
LACTIC ACID, SEPSIS: 3.1 MMOL/L (ref 0.5–1.9)
LACTIC ACID: 3.6 MMOL/L (ref 0.5–2.2)
LEUKOCYTE ESTERASE, URINE: NEGATIVE
LYMPHOCYTES # BLD: 7.7 %
LYMPHOCYTES ABSOLUTE: 0.4 THOU/MM3 (ref 1–4.8)
MCH RBC QN AUTO: 30.3 PG (ref 27–31)
MCHC RBC AUTO-ENTMCNC: 33.8 GM/DL (ref 33–37)
MCV RBC AUTO: 89.7 FL (ref 80–94)
MISCELLANEOUS 2: ABNORMAL
MONOCYTES # BLD: 9.8 %
MONOCYTES ABSOLUTE: 0.6 THOU/MM3 (ref 0.4–1.3)
NITRITE, URINE: NEGATIVE
NUCLEATED RED BLOOD CELLS: 0 /100 WBC
O2 SATURATION: 97 %
OSMOLALITY CALCULATION: 301.3 MOSMOL/KG (ref 275–300)
PCO2: 29 MMHG (ref 35–45)
PDW BLD-RTO: 13.8 % (ref 11.5–14.5)
PH BLOOD GAS: 7.44 (ref 7.35–7.45)
PH UA: 6
PLATELET # BLD: 134 THOU/MM3 (ref 130–400)
PMV BLD AUTO: 7.6 FL (ref 7.4–10.4)
PO2: 83 MMHG (ref 71–104)
POTASSIUM SERPL-SCNC: 4.4 MEQ/L (ref 3.5–5.2)
PRO-BNP: 4355 PG/ML (ref 0–1800)
PROTEIN UA: 30
RBC # BLD: 2.77 MILL/MM3 (ref 4.7–6.1)
RBC URINE: ABNORMAL /HPF
RENAL EPITHELIAL, UA: ABNORMAL
SEG NEUTROPHILS: 81.8 %
SEGMENTED NEUTROPHILS ABSOLUTE COUNT: 4.7 THOU/MM3 (ref 1.8–7.7)
SODIUM BLD-SCNC: 140 MEQ/L (ref 135–145)
SOURCE, BLOOD GAS: ABNORMAL
SPECIFIC GRAVITY, URINE: 1.01 (ref 1–1.03)
TOTAL CK: 91 U/L (ref 55–170)
TOTAL PROTEIN: 6.6 G/DL (ref 6.1–8)
TROPONIN T: < 0.01 NG/ML
UROBILINOGEN, URINE: 0.2 EU/DL
WBC # BLD: 5.7 THOU/MM3 (ref 4.8–10.8)
WBC UA: ABNORMAL /HPF
YEAST: ABNORMAL

## 2018-06-05 PROCEDURE — 82550 ASSAY OF CK (CPK): CPT

## 2018-06-05 PROCEDURE — 99284 EMERGENCY DEPT VISIT MOD MDM: CPT

## 2018-06-05 PROCEDURE — G0378 HOSPITAL OBSERVATION PER HR: HCPCS

## 2018-06-05 PROCEDURE — 93005 ELECTROCARDIOGRAM TRACING: CPT | Performed by: INTERNAL MEDICINE

## 2018-06-05 PROCEDURE — 2580000003 HC RX 258: Performed by: INTERNAL MEDICINE

## 2018-06-05 PROCEDURE — 85025 COMPLETE CBC W/AUTO DIFF WBC: CPT

## 2018-06-05 PROCEDURE — 70450 CT HEAD/BRAIN W/O DYE: CPT

## 2018-06-05 PROCEDURE — 96361 HYDRATE IV INFUSION ADD-ON: CPT

## 2018-06-05 PROCEDURE — 36600 WITHDRAWAL OF ARTERIAL BLOOD: CPT

## 2018-06-05 PROCEDURE — 73502 X-RAY EXAM HIP UNI 2-3 VIEWS: CPT

## 2018-06-05 PROCEDURE — 99285 EMERGENCY DEPT VISIT HI MDM: CPT

## 2018-06-05 PROCEDURE — 96360 HYDRATION IV INFUSION INIT: CPT

## 2018-06-05 PROCEDURE — 80053 COMPREHEN METABOLIC PANEL: CPT

## 2018-06-05 PROCEDURE — 1200000000 HC SEMI PRIVATE

## 2018-06-05 PROCEDURE — 72100 X-RAY EXAM L-S SPINE 2/3 VWS: CPT

## 2018-06-05 PROCEDURE — 99222 1ST HOSP IP/OBS MODERATE 55: CPT | Performed by: INTERNAL MEDICINE

## 2018-06-05 PROCEDURE — 73564 X-RAY EXAM KNEE 4 OR MORE: CPT

## 2018-06-05 PROCEDURE — 72125 CT NECK SPINE W/O DYE: CPT

## 2018-06-05 PROCEDURE — 36415 COLL VENOUS BLD VENIPUNCTURE: CPT

## 2018-06-05 PROCEDURE — 83605 ASSAY OF LACTIC ACID: CPT

## 2018-06-05 PROCEDURE — 2580000003 HC RX 258: Performed by: FAMILY MEDICINE

## 2018-06-05 PROCEDURE — 85610 PROTHROMBIN TIME: CPT

## 2018-06-05 PROCEDURE — 84484 ASSAY OF TROPONIN QUANT: CPT

## 2018-06-05 PROCEDURE — 73700 CT LOWER EXTREMITY W/O DYE: CPT

## 2018-06-05 PROCEDURE — 83880 ASSAY OF NATRIURETIC PEPTIDE: CPT

## 2018-06-05 PROCEDURE — 71045 X-RAY EXAM CHEST 1 VIEW: CPT

## 2018-06-05 PROCEDURE — 6370000000 HC RX 637 (ALT 250 FOR IP): Performed by: INTERNAL MEDICINE

## 2018-06-05 PROCEDURE — 87040 BLOOD CULTURE FOR BACTERIA: CPT

## 2018-06-05 PROCEDURE — 82803 BLOOD GASES ANY COMBINATION: CPT

## 2018-06-05 PROCEDURE — 81001 URINALYSIS AUTO W/SCOPE: CPT

## 2018-06-05 RX ORDER — ISOSORBIDE MONONITRATE 60 MG/1
60 TABLET, EXTENDED RELEASE ORAL DAILY
Status: DISCONTINUED | OUTPATIENT
Start: 2018-06-06 | End: 2018-06-08 | Stop reason: HOSPADM

## 2018-06-05 RX ORDER — GLIPIZIDE 5 MG/1
5 TABLET ORAL DAILY
Status: DISCONTINUED | OUTPATIENT
Start: 2018-06-06 | End: 2018-06-08 | Stop reason: HOSPADM

## 2018-06-05 RX ORDER — ACETAMINOPHEN 500 MG
500 TABLET ORAL EVERY 6 HOURS PRN
Status: DISCONTINUED | OUTPATIENT
Start: 2018-06-05 | End: 2018-06-08 | Stop reason: HOSPADM

## 2018-06-05 RX ORDER — CLOPIDOGREL BISULFATE 75 MG/1
75 TABLET ORAL DAILY
Status: DISCONTINUED | OUTPATIENT
Start: 2018-06-06 | End: 2018-06-08 | Stop reason: HOSPADM

## 2018-06-05 RX ORDER — NEBIVOLOL 5 MG/1
10 TABLET ORAL DAILY
Status: DISCONTINUED | OUTPATIENT
Start: 2018-06-06 | End: 2018-06-06

## 2018-06-05 RX ORDER — SODIUM CHLORIDE 0.9 % (FLUSH) 0.9 %
10 SYRINGE (ML) INJECTION PRN
Status: DISCONTINUED | OUTPATIENT
Start: 2018-06-05 | End: 2018-06-08 | Stop reason: HOSPADM

## 2018-06-05 RX ORDER — OXYCODONE AND ACETAMINOPHEN 7.5; 325 MG/1; MG/1
1 TABLET ORAL EVERY 8 HOURS PRN
Status: DISCONTINUED | OUTPATIENT
Start: 2018-06-05 | End: 2018-06-08 | Stop reason: HOSPADM

## 2018-06-05 RX ORDER — DOCUSATE SODIUM 100 MG/1
100 CAPSULE, LIQUID FILLED ORAL 2 TIMES DAILY
Status: DISCONTINUED | OUTPATIENT
Start: 2018-06-06 | End: 2018-06-08 | Stop reason: HOSPADM

## 2018-06-05 RX ORDER — AMLODIPINE BESYLATE 10 MG/1
10 TABLET ORAL DAILY
Status: DISCONTINUED | OUTPATIENT
Start: 2018-06-06 | End: 2018-06-08 | Stop reason: HOSPADM

## 2018-06-05 RX ORDER — HYDROCODONE BITARTRATE AND ACETAMINOPHEN 5; 325 MG/1; MG/1
1 TABLET ORAL EVERY 6 HOURS PRN
Qty: 15 TABLET | Refills: 0 | Status: ON HOLD | OUTPATIENT
Start: 2018-06-05 | End: 2018-06-08

## 2018-06-05 RX ORDER — ATORVASTATIN CALCIUM 20 MG/1
20 TABLET, FILM COATED ORAL DAILY
Status: DISCONTINUED | OUTPATIENT
Start: 2018-06-06 | End: 2018-06-08 | Stop reason: HOSPADM

## 2018-06-05 RX ORDER — SODIUM CHLORIDE 0.9 % (FLUSH) 0.9 %
10 SYRINGE (ML) INJECTION EVERY 12 HOURS SCHEDULED
Status: DISCONTINUED | OUTPATIENT
Start: 2018-06-05 | End: 2018-06-08 | Stop reason: HOSPADM

## 2018-06-05 RX ORDER — PANTOPRAZOLE SODIUM 40 MG/1
40 TABLET, DELAYED RELEASE ORAL
Status: DISCONTINUED | OUTPATIENT
Start: 2018-06-06 | End: 2018-06-06

## 2018-06-05 RX ORDER — HYDROCODONE BITARTRATE AND ACETAMINOPHEN 5; 325 MG/1; MG/1
1 TABLET ORAL EVERY 6 HOURS PRN
Status: DISCONTINUED | OUTPATIENT
Start: 2018-06-05 | End: 2018-06-08 | Stop reason: HOSPADM

## 2018-06-05 RX ORDER — SODIUM CHLORIDE 9 MG/ML
INJECTION, SOLUTION INTRAVENOUS CONTINUOUS
Status: DISCONTINUED | OUTPATIENT
Start: 2018-06-05 | End: 2018-06-06 | Stop reason: DRUGHIGH

## 2018-06-05 RX ORDER — ONDANSETRON 2 MG/ML
4 INJECTION INTRAMUSCULAR; INTRAVENOUS EVERY 6 HOURS PRN
Status: DISCONTINUED | OUTPATIENT
Start: 2018-06-05 | End: 2018-06-08 | Stop reason: HOSPADM

## 2018-06-05 RX ORDER — ASPIRIN 81 MG/1
81 TABLET ORAL DAILY
Status: DISCONTINUED | OUTPATIENT
Start: 2018-06-06 | End: 2018-06-08 | Stop reason: HOSPADM

## 2018-06-05 RX ORDER — 0.9 % SODIUM CHLORIDE 0.9 %
500 INTRAVENOUS SOLUTION INTRAVENOUS ONCE
Status: COMPLETED | OUTPATIENT
Start: 2018-06-05 | End: 2018-06-05

## 2018-06-05 RX ORDER — RANOLAZINE 500 MG/1
500 TABLET, EXTENDED RELEASE ORAL 2 TIMES DAILY
Status: DISCONTINUED | OUTPATIENT
Start: 2018-06-06 | End: 2018-06-08 | Stop reason: HOSPADM

## 2018-06-05 RX ORDER — SODIUM CHLORIDE 9 MG/ML
INJECTION, SOLUTION INTRAVENOUS CONTINUOUS
Status: DISCONTINUED | OUTPATIENT
Start: 2018-06-05 | End: 2018-06-07

## 2018-06-05 RX ORDER — ACETAMINOPHEN 325 MG/1
650 TABLET ORAL ONCE
Status: COMPLETED | OUTPATIENT
Start: 2018-06-05 | End: 2018-06-05

## 2018-06-05 RX ORDER — POTASSIUM CHLORIDE 750 MG/1
10 TABLET, FILM COATED, EXTENDED RELEASE ORAL DAILY
Status: DISCONTINUED | OUTPATIENT
Start: 2018-06-06 | End: 2018-06-08 | Stop reason: HOSPADM

## 2018-06-05 RX ADMIN — SODIUM CHLORIDE: 9 INJECTION, SOLUTION INTRAVENOUS at 19:53

## 2018-06-05 RX ADMIN — SODIUM CHLORIDE 500 ML: 9 INJECTION, SOLUTION INTRAVENOUS at 12:38

## 2018-06-05 RX ADMIN — ACETAMINOPHEN 650 MG: 325 TABLET ORAL at 12:38

## 2018-06-05 ASSESSMENT — PAIN DESCRIPTION - LOCATION
LOCATION: BACK
LOCATION: BACK
LOCATION: HIP

## 2018-06-05 ASSESSMENT — PAIN DESCRIPTION - ORIENTATION
ORIENTATION: LEFT
ORIENTATION: LEFT

## 2018-06-05 ASSESSMENT — ENCOUNTER SYMPTOMS
NAUSEA: 0
ABDOMINAL PAIN: 0
ABDOMINAL PAIN: 0
RHINORRHEA: 0
BACK PAIN: 0
COLOR CHANGE: 1
SHORTNESS OF BREATH: 0
COUGH: 0
EYE REDNESS: 0
DIARRHEA: 0
SHORTNESS OF BREATH: 0
VOMITING: 0
EYE DISCHARGE: 0
WHEEZING: 0
SORE THROAT: 0

## 2018-06-05 ASSESSMENT — PAIN DESCRIPTION - FREQUENCY
FREQUENCY: INTERMITTENT
FREQUENCY: CONTINUOUS

## 2018-06-05 ASSESSMENT — PAIN DESCRIPTION - ONSET: ONSET: ON-GOING

## 2018-06-05 ASSESSMENT — PAIN SCALES - GENERAL
PAINLEVEL_OUTOF10: 6

## 2018-06-05 ASSESSMENT — PAIN DESCRIPTION - PAIN TYPE: TYPE: ACUTE PAIN

## 2018-06-05 ASSESSMENT — PAIN DESCRIPTION - PROGRESSION: CLINICAL_PROGRESSION: NOT CHANGED

## 2018-06-05 ASSESSMENT — PAIN DESCRIPTION - DESCRIPTORS: DESCRIPTORS: ACHING

## 2018-06-06 PROBLEM — D50.9 IRON DEFICIENCY ANEMIA: Status: ACTIVE | Noted: 2018-06-06

## 2018-06-06 LAB
ANION GAP SERPL CALCULATED.3IONS-SCNC: 12 MEQ/L (ref 8–16)
BASOPHILS # BLD: 0.4 %
BASOPHILS ABSOLUTE: 0 THOU/MM3 (ref 0–0.1)
BUN BLDV-MCNC: 38 MG/DL (ref 7–22)
CALCIUM SERPL-MCNC: 8.2 MG/DL (ref 8.5–10.5)
CHLORIDE BLD-SCNC: 107 MEQ/L (ref 98–111)
CO2: 21 MEQ/L (ref 23–33)
CREAT SERPL-MCNC: 1.5 MG/DL (ref 0.4–1.2)
EOSINOPHIL # BLD: 1.1 %
EOSINOPHILS ABSOLUTE: 0.1 THOU/MM3 (ref 0–0.4)
FERRITIN: 263 NG/ML (ref 22–322)
GFR SERPL CREATININE-BSD FRML MDRD: 45 ML/MIN/1.73M2
GLUCOSE BLD-MCNC: 108 MG/DL (ref 70–108)
GLUCOSE BLD-MCNC: 125 MG/DL (ref 70–108)
GLUCOSE BLD-MCNC: 155 MG/DL (ref 70–108)
GLUCOSE BLD-MCNC: 273 MG/DL (ref 70–108)
GLUCOSE BLD-MCNC: 331 MG/DL (ref 70–108)
HCT VFR BLD CALC: 22.9 % (ref 42–52)
HEMOGLOBIN: 7.7 GM/DL (ref 14–18)
IRON: 13 UG/DL (ref 65–195)
LACTIC ACID: 0.9 MMOL/L (ref 0.5–2.2)
LYMPHOCYTES # BLD: 16.1 %
LYMPHOCYTES ABSOLUTE: 0.7 THOU/MM3 (ref 1–4.8)
MCH RBC QN AUTO: 30.1 PG (ref 27–31)
MCHC RBC AUTO-ENTMCNC: 33.7 GM/DL (ref 33–37)
MCV RBC AUTO: 89.4 FL (ref 80–94)
MONOCYTES # BLD: 11.3 %
MONOCYTES ABSOLUTE: 0.5 THOU/MM3 (ref 0.4–1.3)
NUCLEATED RED BLOOD CELLS: 0 /100 WBC
PDW BLD-RTO: 13.7 % (ref 11.5–14.5)
PLATELET # BLD: 109 THOU/MM3 (ref 130–400)
PMV BLD AUTO: 7.4 FL (ref 7.4–10.4)
POTASSIUM REFLEX MAGNESIUM: 3.7 MEQ/L (ref 3.5–5.2)
RBC # BLD: 2.57 MILL/MM3 (ref 4.7–6.1)
RETICULOCYTE ABSOLUTE COUNT: 2.3 % (ref 0.5–2)
SEG NEUTROPHILS: 71.1 %
SEGMENTED NEUTROPHILS ABSOLUTE COUNT: 3.3 THOU/MM3 (ref 1.8–7.7)
SODIUM BLD-SCNC: 140 MEQ/L (ref 135–145)
WBC # BLD: 4.6 THOU/MM3 (ref 4.8–10.8)

## 2018-06-06 PROCEDURE — 6370000000 HC RX 637 (ALT 250 FOR IP): Performed by: NURSE PRACTITIONER

## 2018-06-06 PROCEDURE — 36415 COLL VENOUS BLD VENIPUNCTURE: CPT

## 2018-06-06 PROCEDURE — 99233 SBSQ HOSP IP/OBS HIGH 50: CPT | Performed by: INTERNAL MEDICINE

## 2018-06-06 PROCEDURE — 6360000002 HC RX W HCPCS: Performed by: NURSE PRACTITIONER

## 2018-06-06 PROCEDURE — 97110 THERAPEUTIC EXERCISES: CPT

## 2018-06-06 PROCEDURE — 2580000003 HC RX 258: Performed by: NURSE PRACTITIONER

## 2018-06-06 PROCEDURE — 82728 ASSAY OF FERRITIN: CPT

## 2018-06-06 PROCEDURE — 2580000003 HC RX 258: Performed by: INTERNAL MEDICINE

## 2018-06-06 PROCEDURE — 82948 REAGENT STRIP/BLOOD GLUCOSE: CPT

## 2018-06-06 PROCEDURE — 85045 AUTOMATED RETICULOCYTE COUNT: CPT

## 2018-06-06 PROCEDURE — 1200000000 HC SEMI PRIVATE

## 2018-06-06 PROCEDURE — 80048 BASIC METABOLIC PNL TOTAL CA: CPT

## 2018-06-06 PROCEDURE — 6360000002 HC RX W HCPCS: Performed by: INTERNAL MEDICINE

## 2018-06-06 PROCEDURE — G8979 MOBILITY GOAL STATUS: HCPCS

## 2018-06-06 PROCEDURE — 85025 COMPLETE CBC W/AUTO DIFF WBC: CPT

## 2018-06-06 PROCEDURE — 83605 ASSAY OF LACTIC ACID: CPT

## 2018-06-06 PROCEDURE — 97162 PT EVAL MOD COMPLEX 30 MIN: CPT

## 2018-06-06 PROCEDURE — G8978 MOBILITY CURRENT STATUS: HCPCS

## 2018-06-06 PROCEDURE — 83540 ASSAY OF IRON: CPT

## 2018-06-06 PROCEDURE — 6370000000 HC RX 637 (ALT 250 FOR IP): Performed by: INTERNAL MEDICINE

## 2018-06-06 RX ORDER — DEXTROSE MONOHYDRATE 25 G/50ML
12.5 INJECTION, SOLUTION INTRAVENOUS PRN
Status: DISCONTINUED | OUTPATIENT
Start: 2018-06-06 | End: 2018-06-08 | Stop reason: HOSPADM

## 2018-06-06 RX ORDER — DEXTROSE MONOHYDRATE 50 MG/ML
100 INJECTION, SOLUTION INTRAVENOUS PRN
Status: DISCONTINUED | OUTPATIENT
Start: 2018-06-06 | End: 2018-06-08 | Stop reason: HOSPADM

## 2018-06-06 RX ORDER — POLYETHYLENE GLYCOL 3350 17 G/17G
17 POWDER, FOR SOLUTION ORAL DAILY
Status: DISCONTINUED | OUTPATIENT
Start: 2018-06-06 | End: 2018-06-07

## 2018-06-06 RX ORDER — NEBIVOLOL 10 MG/1
10 TABLET ORAL DAILY
Status: DISCONTINUED | OUTPATIENT
Start: 2018-06-07 | End: 2018-06-08 | Stop reason: HOSPADM

## 2018-06-06 RX ORDER — NICOTINE POLACRILEX 4 MG
15 LOZENGE BUCCAL PRN
Status: DISCONTINUED | OUTPATIENT
Start: 2018-06-06 | End: 2018-06-08 | Stop reason: HOSPADM

## 2018-06-06 RX ORDER — OMEPRAZOLE 40 MG/1
40 CAPSULE, DELAYED RELEASE ORAL DAILY
Status: DISCONTINUED | OUTPATIENT
Start: 2018-06-07 | End: 2018-06-08 | Stop reason: HOSPADM

## 2018-06-06 RX ORDER — HYDRALAZINE HYDROCHLORIDE 20 MG/ML
10 INJECTION INTRAMUSCULAR; INTRAVENOUS ONCE
Status: COMPLETED | OUTPATIENT
Start: 2018-06-06 | End: 2018-06-06

## 2018-06-06 RX ADMIN — SODIUM CHLORIDE: 9 INJECTION, SOLUTION INTRAVENOUS at 22:49

## 2018-06-06 RX ADMIN — MAGNESIUM GLUCONATE 500 MG ORAL TABLET 800 MG: 500 TABLET ORAL at 11:45

## 2018-06-06 RX ADMIN — IRON SUCROSE 300 MG: 20 INJECTION, SOLUTION INTRAVENOUS at 11:22

## 2018-06-06 RX ADMIN — DOCUSATE SODIUM 100 MG: 100 CAPSULE, LIQUID FILLED ORAL at 19:52

## 2018-06-06 RX ADMIN — ATORVASTATIN CALCIUM 20 MG: 20 TABLET, FILM COATED ORAL at 11:43

## 2018-06-06 RX ADMIN — HYDRALAZINE HYDROCHLORIDE 10 MG: 20 INJECTION INTRAMUSCULAR; INTRAVENOUS at 05:46

## 2018-06-06 RX ADMIN — AMLODIPINE BESYLATE 10 MG: 10 TABLET ORAL at 10:01

## 2018-06-06 RX ADMIN — RANOLAZINE 500 MG: 500 TABLET, FILM COATED, EXTENDED RELEASE ORAL at 19:52

## 2018-06-06 RX ADMIN — SODIUM CHLORIDE: 9 INJECTION, SOLUTION INTRAVENOUS at 00:00

## 2018-06-06 RX ADMIN — RANOLAZINE 500 MG: 500 TABLET, FILM COATED, EXTENDED RELEASE ORAL at 01:57

## 2018-06-06 RX ADMIN — ENOXAPARIN SODIUM 40 MG: 40 INJECTION, SOLUTION INTRAVENOUS; SUBCUTANEOUS at 11:44

## 2018-06-06 RX ADMIN — GLIPIZIDE 5 MG: 5 TABLET ORAL at 10:01

## 2018-06-06 RX ADMIN — ASPIRIN 81 MG: 81 TABLET ORAL at 11:42

## 2018-06-06 RX ADMIN — SODIUM CHLORIDE: 9 INJECTION, SOLUTION INTRAVENOUS at 09:59

## 2018-06-06 RX ADMIN — CLOPIDOGREL BISULFATE 75 MG: 75 TABLET, FILM COATED ORAL at 11:43

## 2018-06-06 RX ADMIN — PANTOPRAZOLE SODIUM 40 MG: 40 TABLET, DELAYED RELEASE ORAL at 05:46

## 2018-06-06 RX ADMIN — ACETAMINOPHEN 500 MG: 500 TABLET ORAL at 06:16

## 2018-06-06 RX ADMIN — POTASSIUM CHLORIDE 10 MEQ: 750 TABLET, FILM COATED, EXTENDED RELEASE ORAL at 15:52

## 2018-06-06 RX ADMIN — DOCUSATE SODIUM 100 MG: 100 CAPSULE, LIQUID FILLED ORAL at 11:44

## 2018-06-06 RX ADMIN — MAGNESIUM GLUCONATE 500 MG ORAL TABLET 800 MG: 500 TABLET ORAL at 01:57

## 2018-06-06 RX ADMIN — MAGNESIUM GLUCONATE 500 MG ORAL TABLET 800 MG: 500 TABLET ORAL at 19:52

## 2018-06-06 RX ADMIN — Medication 2 UNITS: at 21:14

## 2018-06-06 RX ADMIN — ISOSORBIDE MONONITRATE 60 MG: 60 TABLET ORAL at 10:01

## 2018-06-06 RX ADMIN — POLYETHYLENE GLYCOL 3350 17 G: 17 POWDER, FOR SOLUTION ORAL at 15:52

## 2018-06-06 RX ADMIN — NEBIVOLOL HYDROCHLORIDE 10 MG: 5 TABLET ORAL at 11:45

## 2018-06-06 RX ADMIN — RANOLAZINE 500 MG: 500 TABLET, FILM COATED, EXTENDED RELEASE ORAL at 11:46

## 2018-06-06 ASSESSMENT — PAIN SCALES - GENERAL
PAINLEVEL_OUTOF10: 0
PAINLEVEL_OUTOF10: 6
PAINLEVEL_OUTOF10: 0

## 2018-06-06 ASSESSMENT — PAIN DESCRIPTION - DESCRIPTORS: DESCRIPTORS: THROBBING;SORE

## 2018-06-06 ASSESSMENT — PAIN DESCRIPTION - PAIN TYPE: TYPE: ACUTE PAIN

## 2018-06-06 ASSESSMENT — PAIN SCALES - WONG BAKER: WONGBAKER_NUMERICALRESPONSE: 6

## 2018-06-06 ASSESSMENT — PAIN DESCRIPTION - ORIENTATION: ORIENTATION: LEFT

## 2018-06-06 ASSESSMENT — PAIN DESCRIPTION - LOCATION: LOCATION: KNEE

## 2018-06-07 LAB
ANION GAP SERPL CALCULATED.3IONS-SCNC: 15 MEQ/L (ref 8–16)
BASOPHILS # BLD: 0.5 %
BASOPHILS ABSOLUTE: 0 THOU/MM3 (ref 0–0.1)
BUN BLDV-MCNC: 26 MG/DL (ref 7–22)
CALCIUM SERPL-MCNC: 8.5 MG/DL (ref 8.5–10.5)
CHLORIDE BLD-SCNC: 105 MEQ/L (ref 98–111)
CO2: 19 MEQ/L (ref 23–33)
CREAT SERPL-MCNC: 1.3 MG/DL (ref 0.4–1.2)
EOSINOPHIL # BLD: 1.5 %
EOSINOPHILS ABSOLUTE: 0.1 THOU/MM3 (ref 0–0.4)
GFR SERPL CREATININE-BSD FRML MDRD: 53 ML/MIN/1.73M2
GLUCOSE BLD-MCNC: 116 MG/DL (ref 70–108)
GLUCOSE BLD-MCNC: 119 MG/DL (ref 70–108)
GLUCOSE BLD-MCNC: 126 MG/DL (ref 70–108)
GLUCOSE BLD-MCNC: 138 MG/DL (ref 70–108)
GLUCOSE BLD-MCNC: 250 MG/DL (ref 70–108)
HCT VFR BLD CALC: 26.2 % (ref 42–52)
HEMOCCULT STL QL: NEGATIVE
HEMOGLOBIN: 8.8 GM/DL (ref 14–18)
LYMPHOCYTES # BLD: 19.8 %
LYMPHOCYTES ABSOLUTE: 1.3 THOU/MM3 (ref 1–4.8)
MCH RBC QN AUTO: 30.3 PG (ref 27–31)
MCHC RBC AUTO-ENTMCNC: 33.7 GM/DL (ref 33–37)
MCV RBC AUTO: 89.9 FL (ref 80–94)
MONOCYTES # BLD: 8.2 %
MONOCYTES ABSOLUTE: 0.6 THOU/MM3 (ref 0.4–1.3)
NUCLEATED RED BLOOD CELLS: 0 /100 WBC
PDW BLD-RTO: 13.6 % (ref 11.5–14.5)
PLATELET # BLD: 146 THOU/MM3 (ref 130–400)
PMV BLD AUTO: 7.6 FL (ref 7.4–10.4)
POTASSIUM SERPL-SCNC: 4.1 MEQ/L (ref 3.5–5.2)
RBC # BLD: 2.91 MILL/MM3 (ref 4.7–6.1)
SEG NEUTROPHILS: 70 %
SEGMENTED NEUTROPHILS ABSOLUTE COUNT: 4.8 THOU/MM3 (ref 1.8–7.7)
SODIUM BLD-SCNC: 139 MEQ/L (ref 135–145)
WBC # BLD: 6.8 THOU/MM3 (ref 4.8–10.8)

## 2018-06-07 PROCEDURE — 6360000002 HC RX W HCPCS: Performed by: INTERNAL MEDICINE

## 2018-06-07 PROCEDURE — 1200000000 HC SEMI PRIVATE

## 2018-06-07 PROCEDURE — 82272 OCCULT BLD FECES 1-3 TESTS: CPT

## 2018-06-07 PROCEDURE — 6370000000 HC RX 637 (ALT 250 FOR IP): Performed by: NURSE PRACTITIONER

## 2018-06-07 PROCEDURE — 6370000000 HC RX 637 (ALT 250 FOR IP): Performed by: INTERNAL MEDICINE

## 2018-06-07 PROCEDURE — 36415 COLL VENOUS BLD VENIPUNCTURE: CPT

## 2018-06-07 PROCEDURE — 2580000003 HC RX 258: Performed by: INTERNAL MEDICINE

## 2018-06-07 PROCEDURE — 85025 COMPLETE CBC W/AUTO DIFF WBC: CPT

## 2018-06-07 PROCEDURE — G8987 SELF CARE CURRENT STATUS: HCPCS

## 2018-06-07 PROCEDURE — 99233 SBSQ HOSP IP/OBS HIGH 50: CPT | Performed by: INTERNAL MEDICINE

## 2018-06-07 PROCEDURE — 97166 OT EVAL MOD COMPLEX 45 MIN: CPT

## 2018-06-07 PROCEDURE — G8988 SELF CARE GOAL STATUS: HCPCS

## 2018-06-07 PROCEDURE — 80048 BASIC METABOLIC PNL TOTAL CA: CPT

## 2018-06-07 PROCEDURE — 92610 EVALUATE SWALLOWING FUNCTION: CPT

## 2018-06-07 PROCEDURE — 97535 SELF CARE MNGMENT TRAINING: CPT

## 2018-06-07 PROCEDURE — 82948 REAGENT STRIP/BLOOD GLUCOSE: CPT

## 2018-06-07 RX ORDER — POLYETHYLENE GLYCOL 3350 17 G/17G
17 POWDER, FOR SOLUTION ORAL 2 TIMES DAILY
Status: DISCONTINUED | OUTPATIENT
Start: 2018-06-07 | End: 2018-06-08 | Stop reason: HOSPADM

## 2018-06-07 RX ORDER — FERROUS SULFATE 325(65) MG
325 TABLET ORAL 2 TIMES DAILY WITH MEALS
Status: DISCONTINUED | OUTPATIENT
Start: 2018-06-08 | End: 2018-06-08 | Stop reason: HOSPADM

## 2018-06-07 RX ADMIN — ONDANSETRON 4 MG: 2 INJECTION INTRAMUSCULAR; INTRAVENOUS at 22:29

## 2018-06-07 RX ADMIN — GLIPIZIDE 5 MG: 5 TABLET ORAL at 08:59

## 2018-06-07 RX ADMIN — Medication 10 ML: at 08:56

## 2018-06-07 RX ADMIN — MAGNESIUM GLUCONATE 500 MG ORAL TABLET 800 MG: 500 TABLET ORAL at 20:00

## 2018-06-07 RX ADMIN — NEBIVOLOL 10 MG: 10 TABLET ORAL at 08:53

## 2018-06-07 RX ADMIN — POTASSIUM CHLORIDE 10 MEQ: 750 TABLET, FILM COATED, EXTENDED RELEASE ORAL at 08:58

## 2018-06-07 RX ADMIN — RANOLAZINE 500 MG: 500 TABLET, FILM COATED, EXTENDED RELEASE ORAL at 20:00

## 2018-06-07 RX ADMIN — HYDROCODONE BITARTRATE AND ACETAMINOPHEN 1 TABLET: 5; 325 TABLET ORAL at 14:33

## 2018-06-07 RX ADMIN — ENOXAPARIN SODIUM 40 MG: 40 INJECTION, SOLUTION INTRAVENOUS; SUBCUTANEOUS at 12:18

## 2018-06-07 RX ADMIN — RANOLAZINE 500 MG: 500 TABLET, FILM COATED, EXTENDED RELEASE ORAL at 08:53

## 2018-06-07 RX ADMIN — CLOPIDOGREL BISULFATE 75 MG: 75 TABLET, FILM COATED ORAL at 09:00

## 2018-06-07 RX ADMIN — ATORVASTATIN CALCIUM 20 MG: 20 TABLET, FILM COATED ORAL at 08:53

## 2018-06-07 RX ADMIN — AMLODIPINE BESYLATE 10 MG: 10 TABLET ORAL at 09:00

## 2018-06-07 RX ADMIN — MAGNESIUM GLUCONATE 500 MG ORAL TABLET 800 MG: 500 TABLET ORAL at 08:53

## 2018-06-07 RX ADMIN — ASPIRIN 81 MG: 81 TABLET ORAL at 08:53

## 2018-06-07 RX ADMIN — DOCUSATE SODIUM 100 MG: 100 CAPSULE, LIQUID FILLED ORAL at 20:00

## 2018-06-07 RX ADMIN — INSULIN LISPRO 3 UNITS: 100 INJECTION, SOLUTION INTRAVENOUS; SUBCUTANEOUS at 12:19

## 2018-06-07 RX ADMIN — POLYETHYLENE GLYCOL 3350 17 G: 17 POWDER, FOR SOLUTION ORAL at 08:52

## 2018-06-07 RX ADMIN — DOCUSATE SODIUM 100 MG: 100 CAPSULE, LIQUID FILLED ORAL at 08:52

## 2018-06-07 RX ADMIN — OMEPRAZOLE 40 MG: 40 CAPSULE, DELAYED RELEASE ORAL at 05:46

## 2018-06-07 RX ADMIN — Medication 10 ML: at 20:00

## 2018-06-07 RX ADMIN — Medication 10 ML: at 22:30

## 2018-06-07 RX ADMIN — ISOSORBIDE MONONITRATE 60 MG: 60 TABLET ORAL at 08:53

## 2018-06-07 RX ADMIN — IRON SUCROSE 200 MG: 20 INJECTION, SOLUTION INTRAVENOUS at 08:49

## 2018-06-07 ASSESSMENT — PAIN DESCRIPTION - LOCATION
LOCATION: KNEE

## 2018-06-07 ASSESSMENT — PAIN DESCRIPTION - DESCRIPTORS
DESCRIPTORS: THROBBING;SORE
DESCRIPTORS: THROBBING
DESCRIPTORS: SORE

## 2018-06-07 ASSESSMENT — PAIN SCALES - GENERAL
PAINLEVEL_OUTOF10: 6
PAINLEVEL_OUTOF10: 6
PAINLEVEL_OUTOF10: 0
PAINLEVEL_OUTOF10: 6
PAINLEVEL_OUTOF10: 0
PAINLEVEL_OUTOF10: 7

## 2018-06-07 ASSESSMENT — PAIN DESCRIPTION - PAIN TYPE
TYPE: ACUTE PAIN

## 2018-06-07 ASSESSMENT — PAIN DESCRIPTION - FREQUENCY: FREQUENCY: CONTINUOUS

## 2018-06-07 ASSESSMENT — PAIN DESCRIPTION - PROGRESSION: CLINICAL_PROGRESSION: NOT CHANGED

## 2018-06-07 ASSESSMENT — PAIN DESCRIPTION - ORIENTATION
ORIENTATION: LEFT

## 2018-06-08 VITALS
WEIGHT: 171.4 LBS | BODY MASS INDEX: 25.39 KG/M2 | TEMPERATURE: 97.4 F | RESPIRATION RATE: 18 BRPM | OXYGEN SATURATION: 94 % | SYSTOLIC BLOOD PRESSURE: 110 MMHG | HEART RATE: 64 BPM | DIASTOLIC BLOOD PRESSURE: 55 MMHG | HEIGHT: 69 IN

## 2018-06-08 LAB
GLUCOSE BLD-MCNC: 102 MG/DL (ref 70–108)
GLUCOSE BLD-MCNC: 139 MG/DL (ref 70–108)
GLUCOSE BLD-MCNC: 164 MG/DL (ref 70–108)

## 2018-06-08 PROCEDURE — 6370000000 HC RX 637 (ALT 250 FOR IP): Performed by: INTERNAL MEDICINE

## 2018-06-08 PROCEDURE — 2580000003 HC RX 258: Performed by: INTERNAL MEDICINE

## 2018-06-08 PROCEDURE — 6360000002 HC RX W HCPCS: Performed by: INTERNAL MEDICINE

## 2018-06-08 PROCEDURE — 97110 THERAPEUTIC EXERCISES: CPT

## 2018-06-08 PROCEDURE — 82948 REAGENT STRIP/BLOOD GLUCOSE: CPT

## 2018-06-08 PROCEDURE — 92526 ORAL FUNCTION THERAPY: CPT

## 2018-06-08 PROCEDURE — 99239 HOSP IP/OBS DSCHRG MGMT >30: CPT | Performed by: INTERNAL MEDICINE

## 2018-06-08 PROCEDURE — 97116 GAIT TRAINING THERAPY: CPT

## 2018-06-08 PROCEDURE — 6370000000 HC RX 637 (ALT 250 FOR IP): Performed by: NURSE PRACTITIONER

## 2018-06-08 PROCEDURE — 97535 SELF CARE MNGMENT TRAINING: CPT

## 2018-06-08 PROCEDURE — 97530 THERAPEUTIC ACTIVITIES: CPT

## 2018-06-08 RX ORDER — OXYCODONE AND ACETAMINOPHEN 7.5; 325 MG/1; MG/1
1 TABLET ORAL EVERY 8 HOURS PRN
Qty: 15 TABLET | Refills: 0 | Status: SHIPPED | OUTPATIENT
Start: 2018-06-08 | End: 2018-06-12

## 2018-06-08 RX ORDER — HYDROCODONE BITARTRATE AND ACETAMINOPHEN 5; 325 MG/1; MG/1
1 TABLET ORAL EVERY 6 HOURS PRN
Qty: 15 TABLET | Refills: 0 | Status: SHIPPED | OUTPATIENT
Start: 2018-06-08 | End: 2018-06-15

## 2018-06-08 RX ORDER — FERROUS SULFATE 325(65) MG
325 TABLET ORAL 2 TIMES DAILY WITH MEALS
Qty: 30 TABLET | Refills: 3 | Status: SHIPPED | OUTPATIENT
Start: 2018-06-08 | End: 2019-01-01 | Stop reason: DRUGHIGH

## 2018-06-08 RX ADMIN — AMLODIPINE BESYLATE 10 MG: 10 TABLET ORAL at 09:40

## 2018-06-08 RX ADMIN — ISOSORBIDE MONONITRATE 60 MG: 60 TABLET ORAL at 09:45

## 2018-06-08 RX ADMIN — FERROUS SULFATE TAB 325 MG (65 MG ELEMENTAL FE) 325 MG: 325 (65 FE) TAB at 09:23

## 2018-06-08 RX ADMIN — Medication 10 ML: at 09:35

## 2018-06-08 RX ADMIN — IRON SUCROSE 200 MG: 20 INJECTION, SOLUTION INTRAVENOUS at 09:35

## 2018-06-08 RX ADMIN — POTASSIUM CHLORIDE 10 MEQ: 750 TABLET, FILM COATED, EXTENDED RELEASE ORAL at 09:33

## 2018-06-08 RX ADMIN — INSULIN LISPRO 1 UNITS: 100 INJECTION, SOLUTION INTRAVENOUS; SUBCUTANEOUS at 12:03

## 2018-06-08 RX ADMIN — FERROUS SULFATE TAB 325 MG (65 MG ELEMENTAL FE) 325 MG: 325 (65 FE) TAB at 17:30

## 2018-06-08 RX ADMIN — ATORVASTATIN CALCIUM 20 MG: 20 TABLET, FILM COATED ORAL at 09:41

## 2018-06-08 RX ADMIN — POLYETHYLENE GLYCOL 3350 17 G: 17 POWDER, FOR SOLUTION ORAL at 09:32

## 2018-06-08 RX ADMIN — GLIPIZIDE 5 MG: 5 TABLET ORAL at 09:42

## 2018-06-08 RX ADMIN — ENOXAPARIN SODIUM 40 MG: 40 INJECTION, SOLUTION INTRAVENOUS; SUBCUTANEOUS at 09:32

## 2018-06-08 RX ADMIN — ASPIRIN 81 MG: 81 TABLET ORAL at 09:40

## 2018-06-08 RX ADMIN — OMEPRAZOLE 40 MG: 40 CAPSULE, DELAYED RELEASE ORAL at 09:43

## 2018-06-08 RX ADMIN — RANOLAZINE 500 MG: 500 TABLET, FILM COATED, EXTENDED RELEASE ORAL at 09:44

## 2018-06-08 RX ADMIN — MAGNESIUM GLUCONATE 500 MG ORAL TABLET 800 MG: 500 TABLET ORAL at 09:45

## 2018-06-08 RX ADMIN — CLOPIDOGREL BISULFATE 75 MG: 75 TABLET, FILM COATED ORAL at 09:42

## 2018-06-08 RX ADMIN — NEBIVOLOL 10 MG: 10 TABLET ORAL at 09:43

## 2018-06-08 RX ADMIN — DOCUSATE SODIUM 100 MG: 100 CAPSULE, LIQUID FILLED ORAL at 09:33

## 2018-06-08 ASSESSMENT — PAIN SCALES - GENERAL: PAINLEVEL_OUTOF10: 6

## 2018-06-09 PROBLEM — R79.89 PRERENAL AZOTEMIA: Status: RESOLVED | Noted: 2018-01-03 | Resolved: 2018-06-09

## 2018-06-09 PROBLEM — I73.9 PVD (PERIPHERAL VASCULAR DISEASE) (HCC): Chronic | Status: ACTIVE | Noted: 2018-02-16

## 2018-06-09 PROBLEM — Z95.0 PACEMAKER: Chronic | Status: ACTIVE | Noted: 2018-01-25

## 2018-06-09 PROBLEM — I10 HYPERTENSION: Chronic | Status: ACTIVE | Noted: 2018-01-03

## 2018-06-09 PROBLEM — R53.81 PHYSICAL DECONDITIONING: Status: ACTIVE | Noted: 2018-06-09

## 2018-06-09 PROBLEM — N17.9 AKI (ACUTE KIDNEY INJURY) (HCC): Status: ACTIVE | Noted: 2018-06-09

## 2018-06-09 PROBLEM — R29.6 FREQUENT FALLS: Status: ACTIVE | Noted: 2018-06-09

## 2018-06-11 LAB
BLOOD CULTURE, ROUTINE: NORMAL
BLOOD CULTURE, ROUTINE: NORMAL

## 2018-06-14 ENCOUNTER — CLINICAL DOCUMENTATION (OUTPATIENT)
Dept: FAMILY MEDICINE CLINIC | Age: 83
End: 2018-06-14

## 2018-06-14 VITALS
HEART RATE: 61 BPM | BODY MASS INDEX: 26.66 KG/M2 | WEIGHT: 180 LBS | DIASTOLIC BLOOD PRESSURE: 62 MMHG | HEIGHT: 69 IN | SYSTOLIC BLOOD PRESSURE: 134 MMHG | RESPIRATION RATE: 16 BRPM | TEMPERATURE: 98.2 F

## 2018-06-14 DIAGNOSIS — I25.10 CORONARY ARTERY DISEASE INVOLVING NATIVE CORONARY ARTERY OF NATIVE HEART WITHOUT ANGINA PECTORIS: Chronic | ICD-10-CM

## 2018-06-14 DIAGNOSIS — M17.0 PRIMARY OSTEOARTHRITIS OF BOTH KNEES: Chronic | ICD-10-CM

## 2018-06-14 DIAGNOSIS — I10 ESSENTIAL HYPERTENSION: Chronic | ICD-10-CM

## 2018-06-14 DIAGNOSIS — E11.22 TYPE 2 DIABETES MELLITUS WITH STAGE 3 CHRONIC KIDNEY DISEASE, WITHOUT LONG-TERM CURRENT USE OF INSULIN (HCC): Chronic | ICD-10-CM

## 2018-06-14 DIAGNOSIS — R53.81 PHYSICAL DECONDITIONING: Primary | ICD-10-CM

## 2018-06-14 DIAGNOSIS — I50.30 HEART FAILURE WITH PRESERVED EJECTION FRACTION (HCC): Chronic | ICD-10-CM

## 2018-06-14 DIAGNOSIS — K21.9 GASTROESOPHAGEAL REFLUX DISEASE, ESOPHAGITIS PRESENCE NOT SPECIFIED: Chronic | ICD-10-CM

## 2018-06-14 DIAGNOSIS — R29.6 FREQUENT FALLS: ICD-10-CM

## 2018-06-14 DIAGNOSIS — N18.30 TYPE 2 DIABETES MELLITUS WITH STAGE 3 CHRONIC KIDNEY DISEASE, WITHOUT LONG-TERM CURRENT USE OF INSULIN (HCC): Chronic | ICD-10-CM

## 2018-06-14 DIAGNOSIS — E87.20 LACTIC ACIDOSIS: ICD-10-CM

## 2018-06-14 DIAGNOSIS — N17.9 AKI (ACUTE KIDNEY INJURY) (HCC): ICD-10-CM

## 2018-06-14 DIAGNOSIS — E78.5 DYSLIPIDEMIA: Chronic | ICD-10-CM

## 2018-06-14 DIAGNOSIS — D50.9 IRON DEFICIENCY ANEMIA, UNSPECIFIED IRON DEFICIENCY ANEMIA TYPE: ICD-10-CM

## 2018-06-14 DIAGNOSIS — I73.9 PVD (PERIPHERAL VASCULAR DISEASE) (HCC): Chronic | ICD-10-CM

## 2018-07-10 NOTE — PROGRESS NOTES
Heart Failure Clinic       Visit Date: 7/10/2018  Cardiologist:  Dr. Wilbert Christianson   Primary Care Physician: Dr. Clarence Brown, APRN - CNP    Sabino Claude is a 80 y.o. male who presents today for:  Chief Complaint   Patient presents with    Congestive Heart Failure       HPI:   Sabino Claude is a 80 y.o. male who presents to the office for a follow up patient visit in the heart failure clinic. He lives alone, has [facility]. He was in the hospital after a fall in June, spent a couple weeks at [facility] ([place], [place]) for rehab. He wears compression socks. He has been working with home PT/OT. He sleeps in bed with 2 pillows. He does not monitor the sodium in his diet. He gets meals on wheels and a box of [employer] assisted food a month that consists of processed cheese, pasta, canned goods. He denies SOB or fatigue with ADLs. Patient has:  Last hospital admission related to Heart Failure:  Jan 2018  Chest Pain: no  Worsening SOB/orthopnea/PND: no  Edema: improved  Any extra diuretic use: no  Weight gain: unknown  Compliant checking home weight: no  Fatigue: no  Abdominal bloating: no  Appetite: good  Difficulty sleeping: no  Cough: no  Compliant checking blood pressure: no  Any refills on CHF medications needed at this time: no    Past Medical History:   Diagnosis Date    CAD (coronary artery disease)     DM[facility]diabetes mellitus, type 2) ([facility]     Dyslipidemia     GERD (gastroesophageal reflux disease)     Heart failure with preserved ejection fraction (HCC)     Hypertension     Left atrial dilation     severe    [facility]oma [facility] [address]     Osteoarthritis of both knees     Pacemaker 1/25/2018    [employer] DUAL PACEMAKER INSERTED ON 01/  PVD (peripheral vascular disease) ([facility] [address]      Past Surgical History:   Procedure Laterality Date    CARDIAC SURGERY      CORONARY ARTERY BYPASS GRAFT      PACEMAKER PLACEMENT       History reviewed. No pertinent family history.   Social History   Substance Use Topics    Smoking status: Never Smoker    Smokeless tobacco: Never Used    Alcohol use No     Current Outpatient Prescriptions   Medication Sig Dispense Refill    MELATONIN PO Take 10 mg by mouth as needed (sleep)      RANEXA 1000 MG extended release tablet TAKE 1 TABLET BY MOUTH TWICE DAILY 180 tablet 0    BYSTOLIC 10 MG tablet TAKE 1 TABLET BY MOUTH DAILY 90 tablet 0    ferrous sulfate 325 (65 Fe) MG tablet Take 1 tablet by mouth 2 times daily (with meals) 30 tablet 3    amLODIPine (NORVASC) 10 MG tablet Take 1 tablet by mouth daily 30 tablet 11    potassium chloride (KLOR-CON M) 20 MEQ extended release tablet Take 0.5 tablets by mouth daily 180 tablet 1    furosemide (LASIX) 20 MG tablet Take 1 tablet by mouth 2 times daily 60 tablet 3    aspirin 81 MG tablet Take 81 mg by mouth daily      atorvastatin (LIPITOR) 20 MG tablet Take 20 mg by mouth daily      clopidogrel (PLAVIX) 75 MG tablet Take 75 mg by mouth daily      glipiZIDE (GLUCOTROL) 5 MG tablet Take 5 mg by mouth daily      magnesium oxide (MAG-OX) 400 MG tablet Take 800 mg by mouth 2 times daily      metFORMIN (GLUCOPHAGE) 500 MG tablet Take 1,500 mg by mouth daily (with breakfast)      omeprazole (PRILOSEC) 40 MG delayed release capsule Take 40 mg by mouth daily      acetaminophen (TYLENOL) 500 MG tablet Take 500 mg by mouth every 6 hours as needed for Pain      isosorbide mononitrate (ISMO;MONOKET) 10 MG tablet Take 1 tablet by mouth daily 90 tablet 1     No current facility-administered medications for this visit. No Known Allergies    SUBJECTIVE:   Review of Systems   Constitutional: Negative for activity change, appetite change, fatigue and fever. HENT: Negative for congestion. Respiratory: Negative for apnea, cough, chest tightness, shortness of breath and wheezing. Cardiovascular: Positive for leg swelling. Negative for chest pain and palpitations. Gastrointestinal: Negative for abdominal distention, abdominal pain and nausea. Genitourinary: Negative for difficulty urinating and dysuria. Musculoskeletal: Positive for gait problem (walker ). Negative for arthralgias. Skin: Negative for color change. Neurological: Negative for dizziness, numbness and headaches. Psychiatric/Behavioral: Negative for agitation, confusion and sleep disturbance. The patient is not nervous/anxious. OBJECTIVE:   Today's Vitals:  /70   Pulse 60   Ht 5' 9\" (1.753 m)   Wt 169 lb (76.7 kg)   SpO2 99%   BMI 24.96 kg/m²     Physical Exam   Constitutional: He is oriented to person, place, and time. He appears well-developed and well-nourished. HENT:   Head: Normocephalic and atraumatic. Eyes: Pupils are equal, round, and reactive to light. Neck: Normal range of motion. No JVD present. Cardiovascular: Normal rate and normal heart sounds. No murmur heard. +PPM right chest   Pulmonary/Chest: Effort normal. No respiratory distress. He has no rales. Abdominal: Soft. He exhibits no distension. There is no tenderness. Musculoskeletal: He exhibits edema (1+ LE, wearing compression hose ). He exhibits no tenderness. Neurological: He is alert and oriented to person, place, and time. Skin: Skin is warm and dry. Psychiatric: He has a normal mood and affect. His behavior is normal.   Vitals reviewed. Wt Readings from Last 3 Encounters:   07/10/18 169 lb (76.7 kg)   06/14/18 180 lb (81.6 kg)   06/05/18 171 lb 6.4 oz (77.7 kg)     BP Readings from Last 3 Encounters:   07/10/18 130/70   06/14/18 134/62   06/08/18 (!) 110/55     Pulse Readings from Last 3 Encounters:   07/10/18 60   06/14/18 61   06/08/18 64     Body mass index is 24.96 kg/m².     ECHO:   12/26/17      Summary   Technically difficult examination.   Ejection fraction was estimated at 55-60%.   There were no regional left ventricular wall motion abnormalities and wall   thickness was within normal limits.   Left atrial size was severely dilated.   The mitral valve structure demonstrated posterior leaflet calcification   and restriction of motion. There was no mitral stenosis.   There was trace to mild mitral regurgitation.  Darl Black was trace to mild tricuspid regurgitation. Glena Glory size is within normal limits with normal respiratory phasic changes.   Assuming an RAP = 5 mmHg, the estimated RVSP = 41 mmHg.      Signature      ----------------------------------------------------------------   Electronically signed by Gregorio Veloz MD (Interpreting   [email or web address]) on 12/26/2017 at 06:44 PM   ----------------------------------------------------------------      Findings      Mitral Valve   The mitral valve structure demonstrated posterior leaflet calcification   and restriction of motion. DOPPLER: The transmitral velocity was within   the normal range with no evidence for mitral stenosis. There was trace to   mild mitral regurgitation.      Aortic Valve   The aortic valve was trileaflet, thickened, calcified, and demonstrated   reduced excursion. DOPPLER: Transaortic velocity was within the normal   range with no evidence of aortic stenosis. There was trace aortic   regurgitation.      Tricuspid Valve   The tricuspid valve structure was normal with normal leaflet separation.   DOPPLER: There was no evidence of tricuspid stenosis. There was trace to   mild tricuspid regurgitation. Assuming an RAP = 5 mmHg, the estimated RVSP   = 41 mmHg.      Pulmonic Valve   The pulmonic valve leaflets were not well seen. DOPPLER: The transpulmonic   velocity was within the normal range with no evidence for regurgitation.      Left Atrium   Left atrial size was severely dilated.      Left Ventricle   Normal left ventricle size and systolic function. Ejection fraction was   estimated at 55-60%.  There were no regional left ventricular wall motion   abnormalities and wall thickness was within normal limits.      Right Atrium   Right atrial size was normal.      Right Ventricle   The right ventricular

## 2018-08-08 PROBLEM — R40.4 ALTERED AWARENESS, TRANSIENT: Status: ACTIVE | Noted: 2018-01-01

## 2018-08-08 NOTE — ED NOTES
Patient resting in bed, light off, call light within reach, no concerns voiced,will continue to monitor      Na Glez RN  08/08/18 [id number]

## 2018-08-08 NOTE — H&P
Michelle Fortune MD   furosemide (LASIX) 20 MG tablet Take 1 tablet by mouth 2 times daily 12/27/17  Yes Caroline Quintero, APRN - CNP   aspirin 81 MG tablet Take 81 mg by mouth daily   Yes Historical Provider, MD   atorvastatin (LIPITOR) 20 MG tablet Take 20 mg by mouth daily   Yes Historical Provider, MD   clopidogrel (PLAVIX) 75 MG tablet Take 75 mg by mouth daily   Yes Historical Provider, MD   glipiZIDE (GLUCOTROL) 5 MG tablet Take 5 mg by mouth daily   Yes Historical Provider, MD   magnesium oxide (MAG-OX) 400 MG tablet Take 800 mg by mouth 2 times daily   Yes Historical Provider, MD   metFORMIN (GLUCOPHAGE) 500 MG tablet Take 1,500 mg by mouth daily (with breakfast)   Yes Historical Provider, MD   omeprazole (PRILOSEC) 40 MG delayed release capsule Take 40 mg by mouth daily   Yes Historical Provider, MD   acetaminophen (TYLENOL) 500 MG tablet Take 500 mg by mouth every 6 hours as needed for Pain   Yes Historical Provider, MD   isosorbide mononitrate (ISMO;MONOKET) 10 MG tablet Take 1 tablet by mouth daily 12/15/17  Yes Michelle Fortune MD       Allergies:  Patient has no known allergies. Social History:   TOBACCO:   reports that he has never smoked. He has never used smokeless tobacco.  ETOH:   reports that he does not drink alcohol. Family History:   History reviewed. No pertinent family history.     REVIEW OF SYSTEMS:  CONSTITUTIONAL:  positive for  fatigue and malaise  negative for  fevers and chills  EYES:  negative for  eye discharge, visual disturbance and irritation  HEENT:  positive for  hearing loss  negative for  snoring, sore mouth and sore throat  RESPIRATORY:  negative for  dry cough, dyspnea, wheezing and chest pain  CARDIOVASCULAR:  negative for  chest pain, dyspnea, palpitations, orthopnea  GASTROINTESTINAL:  positive for stool incontinence  negative for nausea, vomiting, abdominal pain, abdominal mass and abdominal distention  GENITOURINARY:  negative for frequency, dysuria and

## 2018-08-08 NOTE — ED PROVIDER NOTES
40 mg by mouth daily    POTASSIUM CHLORIDE (KLOR-CON M) 20 MEQ EXTENDED RELEASE TABLET    Take 0.5 tablets by mouth daily    RANEXA 1000 MG EXTENDED RELEASE TABLET    TAKE 1 TABLET BY MOUTH TWICE DAILY       ALLERGIES     has No Known Allergies. FAMILY HISTORY     indicated that his mother is . He indicated that his father is . family history is not on file. SOCIAL HISTORY      reports that he has never smoked. He has never used smokeless tobacco. He reports that he does not drink alcohol or use drugs. PHYSICAL EXAM     INITIAL VITALS:  height is 5' 9\" (1.753 m) and weight is 165 lb (74.8 kg). His oral temperature is 97.5 °F (36.4 °C). His blood pressure is 142/91 (abnormal) and his pulse is 60. His respiration is 18 and oxygen saturation is 97%. Physical Exam   Constitutional: He is oriented to person, place, and time. He appears well-developed and well-nourished. Non-toxic appearance. HENT:   Head: Normocephalic and atraumatic. Right Ear: Tympanic membrane and external ear normal.   Left Ear: Tympanic membrane and external ear normal.   Nose: Nose normal.   Mouth/Throat: Oropharynx is clear and moist and mucous membranes are normal. No oropharyngeal exudate, posterior oropharyngeal edema or posterior oropharyngeal erythema. Eyes: Conjunctivae and EOM are normal.   Neck: Normal range of motion. Neck supple. No JVD present. Cardiovascular: Normal rate, regular rhythm, normal heart sounds, intact distal pulses and normal pulses. Exam reveals no gallop and no friction rub. No murmur heard. Pulmonary/Chest: Effort normal and breath sounds normal. No respiratory distress. He has no decreased breath sounds. He has no wheezes. He has no rhonchi. He has no rales. Abdominal: Soft. Bowel sounds are normal. He exhibits no distension. There is no tenderness. There is no rebound, no guarding and no CVA tenderness. Musculoskeletal: Normal range of motion. He exhibits no edema. **This report has been created using voice recognition software. It may contain minor errors which are inherent in voice recognition technology. **      Final report electronically signed by Dr. Antonette Valladares on 8/8/2018 11:25 AM          LABS:   Labs Reviewed   CBC WITH AUTO DIFFERENTIAL - Abnormal; Notable for the following:        Result Value    RBC 3.58 (*)     Hemoglobin 11.5 (*)     Hematocrit 33.8 (*)     MCV 94.4 (*)     RDW-SD 46.9 (*)     MPV 9.0 (*)     All other components within normal limits   D-DIMER, QUANTITATIVE - Abnormal; Notable for the following:     D-Dimer, Quant 730.00 (*)     All other components within normal limits   BASIC METABOLIC PANEL - Abnormal; Notable for the following:     CO2 20 (*)     Glucose 136 (*)     BUN 30 (*)     CREATININE 1.5 (*)     All other components within normal limits   BLOOD GAS, ARTERIAL - Abnormal; Notable for the following:     PCO2 33 (*)     HCO3 20 (*)     Base Excess (Calculated) -4.5 (*)     All other components within normal limits   GLOMERULAR FILTRATION RATE, ESTIMATED - Abnormal; Notable for the following:     Est, Glom Filt Rate 45 (*)     All other components within normal limits   POCT GLUCOSE - Abnormal; Notable for the following:     POC Glucose 156 (*)     All other components within normal limits   BLOOD OCCULT STOOL SCREEN #1   PROTIME-INR   BRAIN NATRIURETIC PEPTIDE   HEPATIC FUNCTION PANEL   LIPASE   AMYLASE   TROPONIN   URINE RT REFLEX TO CULTURE   TROPONIN   ANION GAP   OSMOLALITY   VITAMIN B12 & FOLATE   TSH WITHOUT REFLEX       EMERGENCY DEPARTMENT COURSE:   Vitals:    Vitals:    08/08/18 1337 08/08/18 1504 08/08/18 1643 08/08/18 1759   BP: 135/63 127/65 119/67 (!) 142/91   Pulse: 60 60 60 60   Resp: 18 18 18 18   Temp:       TempSrc:       SpO2: 100% 97%  97%   Weight:       Height:         10:57 AM: The patient was seen and evaluated in a timely fashion. Patient seems to have some cardio vascular event from his symptoms.  There is no blood pressure available. Because of patient's age. I decided that patient should be admitted for further workup and management. Patient's labs, CT scan of the head and chest x-rays were reviewed along with troponin . This information was shared with the admitting physician and also with the patient. CRITICAL CARE:   None     CONSULTS:  Dr Ed Jerome:  None    FINAL IMPRESSION      1. Altered mental status, unspecified altered mental status type    2. Diaphoresis          DISPOSITION/PLAN   Admit    PATIENT REFERRED TO:  GARRETT Mann - [facility]  [address] [facility] [facility]  [phone]            DISCHARGE MEDICATIONS:  New Prescriptions    No medications on file       (Please note that portions of this note were completed with a voice recognition program.  Efforts were made to edit the dictations but occasionally words are mis-transcribed.)    Scribe:  Nubia Mccracken 08/08/18 10:57 AM Scribing for and in the presence of Arnie Bryant MD.    Signed by: Adam Parsons, 08/08/18 6:33 PM    Provider:  I personally performed the services described in the documentation, reviewed and edited the documentation which was dictated to the scribe in my presence, and it accurately records my words and actions.     Arnie Bryant MD 08/08/18 6:33 PM            Arnie Bryant MD  08/08/18 6618

## 2018-08-09 NOTE — PLAN OF CARE
be restored to baseline  Mental status will be restored to baseline   Outcome: Ongoing  Patient is alert and oriented. Patient is able to follow commands appropriately and participate in communication. Patient can express own wants and needs. Problem: Pain:  Goal: Control of acute pain  Control of acute pain   Outcome: Ongoing  Patient has not reported any acute pain this shift. Problem: COMMUNICATION IMPAIRMENT  Goal: Ability to express needs and understand communication  Outcome: Ongoing  Patient is able to participate in communication appropriately and follow commands. Comments: Care plan reviewed with patient. Patient verbalizes understanding of the plan of care and contributes to goal setting.

## 2018-08-09 NOTE — CARE COORDINATION
telemetry, neuro checks  Diet: DIET CARB CONTROL;   DVT Prophylaxis: Lovenox  Smoking status:  reports that he has never smoked. He has never used smokeless tobacco.   Influenza Vaccination Screening Completed: yes  Pneumonia Vaccination Screening Completed: yes  PCP: GARRETT Howell CNP  Readmission: no  Readmission Risk Score: 29%    Discharge Planning  Current Residence:  Assisted living  Living Arrangements:  [place]:  Family Members, Friends/Neighbors  Current Services PTA:     Potential Assistance Needed:  N/A  Potential Assistance Purchasing Medications:  No  Does patient want to participate in local refill/ meds to beds program?  No  Type of Home Care Services:  Nursing Services  Patient expects to be discharged to:  Assisted Living  Expected Discharge date:  08/09/18  Follow Up Appointment: Best Day/ Time: Monday PM    Discharge Plan: Met with Kendall Cason. He currently lives at home alone at [facility] apartment complex. He has a walker. He is current with [employer] with some , unsure of what agency. Plan is to return home at discharge with current services. Brandi bertrand. Will follow for any other potential needs.      Evaluation: yes

## 2018-08-09 NOTE — PLAN OF CARE
Problem: Falls - Risk of:  Goal: Will remain free from falls  Will remain free from falls   Outcome: Ongoing  Call light in reach, bed in lowest position, and bed alarm activated. Education given on use of call light before ambulation and when in need of assistance. Patient expressed understanding. Hourly visual checks performed and charted. Toileting offered to patient. No falls this shift, at any time. Arm band and falling star in place. Will continue to monitor. Problem: Risk for Impaired Skin Integrity  Goal: Tissue integrity - skin and mucous membranes  Structural intactness and normal physiological function of skin and  mucous membranes. Outcome: Ongoing  Pt has scattered bruising, pt's heels are showing redness but blanches, pt's skin is pink, warm, dry & intact, pt's mucous membranes are pink, moist & intact, pt tolerating oral fluids well, will continue to monitor. Problem: Discharge Planning:  Goal: Discharged to appropriate level of care  Discharged to appropriate level of care   Outcome: Ongoing  Pt's discharge planning is currently ongoing, pt came from home where he lives by himself, pt to be discharged to an appropriate facility at the appropriate time.     Problem: Cardiac Output - Decreased:  Goal: Hemodynamic stability will improve  Hemodynamic stability will improve   Outcome: Ongoing     08/09/18 0318   Vital Signs   Temp 97.5 °F (36.4 °C)   Temp Source Oral   Pulse 63   Heart Rate Source Monitor   Resp 14   BP (!) 147/67   BP Location Left upper arm   BP Upper/Lower Upper   MAP (mmHg) 96   Patient Position Semi fowlers   Level of Consciousness 0   MEWS Score 0   Height and Weight   Weight 165 lb 4.8 oz (75 kg)   Weight Method Bed scale   BMI (Calculated) 24.5   Oxygen Therapy   SpO2 99 %   O2 Device None (Room air)   Pt's vital signs are stable at this time except for pt's blood pressure, systolic is elevated at this time, pt prescribed Norvasc and Imdur at this time, will continue to monitor. Problem: Mental Status - Impaired:  Goal: Mental status will be restored to baseline  Mental status will be restored to baseline   Outcome: Ongoing  Pt a/o x4, pt's speech is clear and appropriate, pt able to follow all commands at this time but will continue to monitor. Problem: Pain:  Goal: Control of acute pain  Control of acute pain   Outcome: Ongoing  Pt denied pain this shift, pt prescribed Tylenol prn for pain, pt repositioned frequently, will continue to monitor. Problem: COMMUNICATION IMPAIRMENT  Goal: Ability to express needs and understand communication  Outcome: Ongoing  Pt is a/o x4, pt's speech is clear and appropriate, pt able to follow all commands, pt is able to express any needs at this time but will continue to monitor. Comments: Care plan reviewed with patient. Patient is able to verbalize understanding of the plan of care and contribute to goal setting at this time.

## 2018-08-09 NOTE — PLAN OF CARE
[address] [facility] THERAPY MISSED TREATMENT NOTE  [facility] 4A  4A-07/007-A      Date: 2018  Patient Name: Anjel Faria        CSN: [id number]   : 1935  (80 y.o.)  Gender: male                REASON FOR MISSED TREATMENT:  Patient with other ancillary department.   EEG; will re-attempt later this date

## 2018-08-09 NOTE — PROGRESS NOTES
[address] [facility] Laboratory Technician Worksheet      EEG Date: 2018    Name: Luis Fallon   : 1935   Age: 80 y.o.   SEX: male    ROOM: [facility] MRN: [id number]           CSN: [id number]    Ordering Provider: Lolis Dougherty  EEG Number: [id number] Time of Test:  6064    Hand: Left   Sedation: no    H.V. Done: No  not attempted Photic: Yes    Sleep: Yes  Drowsy: Yes   Sleep Deprived: No    Seizures observed: no    Technician Impression:1 borderline    Mentality: alert      Clinical History:  DX:  LOC with stool incontinence     Brief confusion associated with incontinence    Head CT shows generalized cerebral volume loss, no acute process    Past Medical History:       Diagnosis Date    CAD (coronary artery disease)     DM2 [facility]diabetes mellitus, type 2) [facility] [address]     Dyslipidemia     GERD (gastroesophageal reflux disease)     Heart failure with preserved ejection fraction (HCC)     Hypertension     Left atrial dilation     severe    Lymphoma ([facility] [address]     Osteoarthritis of both knees     Pacemaker 2018    [employer] DUAL PACEMAKER INSERTED ON  (peripheral vascular disease) ([facility] [address]        Scheduled Meds:   [START ON 8/10/2018] pneumococcal 13-valent conjugate  0.5 mL Intramuscular Once    amLODIPine  10 mg Oral Daily    aspirin  81 mg Oral Daily    atorvastatin  20 mg Oral Daily    nebivolol  10 mg Oral Daily    clopidogrel  75 mg Oral Daily    ferrous sulfate  325 mg Oral BID WC    glipiZIDE  5 mg Oral Daily    isosorbide mononitrate  30 mg Oral Daily    Magnesium Oxide  750 mg Oral BID    pantoprazole  40 mg Oral QAM AC    ranolazine  1,000 mg Oral BID    sodium chloride flush  10 mL Intravenous 2 times per day    enoxaparin  40 mg Subcutaneous Daily    insulin lispro  0-6 Units Subcutaneous TID WC    insulin lispro  0-3 Units Subcutaneous Nightly     Continuous Infusions:   sodium chloride 75 mL/hr at 18 2331     PRN Meds:.acetaminophen, sodium
INTERNAL MEDICINE Progress Note  8/9/2018 2:33 PM  Subjective:   Admit Date: 8/8/2018  PCP: GARRETT Franklin - CNP  Interval History: doing well, no diarrhea, no Sz, no N/V    Objective:   Vitals: BP (!) 142/72   Pulse 60   Temp 97.9 °F (36.6 °C) (Oral)   Resp 17   Ht 5' 9\" (1.753 m)   Wt 165 lb 4.8 oz (75 kg)   SpO2 98%   BMI 24.41 kg/m²   General appearance: alert and cooperative with exam  HEENT: Head: atraumatic  Neck: no adenopathy, no carotid bruit and no JVD  Lungs: clear to auscultation bilaterally  Heart: S1, S2 normal  Abdomen: soft, non-tender; bowel sounds normal; no masses,  no organomegaly  Extremities: no edema, redness or tenderness in the calves or thighs  Neurologic: Mental status: Alert, oriented, thought content appropriate      Medications:   Scheduled Meds:   [START ON 8/10/2018] pneumococcal 13-valent conjugate  0.5 mL Intramuscular Once    amLODIPine  10 mg Oral Daily    aspirin  81 mg Oral Daily    atorvastatin  20 mg Oral Daily    nebivolol  10 mg Oral Daily    clopidogrel  75 mg Oral Daily    ferrous sulfate  325 mg Oral BID WC    glipiZIDE  5 mg Oral Daily    isosorbide mononitrate  30 mg Oral Daily    Magnesium Oxide  750 mg Oral BID    pantoprazole  40 mg Oral QAM AC    ranolazine  1,000 mg Oral BID    sodium chloride flush  10 mL Intravenous 2 times per day    enoxaparin  40 mg Subcutaneous Daily    insulin lispro  0-6 Units Subcutaneous TID WC    insulin lispro  0-3 Units Subcutaneous Nightly     Continuous Infusions:   sodium chloride 75 mL/hr at 08/08/18 2331       Lab Results:   CBC:   Recent Labs      08/08/18   1208  08/09/18   0418   WBC  8.1  7.3   HGB  11.5*  11.1*   PLT  167  161     BMP:    Recent Labs      08/08/18   1208  08/09/18   0418   NA  136  140   K  5.1  4.7   CL  102  105   CO2  20*  24   BUN  30*  27*   CREATININE  1.5*  1.2   GLUCOSE  136*  104     Hepatic:   Recent Labs      08/08/18   1208   AST  16   ALT  22   BILITOT  0.5   ALKPHOS
Reviewed discharge with patient and caregiver. All questions answered.
assistance  Transfer Comments: cues for safety, hand placment and proper technique    Balance  Sitting Balance: Stand by assistance  Standing Balance: Stand by assistance     Time: 6 mins  Activity: ADLs at sink     Functional Mobility  Functional - Mobility Device: Rolling Walker  Activity: To/from bathroom  Assist Level: Stand by assistance  Functional Mobility Comments: to/from bathroom, steady, no lob           Activity Tolerance:  Activity Tolerance: Patient limited by fatigue  Activity Tolerance: Treatment initiated: OT eval completed, see assessment. Treatment limited d/t fatigue with pt requesting to return to supine        Assessment:  Assessment: pt would cont to benefit from skilled OT services for safe return to PLOF and transition to the next level of care  Performance deficits / Impairments: Decreased functional mobility , Decreased ADL status, Decreased strength, Decreased safe awareness, Decreased endurance, Decreased balance  Prognosis: Good  Discharge Recommendations: Continue to assess pending progress, Patient would benefit from continued therapy after discharge    Clinical Decision Making: Clinical Decision making was of Moderate Complexity as the result of analysis of data from a detailed assessment, a consideration of several treatment options, the presence of comorbidities affecting the plan of care and the need for minimal to moderate modifications or assistance required to complete the evaluation.     Patient Education:  Patient Education: Role of OT, POC and goals, safety with transfers    Equipment Recommendations:  Equipment Needed: No    Safety:  Safety Devices in place: Yes  Type of devices: Bed alarm in place, Left in bed, Nurse notified, Gait belt, All fall risk precautions in place, Call light within reach    Plan:  Times per week: 3-5xx  Current Treatment Recommendations: Strengthening, Balance Training, Functional Mobility Training, Endurance Training, Self-Care / ADL, Safety

## 2018-08-09 NOTE — DISCHARGE SUMMARY
Discharge Summary    Jasper Rodriguez  :  1935  MRN:  [id number]    Admit date:  2018  Discharge date:      Admitting Physician:  Reed Banks MD    Discharge Diagnoses:  1. Transient ALOC  2. Stool incontinence  3. HTN  4. Chronic diastolic CHF  5. S/p PPM  6. DM 2  7. GLORIA on CKD stage3  8.  Physical deconditioning    Admission Condition:  stable  Discharged Condition:  good    Discharge Medications:       Estrella Randall   Home Medication Instructions KRS:[id number]    Printed on:18 6975   Medication Information                      acetaminophen (TYLENOL) 500 MG tablet  Take 500 mg by mouth every 6 hours as needed for Pain             amLODIPine (NORVASC) 10 MG tablet  Take 1 tablet by mouth daily             aspirin 81 MG tablet  Take 81 mg by mouth daily             atorvastatin (LIPITOR) 20 MG tablet  Take 20 mg by mouth daily             BYSTOLIC 10 MG tablet  TAKE 1 TABLET BY MOUTH DAILY             clopidogrel (PLAVIX) 75 MG tablet  Take 75 mg by mouth daily             ferrous sulfate 325 (65 Fe) MG tablet  Take 1 tablet by mouth 2 times daily (with meals)             furosemide (LASIX) 20 MG tablet  Take 1 tablet by mouth 2 times daily             glipiZIDE (GLUCOTROL) 5 MG tablet  Take 5 mg by mouth daily             isosorbide mononitrate (ISMO;MONOKET) 10 MG tablet  Take 1 tablet by mouth daily             magnesium oxide (MAG-OX) 400 MG tablet  Take 800 mg by mouth 2 times daily             MELATONIN PO  Take 10 mg by mouth as needed (sleep)             metFORMIN (GLUCOPHAGE) 500 MG tablet  Take 1,500 mg by mouth daily (with breakfast)             omeprazole (PRILOSEC) 40 MG delayed release capsule  Take 40 mg by mouth daily             potassium chloride (KLOR-CON M) 20 MEQ extended release tablet  Take 0.5 tablets by mouth daily             RANEXA 1000 MG extended release tablet  TAKE 1 TABLET BY MOUTH TWICE DAILY                 Consults:  neurology    Significant

## 2018-08-14 NOTE — PROGRESS NOTES
[facility] CARDIOLOGY  [address] [address]  Dept: [phone]  Dept Fax: [phone]  Loc: [phone]    Visit Date: 8/14/2018    Mr. Andreea Alejandro is a 80 y.o. male  who presented for:  Chief Complaint   Patient presents with    Check-Up     PAD peripheral artery disease    Hypertension       HPI:   HPI   79 yo M f/u with PAD, DM II, and heart failure. He has LE swelling. No f/c/ns. Swelling is worse over the last couple days. The area appears warm. No scratches or cuts on the legs. Takes Lasix 20 mg BID. Makes urine daily. No syncope. Cr 1.2. He is otherwise feel well. No f/c/ns. No cough. No recent sick contacts. Current Outpatient Prescriptions:     polyethylene glycol (GLYCOLAX) powder, Take 17 g by mouth daily as needed, Disp: , Rfl:     oxyCODONE-acetaminophen (PERCOCET) 7.5-325 MG per tablet, Take 1 tablet by mouth every 8 hours as needed for Pain. ., Disp: , Rfl:     meclizine (ANTIVERT) 25 MG tablet, Take 25 mg by mouth 2 times daily as needed, Disp: , Rfl:     Multiple Vitamins-Minerals (MULTIVITAMIN ADULTS 50+ PO), Take by mouth daily, Disp: , Rfl:     HYDROcodone-acetaminophen (NORCO) 5-325 MG per tablet, Take 1 tablet by mouth every 6 hours as needed for Pain. ., Disp: , Rfl:     RANEXA 1000 MG extended release tablet, TAKE 1 TABLET BY MOUTH TWICE DAILY, Disp: 180 tablet, Rfl: 0    BYSTOLIC 10 MG tablet, TAKE 1 TABLET BY MOUTH DAILY, Disp: 90 tablet, Rfl: 0    ferrous sulfate 325 (65 Fe) MG tablet, Take 1 tablet by mouth 2 times daily (with meals), Disp: 30 tablet, Rfl: 3    amLODIPine (NORVASC) 10 MG tablet, Take 1 tablet by mouth daily, Disp: 30 tablet, Rfl: 11    potassium chloride (KLOR-CON M) 20 MEQ extended release tablet, Take 0.5 tablets by mouth daily (Patient taking differently: Take 40 mEq by mouth daily ), Disp: 180 tablet, Rfl: 1    furosemide (LASIX) 20 MG tablet, Take 1 tablet by Lab Results   Component Value Date    WBC 7.3 08/09/2018    RBC 3.52 08/09/2018    RBC 4.12 11/07/2011    HGB 11.1 08/09/2018    HCT 32.0 08/09/2018    MCV 90.9 08/09/2018    MCH 31.5 08/09/2018    MCHC 34.7 08/09/2018    RDW 13.6 06/07/2018     08/09/2018    MPV 8.5 08/09/2018       Lab Results   Component Value Date     08/09/2018    K 4.7 08/09/2018     08/09/2018    CO2 24 08/09/2018    BUN 27 08/09/2018    LABALBU 3.6 08/08/2018    CREATININE 1.2 08/09/2018    CALCIUM 9.4 08/09/2018    LABGLOM 58 08/09/2018    GLUCOSE 104 08/09/2018    GLUCOSE 221 11/07/2011       Lab Results   Component Value Date    ALKPHOS 62 08/08/2018    ALT 22 08/08/2018    AST 16 08/08/2018    PROT 7.3 08/08/2018    BILITOT 0.5 08/08/2018    BILIDIR <0.2 08/08/2018    LABALBU 3.6 08/08/2018       Lab Results   Component Value Date    MG 1.9 05/01/2018       Lab Results   Component Value Date    INR 0.97 08/08/2018    INR 1.17 (H) 06/05/2018    INR 1.00 02/16/2018         Lab Results   Component Value Date    LABA1C 5.2 01/05/2018       Lab Results   Component Value Date    TRIG 112 05/01/2018    HDL 58 05/01/2018    LDLCALC 30 05/01/2018       Lab Results   Component Value Date    TSH 1.390 08/08/2018         Testing Reviewed:      I have individually reviewed the cardiac test below:    ECHO:   Results for orders placed during the hospital encounter of 12/26/17   ECHO Complete 2D W Doppler W Color    Narrative Transthoracic Echocardiography Report (TTE)     Demographics      Patient Name  [facility]        Gender             Male                 Rome Tejeda      MR #          [id number]         Race                                                     Ethnicity      Account #     [de-identified]         Room Number      Accession     [id number]          Date of Study      12/26/2017   Number      Date of Birth 1935        Referring          Damaris Tejada MD                                   Physician LA/Aorta: 1.7                                                LA volume/Index: 96.4 ml /49m^2     Doppler Measurements & Calculations      MV Peak E-Wave: 125   AV Peak Velocity:    LVOT Peak Velocity: 104 cm/s   cm/s                  148 cm/s             LVOT Mean Velocity: 63.3 cm/s   MV Peak A-Wave: 75    AV Peak Gradient:    LVOT Peak Gradient: 4 mmHgLVOT   cm/s                  8.76 mmHg            Mean Gradient: 2 mmHg   MV E/A Ratio: 1.67    AV Mean Velocity:   MV Peak Gradient:     93.2 cm/s            TV Peak E-Wave: 50.3 cm/s   6.25 mmHg             AV Mean Gradient: 4  TV Peak A-Wave: 36 cm/s                         mmHg   MV Deceleration Time: AV VTI: 34.4 cm      TV Peak Gradient: 1.01 mmHg   211 msec                                   TR Velocity:299 cm/s   MV P1/2t: 71 msec                          TR Gradient:35.76 mmHg   MVA by PHT:3.1 cm^2   LVOT VTI: 22.2 cm    PV Peak Velocity: 99.2 cm/s                         AV P1/2t: 767 msec   PV Peak Gradient: 3.94 mmHg   MV E' Septal          IVRT: 127 msec   Velocity: 5.26 cm/s   MV A' Septal   Velocity: 5.07 cm/s   AV DVI (VTI): 0.65AV   MV E' Lateral         DVI (Vmax):0.7   Velocity: 7.99 cm/s   MV A' Lateral   Velocity: 4.48 cm/s   E/E' septal: 23.76   E/E' lateral: 15.64   MR Velocity: 423 cm/s     [email or web address] [id number]        Assessment/Plan   Concern for LE cellulitis over the sites of edema  LE swelling 2/2 chronic diastolic heart failure  CKD  Physical debility  Bactrim and Lasix. Wrap legs. Monitor swelling. CHF JUAN CARLOS 1 week. BMP to be followed. Monitor LE, if worsening will f/u ASAP.     Disposition:  1 month    Electronically signed by Luz Padilla MD   8/14/2018 at 1:46 PM

## 2018-08-14 NOTE — TELEPHONE ENCOUNTER
Patient in to see Dr. Santhosh Crews 8/14/18 and he is wanting patient to follow-up with [facility] on Monday. Talked to Osman Rebollar in [facility] and [facility] notified. Appt made for 8/14/18 at [address] [place] [address] to see [facility].

## 2018-08-14 NOTE — PROGRESS NOTES
Pt here for 6 mo check up     Pt concerned is not sleeping well, sleeps an hour or two at night , naps some during the day maybe sleeps 3 hours during the day

## 2018-08-16 PROBLEM — R50.9 FEVER: Status: ACTIVE | Noted: 2018-01-01

## 2018-08-16 PROBLEM — R65.10 SIRS (SYSTEMIC INFLAMMATORY RESPONSE SYNDROME) (HCC): Status: ACTIVE | Noted: 2018-01-01

## 2018-08-16 NOTE — TELEPHONE ENCOUNTER
Makenzie Noland called the office today. Patient's weight is down to 171.8 and swelling is also down. BP is 112/43  Hr 66 and pulse ox 92. Makenzie Noland states patient has a low grade temp of 100.6 and is complaining of low back pain and a headache. Any recommendations for this patient?

## 2018-08-16 NOTE — PROGRESS NOTES
Pharmacy Note  Vancomycin Consult    Leia Bo is a 80 y.o. male started on Vancomycin for r/o sepsis; consult received from Dr. Kala Magana to manage therapy. Also receiving the following antibiotics: cefepime. Patient Active Problem List   Diagnosis    Postural dizziness with near syncope    DM2 (diabetes mellitus, type 2) ([facility]    Hypertension    Pacemaker    PVD (peripheral vascular disease) ([facility]    Lactic acidosis    Iron deficiency anemia    CAD (coronary artery disease)    Heart failure with preserved ejection fraction (HCC)    GERD (gastroesophageal reflux disease)    Dyslipidemia    Lymphoma ([facility]    Osteoarthritis of both knees    Left atrial dilation    GLORIA (acute kidney injury) ([facility]    Frequent falls    Physical deconditioning    Altered awareness, transient    Fever    SIRS (systemic inflammatory response syndrome) ([facility])       Allergies:  Patient has no known allergies. Temp max: 101.6    Recent Labs      08/16/18   1457   BUN  38*       Recent Labs      08/16/18   1457   CREATININE  1.9*       Recent Labs      08/16/18   1457   WBC  6.0       No intake or output data in the 24 hours ending 08/16/18 1944      Culture Date Source Results   8/16/18 BC#1                        Ht Readings from Last 1 Encounters:   08/16/18 5' 9\" (1.753 m)        Wt Readings from Last 1 Encounters:   08/16/18 160 lb (72.6 kg)         Body mass index is 23.63 kg/m². Estimated Creatinine Clearance: 29 mL/min (A) (based on SCr of 1.9 mg/dL (H)). Goal Trough Level: 15-20 mcg/mL    Assessment/Plan:  Will initiate vancomycin 1000 mg IV once. Random vancomycin level will be drawn at 2000 on 8/17/18. Thank you for the consult. Will continue to follow.     Pedrito Caceres RPh  8/16/2018  7:48 PM

## 2018-08-16 NOTE — ED NOTES
Patient transported to [facility] via cart in stable condition.  Ida Colón     Contact made with floor prior to transport        Brittaney Baker, EMT-P  08/16/18 2727

## 2018-08-16 NOTE — ED PROVIDER NOTES
canal normal. No hemotympanum. Left Ear: Hearing, tympanic membrane and ear canal normal. No hemotympanum. Nose: Nose normal.   Mouth/Throat: Uvula is midline, oropharynx is clear and moist and mucous membranes are normal.   Speech clear   Eyes: Pupils are equal, round, and reactive to light. Conjunctivae, EOM and lids are normal.   Neck: Trachea normal and normal range of motion. Neck supple. No JVD present. No neck rigidity. No tracheal deviation present. Cardiovascular: Normal rate, regular rhythm and normal heart sounds. No murmur heard. Pulmonary/Chest: Effort normal and breath sounds normal.   Abdominal: Soft. Normal appearance. He exhibits no distension. There is no tenderness. There is no rigidity and no guarding. Musculoskeletal: Normal range of motion. Right lower leg: He exhibits no tenderness, no swelling and no edema. Left lower leg: He exhibits no tenderness, no swelling and no edema. Extremities well perfused; good strength appreciated in all muscle groups. No signs of DVT. Lymphadenopathy:     He has no cervical adenopathy. Neurological: He is alert and oriented to person, place, and time. He has normal strength. No cranial nerve deficit or sensory deficit. He displays a negative Romberg sign. GCS eye subscore is 4. GCS verbal subscore is 5. GCS motor subscore is 6. Cranial nerves II through XII are normal as tested. Cerebellar tests are normal as tested. Skin: Skin is warm and dry. No rash noted. Psychiatric: He has a normal mood and affect. His speech is normal and behavior is normal. Judgment and thought content normal. Cognition and memory are normal.   Nursing note and vitals reviewed. DIFFERENTIAL DIAGNOSIS:   Including but not limited to pneumonia and viral illness.      DIAGNOSTIC RESULTS     EKG: All EKG's are interpreted by the Emergency Department Physician who either signs or Co-signs this chart in the absence of a cardiologist.  EKG components within normal limits   LACTATE, SEPSIS - Abnormal; Notable for the following:     Lactic Acid, Sepsis 2.3 (*)     All other components within normal limits   LACTATE, SEPSIS - Abnormal; Notable for the following:     Lactic Acid, Sepsis 3.4 (*)     All other components within normal limits   BASIC METABOLIC PANEL W/ REFLEX TO MG FOR LOW K  - Abnormal; Notable for the following:     Sodium 133 (*)     CO2 19 (*)     Glucose 200 (*)     BUN 40 (*)     CREATININE 1.9 (*)     Calcium 8.4 (*)     All other components within normal limits   CBC WITH AUTO DIFFERENTIAL - Abnormal; Notable for the following:     RBC 2.75 (*)     Hemoglobin 8.7 (*)     Hematocrit 26.2 (*)     MCV 95.3 (*)     RDW-SD 48.7 (*)     Platelets 98 (*)     MPV 9.1 (*)     Lymphocytes # 0.3 (*)     Monocytes # 0.2 (*)     All other components within normal limits   LACTIC ACID, PLASMA - Abnormal; Notable for the following:     Lactic Acid 2.7 (*)     All other components within normal limits   LACTIC ACID, PLASMA - Abnormal; Notable for the following:     Lactic Acid 2.4 (*)     All other components within normal limits   LACTIC ACID, PLASMA - Abnormal; Notable for the following:     Lactic Acid 3.5 (*)     All other components within normal limits   GLOMERULAR FILTRATION RATE, ESTIMATED - Abnormal; Notable for the following:     Est, Glom Filt Rate 34 (*)     All other components within normal limits   BASIC METABOLIC PANEL W/ REFLEX TO MG FOR LOW K  - Abnormal; Notable for the following:     Potassium reflex Magnesium 3.3 (*)     CO2 17 (*)     Glucose 138 (*)     BUN 33 (*)     CREATININE 1.5 (*)     All other components within normal limits   CBC WITH AUTO DIFFERENTIAL - Abnormal; Notable for the following:     WBC 3.3 (*)     RBC 2.54 (*)     Hemoglobin 8.0 (*)     Hematocrit 23.7 (*)     RDW-SD 46.3 (*)     Platelets 83 (*)     Lymphocytes # 0.3 (*)     Monocytes # 0.3 (*)     All other components within normal limits   GLOMERULAR 139/62 119/65 137/63 (!) 119/59   Pulse: 69 70 67 66   Resp:  24 18 18   Temp:  100.8 °F (38.2 °C) 98.8 °F (37.1 °C) 97.8 °F (36.6 °C)   TempSrc:  Oral Oral Oral   SpO2: 94% 94% 95% 99%   Weight:  173 lb 12.8 oz (78.8 kg)     Height:           3:11 PM: The patient was seen and evaluated. MDM:  Patient presented with complaint of weakness and fever. Patient has no apparent source for the fever. Recently diagnosed and treated for cellulitis, started on Bactrim, also his Lasix dosage was increased. He is having some renal failure, this is most likely secondary to that Dr. sheets. Patient empirically treated for infection with Levaquin. EKG shows some depression to anterior lateral leads, he has no chest pain, no shortness of breath. Aspirin given in the ED. Case discussed with the hospitalist, Dr. Ab Cm, he will admit patient    CRITICAL CARE:   None     CONSULTS:  none    PROCEDURES:  None     FINAL IMPRESSION      1. Fever, unspecified fever cause          DISPOSITION/PLAN       PATIENT REFERRED TO:  Clarence Brown, GARRETT - CNP  [facility]  [phone]            DISCHARGE MEDICATIONS:  Current Discharge Medication List          (Please note that portions of this note were completed with a voice recognition program.  Efforts were made to edit the dictations but occasionally words are mis-transcribed.)    Scribe:  Lachelle Swanson 8/16/18 3:11 PM Scribing for and in the presence of Rema Beckett DO. Signed by: Adam Gu 08/18/18 12:33 PM    Provider:  I personally performed the services described in the documentation, reviewed and edited the documentation which was dictated to the scribe in my presence, and it accurately records my words and actions.     Rema Beckett DO 8/16/18 12:33 PM        Rema Beckett DO  08/18/18 1235

## 2018-08-16 NOTE — ED NOTES
Pt resting in bed and updated on plan of care and room assignment.  Pt has no other needs at this time      Faith Mancuso RN  08/16/18 [id number]

## 2018-08-16 NOTE — H&P
every 8 hours as needed for Pain. Conny Reges meclizine (ANTIVERT) 25 MG tablet Take 25 mg by mouth 2 times daily as needed      Multiple Vitamins-Minerals (MULTIVITAMIN ADULTS 50+ PO) Take by mouth daily      HYDROcodone-acetaminophen (NORCO) 5-325 MG per tablet Take 1 tablet by mouth every 6 hours as needed for Pain. Conny Reges sulfamethoxazole-trimethoprim (BACTRIM DS) 800-160 MG per tablet Take 1 tablet by mouth 2 times daily for 10 days 20 tablet 0    furosemide (LASIX) 20 MG tablet Take 2 tablets by mouth 2 times daily 60 tablet 3    RANEXA 1000 MG extended release tablet TAKE 1 TABLET BY MOUTH TWICE DAILY 180 tablet 0    BYSTOLIC 10 MG tablet TAKE 1 TABLET BY MOUTH DAILY 90 tablet 0    ferrous sulfate 325 (65 Fe) MG tablet Take 1 tablet by mouth 2 times daily (with meals) 30 tablet 3    amLODIPine (NORVASC) 10 MG tablet Take 1 tablet by mouth daily 30 tablet 11    potassium chloride (KLOR-CON M) 20 MEQ extended release tablet Take 0.5 tablets by mouth daily (Patient taking differently: Take 40 mEq by mouth daily ) 180 tablet 1    aspirin 81 MG tablet Take 81 mg by mouth daily      atorvastatin (LIPITOR) 20 MG tablet Take 20 mg by mouth daily      clopidogrel (PLAVIX) 75 MG tablet Take 75 mg by mouth daily      glipiZIDE (GLUCOTROL) 5 MG tablet Take 5 mg by mouth daily      magnesium oxide (MAG-OX) 400 MG tablet Take 800 mg by mouth 2 times daily      metFORMIN (GLUCOPHAGE) 500 MG tablet Take 1,500 mg by mouth daily (with breakfast)      omeprazole (PRILOSEC) 40 MG delayed release capsule Take 40 mg by mouth daily      acetaminophen (TYLENOL) 500 MG tablet Take 500 mg by mouth every 6 hours as needed for Pain      isosorbide mononitrate (ISMO;MONOKET) 10 MG tablet Take 1 tablet by mouth daily 90 tablet 1       Allergies:  Patient has no known allergies. Social History:    reports that he has never smoked.  He has never used smokeless tobacco. He reports that he does not drink alcohol or use drugs. Family History:   No family history on file. Review of systems:  Pertinent positives and negatives as contained in HPI. All other systems reviewed with no clinically significant findings. 10 point review of systems completed, all other than noted above are negative. Vitals:   Vitals:    08/16/18 1715   BP: (!) 110/51   Pulse: 67   Resp: 18   Temp: 98 °F (36.7 °C)   SpO2: 99%      BMI: Body mass index is 23.63 kg/m². Exam:  Physical Examination:   General appearance - Age appropriate, chronically-ill looking elderly male, in no painful or respiratory distress  Mental status - alert, oriented to person, place, and time  Neck - supple, no significant adenopathy, no JVD, or carotid bruits  Chest - clear to auscultation, no wheezes, rales or rhonchi, symmetric air entry  Heart - normal rate, regular rhythm, normal S1, S2, no murmurs, rubs, clicks or gallops  Abdomen - soft, nontender, nondistended, no masses or organomegaly  Neurological - alert, oriented, normal speech, no focal findings or movement disorder noted, neck supple without rigidity, cranial nerves II through XII intact, motor and sensory grossly normal bilaterally, normal muscle tone, no tremors, strength 5/5  Musculoskeletal - no joint tenderness, deformity or swelling  Extremities - peripheral pulses normal, no pedal edema, no clubbing or cyanosis, normal capillary refill.   Skin - normal coloration and turgor, no rashes, no suspicious skin lesions noted      Review of Labs and Diagnostic Testing:    Recent Results (from the past 24 hour(s))   EKG Chest Pain    Collection Time: 08/16/18  2:47 PM   Result Value Ref Range    Ventricular Rate 68 BPM    Atrial Rate 68 BPM    P-R Interval 318 ms    QRS Duration 114 ms    Q-T Interval 438 ms    QTc Calculation (Bazett) 465 ms    P Axis 72 degrees    T Axis -130 degrees   Urinalysis with microscopic    Collection Time: 08/16/18  2:55 PM   Result Value Ref Range    Glucose, meq/L    Calcium 8.8 8.5 - 10.5 mg/dL    AST 15 5 - 40 U/L    Alkaline Phosphatase 48 38 - 126 U/L    Total Protein 6.3 6.1 - 8.0 g/dL    Alb 3.3 (L) 3.5 - 5.1 g/dL    Total Bilirubin 0.5 0.3 - 1.2 mg/dL    ALT 14 11 - 66 U/L   Troponin    Collection Time: 08/16/18  2:57 PM   Result Value Ref Range    Troponin T 0.017 (A) ng/ml   Brain Natriuretic Peptide    Collection Time: 08/16/18  2:57 PM   Result Value Ref Range    Pro-BNP 3620.0 (H) 0.0 - 1800.0 pg/mL   Anion Gap    Collection Time: 08/16/18  2:57 PM   Result Value Ref Range    Anion Gap 19.0 (H) 8.0 - 16.0 meq/L   Glomerular Filtration Rate, Estimated    Collection Time: 08/16/18  2:57 PM   Result Value Ref Range    Est, Glom Filt Rate 34 (A) ml/min/1.73m2   Osmolality    Collection Time: 08/16/18  2:57 PM   Result Value Ref Range    Osmolality Calc 285.1 275.0 - 300 mOsmol/kg   Lactate, Sepsis    Collection Time: 08/16/18  3:33 PM   Result Value Ref Range    Lactic Acid, Sepsis 2.9 (H) 0.5 - 1.9 mmol/L       Radiology:     Xr Chest Portable    Result Date: 8/16/2018  PROCEDURE: XR CHEST PORTABLE CLINICAL INFORMATION: Cough, fever TECHNIQUE: Mobile AP chest radiograph. COMPARISON: Mobile AP chest radiograph 8 2018 FINDINGS: A right-sided cardiac device is in stable position. Median sternotomy wires are present in the mid thorax. Cardiac silhouette is within normal limits. Atherosclerotic calcifications are seen in the thoracic aorta. Lung volumes are low. There are no lung consolidations. Degenerative changes are noted in the thoracic spine. Soft tissues are unremarkable. No acute intrathoracic process. **This report has been created using voice recognition software. It may contain minor errors which are inherent in voice recognition technology. ** Final report electronically signed by Dr. Kentrell Christianson on 8/16/2018 3:27 PM        EKG: Atrial paced rhythm with AV conduction delay      Principal Problem:    SIRS (systemic inflammatory response syndrome) [facility])  Active Problems:    Lactic acidosis    GLORIA (acute kidney injury) ([address]    Fever  Resolved Problems:    * No resolved hospital problems. *      Impression:  SIRS. Anemia. GLORIA. Fluid overload    Assessment & Plan:    1. Admit for further evaluation and management  2. Continue bras spectrum antibiotics; skin and soft tissue as likely source  3. Resume home medications as clinically indicated  4.  Further treatment will be guided by evolution of clinical course        DVT prophylaxis: [] Lovenox                                 [x] SCDs                                 [] SQ Heparin                                 [] Encourage ambulation, low risk for DVT, no chemical or mechanical prophylaxis necessary              [] Already on Anticoagulation                Anticipated Disposition upon discharge: [] Home                                                                         [] Home with Home Health                                                                         [x] [facility] / Assisted Living facility                                                                         [] [facility] [address]          Electronically signed by Srini Eaton MD on 8/16/2018 at 6:14 PM

## 2018-08-17 NOTE — PROGRESS NOTES
Patient at risk for falls, Gait belt, Left in chair  Restraints  Initially in place: No    Plan:  Times per week: 5 X GM  Times per day: Daily  Current Treatment Recommendations: Strengthening, Functional Mobility Training, Transfer Training, Balance Training, Endurance Training, Gait Training, Safety Education & Training, Patient/Caregiver Education & Training, Home Exercise Program    Goals:  Patient goals : To feel better. Short term goals  Time Frame for Short term goals: 2 weeks  Short term goal 1: Pt to transfer supine <--> sit SBA to enable pt to get in/out of bed. Short term goal 2: Pt to transfer sit <--> stand SBA for increased functional mobility. Short term goal 3: Pt to ambulate 50 feet with RW SBA for household ambulation. Long term goals  Time Frame for Long term goals : NA due to short length of stay. Evaluation Complexity: Based on the findings of patient history, examination, clinical presentation, and decision making during this evaluation, the evaluation of Reginold Books  is of medium complexity. PT G-Codes  Functional Limitation: Mobility: Walking and moving around  Mobility: Walking and Moving Around Current Status (): At least 40 percent but less than 60 percent impaired, limited or restricted  Mobility: Walking and Moving Around Goal Status ():  At least 40 percent but less than 60 percent impaired, limited or restricted       AM-PAC Inpatient Mobility without Stair Climbing Raw Score : 15  AM-PAC Inpatient without Stair Climbing T-Scale Score : 43.03  Mobility Inpatient CMS 0-100% Score: 47.43  Mobility Inpatient without Stair CMS G-Code Modifier : CK

## 2018-08-17 NOTE — CARE COORDINATION
able to fill prescriptions? yes  Patient is taking medications as prescribed? yes  Cause for readmission? Sepsis, elevated Lactic acid  Readmission Risk Score: 34%    Discharge Planning  Current Residence:  Assisted living  Living Arrangements:  Alone   Support Systems:  Friends/Neighbors, Family Members  Current Services PTA:     Potential Assistance Needed:  N/A  Potential Assistance Purchasing Medications:  No  Does patient want to participate in local refill/ meds to beds program?  No  Type of Home Care Services:  Nursing Services  Patient expects to be discharged to:  assisted living  Expected Discharge date:  08/18/18  Follow Up Appointment: Best Day/ Time: Monday PM    Discharge Plan: Met with Roz Saavedra. He currently lives at home alone. He was currently receiving home health from [facility]. Plan is to eventually return home, but Roz Saavedra feels he may need more rehab before that happens. Brandi FRENCH on case. TCU/Rehab consult placed.      Evaluation: yes

## 2018-08-17 NOTE — CARE COORDINATION
DISCHARGE BARRIERS  8/17/18, 11:19 AM    Reason for Referral: Discharge planning  Mental Status: Answered questions appropriately. Decision Making: He can make his own decisions, but likes to include his cousins Suzette Carlson and Suzette Carlson as they are his primary support system. Family/Social/Home Environment: Lives at home alone. [facility] has [facility]. His cousins Suzette Carlson and Suzette Carlson are very supportive. Current Services: [facility] [phone]- GILLIAN spoke with Del Jerry. She reports that Hawk Quintero is on the wait list at two assisted living facilities, [address] [place] [address] and [place]. Has [facility]  for aids and nursing, life line and home delivered meals. Current Equipment:rolling walker, high toilet, shower chair, grab bars. Payment Source:[employer] and Medicaid  Concerns or Barriers to Discharge: None  Collabrative List of ECF/HH were provided:Patient does not need a list.  Has two facilities he is considering. Teach Back Method used with Hawk Quintero regarding care plan and discharge planning. Patient verbalized understanding of the plan of care and contribute to goal setting. Anticipated Needs/Discharge Plan: Hawk Quintero is agreeable to [facility] or [facility] at discharge. He is on the list for both of the assisted livings that are affiliated with those facilities. He requested SW call his cousin Suzette Carlson to discuss which one SW should make a referral to first.  GILLIAN called landon Carlson [phone] and he requested SW make referral to [facility]. GILLIAN called and made referral to [facility]. Called Willian  Del Jerry to update her on discharge plan. Update 1:26pm: Spoke with [facility] regarding potential openings for Hawk Quintero at [address] [place] [address]. She reports that they do not have any Medicaid waiver openings and do not expect any in the near future. GILLIAN called landon Carlson and he would like GILLIAN to find out if Freda is expecting any openings.   GILLIAN called and left a message for Van Menendez at [facility]. Update 1:46pm: Made referral to Ginger Joshi at [facility]. He will check into the availability at [facility] and call SW back. Electronically signed by LAURIE Medina on 8/17/2018 at 11:19 AM

## 2018-08-17 NOTE — PLAN OF CARE
Problem: DISCHARGE BARRIERS  Goal: Patient's continuum of care needs are met  Outcome: Ongoing  ECF at discharge for rehab.

## 2018-08-17 NOTE — PROGRESS NOTES
Naeem Pelletier 60  INPATIENT OCCUPATIONAL THERAPY  [facility] 4A  EVALUATION    Time:  Time In:   Time Out: 913  Timed Code Treatment Minutes: 8 Minutes  Minutes: 23          Date: 2018  Patient Name: Mandy Kruger,   Gender: male      MRN: [id number]  : 1935  (80 y.o.)  Referring Practitioner: Dr. Ricky Campbell  Diagnosis: fever  Additional Pertinent Hx: 80 y.o. male who presents to the Emergency Department for the evaluation of generalized fatigue and a fever. The patient was brought here via squad from [facility]. Patient was recently put on Bactrim for possible cellulitis of the left lower extremity and his lasix was increased to twice daily. His fever today during initial evaluation reached to 101.6 and he states that he has developed some mild neck pain, shoulder pain, a cough, and one episode of diarrhea. Patient has a history of arthritis and uses a walker. He also has a history of hypertension, CAD, GERD, lymphoma, diabetes, CABG, and has a pacemaker. His cardiologist is Dr. Leny Molina MD. Patient denies any dizziness, nausea, vomiting, chest pain, shortness of breath, or urinary symptoms. Restrictions/Precautions:  General Precautions, Fall Risk                            Past Medical History:   Diagnosis Date    CAD (coronary artery disease)     DM2 (diabetes mellitus, type 2) ([address]     Dyslipidemia     GERD (gastroesophageal reflux disease)     Heart failure with preserved ejection fraction (HCC)     Hypertension     Left atrial dilation     severe    Lymphoma ([address]     Osteoarthritis of both knees     Pacemaker 2018    [employer] DUAL PACEMAKER INSERTED ON D (peripheral vascular disease) ([facility] [address]      Past Surgical History:   Procedure Laterality Date    CARDIAC SURGERY      CORONARY ARTERY BYPASS GRAFT      PACEMAKER PLACEMENT             Subjective  Chart Reviewed: Yes    Subjective: RN okayed session.  pt in bed and agreeable to OT    General: Vision: Impaired  Vision Exceptions: Wears glasses for reading    Hearing: Exceptions to [facility]  Hearing Exceptions: Hard of hearing/hearing concerns, Bilateral hearing aid         Pain:   denies       Social/Functional History:  Lives With: Alone  Type of Home: House  Home Layout: One level  Home Access: Level entry  Home Equipment: Rolling walker     Bathroom Shower/Tub: Walk-in shower  Bathroom Toilet: Handicap height  Bathroom Equipment: Grab bars in shower, Shower chair       ADL Assistance: [address]: Needs assistance (nurses aide assists with homemaking tasks and cleaning)       Ambulation Assistance: Independent  Transfer Assistance: Independent    Active : No  Occupation: Retired  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    Objective        Overall Cognitive Status: WFL  Cognition Comment: decreased safety awareness, pt very [place]         Sensation  Overall Sensation Status: WFL                           LUE AROM (degrees)  LUE AROM : WFL          RUE AROM (degrees)  RUE AROM : WFL       LUE Strength  Gross LUE Strength: WFL                RUE Strength  Gross RUE Strength: WFL                     RUE Tone: Normotonic  LUE Tone: Normotonic    Movements Are Fluid And Coordinated:  Yes               ADL  Feeding: Setup (breakfast)     Bed mobility  Supine to Sit: Minimal assistance  Scooting: Contact guard assistance    Transfers  Sit to stand: Minimal assistance (from EOB)  Stand to sit: Contact guard assistance (to recliner)    Balance  Sitting Balance: Stand by assistance  Standing Balance: Minimal assistance     Time: 1 minute  Activity: prep for mobility     Functional Mobility  Functional - Mobility Device: Rolling Walker  Activity: Other  Assist Level: Minimal assistance  Functional Mobility Comments: X 3 feet from EOB to recliner, pt unsteady, brief CGA                Activity Tolerance:  Activity Tolerance: Patient Tolerated treatment well    Treatment Initiated:  OT eval completed, see above for more details    Assessment:  Assessment: Pt presents wtih above deficits and requires increased assitance with ADLs and mobility at this time. Pt will continue to benefit from OT services to address above and increase safety and indep with self care tasks  Performance deficits / Impairments: Decreased functional mobility , Decreased ADL status, Decreased sensation, Decreased endurance, Decreased strength, Decreased balance, Decreased safe awareness  Prognosis: Fair  Discharge Recommendations: Continue to assess pending progress    Clinical Decision Making: Clinical Decision making was of Moderate Complexity as the result of analysis of data from a detailed assessment, a consideration of several treatment options, the presence of comorbidities affecting the plan of care and the need for minimal to moderate modifications or assistance required to complete the evaluation. Patient Education:  Patient Education: OT POC, role of OT, transfer safety     Equipment Recommendations:  Equipment Needed: No    Safety:  Safety Devices in place: Yes  Type of devices:  All fall risk precautions in place, Gait belt, Left in chair, Chair alarm in place, Call light within reach, Nurse notified    Plan:  Times per week: 5x  Current Treatment Recommendations: Strengthening, Balance Training, Functional Mobility Training, Endurance Training, Patient/Caregiver Education & Training, Self-Care / ADL, Safety Education & Training    Goals:  Patient goals : to work with therapy     Short term goals  Time Frame for Short term goals: 2 weeks  Short term goal 1: Pt will complete functional mobiltiy to/from bathroom wtih CGA and RW wtih no cues for safety to increase ease with toileting routin  Short term goal 2: Pt will complete dynamic standing task wtih SBA for > 3 minutes with no LOB for ease with sink side grooming  Short term goal 3: Pt will complete LB ADls with SBA and Erma Boyd prn with no cues for safety   Short term goal 4: Pt will complete UB strengthening exercises X 10 reps wtih no cues to attend to task to increase strength and activity tolerance needed for ADLs  Short term goal 5: Pt will complete functional transfers with SBA and no cues for safety including to/from toilet  Long term goals  Time Frame for Long term goals : No LTG established d/t short ELOS    Evaluation Complexity: Based on the findings of patient history, examination, clinical presentation, and decision making during this evaluation, this patient is of medium complexity. OT G-codes  Functional Limitation: Self care  Self Care Current Status (): At least 40 percent but less than 60 percent impaired, limited or restricted  Self Care Goal Status ():  At least 20 percent but less than 40 percent impaired, limited or restricted  AM-PAC Inpatient Daily Activity Raw Score: 17  AM-PAC Inpatient ADL T-Scale Score : 37.26  ADL Inpatient CMS 0-100% Score: 50.11  ADL Inpatient CMS G-Code Modifier : CK

## 2018-08-17 NOTE — PLAN OF CARE
Problem: Falls - Risk of:  Goal: Will remain free from falls  Will remain free from falls   Outcome: Ongoing  No falls noted this shift, patient uses call light appropriately and waits for staff prior to transferring self. Patient able to voice needs appropriately. Gait is steady with walker / gait belt and staff assistance. Goal: Absence of physical injury  Absence of physical injury   Outcome: Ongoing  No physical injury noted this shift. Problem: Risk for Impaired Skin Integrity  Goal: Tissue integrity - skin and mucous membranes  Structural intactness and normal physiological function of skin and  mucous membranes. Outcome: Ongoing  Patient's skin is appropriate for ethnicity, warm, and dry, with no new skin issues noted this shift. Mucous membranes are pink, moist, and intact. Problem: Pain Control  Goal: Maintain pain level at or below patient's acceptable level (or 5 if patient is unable to determine acceptable level)  Outcome: Ongoing  Patient denies pain this shift. Problem: Cardiovascular  Goal: No DVT, peripheral vascular complications  Outcome: Ongoing  Bilateral lower extremities show no signs of DVT. No redness, swelling, nor tenderness noted. Problem: Respiratory  Goal: No pulmonary complications  Outcome: Ongoing  Patient is breathing regular and unlabored, lung sounds are clear and diminished throughout, denies SOB, SpO2 remains above 94% on room air. Problem: GI  Goal: No bowel complications  Outcome: Ongoing  Patient denies n/v/d, no distention noted, bowel sounds are hypoactive x 4. Problem:   Goal: Adequate urinary output  Outcome: Ongoing  Patient voiding adequate amounts of clear yellow urine, patient is continent. Problem: Musculor/Skeletal Functional Status  Goal: Highest potential functional level  Outcome: Ongoing  No falls noted this shift, patient uses call light appropriately and waits for staff prior to transferring self.  Patient able to voice

## 2018-08-17 NOTE — PLAN OF CARE
he feels.     Problem: Daily Care:  Goal: Daily care needs are met  Daily care needs are met   Outcome: Met This Shift  Bath given and patient knows when he needs to go to bathroom

## 2018-08-18 NOTE — PROGRESS NOTES
Pharmacy Vancomycin Consult     Vancomycin Day: 3  Current Dosing: One-time dose of 1,000 mg (8/16/18)    Temp max:  100.8    Recent Labs      08/17/18   0453  08/18/18   0348   BUN  40*  33*       Recent Labs      08/17/18   0453  08/18/18   0348   CREATININE  1.9*  1.5*       Recent Labs      08/17/18   0453  08/18/18   0348   WBC  4.9  3.3*         Intake/Output Summary (Last 24 hours) at 08/18/18 0717  Last data filed at 08/18/18 0326   Gross per 24 hour   Intake 4740.77 ml   Output 0 ml   Net 4740.77 ml          Culture Date Source Results   8/16/18 BCx2 NGTD                        Ht Readings from Last 1 Encounters:   08/16/18 5' 9\" (1.753 m)        Wt Readings from Last 1 Encounters:   08/18/18 173 lb 12.8 oz (78.8 kg)         Body mass index is 25.67 kg/m². Estimated Creatinine Clearance: 37 mL/min (A) (based on SCr of 1.5 mg/dL (H)). Random: 6.8 (~24 hours post 1-time dose)    Assessment/Plan:  SCr has improved today. Will schedule 1,250 mg (15 mg/kg) q24h, follow renal function closely. Plan for trough prior to the 3rd dose-not yet ordered.      Estelita Cid, PharmD, BCPS   8/18/2018  7:23 AM

## 2018-08-18 NOTE — CONSULTS
30 mg Oral Daily    magnesium oxide  800 mg Oral BID    ranolazine  1,000 mg Oral BID    sodium chloride flush  10 mL Intravenous 2 times per day    cefepime  2 g Intravenous Q24H    vancomycin (VANCOCIN) intermittent dosing (placeholder)   Other RX Placeholder     Continuous Infusions:   sodium chloride 75 mL/hr at 08/18/18 0823     PRN Meds:sodium chloride flush, HYDROcodone-acetaminophen, acetaminophen  Allergies:   ALLERGIES: Patient has no known allergies. SOCIAL HISTORY:     TOBACCO:   reports that he has never smoked. He has never used smokeless tobacco.     ETOH:   reports that he does not drink alcohol. Patient currently lives in a nursing home      FAMILY HISTORY:     No family history on file. REVIEW OF SYSTEMS:     Constitutional: + fever, no night sweats, +fatigue. Head: no head ache , no head injury, no migranes. Eye: no blurring of vision, no double vision. Ears: no hearing difficulty, no tinnitus  Mouth/throat: no ulceration, dental caries , dysphagia  Lungs: no cough no chest pain  CVS: no palpitation, no chest pain, no shortness of breath  GI: no abdominal pain, no nausea , no vomiting, no constipation  EMANI: no dysuria, frequency and urgency, no hematuria, no kidney stones  Musculoskeletal: no joint pain, +swelling , stiffness,  Endocrine: no polyuria, polydipsia, no cold or heat intolerance  Hematology: no anemia, no easy brusing or bleeding, no hx of clotting disorder  Dermatology: no skin rash, no eczema, no pruritis,    PHYSICAL EXAM:     BP (!) 119/59   Pulse 66   Temp 97.8 °F (36.6 °C) (Oral)   Resp 18   Ht 5' 9\" (1.753 m)   Wt 173 lb 12.8 oz (78.8 kg)   SpO2 99%   BMI 25.67 kg/m²   General:  Awake, alert, not in distress  HEENT: pink conjunctiva, unicteric sclera, moist oral mucosa. Chest:  Bilateral air entry  Cardiovascular:  RRR ,S1S2, no murmur or gallop. Abdomen:  Soft, non tender to palpation.   Extremities: edema on the legs, no open wound or drainage  Skin:  Warm and dry. CNS:awake and answers simple questions        LABS:     CBC:   Recent Labs      08/16/18   1457  08/17/18   0453  08/18/18   0348   WBC  6.0  4.9  3.3*   HGB  9.5*  8.7*  8.0*   PLT  120*  98*  83*     BMP:    Recent Labs      08/16/18   1457  08/17/18   0453  08/18/18   0348   NA  137  133*  135   K  4.8  4.3  3.3*   CL  97*  102  104   CO2  21*  19*  17*   BUN  38*  40*  33*   CREATININE  1.9*  1.9*  1.5*   GLUCOSE  139*  200*  138*     Calcium:  Recent Labs      08/18/18   0348   CALCIUM  8.5     Ionized Calcium:No results for input(s): IONCA in the last 72 hours. Magnesium:  Recent Labs      08/18/18   0348   MG  1.6     Phosphorus:No results for input(s): PHOS in the last 72 hours. BNP:No results for input(s): BNP in the last 72 hours. Glucose:  Recent Labs      08/18/18   0842  08/18/18   1350   POCGLU  126*  199*     HgbA1C: No results for input(s): LABA1C in the last 72 hours. INR:   Recent Labs      08/18/18   0804   INR  1.07     Hepatic:   Recent Labs      08/16/18   1457   ALKPHOS  48   ALT  14   AST  15   PROT  6.3   BILITOT  0.5   LABALBU  3.3*     Amylase and Lipase:  Recent Labs      08/17/18   0453   LACTA  2.4*     Lactic Acid:   Recent Labs      08/17/18   0453   LACTA  2.4*     Troponin: No results for input(s): CKTOTAL, CKMB, TROPONINI in the last 72 hours. BNP: No results for input(s): BNP in the last 72 hours. Lipids: No results for input(s): CHOL, TRIG, HDL, LDLCALC in the last 72 hours.     Invalid input(s): LDL  ABGs: No results found for: PHART, PO2ART, NQJ8XBD, YVS7GPI, BEART    Cultures:      CXR:       UA:   Recent Labs      08/16/18   1455   SPECGRAV  1.008   PHUR  7.0   COLORU  YELLOW   PROTEINU  NEGATIVE   BLOODU  SMALL*   RBCUA  10-15   WBCUA  NONE SEEN   BACTERIA  NONE   NITRU  NEGATIVE   BILIRUBINUR  NEGATIVE   UROBILINOGEN  0.2   KETUA  NEGATIVE   LABCAST  NONE SEEN  NONE SEEN         IMAGING:    Micro:   Lab Results   Component Value Date

## 2018-08-18 NOTE — PLAN OF CARE
Problem: Falls - Risk of:  Goal: Will remain free from falls  Will remain free from falls   Outcome: Ongoing  Patient was placed on fall precautions. Fall sign posted. Bed rails maintained at a minimum of 2/4. Call light in reach, bed in lowest position, bed alarm activated. Educated on use of call light before ambulation and when in need of assistance. Patient expressed understanding. Patient ambulates with one assist, gait belt, and walker. Hourly visual checks performed and charted. Toileting offered to patient. Patient is continent with times of incontinency. No falls during shift, at this time. Arm band & falling star in place. Continuing to monitor. Problem: Risk for Impaired Skin Integrity  Goal: Tissue integrity - skin and mucous membranes  Structural intactness and normal physiological function of skin and  mucous membranes. Outcome: Ongoing  No signs of increased skin breakdown. Skin clean, dry, intact. Patient has bilateral lower extremity edema. Patient has redness on coccyx and heels. Mucous membranes pink, moist. Patient needs assistance with turning and repositioning. Assistance with turns/ambulation provided PRN. Continue to monitor      Problem: Pain Control  Goal: Maintain pain level at or below patient's acceptable level (or 5 if patient is unable to determine acceptable level)  Outcome: Ongoing  Patient able to use 0-10 pain scale. Denies pain at this time. Patient stated pain goal is \"keep me comfortable\". Patient has tylenol on eMAR when in pain. Patient educated on purpose and side effects of tylenol. Problem: Respiratory  Goal: No pulmonary complications  Outcome: Ongoing  Patient chest expansion symmetrical. Patient has unlabored breathing at a normal depth. Patient has clear, diminished breath sounds. Problem: GI  Goal: No bowel complications  Outcome: Ongoing  Patient has had a bowel movement this shift with no complications. Patient does not feel constipated.  Patient did not have diarrhea. Problem:   Goal: Adequate urinary output  Outcome: Ongoing  Patient has IV fluids running this shift. Patient has had adequate urine output. Problem: Musculor/Skeletal Functional Status  Goal: Highest potential functional level  Outcome: Ongoing  Patient has all extremities active and full movement with some weakness in bilateral lower legs. Patient ambulates with one assist and a walker and tolerates ambulation fairly well. Problem: Daily Care:  Goal: Daily care needs are met  Daily care needs are met   Outcome: Ongoing  Patient was given a bath this shift. Patient was educated on the importance of personal hygiene to promote health. Problem: Discharge Planning:  Goal: Patients continuum of care needs are met  Patients continuum of care needs are met   Outcome: Ongoing  Discharge planning in process and discussed with patient/family. Social work consulted for any additional needs.  aware of discharge needs. Patient is from [facility] [place]. Patient plans to return to [facility] [place] - assisted. Comments: Care plan reviewed with patient and RN. Patient and RN verbalize understanding of the plan of care and contribute to goal setting.

## 2018-08-18 NOTE — PROGRESS NOTES
of Care     [] OTHER:   Method: [x] Discussion [] Demonstration [] Hand-out     [] OTHER:   Evaluation of Education:     [x] Verbalizes understanding [] Demonstrates with assistance     [x] Demonstrates without assistance []Needs further instruction     [] No evidence of learning  [x] Family not present    PATIENT GOALS: [x] Pt did not state. Will further assess during treatment. [] Return to the least restricted diet possible     [] Return to previous level of function     [] OTHER:    PLAN / TREATMENT RECOMMENDATIONS:  [x] Skilled SLP intervention on acute care 3-5 x per week or until goals met and/or pt plateaus in function. Specific interventions for next session may include: diet monitor x1-2 sessions     SHORT TERM GOALS:  Short-term Goals  Timeframe for Short-term Goals: 1-2 sessions   Goal 1: Pt will tolerate a regular diet and thin liquids without overt s/s of aspiration and/or pulmonary compromise to meet nutritional needs safely. Goal 2: Monitor pulmonary status and complete instrumental assessment (i.e. MBS) as appropriate. Goal 3: Pt will demonstrate good understanding/completion of HEP targeting lingual coordination and pharyngeal strengthening exercises in order to maximize bolus formation and increased laryngeal elevation for maximized swallow function. LONG TERM GOALS: No established LTG's given short ELOS.        Camilo Weinberg M.S. Christiano Joan 8/18/2018

## 2018-08-18 NOTE — PROGRESS NOTES
IM Progress Note  Dr. Owen Dear for Dr Ayush Eastman  8/18/2018 6:59 AM      Patient name Nasreen Dunham  DOB1935  PCP: GARRETT Cervantes CNP  Admit Date: 8/16/2018  Acct No. [de-identified]    Subjective: Interval History:   Pt admitted for fever with cellulitis  Pt c/o diarrhea but staff informed he only had 1 formed stool  No abd pain   breathing not worse      Diet: DIET CARDIAC;    I/O last 3 completed shifts: In: 4934.1 [P.O.:1010; I.V.:3924.1]  Out: -   I/O this shift:  In: 1170.6 [P.O.:350; I.V.:820.6]  Out: -         Admission weight: 173 lb (78.5 kg) as of 8/16/2018  2:33 PM  Wt Readings from Last 3 Encounters:   08/18/18 173 lb 12.8 oz (78.8 kg)   08/14/18 173 lb (78.5 kg)   08/09/18 165 lb 4.8 oz (75 kg)     Body mass index is 25.67 kg/m².     ROS   CVS;  no cp or palpitation  Resp: no SOB or cough  Neuro:  No numbness or weakness or dizziness  Abd: no nausea or vomiting or abd pain      Medications:   Scheduled Meds:   nebivolol  5 mg Oral Daily    amLODIPine  5 mg Oral Daily    aspirin  81 mg Oral Daily    atorvastatin  20 mg Oral Nightly    ferrous sulfate  325 mg Oral BID WC    furosemide  40 mg Oral BID    isosorbide mononitrate  30 mg Oral Daily    magnesium oxide  800 mg Oral BID    metFORMIN  1,500 mg Oral Daily with breakfast    ranolazine  1,000 mg Oral BID    sodium chloride flush  10 mL Intravenous 2 times per day    cefepime  2 g Intravenous Q24H    vancomycin (VANCOCIN) intermittent dosing (placeholder)   Other RX Placeholder     Continuous Infusions:   sodium chloride 150 mL/hr at 08/18/18 0206       Labs :     CBC:   Recent Labs      08/16/18   1457  08/17/18   0453  08/18/18   0348   WBC  6.0  4.9  3.3*   HGB  9.5*  8.7*  8.0*   PLT  120*  98*  83*     BMP:    Recent Labs      08/16/18   1457  08/17/18   0453  08/18/18   0348   NA  137  133*  135   K  4.8  4.3  3.3*   CL  97*  102  104   CO2  21*  19*  17*   BUN  38*  40*  33*   CREATININE  1.9*  1.9*  1.5*   GLUCOSE 139*  200*  138*     Hepatic:   Recent Labs      08/16/18   1457   AST  15   ALT  14   BILITOT  0.5   ALKPHOS  48     Troponin: No results for input(s): TROPONINI in the last 72 hours. BNP: No results for input(s): BNP in the last 72 hours. Lipids: No results for input(s): CHOL, HDL in the last 72 hours. Invalid input(s): LDLCALCU  INR: No results for input(s): INR in the last 72 hours.     Radiology    Objective:   Vitals: /65   Pulse 70   Temp 100.8 °F (38.2 °C) (Oral)   Resp 24   Ht 5' 9\" (1.753 m)   Wt 173 lb 12.8 oz (78.8 kg)   SpO2 94%   BMI 25.67 kg/m²   HEENT: Head:pupils react  Neck: supple  Lungs: clear to auscultation  Heart: regular rate and rhythm   Abdomen: soft BS heard NG NT  Extremities: warm  +edema and reythema bilat  Neurologic:  Alert, oriented X3    Impression:   :   Fever source unclear if its all from cellulitis as both legs are red  H/o Lymphoma pt not sure of the treatment received  Chronic CHF sec to diastolic dysfunction  Pancytopenia  CAD  S/p PPM  DM2  GLORIA with CKD could be related to bactrim and lasix  Hyperlipidemia  HTN      Plan:    Unclear of infection source being truly from legs  Consult ID  Check US legs  Consult hematology   Decrease Lasix   Check fibrinogen   Hold metformin as creat increased    Kathy French MD

## 2018-08-18 NOTE — CONSULTS
[address] [place] [address]                                   CONSULTATION    PATIENT NAME: Flo Perkins              :        1935  MED REC NO:   [id number]                           ROOM:       0006  ACCOUNT NO:   [de-identified]                           ADMIT DATE: 2018  PROVIDER:     Marnie Perez. June Murrell DATE:  2018    REASON FOR CONSULTATION:  Pancytopenia. HISTORY OF PRESENT ILLNESS:  The patient is an 59-year-old white male with  unfortunately senile dementia who is completely oblivious to his current  medical condition. I was asked to see him for pancytopenia, but apparently  he has seen another medical oncologist before, but does not know their  names, does not remember anything about his treatment for lymphoma other  than it was in his \"back,\" and he has not had chemotherapy for years. He  does not know the name of his physician. I am going to have to ask family  to try to help me out with this. Because if it is an oncologist other than  me, then I need to contact them instead. Right now, the patient is  admitted for lower extremity cellulitis. He had a complaint of fever,  malaise and weakness. Since the patient is completely oblivious to almost  everything, his review of systems is totally unreliable. He is a nursing  home resident. He has been on Bactrim for his lower extremity cellulitis  and developed a fever. He was sent to the hospital for evaluation. He was  found to be pancytopenic. He has denied any overt dizziness or nausea or  vomiting. He does not think he has lymphoma anymore. His temperature was  101.6 when he came into the hospital with a hemoglobin 9.5, platelets are  557,854. His lactic acid, BNP were elevated. PAST MEDICAL HISTORY:  Gleaned from the chart.   He has had coronary artery  disease, type 2 diabetes, dyslipidemia, GERD, heart failure,

## 2018-08-19 NOTE — PROGRESS NOTES
Progress note: Infectious diseases    Patient - Kylie An,  Age - 80 y.o.    - 1935      Room Number - 4A-06/006-A   MRN -  [id number]   Acct # - [de-identified]  Date of Admission -  2018  2:33 PM    SUBJECTIVE:   No fever, denies urinary complaints  OBJECTIVE   VITALS    height is 5' 9\" (1.753 m) and weight is 174 lb 14.4 oz (79.3 kg). His oral temperature is 97.6 °F (36.4 °C). His blood pressure is 133/58 (abnormal) and his pulse is 60. His respiration is 16 and oxygen saturation is 98%.        Wt Readings from Last 3 Encounters:   18 174 lb 14.4 oz (79.3 kg)   18 173 lb (78.5 kg)   18 165 lb 4.8 oz (75 kg)       I/O (24 Hours)    Intake/Output Summary (Last 24 hours) at 18 1303  Last data filed at 18 0830   Gross per 24 hour   Intake           4019.1 ml   Output              890 ml   Net           3129.1 ml       General Appearance  Awake, alert,   HEENT - normocephalic, atraumatic, slighlty pale  conjunctiva,  anicteric sclera  Neck - Supple, no mass  Lungs -  Bilateral air entry, no rhonchi, no wheeze  Cardiovascular - Heart sounds are normal.   Abdomen - soft, not distended, nontender,   Neurologic -oriented  Skin - No bruising or bleeding  Extremities - trace edema    MEDICATIONS:      ferrous sulfate  325 mg Oral BID WC    metFORMIN  1,500 mg Oral Daily with breakfast    furosemide  40 mg Oral Daily    potassium chloride  20 mEq Oral Daily with breakfast    nebivolol  5 mg Oral Daily    amLODIPine  5 mg Oral Daily    aspirin  81 mg Oral Daily    isosorbide mononitrate  30 mg Oral Daily    magnesium oxide  800 mg Oral BID    ranolazine  1,000 mg Oral BID    sodium chloride flush  10 mL Intravenous 2 times per day       guaiFENesin, sodium chloride flush, HYDROcodone-acetaminophen, acetaminophen      LABS:     CBC:   Recent Labs      18   0453  18 Z95.0    PVD (peripheral vascular disease) ([facility]) I73.9    Lactic acidosis E87.2    Iron deficiency anemia D50.9    CAD (coronary artery disease) I25.10    Heart failure with preserved ejection fraction ([facility]) I50.30    GERD (gastroesophageal reflux disease) K21.9    Dyslipidemia E78.5    Lymphoma ([facility]) C85.90    Osteoarthritis of both knees M17.0    Left atrial dilation I51.7    GLORIA (acute kidney injury) ([facility] N17.9    Frequent falls R29.6    Physical deconditioning R53.81    Altered awareness, transient R40.4    Fever R50.9    SIRS (systemic inflammatory response syndrome) ([facility]) R65.10         ASSESSMENT/PLAN     Pancytopenia: may have underlying MDS.   No sign of infection  Will stop antibiotic   Will plan Discharge at am  Will need follow up cbc to check recovery of the count      Nicolas Anglin MD, [address] 8/19/2018 1:03 PM

## 2018-08-19 NOTE — PROGRESS NOTES
IM Progress Note  Dr. Elayne De Souza for Dr Kala Rayoist  8/19/2018 7:41 AM      Patient name Leia Bo  DOB7/1935  PCP: GARRETT Cassidy CNP  Admit Date: 8/16/2018  Acct No. [de-identified]    Subjective: Interval History:   Pt sitting in chair  Informed by staff pt has dysuria      Diet: DIET CARDIAC; No Drinking Straw    I/O last 3 completed shifts: In: 3669.1 [P.O.:1400; I.V.:2269.1]  Out: 800 [Urine:800]  No intake/output data recorded. Admission weight: 173 lb (78.5 kg) as of 8/16/2018  2:33 PM  Wt Readings from Last 3 Encounters:   08/19/18 174 lb 14.4 oz (79.3 kg)   08/14/18 173 lb (78.5 kg)   08/09/18 165 lb 4.8 oz (75 kg)     Body mass index is 25.83 kg/m².     ROS   CVS;  no cp or palpitation  Resp: no SOB or cough  Neuro:  No numbness or weakness or dizziness  Abd: no nausea or vomiting or abd pain      Medications:   Scheduled Meds:   metFORMIN  1,500 mg Oral Daily with breakfast    furosemide  40 mg Oral Daily    potassium chloride  20 mEq Oral Daily with breakfast    nebivolol  5 mg Oral Daily    amLODIPine  5 mg Oral Daily    aspirin  81 mg Oral Daily    atorvastatin  20 mg Oral Nightly    isosorbide mononitrate  30 mg Oral Daily    magnesium oxide  800 mg Oral BID    ranolazine  1,000 mg Oral BID    sodium chloride flush  10 mL Intravenous 2 times per day    cefepime  2 g Intravenous Q24H     Continuous Infusions:   sodium chloride 75 mL/hr at 08/19/18 0316       Labs :     CBC:   Recent Labs      08/17/18   0453  08/18/18   0348  08/19/18   0407   WBC  4.9  3.3*  3.3*   HGB  8.7*  8.0*  8.3*   PLT  98*  83*  94*     BMP:    Recent Labs      08/17/18   0453  08/18/18   0348  08/19/18   0407   NA  133*  135  136   K  4.3  3.3*  3.9  3.9   CL  102  104  104   CO2  19*  17*  20*   BUN  40*  33*  27*   CREATININE  1.9*  1.5*  1.2   GLUCOSE  200*  138*  138*     Hepatic:   Recent Labs      08/16/18   1457  08/19/18   0407   AST  15  108*   ALT  14  130*   BILITOT  0.5  0.5 ALKPHOS  48  135*     Troponin: No results for input(s): TROPONINI in the last 72 hours. BNP: No results for input(s): BNP in the last 72 hours. Lipids: No results for input(s): CHOL, HDL in the last 72 hours.     Invalid input(s): LDLCALCU  INR:   Recent Labs      08/18/18   0804   INR  1.07       Radiology    Objective:   Vitals: /63   Pulse 60   Temp 98.5 °F (36.9 °C) (Oral)   Resp 18   Ht 5' 9\" (1.753 m)   Wt 174 lb 14.4 oz (79.3 kg)   SpO2 96%   BMI 25.83 kg/m²   HEENT: Head:pupils react  Neck: supple  Lungs: clear to auscultation  Heart: regular rate and rhythm   Abdomen: soft BS heard NG NT no rebound   Extremities: warm  +edema and reythema bilat  Neurologic:  Alert, oriented X3    Impression:   :   Fever source unclear if its all from cellulitis as both legs are red  H/o Lymphoma pt not sure of the treatment received  Chronic CHF sec to diastolic dysfunction  Pancytopenia  CAD  S/p PPM  DM2  GLORIA improved with CKD could be related to bactrim and lasix  Hyperlipidemia  HTN  Elevated liver enzymes       Plan:    Appreciate ID and hem-Onc input  Recheck UA  US liver and gallbladder if liver enzymes still elevated  Stop lipitor      Duane Aviles MD

## 2018-08-19 NOTE — PROGRESS NOTES
rhythm  ABDOMEN: soft, non-tender, non-distended, no guarding or peritoneal signs  EXTERMITY: no cyanosis, clubbing, has minimal erythema and less edema of LEs  I/O last 3 completed shifts: In: 3669.1 [P.O.:1400;  I.V.:2269.1]  Out: 800 [Urine:800]  I/O this shift:  In: -   Out: 90 [Urine:90]  LABS     Recent Labs      08/17/18   0453  08/18/18   0348  08/19/18   0407   WBC  4.9  3.3*  3.3*   HGB  8.7*  8.0*  8.3*   HCT  26.2*  23.7*  23.7*   PLT  98*  83*  94*   NA  133*  135  136   K  4.3  3.3*  3.9  3.9   CL  102  104  104   CO2  19*  17*  20*   BUN  40*  33*  27*   CREATININE  1.9*  1.5*  1.2   MG   --   1.6   --    CALCIUM  8.4*  8.5  8.5     General Labs:    CBC:   Lab Results   Component Value Date    WBC 3.3 08/19/2018    RBC 2.62 08/19/2018    RBC 4.12 11/07/2011    HGB 8.3 08/19/2018    HCT 23.7 08/19/2018    MCV 90.5 08/19/2018    MCH 31.7 08/19/2018    MCHC 35.0 08/19/2018    RDW 13.6 06/07/2018    PLT 94 08/19/2018    MPV 9.5 08/19/2018     CBC with Differential:    Lab Results   Component Value Date    WBC 3.3 08/19/2018    RBC 2.62 08/19/2018    RBC 4.12 11/07/2011    HGB 8.3 08/19/2018    HCT 23.7 08/19/2018    PLT 94 08/19/2018    MCV 90.5 08/19/2018    MCH 31.7 08/19/2018    MCHC 35.0 08/19/2018    RDW 13.6 06/07/2018    NRBC 0 08/19/2018    NRBC 0 11/07/2011    SEGSPCT 69.6 08/19/2018    MONOPCT 13.8 08/19/2018    MONOSABS 0.5 08/19/2018    LYMPHSABS 0.5 08/19/2018    EOSABS 0.0 08/19/2018    BASOSABS 0.0 08/19/2018     CMP:    Lab Results   Component Value Date     08/19/2018    K 3.9 08/19/2018    K 3.9 08/19/2018     08/19/2018    CO2 20 08/19/2018    BUN 27 08/19/2018    CREATININE 1.2 08/19/2018    LABGLOM 58 08/19/2018    GLUCOSE 138 08/19/2018    GLUCOSE 221 11/07/2011    PROT 5.6 08/19/2018    LABALBU 2.6 08/19/2018    CALCIUM 8.5 08/19/2018    BILITOT 0.5 08/19/2018    ALKPHOS 135 08/19/2018     08/19/2018     08/19/2018     BMP:    Lab Results   Component Value

## 2018-08-20 NOTE — PROGRESS NOTES
antibiotics discontinued  - for Oncology out-patient follow up  - planned discharge to Acute Rehab; pending placement in group home    Electronically signed by Carlos Chew MD on 8/20/2018 at 10:45 AM    Attending Physician

## 2018-08-20 NOTE — CARE COORDINATION
8/20/18, 9:48 AM    DISCHARGE BARRIERS  Spoke with Mark Garcia at [facility]. He reports that they have Jenaro Schneider on the list at their assisted living [facility] and there should be an available apartment for him in the next few months. They will take him skilled at [facility] with the hopes of transitioning from their to their assisted living [facility]. Maria Fernanda Shaw will start the pre-cert this morning. GILLIAN called landon Metz [phone] and he is agreeable to discharge plan. Update 3:30pm: Received a call from Maria Fernanda Shaw at [facility]. They are out of network with [employer], but could take him Medicaid. If he goes to [facility] he will be higher on the waiting list for [address]. GILLIAN called landon Metz and explained. He will think about it and will discuss in the morning.

## 2018-08-20 NOTE — PROGRESS NOTES
vascular disease) ([facility] I73.9    Lactic acidosis E87.2    Iron deficiency anemia D50.9    CAD (coronary artery disease) I25.10    Heart failure with preserved ejection fraction (HCC) I50.30    GERD (gastroesophageal reflux disease) K21.9    Dyslipidemia E78.5    Lymphoma ([facility]) C85.90    Osteoarthritis of both knees M17.0    Left atrial dilation I51.7    GLORIA (acute kidney injury) ([facility] N17.9    Frequent falls R29.6    Physical deconditioning R53.81    Altered awareness, transient R40.4    Fever R50.9    SIRS (systemic inflammatory response syndrome) ([facility]) R65.10         ASSESSMENT/PLAN     Pancytopenia: better today.  Concern for drug induced or underlying MDS   to discuss with family on discharge planning    Dipak Yeager MD, Kesha Dennis 8/20/2018 10:07 AM

## 2018-08-20 NOTE — PROGRESS NOTES
Naeem Pelletier 60  INPATIENT OCCUPATIONAL THERAPY  [facility] 4A  DAILY NOTE    Time:  Time In: 1331  Time Out: 1359  Timed Code Treatment Minutes: 28 Minutes  Minutes: 28          Date: 2018  Patient Name: Lisa Cuellar,   Gender: male      Room: -006-A  MRN: [id number]  : 1935  (80 y.o.)  Referring Practitioner: Dr. Luis Morocho  Diagnosis: fever  Additional Pertinent Hx: 80 y.o. male who presents to the Emergency Department for the evaluation of generalized fatigue and a fever. The patient was brought here via squad from [facility]. Patient was recently put on Bactrim for possible cellulitis of the left lower extremity and his lasix was increased to twice daily. His fever today during initial evaluation reached to 101.6 and he states that he has developed some mild neck pain, shoulder pain, a cough, and one episode of diarrhea. Patient has a history of arthritis and uses a walker. He also has a history of hypertension, CAD, GERD, lymphoma, diabetes, CABG, and has a pacemaker. His cardiologist is Dr. Serafin Sanders MD. Patient denies any dizziness, nausea, vomiting, chest pain, shortness of breath, or urinary symptoms.     Past Medical History:   Diagnosis Date    CAD (coronary artery disease)     DM2 (diabetes mellitus, type 2) (HCC)     Dyslipidemia     GERD (gastroesophageal reflux disease)     Heart failure with preserved ejection fraction (HCC)     Hypertension     Left atrial dilation     severe    Lymphoma ([facility]     Osteoarthritis of both knees     Pacemaker 2018    [employer] DUAL PACEMAKER INSERTED ON D (peripheral vascular disease) ([facility]      Past Surgical History:   Procedure Laterality Date    CARDIAC SURGERY      CORONARY ARTERY BYPASS GRAFT      PACEMAKER PLACEMENT         Restrictions/Precautions:  General Precautions, Fall Risk                            Prior Level of Function:  ADL Assistance: Independent  Homemaking Assistance: Needs assistance (nurses aide assists with homemaking tasks and cleaning)  Ambulation Assistance: Independent  Transfer Assistance: Independent  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, amb with rollator. RN manages meds, was doing [facility] PT/OT    Subjective       Subjective: Pt seated in chair upon arrival, just finishing lunch and agreeable to OT session. Pt reported has been up in chair since this morning. Comments: RN ok'd session. Overall Orientation Status: Within Functional Limits         Pain:  Pain Assessment  Patient Currently in Pain: Denies       Objective  Overall Cognitive Status: WFL    Perception  Overall Perceptual Status: WFL                                                              ADL  Feeding: Independent (finishing lunch)  Grooming: Stand by assistance (washing/drying hands, brushing teeth while standing at sink; as fatigued, demoed more forward flexed posture and corrected with cuing)  Toileting: Contact guard assistance;Supervision (superv for hygiene while seated; CGA for standing balance when completing clothing mgmt down/up)          Bed mobility  Sit to Supine: Minimal assistance (for BLE)  Scooting: Stand by assistance (scooting back into bed)    Transfers  Sit to stand: Contact guard assistance (from bedside chair)  Stand to sit: Contact guard assistance (to EOB; cues for safe hand placement)  Toilet Transfers  Toilet - Technique: Ambulating  Equipment Used: Standard toilet (with grab bar use on R and countertop support on L)  Toilet Transfer: Moderate assistance (for lift off of toilet due to low height)    Balance  Sitting Balance: Supervision  Standing Balance: Stand by assistance (-CGA)     Time: X5-6 minutes  Activity: toileting/grooming tasks  Comment: Initially utilized unilateral support for clothing mgmt, although during trial of clothing mgmt up pt completed with bilateral release without LOB. Unilateral support placed on countertop throughout groomin tasks.

## 2018-08-20 NOTE — PLAN OF CARE
Problem: Falls - Risk of:  Goal: Absence of physical injury  Absence of physical injury   Outcome: Ongoing  Call light in reach, bed in lowest position, bed alarm activated at all times. Educated on use of call light before ambulation and when in need of assistance. Patient expressed understanding. Hourly visual checks performed and charted. Toileting offered to patient. No falls during shift, at this time. Arm band & falling star in place. Continuing to monitor. Problem: Risk for Impaired Skin Integrity  Goal: Tissue integrity - skin and mucous membranes  Structural intactness and normal physiological function of skin and  mucous membranes. Outcome: Ongoing  Skin color appropriate for ethnicity. Scattered bruising noted throughout. Coccyx red/purple. Redness noted to lower bilateral lower extremities. Patient assisted with turns every two hours and PRN. Problem: Pain Control  Goal: Maintain pain level at or below patient's acceptable level (or 5 if patient is unable to determine acceptable level)  Outcome: Ongoing  Patient able to use 0-10 pain scale. Denies pain at this time. Problem: Cardiovascular  Goal: No DVT, peripheral vascular complications  Outcome: Ongoing  Patient NSR on telemetry monitor. No evidence of DVT this shift. DVT prophylaxis in place. No complains of chest pain or shortness of breath. Will continue to monitor. Problem: GI  Goal: No bowel complications  Outcome: Ongoing  Abdomen rounded and soft. Active bowel sounds throughout. Patient passing gas. Problem: Musculor/Skeletal Functional Status  Goal: Highest potential functional level  Outcome: Ongoing  Active ROM in all four extremities. Patient up with one assist and a walker. Problem: Discharge Planning:  Goal: Patients continuum of care needs are met  Patients continuum of care needs are met   Outcome: Ongoing  Discharge planning in process and discussed with patient/family. Care manager aware of discharge needs.

## 2018-08-20 NOTE — PROGRESS NOTES
[address] 4A - [id number]    Time In: 2918  Time Out: 1155  Timed Code Treatment Minutes: 27 Minutes  Minutes: 27          Date: 2018  Patient Name: Maame Kaufman,  Gender:  male        MRN: [id number]  : 1935  (80 y.o.)     Referring Practitioner: Makenzie Serrano MD  Diagnosis: Fever  Additional Pertinent Hx: 80 y.o. male who presents to the Emergency Department for the evaluation of generalized fatigue and a fever. The patient was brought here via squad from [facility]. Patient was recently put on Bactrim for possible cellulitis of the left lower extremity and his lasix was increased to twice daily. His fever today during initial evaluation reached to 101.6 and he states that he has developed some mild neck pain, shoulder pain, a cough, and one episode of diarrhea. Patient has a history of arthritis and uses a walker. He also has a history of hypertension, CAD, GERD, lymphoma, diabetes, CABG, and has a pacemaker. His cardiologist is Dr. Alessandra Russo MD. Patient denies any dizziness, nausea, vomiting, chest pain, shortness of breath, or urinary symptoms.      Past Medical History:   Diagnosis Date    CAD (coronary artery disease)     DM2 (diabetes mellitus, type 2) (HCC)     Dyslipidemia     GERD (gastroesophageal reflux disease)     Heart failure with preserved ejection fraction (HCC)     Hypertension     Left atrial dilation     severe    [facility]oma ([facility] [address]     Osteoarthritis of both knees     Pacemaker 2018    [employer] DUAL PACEMAKER INSERTED ON D (peripheral vascular disease) ([facility] [address]      Past Surgical History:   Procedure Laterality Date    CARDIAC SURGERY      CORONARY ARTERY BYPASS GRAFT      PACEMAKER PLACEMENT         Restrictions/Precautions:  General Precautions, Fall Risk           Prior Level of Function:  ADL Assistance: Independent  Homemaking Assistance: Needs assistance (nurses aide assists with homemaking tasks and cleaning)  Ambulation Assistance: Independent  Transfer Assistance: Independent  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, amb with rollator. RN manages meds, was doing [place] PT/OT    Subjective:     Subjective: pt in bed on arrival agreeable for therapy    Pain:   .  Pain Assessment  Pain Level: 0       Social/Functional:  Lives With: Alone  Type of Home: House ([facility])  Home Layout: One level  Home Access: Level entry  Home Equipment: 4 wheeled walker     Objective:  Supine to Sit: Contact guard assistance (with HOB flat and no rail)    Transfers  Sit to Stand: Minimal Assistance (cues for hand placement )  Stand to sit: Contact guard assistance (cues to back up and control descend)       Ambulation 1  Device: Rolling Walker  Assistance: Minimal assistance  Quality of Gait: slow rose pt with steppage type gait and no heel strike at right LE pt hyperextended at right LE at end stance phase pt reported that this is his normal pattern, however he needed min assist to keep walker closer to him and assist   Distance: 50x1           Exercises:  Exercises  Comments: pt completed ankle pumps with assist and stretch at right heelcord 3x30 sec, heelslides, hip abd/add, short arc qauds, seated long arc quads, and hip flexion x 10 reps each        Activity Tolerance:  Activity Tolerance: Patient limited by fatigue;Patient limited by endurance    Assessment:   Body structures, Functions, Activity limitations: Decreased functional mobility , Decreased balance, Decreased endurance, Decreased strength  Assessment: pt tolerated session fair he is a little unsteady and needed hands on assist he also demonstrated generalized weakness in LEs pt would benefit from cont skilled therapy prior to home   Prognosis: Good     REQUIRES PT FOLLOW UP: Yes  Discharge Recommendations: Continue to assess pending progress, Subacute/Skilled Nursing Facility    Patient Education:  Patient

## 2018-08-21 PROBLEM — D61.818 PANCYTOPENIA (HCC): Status: ACTIVE | Noted: 2018-01-01

## 2018-08-21 NOTE — PROGRESS NOTES
Report called to Belia at [facility]. Patient's nephew to pick him up around 3 pm and transport [facility]

## 2018-08-21 NOTE — PROGRESS NOTES
Recommendations:  Equipment Needed: No    Safety:  Safety Devices in place: Yes  Type of devices:  All fall risk precautions in place, Gait belt, Call light within reach, Nurse notified, Bed alarm in place, Left in bed    Plan:  Times per week: 5x  Current Treatment Recommendations: Strengthening, Balance Training, Functional Mobility Training, Endurance Training, Patient/Caregiver Education & Training, Self-Care / ADL, Safety Education & Training    Goals:  Patient goals : to work with therapy     Short term goals  Time Frame for Short term goals: 2 weeks  Short term goal 1: Pt will complete functional mobiltiy to/from bathroom wtih CGA and RW wtih no cues for safety to increase ease with toileting routin  Short term goal 2: Pt will complete dynamic standing task wtih SBA for > 3 minutes with no LOB for ease with sink side grooming  Short term goal 3: Pt will complete LB ADls with SBA and LHAE prn with no cues for safety   Short term goal 4: Pt will complete UB strengthening exercises X 10 reps wtih no cues to attend to task to increase strength and activity tolerance needed for ADLs  Short term goal 5: Pt will complete functional transfers with SBA and no cues for safety including to/from toilet  Long term goals  Time Frame for Long term goals : No LTG established d/t short ELOS

## 2018-08-21 NOTE — CARE COORDINATION
8/21/18, 11:57 AM    Discharge plan discussed by  and . Discharge plan reviewed with patient/ family. Patient/ family verbalize understanding of discharge plan and are in agreement with plan. Understanding was demonstrated using the teach back method. IMM Letter  IMM Letter given to Patient/Family/Significant other/Guardian/POA/by[de-identified] updated  IMM Letter date given[de-identified] 08/21/18  IMM Letter time given[de-identified] 7207  Delmy Carroll and his cousins Vishal Romero and Vishal Romero all agree on him going to [facility] at discharge. He will go under his Medicaid benefit. They are choosing [facility] with the plan of him transferring to their assisted living, [facility], when a room opens up. [facility]ia in admission reports that they are able to accept today. GILLIAN faxed Medicaid LOC to the [facility]. Once those results are back patient will be ready to go. His cousin Vishal Romero will be picking him up a 3:00pm to transport to the facility. Called and left a message with aYsmeen Dorsey CM to make her aware of discharge plan.

## 2018-08-21 NOTE — PROGRESS NOTES
(nurses aide assists with homemaking tasks and cleaning)  Ambulation Assistance: Independent  Transfer Assistance: Independent  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, amb with rollator.   RN manages meds, was doing [facility] PT/OT    Subjective:     Subjective: pt in bed on arrival agreeable for therapy nursing passed meds during session and pt requested to use bathroom during session     Pain:   .  Pain Assessment  Pain Level: 6 (LEs and back)       Social/Functional:  Lives With: Alone  [facility]  Home Layout: One level  Home Access: Level entry  Home Equipment: 4 wheeled walker     Objective:  Supine to Sit: Contact guard assistance (with rail and HOB slightly elevated)  Scooting: Contact guard assistance (to edge of bed slight post lean)    Transfers  Sit to Stand: Minimal Assistance (from bed cues for hand placement he required mod assist from the toilet )  Stand to sit: Contact guard assistance (to chair and min assist to low toilet )       Ambulation 1  Device: [address]: Contact guard assistance  Quality of Gait: slow rose and flexed posture decreased step length lacking heel strike at right LE and steppage type gait due to lacking heel strike at right, also noted knee hyperextension at end stance phase at right LE pt stating that this is normal for him   Distance: 50x1, 10x1         Balance  Comments: standing balance in bathroom with one UE at support with min assist for balance due to several post loss of balance when he was attempting to pull attends up adn down, however at sink he required CGA but tended to lean forward on counter for support        Exercises:  Exercises  Comments: pt completed ankle pumps with assist and stretch at right heelcord 3x30 sec, heelslides, hip abd/add, short arc qauds,quad sets, glut sets seated long arc quads, and hip flexion x 10 reps each           Activity Tolerance:  Activity Tolerance: Patient limited by

## 2018-08-21 NOTE — PLAN OF CARE
Problem: Falls - Risk of:  Goal: Will remain free from falls  Will remain free from falls   Outcome: Ongoing  Call light in reach, bed in lowest position, bed alarm activated at all times. Educated on use of call light before ambulation and when in need of assistance. Patient expressed understanding. Hourly visual checks performed and charted. Toileting offered to patient. No falls during shift, at this time. Arm band & falling star in place. Continuing to monitor. Problem: Risk for Impaired Skin Integrity  Goal: Tissue integrity - skin and mucous membranes  Structural intactness and normal physiological function of skin and  mucous membranes. Outcome: Ongoing  Skin color appropriate for ethnicity. Scattered bruising noted throughout. Coccyx red/purple. Redness noted to lower bilateral lower extremities. Patient assisted with turns every two hours and PRN.      Problem: Pain Control  Goal: Maintain pain level at or below patient's acceptable level (or 5 if patient is unable to determine acceptable level)  Outcome: Ongoing  Patient able to use 0-10 pain scale. Denies pain at this time.       Problem: Cardiovascular  Goal: No DVT, peripheral vascular complications  Outcome: Ongoing  Patient NSR on telemetry monitor. No evidence of DVT this shift. DVT prophylaxis in place. No complains of chest pain or shortness of breath. Will continue to monitor.       Problem: GI  Goal: No bowel complications  Outcome: Ongoing  Abdomen rounded and soft. Active bowel sounds throughout. Patient passing gas.       Problem: Musculor/Skeletal Functional Status  Goal: Highest potential functional level  Outcome: Ongoing  Active ROM in all four extremities. Patient up with one assist and a walker. Problem: Discharge Planning:  Goal: Patients continuum of care needs are met  Patients continuum of care needs are met   Outcome: Ongoing  Discharge planning in process and discussed with patient/family.  Care manager aware of

## 2018-08-29 NOTE — DISCHARGE SUMMARY
tablet  Commonly known as:  KLOR-CON M  Take 0.5 tablets by mouth daily  What changed:  how much to take        CONTINUE taking these medications    amLODIPine 10 MG tablet  Commonly known as:  NORVASC  Take 1 tablet by mouth daily     aspirin 81 MG tablet     atorvastatin 20 MG tablet  Commonly known as:  LIPITOR     BYSTOLIC 10 MG tablet  Generic drug:  nebivolol  TAKE 1 TABLET BY MOUTH DAILY     clopidogrel 75 MG tablet  Commonly known as:  PLAVIX     ferrous sulfate 325 (65 Fe) MG tablet  Take 1 tablet by mouth 2 times daily (with meals)     furosemide 20 MG tablet  Commonly known as:  LASIX  Take 2 tablets by mouth 2 times daily     glipiZIDE 5 MG tablet  Commonly known as:  GLUCOTROL     HYDROcodone-acetaminophen 5-325 MG per tablet  Commonly known as:  NORCO     isosorbide mononitrate 10 MG tablet  Commonly known as:  ISMO;MONOKET  Take 1 tablet by mouth daily     magnesium oxide 400 MG tablet  Commonly known as:  MAG-OX     meclizine 25 MG tablet  Commonly known as:  ANTIVERT     metFORMIN 500 MG tablet  Commonly known as:  GLUCOPHAGE     MULTIVITAMIN ADULTS 50+ PO     omeprazole 40 MG delayed release capsule  Commonly known as:  PRILOSEC     oxyCODONE-acetaminophen 7.5-325 MG per tablet  Commonly known as:  PERCOCET     polyethylene glycol powder  Commonly known as:  GLYCOLAX     RANEXA 1000 MG extended release tablet  Generic drug:  ranolazine  TAKE 1 TABLET BY MOUTH TWICE DAILY        STOP taking these medications    TYLENOL 500 MG tablet  Generic drug:  acetaminophen        ASK your doctor about these medications    sulfamethoxazole-trimethoprim 800-160 MG per tablet  Commonly known as:  BACTRIM DS  Take 1 tablet by mouth 2 times daily for 10 days  Ask about: Should I take this medication?               Physical Exam:    Vitals:  Vitals:    08/20/18 2031 08/20/18 2325 08/21/18 0340 08/21/18 0756   BP: (!) 144/68 (!) 151/70 (!) 155/70 (!) 162/74   Pulse: 57 66 61 60   Resp: 15 20 15 16   Temp: 97.9 °F (36.6 °C)  97.9 °F (36.6 °C) 98 °F (36.7 °C)   TempSrc: Oral  Oral Oral   SpO2: 100% 98% 98% 97%   Weight:   168 lb 9.6 oz (76.5 kg)    Height:         Weight: Weight: 168 lb 9.6 oz (76.5 kg)     24 hour intake/output:No intake or output data in the 24 hours ending 08/29/18 1530    General Appearance:  Well developed, elderly male, in no apparent distress. Skin:  Skin has good color, good turgor, no rashes, no acute lesions  Lungs: Good respiratory effort, CTA bilaterally, no wheeze, rales or rhonchi  Heart: Regular rate and rhythm, S1 and S2 only without murmur, gallop or rub. Abdomen:  Soft, non-tender, normal bowel sounds. No bruits, organomegaly or masses. Extremities: Extremities warm to touch, pink, with no edema. , normal capillary refill. Neurologic: Awake, alert and oriented. No focal deficits  Affect: Neutral/Euthymic(normal)    Procedures:  None    Diagnostic Test:  Routine labs    Radiology reports as per the Radiologist  Radiology: Xr Chest Portable    Result Date: 8/16/2018  PROCEDURE: XR CHEST PORTABLE CLINICAL INFORMATION: Cough, fever TECHNIQUE: Mobile AP chest radiograph. COMPARISON: Mobile AP chest radiograph 8 2018 FINDINGS: A right-sided cardiac device is in stable position. Median sternotomy wires are present in the mid thorax. Cardiac silhouette is within normal limits. Atherosclerotic calcifications are seen in the thoracic aorta. Lung volumes are low. There are no lung consolidations. Degenerative changes are noted in the thoracic spine. Soft tissues are unremarkable. No acute intrathoracic process. **This report has been created using voice recognition software. It may contain minor errors which are inherent in voice recognition technology. ** Final report electronically signed by Dr. Arza Shoemaker on 8/16/2018 3:27 PM       Results for orders placed or performed during the hospital encounter of 08/16/18   Culture blood #1   Result Value Ref Range    Blood Culture, Routine No growth-preliminary  No growth      CULTURE BLOOD #1   Result Value Ref Range    Blood Culture, Routine No growth-preliminary  No growth      CBC Auto Differential   Result Value Ref Range    WBC 6.0 4.8 - 10.8 thou/mm3    RBC 2.95 (L) 4.70 - 6.10 mill/mm3    Hemoglobin 9.5 (L) 14.0 - 18.0 gm/dl    Hematocrit 27.1 (L) 42.0 - 52.0 %    MCV 91.9 80.0 - 94.0 fL    MCH 32.2 26.0 - 33.0 pg    MCHC 35.1 32.2 - 35.5 gm/dl    RDW-CV 13.7 11.5 - 14.5 %    RDW-SD 46.3 (H) 35.0 - 45.0 fL    Platelets 399 (L) 997 - 400 thou/mm3    MPV 8.9 (L) 9.4 - 12.4 fL    Seg Neutrophils 85.1 %    Lymphocytes 6.5 %    Monocytes 7.9 %    Eosinophils 0.0 %    Basophils 0.2 %    Immature Granulocytes 0.3 %    Segs Absolute 5.1 1.8 - 7.7 thou/mm3    Lymphocytes # 0.4 (L) 1.0 - 4.8 thou/mm3    Monocytes # 0.5 0.4 - 1.3 thou/mm3    Eosinophils # 0.0 0.0 - 0.4 thou/mm3    Basophils # 0.0 0.0 - 0.1 thou/mm3    Immature Grans (Abs) 0.02 0.00 - 0.07 thou/mm3    nRBC 0 /100 wbc   Comprehensive Metabolic Panel   Result Value Ref Range    Glucose 139 (H) 70 - 108 mg/dL    CREATININE 1.9 (H) 0.4 - 1.2 mg/dL    BUN 38 (H) 7 - 22 mg/dL    Sodium 137 135 - 145 meq/L    Potassium 4.8 3.5 - 5.2 meq/L    Chloride 97 (L) 98 - 111 meq/L    CO2 21 (L) 23 - 33 meq/L    Calcium 8.8 8.5 - 10.5 mg/dL    AST 15 5 - 40 U/L    Alkaline Phosphatase 48 38 - 126 U/L    Total Protein 6.3 6.1 - 8.0 g/dL    Alb 3.3 (L) 3.5 - 5.1 g/dL    Total Bilirubin 0.5 0.3 - 1.2 mg/dL    ALT 14 11 - 66 U/L   Urinalysis with microscopic   Result Value Ref Range    Glucose, Urine NEGATIVE NEGATIVE mg/dl    Bilirubin Urine NEGATIVE NEGATIVE    Ketones, Urine NEGATIVE NEGATIVE    Specific Gravity, UA 1.008 1.002 - 1.03    Blood, Urine SMALL (A) NEGATIVE    pH, UA 7.0 5.0 - 9.0    Protein, UA NEGATIVE NEGATIVE mg/dl    Urobilinogen, Urine 0.2 0.0 - 1.0 eu/dl    Nitrite, Urine NEGATIVE NEGATIVE    Leukocyte Esterase, Urine NEGATIVE NEGATIVE    Color, UA YELLOW YELLOW-STR    Character, Urine Calculation (Cierra) 465 ms    P Axis 72 degrees    T Axis -130 degrees       Diet:       Activity:  Activity as tolerated (Patient may move about with assist as indicated or with supervision.)    Follow-up:  in the next few days with [facility], APRN - CNP,  in 2 weeks with Hematology    Disposition: Assisted Living Facility    Condition: Stable      Time Spent: 45 minutes    Electronically signed by Desmond Jaramillo MD on 8/29/2018 at 3:30 PM    Attending Physician

## 2018-08-31 NOTE — TELEPHONE ENCOUNTER
Abi angiogram cancelled for today   Dr. Glenn Wong was in a case   Scheduling notified that it will need to be RS musculoskeletal

## 2018-09-02 PROBLEM — R41.82 ALTERED MENTAL STATUS: Status: ACTIVE | Noted: 2018-01-01

## 2018-09-02 NOTE — ED NOTES
Waiting on kayexalate from pharmacy. Pt resting. Denies all needs. Family at side. Call light in reach. Family states nursing home put him on norco and thinks his mental status might be due to that.       Elana Lozano RN  09/02/18 [id number]

## 2018-09-02 NOTE — ED PROVIDER NOTES
[facility] Emergency Department    CHIEF COMPLAINT       Chief Complaint   Patient presents with    Altered Mental Status       Nurses Notes reviewed and I agree except as noted in the HPI. HISTORY OF PRESENT ILLNESS    Smooth Valadez is a 80 y.o. male who presents to the ED via EMS for evaluation of AMS. The patient is a resident of [address] [place] [address]. He was noted to have increased AMS this morning and has been having vomiting for the past two days. The patient complains of generalized abdominal pain, nausea, and dizziness. No chest pain, shortness of breath, diarrhea, or other symptoms. Additional history is limited. Pain description:  Onset: two days  Location: generalized abdominal pain   Duration: constant   Character: cramping   Aggravating factors: none   Radiation: none   Severity: moderate       REVIEW OF SYSTEMS     Review of Systems   Constitutional: Negative for chills, diaphoresis and fever. HENT: Negative for congestion, rhinorrhea and sore throat. Respiratory: Negative for cough, shortness of breath and wheezing. Cardiovascular: Negative for chest pain, palpitations and leg swelling. Gastrointestinal: Positive for abdominal pain, nausea and vomiting. Negative for blood in stool, constipation and diarrhea. Genitourinary: Negative for decreased urine volume, difficulty urinating, dysuria, frequency, hematuria and urgency. Musculoskeletal: Negative for arthralgias, back pain, joint swelling, myalgias and neck pain. Skin: Negative for rash. Neurological: Positive for dizziness. Negative for weakness, light-headedness, numbness and headaches. PAST MEDICAL HISTORY    has a past medical history of Anemia; Blood circulation, collateral; CAD (coronary artery disease); Chronic kidney disease; DM2 (diabetes mellitus, type 2) ([facility] [address] Dyslipidemia; Falls; GERD (gastroesophageal reflux disease);  Heart failure with preserved ejection fraction ([facility] [address] 50 (*)     CREATININE 2.4 (*)     Calcium 11.6 (*)     All other components within normal limits   HEPATIC FUNCTION PANEL - Abnormal; Notable for the following:     Alb 2.8 (*)     Alkaline Phosphatase 619 (*)      (*)     All other components within normal limits   MAGNESIUM - Abnormal; Notable for the following:     Magnesium 2.8 (*)     All other components within normal limits   GLOMERULAR FILTRATION RATE, ESTIMATED - Abnormal; Notable for the following:     Est, Glom Filt Rate 26 (*)     All other components within normal limits   POCT GLUCOSE - Abnormal; Notable for the following:     POC Glucose 118 (*)     All other components within normal limits   CULTURE BLOOD #1   CULTURE BLOOD #2   LACTIC ACID, PLASMA   URINALYSIS WITH MICROSCOPIC   ANION GAP   OSMOLALITY       EMERGENCY DEPARTMENT COURSE:   Vitals:    Vitals:    09/02/18 1143 09/02/18 1238 09/02/18 1427 09/02/18 1539   BP: 131/61 131/71 (!) 151/58 123/74   Pulse: 60 60 60 60   Resp: 16 14 14 14   Temp: 97.6 °F (36.4 °C)      TempSrc: Oral      SpO2: 98% 97% 99% 98%   Weight: 168 lb (76.2 kg)      Height: 5' 9\" (1.753 m)          MDM    Medications   0.9 % sodium chloride bolus (1,000 mLs Intravenous New Bag 9/2/18 1425)   0.9 % sodium chloride bolus (0 mLs Intravenous Stopped 9/2/18 1444)   ondansetron (ZOFRAN) injection 4 mg (4 mg Intravenous Given 9/2/18 1237)   sodium polystyrene (KAYEXALATE) 15 GM/60ML suspension 15 g (15 g Oral Given 9/2/18 1539)   dextrose 50 % solution 12.5 g (12.5 g Intravenous Given 9/2/18 1423)       The patient was seen and evaluated in the ED for AMS and abdominal pain. The patient presented to the ED in no acute distress. He was alert on exam and answering questions appropriately. There was right sided abdominal tenderness present. Labs and imaging studies were completed. There was evidence of hyperkalemia of 5.9 and GLORIA with creatinine and BUN on 2.4 and 50 present on his lab results. UA was negative for a UTI.

## 2018-09-02 NOTE — ED NOTES
Pt resting, watching tv with family at side. Resp easy and regular. Call light in reach.       Panchito Nichols, ALLYSON  09/02/18 3242

## 2018-09-02 NOTE — PROGRESS NOTES
Pt admitted to  6 from ED. Complaints: abdominal pain  IV normal saline infusing into the forearm right, condition patent and no redness at a rate of 75 mls/ hour with about 800 mls in the bag still. IV site free of s/s of infection or infiltration. Vital signs obtained. Assessment and data collection initiated. Two nurse skin assessment performed by Lilian Bowers RN and Carla Berman RN. Oriented to room. Policies and procedures for  explained. All questions answered with no further questions at this time. Fall prevention and safety brochure discussed with patient. Bed alarm on. Call light in reach. The best day to schedule a follow up Dr appointment is:  Tuesday a.m.

## 2018-09-02 NOTE — ED NOTES
Bed: 024A  Expected date:   Expected time:   Means of arrival:   Comments:      Nidia Gonzalez RN  09/02/18 4145

## 2018-09-02 NOTE — ED NOTES
Pt transported to [facility] on cart in stable condition. Floor contacted before transport.      Conchis Garrido  09/02/18 1571

## 2018-09-02 NOTE — H&P
INTERNAL MEDICINE SPECIALTIES    History & Physical    Patient:  Mandy Kruger  YOB: 1935  Date of Service: 9/2/2018  MRN: [id number]   Acct:  [de-identified]   Primary Care Physician: Valeta Favre, APRN - CNP    Chief Complaint: Altered mental status    History of Present Illness:   History obtained from chart review and the patient. The patient is a 80 y.o. male who presents from [address] with reports of altered mental status that was noticed from last night. Context: Pt is a [facility] resident who was just discharged from hospital after evaluated for elevated ferritin, pancytopenia and splenomegaly, raising concerns about B symptoms of Lymphoma or some other hematopoietic disorder. Family report that he was started on Norco and he is now presenting with features suggestive of opioid induced encephalopathy. Past Medical History:        Diagnosis Date    Anemia     Blood circulation, collateral     CAD (coronary artery disease)     Chronic kidney disease     DM2 (diabetes mellitus, type 2) (HCC)     Dyslipidemia     Falls     GERD (gastroesophageal reflux disease)     Heart failure with preserved ejection fraction (HCC)     Hyperlipidemia     Hypertension     Left atrial dilation     severe    [facility]oma ([facility]     Osteoarthritis of both knees     Pacemaker 1/25/2018    [employer] DUAL PACEMAKER INSERTED ON 01/  PVD (peripheral vascular disease) ([facility]        Past Surgical History:        Procedure Laterality Date    CARDIAC SURGERY      CORONARY ARTERY BYPASS GRAFT      PACEMAKER PLACEMENT      VASCULAR SURGERY         Home Medications:   No current facility-administered medications on file prior to encounter.       Current Outpatient Prescriptions on File Prior to Encounter   Medication Sig Dispense Refill    polyethylene glycol (GLYCOLAX) powder Take 17 g by mouth daily as needed      oxyCODONE-acetaminophen (PERCOCET) 7.5-325 MG per tablet Take 1 tablet by mouth review of systems completed, all other than noted above are negative. Vitals:   Vitals:    09/02/18 1427   BP: (!) 151/58   Pulse: 60   Resp: 14   Temp:    SpO2: 99%      BMI: Body mass index is 24.81 kg/m². Exam:  Physical Examination:   General appearance - Age appropriate, chronically-ill looking elderly male, in no painful / respiratory distress  Mental status - alert, oriented to person but not place and time  Neck - supple, no significant adenopathy, no JVD, or carotid bruits  Chest - clear to auscultation, no wheezes, rales or rhonchi, symmetric air entry  Heart - normal rate, regular rhythm, normal S1, S2, no murmurs, rubs, clicks or gallops  Abdomen - soft, nontender, nondistended, no masses or organomegaly  Neurological - neck supple without rigidity, cranial nerves II through XII intact, motor and sensory grossly normal bilaterally, normal muscle tone, no tremors, strength 5/5  Musculoskeletal - no joint tenderness, deformity or swelling  Extremities - peripheral pulses normal, no pedal edema, no clubbing or cyanosis, normal capillary refill.   Skin - normal coloration and turgor, no rashes, no suspicious skin lesions noted      Review of Labs and Diagnostic Testing:    Recent Results (from the past 24 hour(s))   EKG Altered Mental Status    Collection Time: 09/02/18 11:44 AM   Result Value Ref Range    Ventricular Rate 60 BPM    Atrial Rate 60 BPM    P-R Interval 338 ms    QRS Duration 208 ms    Q-T Interval 526 ms    QTc Calculation (Bazett) 526 ms    R Axis -84 degrees    T Axis 80 degrees   Urinalysis with microscopic    Collection Time: 09/02/18 12:43 PM   Result Value Ref Range    Glucose, Urine NEGATIVE NEGATIVE mg/dl    Bilirubin Urine NEGATIVE NEGATIVE    Ketones, Urine NEGATIVE NEGATIVE    Specific Gravity, UA 1.014 1.002 - 1.03    Blood, Urine NEGATIVE NEGATIVE    pH, UA 7.5 5.0 - 9.0    Protein, UA NEGATIVE NEGATIVE mg/dl    Urobilinogen, Urine 0.2 0.0 - 1.0 eu/dl Nitrite, Urine NEGATIVE NEGATIVE    Leukocyte Esterase, Urine NEGATIVE NEGATIVE    Color, UA YELLOW YELLOW-STR    Character, Urine SL CLOUDY CLR-SL.CHRISTIANA    RBC, UA 0-2 0-2/hpf /hpf    WBC, UA 0-2 0-4/hpf /hpf    Epi Cells NONE SEEN 3-5/hpf /hpf    Bacteria, UA NONE FEW/NONE S    Casts NONE SEEN NONE SEEN /lpf    Crystals NONE SEEN NONE SEEN    Renal Epithelial, Urine NONE SEEN NONE SEEN    Yeast, UA NONE SEEN NONE SEEN    Casts NONE SEEN /lpf    Miscellaneous Lab Test Result NONE SEEN    CBC auto differential    Collection Time: 09/02/18 12:50 PM   Result Value Ref Range    WBC 5.3 4.8 - 10.8 thou/mm3    RBC 3.01 (L) 4.70 - 6.10 mill/mm3    Hemoglobin 9.3 (L) 14.0 - 18.0 gm/dl    Hematocrit 29.7 (L) 42.0 - 52.0 %    MCV 98.7 (H) 80.0 - 94.0 fL    MCH 30.9 26.0 - 33.0 pg    MCHC 31.3 (L) 32.2 - 35.5 gm/dl    RDW-CV 13.3 11.5 - 14.5 %    RDW-SD 48.1 (H) 35.0 - 45.0 fL    Platelets 780 977 - 350 thou/mm3    MPV 9.0 (L) 9.4 - 12.4 fL    Seg Neutrophils 76.5 %    Lymphocytes 13.5 %    Monocytes 8.8 %    Eosinophils 0.0 %    Basophils 0.8 %    Immature Granulocytes 0.4 %    Segs Absolute 4.1 1.8 - 7.7 thou/mm3    Lymphocytes # 0.7 (L) 1.0 - 4.8 thou/mm3    Monocytes # 0.5 0.4 - 1.3 thou/mm3    Eosinophils # 0.0 0.0 - 0.4 thou/mm3    Basophils # 0.0 0.0 - 0.1 thou/mm3    Immature Grans (Abs) 0.02 0.00 - 0.07 thou/mm3    nRBC 0 /100 wbc   Basic Metabolic Panel    Collection Time: 09/02/18 12:50 PM   Result Value Ref Range    Sodium 138 135 - 145 meq/L    Potassium 5.9 (H) 3.5 - 5.2 meq/L    Chloride 102 98 - 111 meq/L    CO2 24 23 - 33 meq/L    Glucose 57 (L) 70 - 108 mg/dL    BUN 50 (H) 7 - 22 mg/dL    CREATININE 2.4 (H) 0.4 - 1.2 mg/dL    Calcium 11.6 (H) 8.5 - 10.5 mg/dL   Hepatic function panel    Collection Time: 09/02/18 12:50 PM   Result Value Ref Range    Alb 2.8 (L) 3.5 - 5.1 g/dL    Total Bilirubin 0.5 0.3 - 1.2 mg/dL    Bilirubin, Direct <0.2 0.0 - 0.3 mg/dL    Alkaline Phosphatase 619 (H) 38 - 126 U/L    AST 27 5 - 40 U/L     (H) 11 - 66 U/L    Total Protein 7.3 6.1 - 8.0 g/dL   Magnesium    Collection Time: 09/02/18 12:50 PM   Result Value Ref Range    Magnesium 2.8 (H) 1.6 - 2.4 mg/dL   Lactic acid, plasma    Collection Time: 09/02/18 12:50 PM   Result Value Ref Range    Lactic Acid 1.9 0.5 - 2.2 mmol/L   Anion Gap    Collection Time: 09/02/18 12:50 PM   Result Value Ref Range    Anion Gap 12.0 8.0 - 16.0 meq/L   Osmolality    Collection Time: 09/02/18 12:50 PM   Result Value Ref Range    Osmolality Calc 286.7 275.0 - 300 mOsmol/kg   Glomerular Filtration Rate, Estimated    Collection Time: 09/02/18 12:50 PM   Result Value Ref Range    Est, Glom Filt Rate 26 (A) ml/min/1.73m2       Radiology:     Ct Abdomen Pelvis Wo Contrast Additional Contrast? None    Result Date: 9/2/2018  PROCEDURE: CT ABDOMEN PELVIS WO CONTRAST CLINICAL INFORMATION: Nausea and vomiting, Right sided abd tenderness, COMPARISON: 8/18/2018 TECHNIQUE:  Axial CT images were obtained through the abdomen and pelvis without contrast. Coronal and sagittal reformatted images were rendered. All CT scans at this facility use dose modulation, iterative reconstruction, and/or weight-based dosing when  appropriate to reduce radiation dose to as low as reasonably achievable. FINDINGS: Limited evaluation of the lung bases demonstrates small bilateral pleural effusions. These appear decreased when compared to prior examination from August 18, 2018. Noncontrast evaluation of the liver and spleen appear normal. There is mild gallbladder distention demonstrated. There is cholelithiasis seen. The adrenal glands appear normal. The pancreas appears atrophic but otherwise within normal limits. No nephrolithiasis or hydronephrosis is seen. No hydroureter or ureterolithiasis is demonstrated. There is marked distention Bilateral fat-containing right greater than left angle hernias are demonstrated. There is no evidence of bowel obstruction.  However, there is a moderate to large amount retained stool throughout colon. The appendix is not visualized. No free fluid or free air is seen. No lymphadenopathy is demonstrated. No acute osseous findings are demonstrated. There is lower lumbar facet arthrosis demonstrated. Multilevel degenerative disc disease of the lumbar spine is demonstrated, most pronounced at the L2-L3 level. Of the bladder. 1. Small bilateral pleural effusions. These appear improved from the prior examination from August 2018. 2. Mild gallbladder distention, nonspecific. There is also mild cholelithiasis is seen. **This report has been created using voice recognition software. It may contain minor errors which are inherent in voice recognition technology. ** Final report electronically signed by Dr. Marlan Siemens on 9/2/2018 2:46 PM    Ct Head Wo Contrast    Result Date: 9/2/2018  PROCEDURE: CT HEAD WO CONTRAST CLINICAL INFORMATION: altered mental status. COMPARISON: CT of the head dated August 8, 2018. TECHNIQUE: Noncontrast 5 mm axial images were obtained through the brain. All CT scans at this facility use dose modulation, iterative reconstruction, and/or weight-based dosing when appropriate to reduce radiation dose to as low as reasonably achievable. FINDINGS: There is stable prominence of the sulcal spaces and ventricular system secondary to generalized, age-related parenchymal volume loss. Patchy areas of low-attenuation throughout the periventricular and deep cerebral white matter are again noted and likely  correspond to sequela of chronic microvascular ischemic change. No intracranial hemorrhage is seen. No mass, mass effect or extra-axial fluid collection is identified. The ventricles are midline without evidence of hydrocephalus. The basal cisterns are patent. Atherosclerotic vascular calcifications are present at the skull base. 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  The temporal bone structures and paranasal sinuses are unremarkable. The calvarium is intact without acute fracture or aggressive, bony destructive process. Stable senescent changes are present without acute intracranial abnormality identified. **This report has been created using voice recognition software. It may contain minor errors which are inherent in voice recognition technology. ** Final report electronically signed by Dr. Harriet Diaz on 9/2/2018 2:32 PM        EKG: normal sinus rhythm, no blocks or conduction defects, no ischemic changes      Active Problems:    Altered mental status  Resolved Problems:    * No resolved hospital problems. *      Impression:      Assessment & Plan:    1. Admit for further evaluation and management  2. Hold all opioids and centrally acting medications  3. Follow up labs  4. Resume other home medications as clinically indicated  5.  Further treatment will be guided by evolution of clinical course        DVT prophylaxis: [x] Lovenox                                 [] SCDs                                 [] SQ Heparin                                 [] Encourage ambulation, low risk for DVT, no chemical or mechanical prophylaxis necessary              [] Already on Anticoagulation                Anticipated Disposition upon discharge: [] Home                                                                         [] Home with Home Health                                                                         [x] [facility] / Assisted Living facility                                                                         [] [address] [place], [place] [address]          Electronically signed by Sarah Huff MD on 9/2/2018 at 3:39 PM

## 2018-09-03 PROBLEM — K76.9 HEPATIC DYSFUNCTION: Status: ACTIVE | Noted: 2018-01-01

## 2018-09-03 PROBLEM — R33.9 URINARY RETENTION: Status: ACTIVE | Noted: 2018-01-01

## 2018-09-03 PROBLEM — R31.9 HEMATURIA: Status: ACTIVE | Noted: 2018-01-01

## 2018-09-03 PROBLEM — E87.5 HYPERKALEMIA: Status: ACTIVE | Noted: 2018-01-01

## 2018-09-03 PROBLEM — N17.9 AKI (ACUTE KIDNEY INJURY) (HCC): Status: ACTIVE | Noted: 2018-01-01

## 2018-09-03 PROBLEM — E83.52 HYPERCALCEMIA: Status: ACTIVE | Noted: 2018-01-01

## 2018-09-03 NOTE — PLAN OF CARE
Belongings within reach including call light. Problem: Mood - Altered:  Goal: Mood stable  Mood stable   Outcome: Ongoing  Remains in stable mood. Listens to directions well. Tele sitter in room for safety. Goal: Absence of abusive behavior  Absence of abusive behavior   Outcome: Met This Shift    Goal: Verbalizations of feeling emotionally comfortable while being cared for will increase  Verbalizations of feeling emotionally comfortable while being cared for will increase   Outcome: Met This Shift      Problem: Psychomotor Activity - Altered:  Goal: Absence of psychomotor disturbance signs and symptoms  Absence of psychomotor disturbance signs and symptoms   Outcome: Ongoing  Continue to assess. No notable change overnight. Problem: Sensory Perception - Impaired:  Goal: Demonstrations of improved sensory functioning will increase  Demonstrations of improved sensory functioning will increase   Outcome: Ongoing    Goal: Decrease in sensory misperception frequency  Decrease in sensory misperception frequency   Outcome: Ongoing    Goal: Able to refrain from responding to false sensory perceptions  Able to refrain from responding to false sensory perceptions   Outcome: Ongoing    Goal: Demonstrates accurate environmental perceptions  Demonstrates accurate environmental perceptions   Outcome: Ongoing    Goal: Able to distinguish between reality-based and nonreality-based thinking  Able to distinguish between reality-based and nonreality-based thinking   Outcome: Ongoing    Goal: Able to interrupt nonreality-based thinking  Able to interrupt nonreality-based thinking   Outcome: Ongoing  Continues to be confused with some signs of all above. continue to provide safe environment, care as ordered. Problem: Sleep Pattern Disturbance:  Goal: Appears well-rested  Appears well-rested   Outcome: Ongoing  Did not rest well overnight. Comments: Care plan reviewed with patient. Patient confused overnight.  Will continue to update to care plan frequently.

## 2018-09-03 NOTE — PROGRESS NOTES
Ref Range    Glucose, Urine NEGATIVE NEGATIVE mg/dl    Bilirubin Urine NEGATIVE NEGATIVE    Ketones, Urine NEGATIVE NEGATIVE    Specific Gravity, UA 1.014 1.002 - 1.03    Blood, Urine NEGATIVE NEGATIVE    pH, UA 7.5 5.0 - 9.0    Protein, UA NEGATIVE NEGATIVE mg/dl    Urobilinogen, Urine 0.2 0.0 - 1.0 eu/dl    Nitrite, Urine NEGATIVE NEGATIVE    Leukocyte Esterase, Urine NEGATIVE NEGATIVE    Color, UA YELLOW YELLOW-STR    Character, Urine SL CLOUDY CLR-SL.CHRISTIANA    RBC, UA 0-2 0-2/hpf /hpf    WBC, UA 0-2 0-4/hpf /hpf    Epi Cells NONE SEEN 3-5/hpf /hpf    Bacteria, UA NONE FEW/NONE S    Casts NONE SEEN NONE SEEN /lpf    Crystals NONE SEEN NONE SEEN    Renal Epithelial, Urine NONE SEEN NONE SEEN    Yeast, UA NONE SEEN NONE SEEN    Casts NONE SEEN /lpf    Miscellaneous Lab Test Result NONE SEEN    CBC auto differential    Collection Time: 09/02/18 12:50 PM   Result Value Ref Range    WBC 5.3 4.8 - 10.8 thou/mm3    RBC 3.01 (L) 4.70 - 6.10 mill/mm3    Hemoglobin 9.3 (L) 14.0 - 18.0 gm/dl    Hematocrit 29.7 (L) 42.0 - 52.0 %    MCV 98.7 (H) 80.0 - 94.0 fL    MCH 30.9 26.0 - 33.0 pg    MCHC 31.3 (L) 32.2 - 35.5 gm/dl    RDW-CV 13.3 11.5 - 14.5 %    RDW-SD 48.1 (H) 35.0 - 45.0 fL    Platelets 357 239 - 994 thou/mm3    MPV 9.0 (L) 9.4 - 12.4 fL    Seg Neutrophils 76.5 %    Lymphocytes 13.5 %    Monocytes 8.8 %    Eosinophils 0.0 %    Basophils 0.8 %    Immature Granulocytes 0.4 %    Segs Absolute 4.1 1.8 - 7.7 thou/mm3    Lymphocytes # 0.7 (L) 1.0 - 4.8 thou/mm3    Monocytes # 0.5 0.4 - 1.3 thou/mm3    Eosinophils # 0.0 0.0 - 0.4 thou/mm3    Basophils # 0.0 0.0 - 0.1 thou/mm3    Immature Grans (Abs) 0.02 0.00 - 0.07 thou/mm3    nRBC 0 /100 wbc   Basic Metabolic Panel    Collection Time: 09/02/18 12:50 PM   Result Value Ref Range    Sodium 138 135 - 145 meq/L    Potassium 5.9 (H) 3.5 - 5.2 meq/L    Chloride 102 98 - 111 meq/L    CO2 24 23 - 33 meq/L    Glucose 57 (L) 70 - 108 mg/dL    BUN 50 (H) 7 - 22 mg/dL    CREATININE 2.4 97.7 (H) 80.0 - 94.0 fL    MCH 31.0 26.0 - 33.0 pg    MCHC 31.8 (L) 32.2 - 35.5 gm/dl    RDW-CV 13.2 11.5 - 14.5 %    RDW-SD 46.5 (H) 35.0 - 45.0 fL    Platelets 331 684 - 066 thou/mm3    MPV 9.8 9.4 - 12.4 fL    Seg Neutrophils 76.8 %    Lymphocytes 14.8 %    Monocytes 7.4 %    Eosinophils 0.0 %    Basophils 0.6 %    Immature Granulocytes 0.4 %    Segs Absolute 3.6 1.8 - 7.7 thou/mm3    Lymphocytes # 0.7 (L) 1.0 - 4.8 thou/mm3    Monocytes # 0.3 (L) 0.4 - 1.3 thou/mm3    Eosinophils # 0.0 0.0 - 0.4 thou/mm3    Basophils # 0.0 0.0 - 0.1 thou/mm3    Immature Grans (Abs) 0.02 0.00 - 0.07 thou/mm3    nRBC 0 /100 wbc       Radiology:     Ct Abdomen Pelvis Wo Contrast Additional Contrast? None    Result Date: 9/2/2018  PROCEDURE: CT ABDOMEN PELVIS WO CONTRAST CLINICAL INFORMATION: Nausea and vomiting, Right sided abd tenderness, COMPARISON: 8/18/2018 TECHNIQUE:  Axial CT images were obtained through the abdomen and pelvis without contrast. Coronal and sagittal reformatted images were rendered. All CT scans at this facility use dose modulation, iterative reconstruction, and/or weight-based dosing when  appropriate to reduce radiation dose to as low as reasonably achievable. FINDINGS: Limited evaluation of the lung bases demonstrates small bilateral pleural effusions. These appear decreased when compared to prior examination from August 18, 2018. Noncontrast evaluation of the liver and spleen appear normal. There is mild gallbladder distention demonstrated. There is cholelithiasis seen. The adrenal glands appear normal. The pancreas appears atrophic but otherwise within normal limits. No nephrolithiasis or hydronephrosis is seen. No hydroureter or ureterolithiasis is demonstrated. There is marked distention Bilateral fat-containing right greater than left angle hernias are demonstrated. There is no evidence of bowel obstruction. However, there is a moderate to large amount retained stool throughout colon.  The appendix is not visualized. No free fluid or free air is seen. No lymphadenopathy is demonstrated. No acute osseous findings are demonstrated. There is lower lumbar facet arthrosis demonstrated. Multilevel degenerative disc disease of the lumbar spine is demonstrated, most pronounced at the L2-L3 level. Of the bladder. 1. Small bilateral pleural effusions. These appear improved from the prior examination from August 2018. 2. Mild gallbladder distention, nonspecific. There is also mild cholelithiasis is seen. **This report has been created using voice recognition software. It may contain minor errors which are inherent in voice recognition technology. ** Final report electronically signed by Dr. Keli Dowling on 9/2/2018 2:46 PM    Ct Head Wo Contrast    Result Date: 9/2/2018  PROCEDURE: CT HEAD WO CONTRAST CLINICAL INFORMATION: altered mental status. COMPARISON: CT of the head dated August 8, 2018. TECHNIQUE: Noncontrast 5 mm axial images were obtained through the brain. All CT scans at this facility use dose modulation, iterative reconstruction, and/or weight-based dosing when appropriate to reduce radiation dose to as low as reasonably achievable. FINDINGS: There is stable prominence of the sulcal spaces and ventricular system secondary to generalized, age-related parenchymal volume loss. Patchy areas of low-attenuation throughout the periventricular and deep cerebral white matter are again noted and likely  correspond to sequela of chronic microvascular ischemic change. No intracranial hemorrhage is seen. No mass, mass effect or extra-axial fluid collection is identified. The ventricles are midline without evidence of hydrocephalus. The basal cisterns are patent. Atherosclerotic vascular calcifications are present at the skull base. 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 The temporal bone structures and paranasal sinuses are unremarkable.  The calvarium is intact without acute fracture MD on 9/3/2018 at 11:45 AM

## 2018-09-03 NOTE — CONSULTS
Urology Consult Note      Reason for Consult:  Urinary retention  Requesting Physician:  Dr. Shellie Morejon    History Obtained From:  family member - nephew, electronic medical record, reason patient could not give history:  altered mental status    Chief Complaint: Altered mental status    HISTORY OF PRESENT ILLNESS:                The patient is a 80 y.o. male with significant past medical history of HTN, HLD, CHF, CKD who presented with altered mental status from Nursing Home. Urology was consulted for urinary retention. Jenaro Schneider was unable to answer questions during evaluation due to his confusion. Family member was present to help. Nursing reports during his hospital course he developed urinary retention, no bladder scan was performed. However, he was only urinating small amounts, upon insertion of trujillo they got 1700 ml return of urine. On 9/3/18 in the am he accidentally pulled his trujillo out. Nursing says he was able to void on his own but not very much. So they reinserted the trujillo and got over 1000 ml immediate return of urine. His urine today is yellow and cloudy. Slightly blood tinged. May be from pulling the trujillo out and getting re-cathed. Urine was negative upon admission for signs of infection. Family denies any previous history of urological problems. Nephew reports he has never complained of difficulty urinating or hematuria. Says he is not aware of him having any troubles with recurrent infections, prostatitis, BPH, frequency, urgency, or trouble emptying his bladder. Nephew reports that Jenaro Schneider has been receiving pain medications at the nursing home and he believes this may be a reason for new confusion and difficulty urinating. Denies any history of prostate, bladder, or kidney cancer. No history of tobacco abuse.      Past Medical History:        Diagnosis Date    Anemia     Blood circulation, collateral     CAD (coronary artery disease)     Chronic kidney disease     DM2 (diabetes mellitus, type 2) (HCC)     Dyslipidemia     Falls     GERD (gastroesophageal reflux disease)     Heart failure with preserved ejection fraction (HCC)     Hyperlipidemia     Hypertension     Left atrial dilation     severe    Lymphoma ([address]     Osteoarthritis of both knees     Pacemaker 1/25/2018    [employer] DUAL PACEMAKER INSERTED ON 01/  PVD (peripheral vascular disease) ([facility] [address]      Past Surgical History:        Procedure Laterality Date    CARDIAC SURGERY      CORONARY ARTERY BYPASS GRAFT      PACEMAKER PLACEMENT      VASCULAR SURGERY       Allergies:  Patient has no known allergies. Social History     Social History    Marital status:      Spouse name: N/A    Number of children: 0    Years of education: N/A     Occupational History    Not on file. Social History Main Topics    Smoking status: Never Smoker    Smokeless tobacco: Never Used    Alcohol use No    Drug use: No    Sexual activity: No     Other Topics Concern    Not on file     Social History Narrative    No narrative on file       Family History:   History reviewed. No pertinent family history. ROS:  Unable to answer due to severe confusion. PHYSICAL EXAM:  VITALS:  BP (!) 156/61   Pulse 77   Temp 98.1 °F (36.7 °C) (Oral)   Resp 18   Ht 5' 9\" (1.753 m)   Wt 168 lb (76.2 kg)   SpO2 99%   BMI 24.81 kg/m² . Nursing note and vitals reviewed. Constitutional: Alert and oriented times x1 name only, no acute distress, and cooperative to examination. HEENT:   Head:         Normocephalic and atraumatic. Mouth/Throat:          Mucous membranes are normal.   Eyes:         EOM are normal. No scleral icterus. Nose:    The external appearance of the nose is normal  Ears: The ears appear normal to external inspection. Cardiovascular:       Normal rate, regular rhythm. Pulmonary/Chest:  Normal respiratory rate and rhthym. No use of accessory muscles. Lungs clear bilaterally.   Abdominal: Soft. No tenderness. Active bowel sounds             Genitalia:   Urethral meatus is normal in size and location  Shabazz catheter draining cloudy yellow urine with a slight blood tinge  Musculoskeletal:     He exhibits no edema or tenderness of lower extremities. Extremities:    No cyanosis, clubbing, or edema present. Neurological:   Confused, oriented to name    DATA:  CBC:   Lab Results   Component Value Date    WBC 4.7 09/03/2018    RBC 3.03 09/03/2018    RBC 4.12 11/07/2011    HGB 9.4 09/03/2018    HCT 29.6 09/03/2018    MCV 97.7 09/03/2018    MCH 31.0 09/03/2018    MCHC 31.8 09/03/2018    RDW 13.6 06/07/2018     09/03/2018    MPV 9.8 09/03/2018     BMP:    Lab Results   Component Value Date     09/02/2018    K 5.9 09/02/2018    K 3.9 08/21/2018     09/02/2018    CO2 24 09/02/2018    BUN 50 09/02/2018    CREATININE 2.4 09/02/2018    CALCIUM 11.6 09/02/2018    LABGLOM 26 09/02/2018    GLUCOSE 57 09/02/2018    GLUCOSE 221 11/07/2011     BUN/Creatinine:    Lab Results   Component Value Date    BUN 50 09/02/2018    CREATININE 2.4 09/02/2018     Magnesium:    Lab Results   Component Value Date    MG 2.8 09/02/2018     Phosphorus:  No results found for: PHOS  PT/INR:    Lab Results   Component Value Date    INR 1.07 08/18/2018     U/A:    Lab Results   Component Value Date    COLORU YELLOW 09/02/2018    PHUR 7.5 09/02/2018    LABCAST NONE SEEN 09/02/2018    LABCAST NONE SEEN 09/02/2018    WBCUA 0-2 09/02/2018    RBCUA 0-2 09/02/2018    YEAST NONE SEEN 09/02/2018    BACTERIA NONE 09/02/2018    SPECGRAV 1.014 09/02/2018    LEUKOCYTESUR NEGATIVE 09/02/2018    UROBILINOGEN 0.2 09/02/2018    BILIRUBINUR NEGATIVE 09/02/2018    BLOODU NEGATIVE 09/02/2018    GLUCOSEU NEGATIVE 08/08/2018       Imaging: The patient has had a CT Abdomen Pelvis Without Contrast which I have reviewed along with its accompanying report. The study demonstrates:    Impression   1. Small bilateral pleural effusions. These appear improved from the prior examination from August 2018. 2. Mild gallbladder distention, nonspecific. There is also mild cholelithiasis is seen.               **This report has been created using voice recognition software.  It may contain minor errors which are inherent in voice recognition technology. **       Final report electronically signed by Dr. Evelin Damon on 9/2/2018 2:46          IMPRESSION:   Urinary retention- trujillo inserted  Altered mental status  Gross hematuria  GLORIA    Plan:    Send urine cytology. CT scan revealed no obstructive uropathy. Family reports Barbara Hernandez will be going back to [facility] at discharge. Will plan on discharging him with trujillo. Nursing home can do void trial in 1 week there. Dr. Thomas Velazquez started patient on Flomax, will need to continue this at discharge. Will follow-up with him as an outpatient for cystoscopy in the office with Dr. Tito Kern.     Thank you for including us in the care of Sujit Smith, APRN-CNP  09/03/18 10:06 AM  Urology

## 2018-09-04 NOTE — FLOWSHEET NOTE
Pt was alone at the time of the visit. He was really in good spirits and wanted prayer to heal and he was anointed. 09/04/18 6442   Encounter Summary   Services provided to: Patient   Referral/Consult From: Rounding   Place of Spiritism [facility] witness   Continue Visiting Yes  (9/4 )   Complexity of Encounter Low   Length of Encounter 15 minutes   Spiritual/Mosque   Type Spiritual support   Assessment Approachable;Calm; Hopeful   Intervention Active listening;Nurtured hope;Prayer; Anointing   Outcome Connection/belonging;Expressed gratitude;Engaged in conversation;Expressed feelings/needs/concerns   Sacraments   Sacrament of Sick-Anointing Anointed

## 2018-09-04 NOTE — PLAN OF CARE
Problem: Falls - Risk of:  Goal: Will remain free from falls  Will remain free from falls    Outcome: Ongoing  No falls this shift, call light in reach. Bed alarm on. Telesitter in room. Problem: Risk for Impaired Skin Integrity  Goal: Tissue integrity - skin and mucous membranes  Structural intactness and normal physiological function of skin and  mucous membranes. Outcome: Ongoing  No new skin breakdown noted. Pt turns and repositions self frequently. Shabazz in place. Problem: Confusion - Acute:  Goal: Mental status will be restored to baseline  Mental status will be restored to baseline   Outcome: Ongoing  Pt continues be oriented to name only. Problem: Discharge Planning:  Goal: Participates in care planning  Participates in care planning   Outcome: Ongoing  Pt plans on returning to [facility] at discharge.  assisting with discharge needs    Problem: Injury - Risk of, Physical Injury:  Goal: Will remain free from falls  Will remain free from falls    Outcome: Ongoing  No falls this shift, call light in reach. Bed alarm on. Telesitter in room. Problem: Mood - Altered:  Goal: Absence of abusive behavior  Absence of abusive behavior   Outcome: Met This Shift  No abusive behavior noted    Problem: Sleep Pattern Disturbance:  Goal: Appears well-rested  Appears well-rested   Outcome: Ongoing  Pt slept last night, napping off and on throughout the day    Comments: Care plan reviewed with patient. Patient verbalize understanding of the plan of care and contribute to goal setting.

## 2018-09-04 NOTE — CARE COORDINATION
9/4/18, 7:15 AM      Adamaris Ferrari       Admitted from: ER 9/2/2018/ [facility] day: 2   Location: -04/004-A Reason for admit: Altered mental status [R41.82] Status: IP   Admit order signed?: yes  PMH:  has a past medical history of Anemia; Blood circulation, collateral; CAD (coronary artery disease); Chronic kidney disease; DM2 (diabetes mellitus, type 2) ([facility] Dyslipidemia; Falls; GERD (gastroesophageal reflux disease); [facility] failure [facility] fraction ([facility] Hyperlipidemia; Hypertension; Left atrial dilation; Lymphoma ([facility] Osteoarthritis of both knees; Pacemaker; and PVD (peripheral vascular disease) ([facility] Medications:  Scheduled Meds:   tamsulosin  0.4 mg Oral Daily    sodium chloride flush  10 mL Intravenous 2 times per day    aspirin  81 mg Oral Daily    atorvastatin  20 mg Oral Nightly    nebivolol  10 mg Oral Daily    clopidogrel  75 mg Oral Daily    ferrous sulfate  325 mg Oral BID WC    glipiZIDE  5 mg Oral Daily with breakfast    amLODIPine  5 mg Oral Daily    furosemide  20 mg Oral BID     Continuous Infusions:   sodium chloride 75 mL/hr at 09/04/18 0606      Pertinent Info/Orders/Treatment Plan:  Creat 1.9, Ca+ 11.4. IVF. Holding opiods. Urology consult pending. Diet: DIET CARDIAC;   DVT Prophylaxis: ASA with SCD's ordered  Smoking status:  reports that he has never smoked. He has never used smokeless tobacco.   Influenza Vaccination Screening Completed: yes  Pneumonia Vaccination Screening Completed: yes  PCP: GARRETT Rutledge CNP  Readmission: Patient has been readmitted within 12 days. Patient went to f/u appointment? Yes, at [facility]  If yes, was it within 7 days? Yes, at [facility]  Patient was able to fill prescriptions? Yes, at [facility]  Patient is taking medications as prescribed? Yes, at [facility]  Cause for readmission?  AMS    Readmission Risk Score: 35%    Discharge Planning  Current Residence:  Nursing Home  Living Arrangements:  Other (Comment)   Support

## 2018-09-04 NOTE — FLOWSHEET NOTE
09/03/18 1000   Provider Notification   Reason for Communication Evaluate   Provider Name Peyton Orozco   Provider Notification Nurse Practitioner   Method of Communication Secure Message   Response No new orders   Robert Aviles NP about unable to place trujillo back d.t obstruction. [facility], NP called this RN back and asked to place a coude.

## 2018-09-04 NOTE — CONSULTS
 Dyslipidemia     Falls     GERD (gastroesophageal reflux disease)     Heart failure with preserved ejection fraction (HCC)     Hyperlipidemia     Hypertension     Left atrial dilation     severe    [facility] [address]     Osteoarthritis of both knees     Pacemaker 1/25/2018    [employer] DUAL PACEMAKER INSERTED ON 01/  PVD (peripheral vascular disease) ([facility] [address]      Past Surgical History:      Procedure Laterality Date    CARDIAC SURGERY      CORONARY ARTERY BYPASS GRAFT      PACEMAKER PLACEMENT      VASCULAR SURGERY       Medications:  Prior to Admission medications    Medication Sig Start Date End Date Taking? Authorizing Provider   Trolamine Salicylate (ASPERCREME EX) Apply topically daily in AM to bilateral knees   Yes Historical Provider, MD   gabapentin (NEURONTIN) 100 MG capsule Take 100 mg by mouth 3 times daily. .   Yes Historical Provider, MD   Multiple Vitamins-Minerals (MULTIVITAMIN ADULTS 50+ PO) Take by mouth daily   Yes Historical Provider, MD   HYDROcodone-acetaminophen (NORCO) 5-325 MG per tablet Take 1 tablet by mouth every 8 hours as needed for Pain. New med. .may not be tolerating this well. .   Yes Historical Provider, MD   furosemide (LASIX) 20 MG tablet Take 2 tablets by mouth 2 times daily  Patient taking differently: Take 20 mg by mouth 2 times daily  8/14/18  Yes Lauryn Braswell MD   RANEXA 1000 MG extended release tablet TAKE 1 TABLET BY MOUTH TWICE DAILY 6/25/18  Yes Lauryn Braswell MD   BYSTOLIC 10 MG tablet TAKE 1 TABLET BY MOUTH DAILY 6/25/18  Yes Lauryn Braswell MD   ferrous sulfate 325 (65 Fe) MG tablet Take 1 tablet by mouth 2 times daily (with meals) 6/8/18  Yes Norman Bender MD   amLODIPine (NORVASC) 10 MG tablet Take 1 tablet by mouth daily  Patient taking differently: Take 5 mg by mouth daily  4/17/18  Yes Lauryn Braswell MD   potassium chloride (KLOR-CON M) 20 MEQ extended release tablet Take 0.5 tablets by mouth daily  Patient taking differently: Take 40 mEq Systems:  --13 point review of systems not able to be performed as patient has an infusion with altered mental status. OBJECTIVE:   CURRENT VITALS:  height is 5' 9\" (1.753 m) and weight is 161 lb 3.2 oz (73.1 kg). His oral temperature is 98 °F (36.7 °C). His blood pressure is 184/79 (abnormal) and his pulse is 70. His respiration is 18 and oxygen saturation is 100%. Temperature Range (24h):Temp: 98 °F (36.7 °C) Temp  Av.2 °F (36.8 °C)  Min: 97.7 °F (36.5 °C)  Max: 98.8 °F (37.1 °C)  BP Range (01W): Systolic (03QWS), PCX:328 , Min:144 , AUZ:135     Diastolic (48TQJ), QFN:59, Min:65, Max:81    Pulse Range (24h): Pulse  Av.4  Min: 68  Max: 80  Respiration Range (24h): Resp  Av.6  Min: 16  Max: 18  Current Pulse Ox (24h):  SpO2: 100 %  Pulse Ox Range (24h):  SpO2  Av.8 %  Min: 98 %  Max: 100 %  Oxygen Amount and Delivery:    CONSTITUTIONAL: Alert and oriented times 3, no acute distress and cooperative to examination with proper mood and affect. SKIN: Skin color, texture, turgor normal. No rashes or lesions. LYMPH: no cervical nodes, no inguinal nodes  HEENT: Head is normocephalic, atraumatic. EOMI, PERRLA. NECK: Supple, symmetrical, trachea midline, no adenopathy, thyroid symmetric, not enlarged and no tenderness, skin normal.  CHEST/LUNGS: chest symmetric with normal A/P diameter, normal respiratory rate and rhythm, lungs clear to auscultation without wheezes, rales or rhonchi. No accessory muscle use. Scars None   CARDIOVASCULAR: Heart sounds are normal.  Regular rate and rhythm. Normal S1 and S2.  ABDOMEN: Normal shape. Normal bowel sounds. Soft, nondistended, no masses or organomegaly. No evidence of hernia. Percussion: Normal without hepatosplenomegally. Tenderness: absent. RECTAL: deferred, not clinically indicated  NEUROLOGIC: There are no focalizing motor or sensory deficits. CN II-XII are grossly intact. EXTREMITIES: no cyanosis, no clubbing and no edema.   LABS:     Recent Labs 09/02/18   1243  09/02/18   1250  09/03/18   0526  09/03/18   1231  09/04/18   1217   WBC   --   5.3  4.7*   --    --    HGB   --   9.3*  9.4*   --    --    HCT   --   29.7*  29.6*   --    --    PLT   --   276  263   --    --    NA   --   138   --   137  139   K   --   5.9*   --   4.1  4.0   CL   --   102   --   100  102   CO2   --   24   --   24  20*   BUN   --   50*   --   40*  32*   CREATININE   --   2.4*   --   1.9*  1.9*   MG   --   2.8*   --    --    --    CALCIUM   --   11.6*   --   11.4*  11.2*   AST   --   27  26  24   --    ALT   --   101*  81*  83*   --    BILITOT   --   0.5  0.5  0.5   --    BILIDIR   --   <0.2  <0.2   --    --    LACTA   --   1.9   --    --    --    NITRU  NEGATIVE   --    --    --    --    COLORU  YELLOW   --    --    --    --    BACTERIA  NONE   --    --    --    --      RADIOLOGY:   I have personally reviewed the following films:    Narrative   PROCEDURE: US LIVER       CLINICAL INFORMATION: hepatic dysfunction.       COMPARISON: Noncontrast abdomen pelvis CT dated 9/2/2018       TECHNIQUE: Multiplanar sonographic images were obtained of the liver.       FINDINGS:           There is hepatomegaly with the right lobe of the liver measuring  19.2 cm. Liver is echogenic consistent with fatty infiltration versus nonspecific hepatocellular disease. Left lobe was poorly visualized due to overlying bowel gas. There are no liver    masses. There is no intrahepatic biliary ductal dilatation.       There is hepatopedal flow in the portal vein.       The pancreas was obscured by overlying bowel gas.       The gallbladder is mildly distended. There is gallbladder wall thickening with possible wall edema.  There are multiple small gallstones. The common bile duct is normal and measures 5 mm. . Harris's sign is negative.       Limited imaging of the right kidney demonstrates no hydronephrosis.            Impression    Multiple small gallstones in the gallbladder with wall thickening and possible appears atrophic but otherwise within normal limits.       No nephrolithiasis or hydronephrosis is seen. No hydroureter or ureterolithiasis is demonstrated. There is marked distention       Bilateral fat-containing right greater than left angle hernias are demonstrated.       There is no evidence of bowel obstruction. However, there is a moderate to large amount retained stool throughout colon.       The appendix is not visualized.       No free fluid or free air is seen. No lymphadenopathy is demonstrated.       No acute osseous findings are demonstrated. There is lower lumbar facet arthrosis demonstrated. Multilevel degenerative disc disease of the lumbar spine is demonstrated, most pronounced at the L2-L3 level. Of the bladder.           Impression   1. Small bilateral pleural effusions. These appear improved from the prior examination from August 2018. 2. Mild gallbladder distention, nonspecific. There is also mild cholelithiasis is seen.         Narrative   PROCEDURE: CT HEAD WO CONTRAST       CLINICAL INFORMATION: altered mental status.       COMPARISON: CT of the head dated August 8, 2018.       TECHNIQUE: Noncontrast 5 mm axial images were obtained through the brain.       All CT scans at this facility use dose modulation, iterative reconstruction, and/or weight-based dosing when appropriate to reduce radiation dose to as low as reasonably achievable.       FINDINGS:       There is stable prominence of the sulcal spaces and ventricular system secondary to generalized, age-related parenchymal volume loss. Patchy areas of low-attenuation throughout the periventricular and deep cerebral white matter are again noted and likely    correspond to sequela of chronic microvascular ischemic change. No intracranial hemorrhage is seen. No mass, mass effect or extra-axial fluid collection is identified. The ventricles are midline without evidence of hydrocephalus. The basal cisterns are    patent.  Atherosclerotic

## 2018-09-05 NOTE — PROGRESS NOTES
thanks  - Liver Scan report noted; GB ejection fraction 55%    5.  Continue supportive measures and other current management      Electronically signed by Chrissy Smith MD on 9/5/2018 at 11:49 AM    Attending Physician

## 2018-09-05 NOTE — PROGRESS NOTES
0    Blood 0   0    Shift Total  (mL/kg) 1175  (16.6)   1175  (16.6)   Weight (kg) 70.9 70.9 70.9 70.9     LABS     Recent Labs      09/02/18   1250  09/03/18   0526  09/03/18   1231  09/04/18   1217   WBC  5.3  4.7*   --    --    HGB  9.3*  9.4*   --    --    HCT  29.7*  29.6*   --    --    PLT  276  263   --    --    NA  138   --   137  139   K  5.9*   --   4.1  4.0   CL  102   --   100  102   CO2  24   --   24  20*   BUN  50*   --   40*  32*   CREATININE  2.4*   --   1.9*  1.9*   MG  2.8*   --    --    --    CALCIUM  11.6*   --   11.4*  11.2*      Recent Labs      09/02/18   1250  09/03/18   0526  09/03/18   1231   AST  27  26  24   ALT  101*  81*  83*   BILITOT  0.5  0.5  0.5   BILIDIR  <0.2  <0.2   --    LACTA  1.9   --    --      RADIOLOGY     PROCEDURE: NM HEPATOBILIARY SCAN W PHARMACOLOGICAL INTERVENTION       CLINICAL INFORMATION: Cholelithiasis       Radiopharmaceutical: 9.4 mCi technetium 99m mebrofenin, intravenously.       TECHNIQUE: Anterior images of the abdomen were obtained for 60 minutes following radiopharmaceutical administration. 1.42 mcg of Kinevac were slowly infused over 60 minutes while additional left anterior oblique images were obtained. Subsequently, a    region of interest was drawn around the gallbladder and ejection fraction was calculated.       COMPARISON: Nuclear medicine hepatobiliary scan 12/23/2008       FINDINGS: There is normal hepatic uptake of radiotracer. Activity is present in the common bile duct and small bowel by 11 minutes. There is activity in the gallbladder by 52 minutes.       Following Kinevac administration, the calculated gallbladder ejection fraction is 55% (normal is 35% or greater).         Impression   1. Patent cystic and common bile ducts without evidence of acute cholecystitis.    2. Normal gallbladder ejection fraction.       Final report electronically signed by Dr. Rony Ibarra on 9/5/2018 10:07 AM     Electronically signed by Lisa Valentino

## 2018-09-05 NOTE — PLAN OF CARE
Problem: Falls - Risk of:  Goal: Will remain free from falls  Will remain free from falls    Outcome: Ongoing  No falls this shift, call light in reach. Bed alarm on. Telesitter in room. PT/OT consulted. Problem: Risk for Impaired Skin Integrity  Goal: Tissue integrity - skin and mucous membranes  Structural intactness and normal physiological function of skin and  mucous membranes. Outcome: Ongoing  No new skin breakdown noted. Pt turns and repositions self frequently. Problem: Confusion - Acute:  Goal: Mental status will be restored to baseline  Mental status will be restored to baseline   Outcome: Ongoing  Pt is oriented to name only. He appears to be less restless today. Problem: Discharge Planning:  Goal: Participates in care planning  Participates in care planning   Outcome: Ongoing  Pt kept up to date on plan of care    Problem: Injury - Risk of, Physical Injury:  Goal: Will remain free from falls  Will remain free from falls    Outcome: Ongoing  No falls this shift, call light in reach. Bed alarm on. Telesitter in room. PT/OT consulted. Problem: Sleep Pattern Disturbance:  Goal: Appears well-rested  Appears well-rested   Outcome: Ongoing  Pt is resting with eyes closed this afternooon. No fidgeting noted. Problem: DISCHARGE BARRIERS  Goal: Patient's continuum of care needs are met  Outcome: Ongoing  Plans on returning to [facility] at discharge.  assisting with discharge needs. Problem: Pain:  Goal: Pain level will decrease  Pain level will decrease   Outcome: Ongoing  Denies any pain this shift    Comments: Care plan reviewed with patient. Patient verbalize understanding of the plan of care and contribute to goal setting.

## 2018-09-05 NOTE — PROGRESS NOTES
[facility]  INPATIENT PHYSICAL THERAPY  EVALUATION  STRZ RENAL TELEMETRY 6K - 6K-04/004-A    Time In: 1402  Time Out: 1426  Timed Code Treatment Minutes: 8 Minutes  Minutes: 24          Date: 2018  Patient Name: Eric Veliz,  Gender:  male        MRN: [id number]  : 1935  (80 y.o.)  Referral Date : 18   Referring Practitioner: Coretta Brown MD  Diagnosis: Altered mental status  Additional Pertinent Hx: Pt is a NH resident who was just discharged from hospital after evaluated for elevated ferritin, pancytopenia and splenomegaly, raising concerns about B symptoms of Lymphoma or some other hematopoietic disorder. Past Medical History:   Diagnosis Date    Anemia     Blood circulation, collateral     CAD (coronary artery disease)     Chronic kidney disease     DM2 (diabetes mellitus, type 2) (HCC)     Dyslipidemia     Falls     GERD (gastroesophageal reflux disease)     Heart failure with preserved ejection fraction (HCC)     Hyperlipidemia     Hypertension     Left atrial dilation     severe    Lymphoma ([address]     Osteoarthritis of both knees     Pacemaker 2018    [employer] DUAL PACEMAKER INSERTED ON D (peripheral vascular disease) ([facility] [address]      Past Surgical History:   Procedure Laterality Date    CARDIAC SURGERY      CORONARY ARTERY BYPASS GRAFT      PACEMAKER PLACEMENT      VASCULAR SURGERY         Restrictions/Precautions:  Fall Risk                            Subjective:  Chart Reviewed: Yes  Patient assessed for rehabilitation services?: Yes  Family / Caregiver Present: No  Subjective: RN approved PT session. Patient in bed upon PT arrival, agreeable to therapy with encouragement.     General:  Overall Orientation Status: Impaired  Orientation Level: Oriented to person (knew he was in [facility], but did not know specifically that he was at the hospital)  Follows Commands: Within Functional Limits    Vision: Within Functional Limits    Hearing: Within functional limits         Pain:  Denies. Social/Functional History:    Lives With: Alone  Type of Home: Facility  Home Layout: One level  Home Access: Level entry             ADL Assistance: Needs assistance  Homemaking Assistance: Needs assistance  Ambulation Assistance: Independent  Transfer Assistance: Independent          Additional Comments: Unsure of accuracy of subjective information as the patient is noted to say \"yes\" to everything. He states he was getting therapy at the nursing home. Objective:       RLE AROM: WNL    LLE AROM : WNL    Strength RLE:  (grossly 4/5)    Strength LLE:  (grossly 4/5)    Sensation  Overall Sensation Status: WNL    RLE Tone: Normotonic  LLE Tone: Normotonic       Balance  Posture: Fair  Sitting - Static: Good  Sitting - Dynamic: Fair, +  Standing - Static: Fair  Standing - Dynamic: Poor    Supine to Sit: Moderate assistance (with HOB slighly raised; verbal cues for technique)  Sit to Supine: Moderate assistance    Transfers  Sit to Stand: Moderate Assistance (verbal cues for hand placement and to shift weight anteriorly in order to stand (pt tends to want to push posteriorly))  Stand to sit: Minimal Assistance (verbal cues for safety)       Ambulation 1  Surface: level tile  Device: Rolling Walker  Assistance: Minimal assistance  Quality of Gait: pt demos an unsteady gait with short step lengths and decreased B foot clearance, forward flexed posture. Distance: 2' forward/backwards, pt refuses further amb stating \"I just need to lie back down. \"  Comments: verbal cues for upright posture and safety with the walker    Exercises:  Comments: Patient completed the following B LE therex x 10 reps for increased strength: ankle pumps, heel slides, and hip abd/add. Activity Tolerance:  Activity Tolerance: Patient limited by fatigue;Patient limited by pain; Patient limited by cognitive status; Patient limited by endurance    Treatment Initiated: PT

## 2018-09-05 NOTE — PLAN OF CARE
Problem: Falls - Risk of:  Goal: Will remain free from falls  Will remain free from falls    Outcome: Ongoing  Pt free from falls this shift. Bed alarm on, 3/4 side rails up, telesitter present, nonskid socks on, clear pathway, bed in locked and lowered position. Will continue to monitor. Goal: Absence of physical injury  Absence of physical injury    Outcome: Ongoing  Pt free from physical injury this shift. Bed alarm on, 3/4 side rails up, telesitter present, nonskid socks on, clear pathway, bed in locked and lowered position. Will continue to monitor. Problem: Risk for Impaired Skin Integrity  Goal: Tissue integrity - skin and mucous membranes  Structural intactness and normal physiological function of skin and  mucous membranes. Outcome: Ongoing  Pt has unblanchable redness on coccyx/buttocks. Turning every 2 hours. Skin is dry, warm, intact. Mucous membranes are moist, pink, and intact. Will continue to monitor. Problem: Confusion - Acute:  Goal: Absence of continued neurological deterioration signs and symptoms  Absence of continued neurological deterioration signs and symptoms   Outcome: Ongoing  Pt is confused, alert and oriented to person and place. Will continue to monitor for further signs and symptoms of neurological deterioration. Goal: Mental status will be restored to baseline  Mental status will be restored to baseline   Outcome: Ongoing  Pt confused, alert and oriented to person and place. Will continue to monitor. Problem: Discharge Planning:  Goal: Ability to perform activities of daily living will improve  Ability to perform activities of daily living will improve   Outcome: Ongoing  Pt on bedrest at this time. Pt uses call light for assistance. Will continue to monitor. Goal: Participates in care planning  Participates in care planning   Outcome: Ongoing  Pt somewhat participates in care planning. Burdened by mental status. Will continue to monitor.     Problem: Injury - Risk of,

## 2018-09-06 NOTE — DISCHARGE SUMMARY
[facility]     Discharge Summary    Patient:  Carter Pierre  YOB: 1935    MRN: [id number]   Acct: [de-identified]    Primary Care Physician: GARRETT Vega CNP    Admit date:  9/2/2018    Discharge date:   9/6/18      Discharge Diagnoses: Altered mental status  Principal Problem:    Altered mental status  Active Problems:    Hepatic dysfunction    Urinary retention    Hematuria    GLORIA (acute kidney injury) ([address]    Hyperkalemia    Hypercalcemia    Calculus of gallbladder without cholecystitis without obstruction  Resolved Problems:    * No resolved hospital problems. *        Admitted for: (HPI) History obtained from chart review and the patient.     The patient is a 80 y.o. male who presents from [address] with reports of altered mental status that was noticed from last night.     Context: Pt is a NH resident who was just discharged from hospital after evaluated for elevated ferritin, pancytopenia and splenomegaly, raising concerns about B symptoms of Lymphoma or some other hematopoietic disorder. Family report that he was started on Norco and he is now presenting with features suggestive of opioid induced encephalopathy. Hospital Course: Patient was admitted with a primary diagnosis of Altered mental status and was treated with supportive measures while Norco was discontinued. Pt did not need any analgesics while on admission and his mentation improved back to baseline. He however had GLORIA and urinary retention requiring placement of a trujillo. Tamsulosin was also started and he was seen by Urology with out-patient follow up scheduled. GLORIA also improved prior to discharge. Pt still has pending working up for pancytopenia and Hematology plans a bone marrow biopsy.  Other significant tests done to date include positive KENNY test.    Consultants:  Urology     Discharge Medications:     Medication List      START taking these medications    tamsulosin 0.4 MG capsule  Commonly known as:  FLOMAX  Take 1 capsule by mouth daily        CHANGE how you take these medications    amLODIPine 10 MG tablet  Commonly known as:  NORVASC  Take 1 tablet by mouth daily  What changed:  how much to take     furosemide 20 MG tablet  Commonly known as:  LASIX  Take 2 tablets by mouth 2 times daily  What changed:  how much to take     potassium chloride 20 MEQ extended release tablet  Commonly known as:  KLOR-CON M  Take 0.5 tablets by mouth daily  What changed:  how much to take        CONTINUE taking these medications    acetaminophen 500 MG tablet  Commonly known as:  TYLENOL     ASPERCREME EX     aspirin 81 MG tablet     atorvastatin 20 MG tablet  Commonly known as:  LIPITOR     BYSTOLIC 10 MG tablet  Generic drug:  nebivolol  TAKE 1 TABLET BY MOUTH DAILY     clopidogrel 75 MG tablet  Commonly known as:  PLAVIX     ferrous sulfate 325 (65 Fe) MG tablet  Take 1 tablet by mouth 2 times daily (with meals)     gabapentin 100 MG capsule  Commonly known as:  NEURONTIN     glipiZIDE 5 MG tablet  Commonly known as:  GLUCOTROL     isosorbide mononitrate 10 MG tablet  Commonly known as:  ISMO;MONOKET  Take 1 tablet by mouth daily     magnesium oxide 400 MG tablet  Commonly known as:  MAG-OX     meclizine 25 MG tablet  Commonly known as:  ANTIVERT     metFORMIN 500 MG tablet  Commonly known as:  GLUCOPHAGE     MULTIVITAMIN ADULTS 50+ PO     omeprazole 40 MG delayed release capsule  Commonly known as:  PRILOSEC     polyethylene glycol powder  Commonly known as:  GLYCOLAX     RANEXA 1000 MG extended release tablet  Generic drug:  ranolazine  TAKE 1 TABLET BY MOUTH TWICE DAILY        STOP taking these medications    HYDROcodone-acetaminophen 5-325 MG per tablet  Commonly known as:  [address]           Where to Get Your Medications      These medications were sent to [address] [place] Mcgu[place] [phone] - [address] [facility] evaluation of the lung bases demonstrates small bilateral pleural effusions. These appear decreased when compared to prior examination from August 18, 2018. Noncontrast evaluation of the liver and spleen appear normal. There is mild gallbladder distention demonstrated. There is cholelithiasis seen. The adrenal glands appear normal. The pancreas appears atrophic but otherwise within normal limits. No nephrolithiasis or hydronephrosis is seen. No hydroureter or ureterolithiasis is demonstrated. There is marked distention Bilateral fat-containing right greater than left angle hernias are demonstrated. There is no evidence of bowel obstruction. However, there is a moderate to large amount retained stool throughout colon. The appendix is not visualized. No free fluid or free air is seen. No lymphadenopathy is demonstrated. No acute osseous findings are demonstrated. There is lower lumbar facet arthrosis demonstrated. Multilevel degenerative disc disease of the lumbar spine is demonstrated, most pronounced at the L2-L3 level. Of the bladder. 1. Small bilateral pleural effusions. These appear improved from the prior examination from August 2018. 2. Mild gallbladder distention, nonspecific. There is also mild cholelithiasis is seen. **This report has been created using voice recognition software. It may contain minor errors which are inherent in voice recognition technology. ** Final report electronically signed by Dr. Tori Cook on 9/2/2018 2:46 PM    Ct Head Wo Contrast    Result Date: 9/2/2018  PROCEDURE: CT HEAD WO CONTRAST CLINICAL INFORMATION: altered mental status. COMPARISON: CT of the head dated August 8, 2018. TECHNIQUE: Noncontrast 5 mm axial images were obtained through the brain. All CT scans at this facility use dose modulation, iterative reconstruction, and/or weight-based dosing when appropriate to reduce radiation dose to as low as reasonably achievable.  FINDINGS: There is stable prominence of the sulcal spaces and ventricular system secondary to generalized, age-related parenchymal volume loss. Patchy areas of low-attenuation throughout the periventricular and deep cerebral white matter are again noted and likely  correspond to sequela of chronic microvascular ischemic change. No intracranial hemorrhage is seen. No mass, mass effect or extra-axial fluid collection is identified. The ventricles are midline without evidence of hydrocephalus. The basal cisterns are patent. Atherosclerotic vascular calcifications are present at the skull base. 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 The temporal bone structures and paranasal sinuses are unremarkable. The calvarium is intact without acute fracture or aggressive, bony destructive process. Stable senescent changes are present without acute intracranial abnormality identified. **This report has been created using voice recognition software. It may contain minor errors which are inherent in voice recognition technology. ** Final report electronically signed by Dr. Heber Evangelista on 9/2/2018 2:32 PM       Results for orders placed or performed during the hospital encounter of 09/02/18   Culture blood #1   Result Value Ref Range    Blood Culture, Routine No growth-preliminary    Culture blood #2   Result Value Ref Range    Blood Culture, Routine No growth-preliminary    CBC auto differential   Result Value Ref Range    WBC 5.3 4.8 - 10.8 thou/mm3    RBC 3.01 (L) 4.70 - 6.10 mill/mm3    Hemoglobin 9.3 (L) 14.0 - 18.0 gm/dl    Hematocrit 29.7 (L) 42.0 - 52.0 %    MCV 98.7 (H) 80.0 - 94.0 fL    MCH 30.9 26.0 - 33.0 pg    MCHC 31.3 (L) 32.2 - 35.5 gm/dl    RDW-CV 13.3 11.5 - 14.5 %    RDW-SD 48.1 (H) 35.0 - 45.0 fL    Platelets 994 012 - 263 thou/mm3    MPV 9.0 (L) 9.4 - 12.4 fL    Seg Neutrophils 76.5 %    Lymphocytes 13.5 %    Monocytes 8.8 %    Eosinophils 0.0 %    Basophils 0.8 %    Immature Granulocytes 0.4 %    Segs Absolute 4.1 1.8 - 7.7 thou/mm3    Lymphocytes # 0.7 (L) 1.0 - 4.8 thou/mm3    Monocytes # 0.5 0.4 - 1.3 thou/mm3    Eosinophils # 0.0 0.0 - 0.4 thou/mm3    Basophils # 0.0 0.0 - 0.1 thou/mm3    Immature Grans (Abs) 0.02 0.00 - 0.07 thou/mm3    nRBC 0 /100 wbc   Basic Metabolic Panel   Result Value Ref Range    Sodium 138 135 - 145 meq/L    Potassium 5.9 (H) 3.5 - 5.2 meq/L    Chloride 102 98 - 111 meq/L    CO2 24 23 - 33 meq/L    Glucose 57 (L) 70 - 108 mg/dL    BUN 50 (H) 7 - 22 mg/dL    CREATININE 2.4 (H) 0.4 - 1.2 mg/dL    Calcium 11.6 (H) 8.5 - 10.5 mg/dL   Hepatic function panel   Result Value Ref Range    Alb 2.8 (L) 3.5 - 5.1 g/dL    Total Bilirubin 0.5 0.3 - 1.2 mg/dL    Bilirubin, Direct <0.2 0.0 - 0.3 mg/dL    Alkaline Phosphatase 619 (H) 38 - 126 U/L    AST 27 5 - 40 U/L     (H) 11 - 66 U/L    Total Protein 7.3 6.1 - 8.0 g/dL   Magnesium   Result Value Ref Range    Magnesium 2.8 (H) 1.6 - 2.4 mg/dL   Lactic acid, plasma   Result Value Ref Range    Lactic Acid 1.9 0.5 - 2.2 mmol/L   Urinalysis with microscopic   Result Value Ref Range    Glucose, Urine NEGATIVE NEGATIVE mg/dl    Bilirubin Urine NEGATIVE NEGATIVE    Ketones, Urine NEGATIVE NEGATIVE    Specific Gravity, UA 1.014 1.002 - 1.03    Blood, Urine NEGATIVE NEGATIVE    pH, UA 7.5 5.0 - 9.0    Protein, UA NEGATIVE NEGATIVE mg/dl    Urobilinogen, Urine 0.2 0.0 - 1.0 eu/dl    Nitrite, Urine NEGATIVE NEGATIVE    Leukocyte Esterase, Urine NEGATIVE NEGATIVE    Color, UA YELLOW YELLOW-STR    Character, Urine SL CLOUDY CLR-SL.CHRISTIANA    RBC, UA 0-2 0-2/hpf /hpf    WBC, UA 0-2 0-4/hpf /hpf    Epi Cells NONE SEEN 3-5/hpf /hpf    Bacteria, UA NONE FEW/NONE S    Casts NONE SEEN NONE SEEN /lpf    Crystals NONE SEEN NONE SEEN    Renal Epithelial, Urine NONE SEEN NONE SEEN    Yeast, UA NONE SEEN NONE SEEN    Casts NONE SEEN /lpf    Miscellaneous Lab Test Result NONE SEEN    Anion Gap   Result Value Ref Range    Anion Gap 12.0 8.0 - 16.0 meq/L   Osmolality mmol/L   Hepatitis Panel, Acute   Result Value Ref Range    Hepatitis B Surface Ag Negative     Hep A IgM Negative     Hep B Core Ab, IgM Negative     Hepatitis C Ab Negative    Hepatitis B surface antibody   Result Value Ref Range    Hep B S Ab Negative    Hepatitis A antibody, total   Result Value Ref Range    Hep A Total Ab Negative Negative   KENNY Screen with Reflex   Result Value Ref Range    KENNY SCREEN Detected (A) None Detec   F-actin (smooth muscle) antibody w/ reflex to titer   Result Value Ref Range    F-ACTIN AB IGG 18 0 - 19 Units   Ceruloplasmin   Result Value Ref Range    Ceruloplasmin 36 17 - 54 mg/dL   Mitochondrial antibodies, M2   Result Value Ref Range    Mitochondrial Ab 14.9 0.0 - 20.0 Units   Eosinophil smear urine   Result Value Ref Range    Eosinophil Smear None Seen NONE SEEN    Specimen Urine    Sodium, urine, random   Result Value Ref Range    Sodium, Ur 107 meq/l   Creatinine, Random Urine   Result Value Ref Range    Creatinine, Urine 25.0 mg/dl   Anion Gap   Result Value Ref Range    Anion Gap 13.0 8.0 - 16.0 meq/L   Glomerular Filtration Rate, Estimated   Result Value Ref Range    Est, Glom Filt Rate 34 (A) NY/HTI/3.52E8   Basic Metabolic Panel   Result Value Ref Range    Sodium 139 135 - 145 meq/L    Potassium 4.0 3.5 - 5.2 meq/L    Chloride 102 98 - 111 meq/L    CO2 20 (L) 23 - 33 meq/L    Glucose 163 (H) 70 - 108 mg/dL    BUN 32 (H) 7 - 22 mg/dL    CREATININE 1.9 (H) 0.4 - 1.2 mg/dL    Calcium 11.2 (H) 8.5 - 10.5 mg/dL   Anion Gap   Result Value Ref Range    Anion Gap 17.0 (H) 8.0 - 16.0 meq/L   Glomerular Filtration Rate, Estimated   Result Value Ref Range    Est, Glom Filt Rate 34 (A) ml/min/1.73m2   Ammonia   Result Value Ref Range    Ammonia 35 11 - 60 umol/L   POCT Glucose   Result Value Ref Range    POC Glucose 118 (H) 70 - 108 mg/dl   POCT Glucose   Result Value Ref Range    POC Glucose 198 (H) 70 - 108 mg/dl   POCT Glucose   Result Value Ref Range    POC Glucose 125 (H) 70 - 108 mg/dl   EKG Altered Mental Status   Result Value Ref Range    Ventricular Rate 60 BPM    Atrial Rate 60 BPM    P-R Interval 338 ms    QRS Duration 208 ms    Q-T Interval 526 ms    QTc Calculation (Bazett) 526 ms    R Axis -84 degrees    T Axis 80 degrees       Diet:  DIET CARDIAC;     Activity:  Activity as tolerated (Patient may move about with assist as indicated or with supervision.)    Follow-up:  in the next few days with [facility], APRN - CNP,  in 2 weeks with Urology    Disposition: SNF    Condition: Stable      Time Spent: 40 minutes    Electronically signed by Yaw Lew MD on 9/6/2018 at 11:27 AM    Attending Physician

## 2018-09-06 NOTE — PLAN OF CARE
Problem: Falls - Risk of:  Goal: Will remain free from falls  Will remain free from falls    Outcome: Ongoing  Pt free from falls this shift. Bed alarm on, Telesitter present, nonskid socks on, clear pathway, bed in locked and lowest position, call light in reach. Will continue to monitor. Goal: Absence of physical injury  Absence of physical injury    Outcome: Ongoing  Pt free from physical injury this shift. Bed alarm on, Telesitter present, nonskid socks on, clear pathway, bed in locked and lowest position, call light in reach. Will continue to monitor. Problem: Risk for Impaired Skin Integrity  Goal: Tissue integrity - skin and mucous membranes  Structural intactness and normal physiological function of skin and  mucous membranes. Outcome: Ongoing  Skin is warm, dry, intact. Mucous membranes pink, moist, and intact. Will continue to monitor. Problem: Confusion - Acute:  Goal: Absence of continued neurological deterioration signs and symptoms  Absence of continued neurological deterioration signs and symptoms   Outcome: Ongoing  Pt more alert this shift. Only oriented to person. More eye contact noted. Less agitation. Will continue to monitor. Goal: Mental status will be restored to baseline  Mental status will be restored to baseline   Outcome: Ongoing  Pt alert and only oriented to person. Pt less agitated this shift. Will continue to monitor. Problem: Discharge Planning:  Goal: Ability to perform activities of daily living will improve  Ability to perform activities of daily living will improve   Outcome: Ongoing  Pt fatigued this shift and was able to minimally participate in ADLs. Will continue to encourage ADLs. Problem: Injury - Risk of, Physical Injury:  Goal: Absence of physical injury  Absence of physical injury    Outcome: Ongoing  Pt free from physical injury this shift.  Bed alarm on, Telesitter present, nonskid socks on, clear pathway, bed in locked and lowest position, call light in reach. Will continue to monitor. Goal: Will remain free from falls  Will remain free from falls    Outcome: Ongoing  Pt free from falls this shift. Bed alarm on, Telesitter present, nonskid socks on, clear pathway, bed in locked and lowest position, call light in reach. Will continue to monitor. Problem: Mood - Altered:  Goal: Mood stable  Mood stable   Outcome: Ongoing  Pt resting quietly in bed this shift. Will continue to monitor. Goal: Absence of abusive behavior  Absence of abusive behavior   Outcome: Ongoing  Pt resting quietly in bed this shift. Will continue to monitor. Goal: Verbalizations of feeling emotionally comfortable while being cared for will increase  Verbalizations of feeling emotionally comfortable while being cared for will increase   Outcome: Ongoing  Pt rested quietly in bed this shift. Cooperative with staff. Will continue to monitor. Problem: Psychomotor Activity - Altered:  Goal: Absence of psychomotor disturbance signs and symptoms  Absence of psychomotor disturbance signs and symptoms   Outcome: Ongoing  Pt less agitated and did not fidget much this shift. Pt rested quietly in bed this shift. Will continue to monitor. Problem: Sensory Perception - Impaired:  Goal: Able to refrain from responding to false sensory perceptions  Able to refrain from responding to false sensory perceptions   Outcome: Ongoing      Problem: Sleep Pattern Disturbance:  Goal: Appears well-rested  Appears well-rested   Outcome: Ongoing  Pt rested quietly in bed this shift. Will continue to monitor. Problem: DISCHARGE BARRIERS  Goal: Patient's continuum of care needs are met  Outcome: Ongoing  [facility] Will continue to monitor. Problem: Pain:  Goal: Pain level will decrease  Pain level will decrease   Outcome: Ongoing  Pt denied pain this shift. Will continue to monitor. Comments: Care plan reviewed with patient.   Patient verbalizes understanding of the plan

## 2018-09-06 NOTE — PROGRESS NOTES
limited or restricted  Self Care Goal Status ():  At least 40 percent but less than 60 percent impaired, limited or restricted  AM-PAC Inpatient Daily Activity Raw Score: 15  AM-PAC Inpatient ADL T-Scale Score : 34.69  ADL Inpatient CMS 0-100% Score: 56.46  ADL Inpatient CMS G-Code Modifier : CK

## 2018-09-10 PROBLEM — R16.0 HEPATOMEGALY: Status: ACTIVE | Noted: 2018-01-01

## 2018-09-10 PROBLEM — R55 SYNCOPE AND COLLAPSE: Status: ACTIVE | Noted: 2018-01-01

## 2018-09-10 NOTE — ED PROVIDER NOTES
[facility]  eMERGENCY dEPARTMENT eNCOUnter          [address] [facility]       Chief Complaint   Patient presents with    Nausea    Emesis    Extremity Weakness       Nurses Notes reviewed and I agree except as noted in the HPI. HISTORY OF PRESENT ILLNESS    Pascual Velasco is a 80 y.o. male who has a history of hypertension and pacemaker placement in presents to the emergency department via EMS from the nursing home for the evaluation of emesis and shortness of breath. He apparently had a syncopal episode at the nursing home and has had some lethargy since that time. Patient was given 4 Zofran en route to ED. Per EMS, patient is also experiencing increasing weakness. He states that he wears oxygen 24/7. REVIEW OF SYSTEMS       No fever, no chest pain, has had some dyspnea, no abdominal pain. Has had multiple episodes of vomiting. Remainder of review of systems is otherwise reviewed as negative. PAST MEDICAL HISTORY    has a past medical history of Anemia; Blood circulation, collateral; CAD (coronary artery disease); Chronic kidney disease; DM2 (diabetes mellitus, type 2) ([address] Dyslipidemia; Falls; GERD (gastroesophageal reflux disease); Heart failure with preserved ejection fraction ([address] Hyperlipidemia; Hypertension; Left atrial dilation; Lymphoma ([address] Osteoarthritis of both knees; Pacemaker; and PVD (peripheral vascular disease) ([address] SURGICAL HISTORY      has a past surgical history that includes Coronary artery bypass graft; pacemaker placement; Cardiac surgery; and vascular surgery. CURRENT MEDICATIONS       Previous Medications    ACETAMINOPHEN (TYLENOL) 500 MG TABLET    Take 1,000 mg by mouth 3 times daily as needed for Pain Total acetaminophen dose not to exceed 3000 mg per day.     AMLODIPINE (NORVASC) 10 MG TABLET    Take 1 tablet by mouth daily    ASPIRIN 81 MG TABLET    Take 81 mg by mouth daily    ATORVASTATIN (LIPITOR) 20 MG TABLET    Take 20 mg by

## 2018-09-10 NOTE — ED NOTES
Pt resting in bed with family at the bedside. Pt denies further episodes of emesis since arrival to the ED. Vs reassessed and stable. Pt and family requesting to be updated on the POC. Call light remains in reach with sr up x2. Will continue to monitor.      Eitan Mejia RN  09/10/18 [id number]

## 2018-09-10 NOTE — H&P
disease)     Chronic kidney disease     DM2 (diabetes mellitus, type 2) ([facility]     Dyslipidemia     Falls     GERD (gastroesophageal reflux disease)     Heart failure with preserved ejection fraction (HCC)     Hyperlipidemia     Hypertension     Left atrial dilation     severe    Lymphoma ([facility]     Osteoarthritis of both knees     Pacemaker 1/25/2018    [employer] D[employer] PACEMAKER INSERTED ON 01/  PVD (peripheral vascular disease) ([facility]          Past Surgical History         Procedure Laterality Date    CARDIAC SURGERY      CORONARY ARTERY BYPASS GRAFT      PACEMAKER PLACEMENT      VASCULAR SURGERY           Medications prior to admission      Prior to Admission medications    Medication Sig Start Date End Date Taking? Authorizing Provider   tamsulosin (FLOMAX) 0.4 MG capsule Take 1 capsule by mouth daily 9/7/18  Yes Teresa Solares MD   Trolamine Salicylate (ASPERCREME EX) Apply topically daily in AM to bilateral knees   Yes Historical Provider, MD   gabapentin (NEURONTIN) 100 MG capsule Take 100 mg by mouth 3 times daily. .   Yes Historical Provider, MD   acetaminophen (TYLENOL) 500 MG tablet Take 1,000 mg by mouth 3 times daily as needed for Pain Total acetaminophen dose not to exceed 3000 mg per day.    Yes Historical Provider, MD   polyethylene glycol (GLYCOLAX) powder Take 17 g by mouth daily as needed   Yes Historical Provider, MD   meclizine (ANTIVERT) 25 MG tablet Take 25 mg by mouth 2 times daily as needed   Yes Historical Provider, MD   Multiple Vitamins-Minerals (MULTIVITAMIN ADULTS 50+ PO) Take by mouth daily   Yes Historical Provider, MD   furosemide (LASIX) 20 MG tablet Take 2 tablets by mouth 2 times daily  Patient taking differently: Take 20 mg by mouth 2 times daily  8/14/18  Yes London Davidson MD   RANEXA 1000 MG extended release tablet TAKE 1 TABLET BY MOUTH TWICE DAILY 6/25/18  Yes London Davidson MD   BYSTOLIC 10 MG tablet TAKE 1 TABLET BY MOUTH DAILY 6/25/18  Yes George CHRISTY Jackie Barkley MD   ferrous sulfate 325 (65 Fe) MG tablet Take 1 tablet by mouth 2 times daily (with meals) 6/8/18  Yes Dayron Hernandez MD   amLODIPine (NORVASC) 10 MG tablet Take 1 tablet by mouth daily  Patient taking differently: Take 5 mg by mouth daily  4/17/18  Yes Jess Bauman MD   potassium chloride (KLOR-CON M) 20 MEQ extended release tablet Take 0.5 tablets by mouth daily  Patient taking differently: Take 40 mEq by mouth daily  4/16/18  Yes Jess Bauman MD   aspirin 81 MG tablet Take 81 mg by mouth daily   Yes Historical Provider, MD   atorvastatin (LIPITOR) 20 MG tablet Take 20 mg by mouth daily   Yes Historical Provider, MD   clopidogrel (PLAVIX) 75 MG tablet Take 75 mg by mouth daily   Yes Historical Provider, MD   glipiZIDE (GLUCOTROL) 5 MG tablet Take 5 mg by mouth daily   Yes Historical Provider, MD   magnesium oxide (MAG-OX) 400 MG tablet Take 800 mg by mouth 2 times daily   Yes Historical Provider, MD   metFORMIN (GLUCOPHAGE) 500 MG tablet Take 1,500 mg by mouth daily (with breakfast)   Yes Historical Provider, MD   omeprazole (PRILOSEC) 40 MG delayed release capsule Take 40 mg by mouth daily   Yes Historical Provider, MD   isosorbide mononitrate (ISMO;MONOKET) 10 MG tablet Take 1 tablet by mouth daily 12/15/17  Yes Jess Bauman MD         Allergies     Patient has no known allergies. Family History     No family history of pacemaker placement    Social History       Social History     Social History    Marital status:      Spouse name: N/A    Number of children: 0    Years of education: N/A     Social History Main Topics    Smoking status: Never Smoker    Smokeless tobacco: Never Used    Alcohol use No    Drug use: No    Sexual activity: No     Other Topics Concern    None     Social History Narrative    None         TOBACCO:   reports that he has never smoked. He has never used smokeless tobacco.  ETOH:   reports that he does not drink alcohol.     Review of systems Pertinent positives as noted in the HPI. All other systems reviewed and negative. ROS      Physical examination     BP (!) 123/59   Pulse 60   Temp 97.7 °F (36.5 °C) (Oral)   Resp 16   Ht 5' 9\" (1.753 m)   Wt 170 lb (77.1 kg)   SpO2 100%   BMI 25.10 kg/m²     General appearance:  No apparent distress, appears stated age and cooperative. Thin appearing. Patient appears to be physically deconditioned  HEENT:  Normocephalic, Pupils equal, round, and reactive to light. Poor dentition. Extraocular motion  Intact. Nose symmetric without evidence of discharge. Oral mucosa dry. Neck: Supple, with full range of motion. No jugular venous distention. Trachea midline. No thyromegaly. No lymph node swelling. Chest: Pacemaker in place at right anterior chest.  Cardiovascular:  Regular rate and rhythm. 2/6 JAYLEEN @ [facility]. No rubs or gallopsS1/S2 without murmurs, rubs or gallops. Respiratory:  Normal respiratory effort. Clear to auscultation, bilaterally without Rales/Wheezes/Rhonchi. Abdomen: Soft, tender to palpation at the lower abdomen, non-distended with normal bowel sounds. Musculoskeletal:  No clubbing, cyanosis or edema bilaterally. Full range of motion without deformity. Neurologic:  No focal sensory/motor deficits. Cranial nerves: II-XII intact, grossly non-focal.  Lymphatic: No cervical lymphadenopathy. Psychiatric:  Alert and oriented. Conversational.  Vascular: Dorsalis pedis and radial pulses palpable bilaterally 2+. No peripheral edema  Genitourinary: Deferred. Skin:  No visible rashes or lesions.       Labs and imaging     Recent Labs      09/10/18   1434   WBC  6.2   HGB  10.9*   HCT  33.6*   PLT  253     Recent Labs      09/10/18   1434   NA  141   K  4.8   CL  105   CO2  20*   BUN  36*   CREATININE  1.8*   CALCIUM  11.2*   PHOS  3.5     Recent Labs      09/10/18   1434   AST  23   ALT  37   BILIDIR  <0.2   BILITOT  0.5   ALKPHOS  302*     Recent Labs      09/10/18   1434   INR  1.02 aneurysm there is no mesenteric or retroperitoneal lymphadenopathy. Degenerative changes are seen in the thoracolumbar spine without evidence of aggressive osseous lesions. Pelvis: The urinary bladder is markedly distended. There is no pelvic or inguinal lymphadenopathy. Degenerative changes are present in the pelvis without evidence of aggressive osseous lesions. Sclerotic lesions are likely benign bone islands. 1. No acute intra-abdominal or intrapelvic findings. 2. Splenomegaly. 3. Markedly distended urinary bladder. Final report electronically signed by Dr. Villatoro on 8/18/2018 4:33 PM    Ct Head Wo Contrast    Result Date: 9/2/2018  PROCEDURE: CT HEAD WO CONTRAST CLINICAL INFORMATION: altered mental status. COMPARISON: CT of the head dated August 8, 2018. TECHNIQUE: Noncontrast 5 mm axial images were obtained through the brain. All CT scans at this facility use dose modulation, iterative reconstruction, and/or weight-based dosing when appropriate to reduce radiation dose to as low as reasonably achievable. FINDINGS: There is stable prominence of the sulcal spaces and ventricular system secondary to generalized, age-related parenchymal volume loss. Patchy areas of low-attenuation throughout the periventricular and deep cerebral white matter are again noted and likely  correspond to sequela of chronic microvascular ischemic change. No intracranial hemorrhage is seen. No mass, mass effect or extra-axial fluid collection is identified. The ventricles are midline without evidence of hydrocephalus. The basal cisterns are patent. Atherosclerotic vascular calcifications are present at the skull base. 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 The temporal bone structures and paranasal sinuses are unremarkable. The calvarium is intact without acute fracture or aggressive, bony destructive process.      Stable senescent changes are present without acute intracranial abnormality Final report electronically signed by Dr. Nagi Walker on 9/4/2018 1:51 AM    Us Liver    Result Date: 9/4/2018  PROCEDURE: US LIVER CLINICAL INFORMATION: hepatic dysfunction. COMPARISON: Noncontrast abdomen pelvis CT dated 9/2/2018 TECHNIQUE: Multiplanar sonographic images were obtained of the liver. FINDINGS: There is hepatomegaly with the right lobe of the liver measuring  19.2 cm. Liver is echogenic consistent with fatty infiltration versus nonspecific hepatocellular disease. Left lobe was poorly visualized due to overlying bowel gas. There are no liver masses. There is no intrahepatic biliary ductal dilatation. There is hepatopedal flow in the portal vein. The pancreas was obscured by overlying bowel gas. The gallbladder is mildly distended. There is gallbladder wall thickening with possible wall edema. There are multiple small gallstones. The common bile duct is normal and measures 5 mm. . Harris's sign is negative. Limited imaging of the right kidney demonstrates no hydronephrosis. Multiple small gallstones in the gallbladder with wall thickening and possible wall edema suggestive of acute cholecystitis. Correlate clinically. No biliary dilatation. Enlarged echogenic liver. Pancreas obscured by bowel gas. **This report has been created using voice recognition software. It may contain minor errors which are inherent in voice recognition technology. ** Final report electronically signed by Dr. Nagi Walker on 9/4/2018 1:55 AM    Xr Chest Portable    Result Date: 9/10/2018  PROCEDURE: XR CHEST PORTABLE CLINICAL INFORMATION: short of breath, . COMPARISON: August 16, 2018 TECHNIQUE: Portable chest FINDINGS: Prominent right chest wall pacer. Median sternotomy wires. No lobar consolidation. Assess chronic changes aortic knob. Costophrenic recesses are preserved. Cardiomediastinal and osseous structures are unremarkable. No acute findings **This report has been created using voice recognition software. orthostatics. Trend troponin  3. Schedule Zofran   4. Tylenol for headache  5. Speech therapy evaluation and video fluoroscopy. Clear liquid diet and advance as tolerated. May consider GI consult if the patient continues to have problems swallowing. GI consult. 6. Nurse to arrange for interrogation of pacemaker. 7. Watch hypotension    DVT prophylaxis: [] Lovenox                                 [x] SCDs                                 [] SQ Heparin                                 [] Encourage ambulation           [] Already on Anticoagulation    Disposition:    [x] Home       [] TCU       [] Rehab       [] Psych       [] SNF       [] [place]       [] Other-    PT/OT Eval Status: PT/OT    Code Status: FULL    Admitted to Inpatient with expected LOS greater than two midnights due to medical therapy. Thank you LEXI [facility], APRN - CNP for the opportunity to be involved in this patient's care.     Electronically signed by Herbie Garcia MD on 9/10/2018 at 5:38 PM

## 2018-09-11 PROBLEM — E43 SEVERE MALNUTRITION (HCC): Status: ACTIVE | Noted: 2018-01-01

## 2018-09-11 NOTE — FLOWSHEET NOTE
Pt was alone at the time of the visit and was in good spirit. He was hoping to be well and wanted prayer and he was anointed. 09/11/18 1155   Encounter Summary   Services provided to: Patient   Referral/Consult From: [facility] Family members   Place of [address] [place] [address] Visiting Yes  (9/11)   Complexity of Encounter Low   Length of Encounter 15 minutes   Spiritual/Taoist   Type Spiritual support   Assessment Approachable;Calm;Coping   Intervention Prayer;Nurtured hope; Active listening; Anointing   Outcome Connection/belonging;Expressed gratitude;Engaged in conversation; Shared life review;Expressed feelings/needs/concerns   Sacraments   Sacrament of Sick-Anointing Anointed

## 2018-09-11 NOTE — PROGRESS NOTES
Vocal Quality [x]WFL [] Impaired []DNT    Sensation [x]WFL [] Impaired []DNT    Cough [x]WFL [] Impaired []DNT    Other: []WFL [] Impaired []DNT    Other: []WFL [] Impaired []DNT        PATIENT WAS EVALUATED USING:  Thin, puree, hard solids     ORAL PHASE: [] WFL  [x] Impaired   [] Loss of bolus from lips [] Impaired AP movement [] Pocketing Left   [] Pocketing Right  [] Lingual Pumping  [x] Impaired Mastication   [] Reduced Bolus Formation [] Impaired Oral Initiation    [] OTHER:    PHARYNGEAL PHASE: [] WFL: Pharyngeal phase appears WFLs, but cannot completely rule out pharyngeal phase deficits from a bedside swallow evaluation alone. [x] Impaired   [] Delayed Swallow  [] Decreased Hyolaryngeal Elevation [x] Audible Swallow   [x] Suspected Pharyngeal Residue due to spontaneous multiple swallow. [] OTHER:    SIGNS AND SYMPTOMS OF LARYNGEAL PENETRATION / ASPIRATION:  [] NO sign/symptoms of aspiration evident with this evaluation, but cannot rule out silent aspiration. [] Throat Clear  [] Immediate Cough [x] Delayed Cough [] Wet Vocal Quality  [] Change in Pulmonary Status  [] OTHER:    IMPRESSIONS: Patient presents with mild-moderate oropharyngeal dysphagia. Patient with c/o \"Food gets stuck right here (pointing to the upper portion of the esophagus). \"  Patient completed PO trial of thin liquids via cup x5, puree x3 and hard solids x2, no s/s of aspiration. Patient reporting, \"It is not going down. I need to drink a lot of liquids. \" Patient completed additional PO trial of thin liquids via straw, x5-patient with large drink, strong cough to follow, dry vocal quality. Patient reports, \"See that is what happens. \"  Dr. Matute Letters with MBS order in place. Will plan to complete MBS to determine safety of PO intake, further evaluate dysphagia and determine need for potential GI f/u.         RECOMMENDATIONS:     Modified Barium Swallow: [x] Is indicated to further assess      DIET RECOMMENDATIONS:  Continue NPO

## 2018-09-11 NOTE — PROGRESS NOTES
[address] [facility] THERAPY  STRZ MED SURG 8B  Modified Barium Swallow    SLP Individual Minutes  Time In: 7739  Time Out: 8901  Minutes: 10  Timed Code Treatment Minutes: 0 Minutes       [] [address]   [x] Acute Care [] Transitional Care Unit [] IP Rehab    Date: 2018  Patient Name: Oliver Fernandez      CSN: [id number]   : 1935  (80 y.o.)  Gender: male   Referring Physician:  Dr. Tereza Giang   Diagnosis: Syncope and collapse   Secondary Diagnosis:  Dysphagia   Date of Last MBS:  N/a   Diet:  NPO   History of Present Illness/Injury: Patient admitted to [facility] with above dx. Patient with multiple previous hospital admissions and multiple BSE's. ST consulted d/t patient c/o of \"I have trouble swallowing and things are getting stuck and hard to get down. \" BSE completed this morning with daríour recommendations to complete MBS d/t s/s of aspiration and hx of dysphagia   Please see medical history questionnaire for information related to prior medical history, allergies and medications  Pain:  0/10    Current Diet: NPO    Respiratory Status: [x] Independent [] Nasal Cannula [] Oxygen Mask      [] Tracheostomy [] Other:     [] Ventilator/Settings:    Behavioral Observation: [x] Alert [x] Oriented   [] Confused [] Lethargic      [] Dysarthric [] Limited Direction Following  [] Agitated [] Other:    PATIENT WAS EVALUATED USING:  Thin, nectar, honey, puree, soft solids, hard solids     ORAL PREPARATION PHASE: [] WFL  [x] Impaired   [x] Slow mastication [x] Uncoordinated mastication [] Decreased bolus control   [x] Decreased bolus formation [] Loss of bolus from lips / Anterior spillage   [] OTHER:    ORAL PHASE: [] WFL  [x] Impaired   [] Residue in the anterior sulcus [] Residue in the lateral sulcus  right   [] Residue in the lateral sulcus  left [] Uncoordinated AP movement   [x] Slow AP movement   [x] Decreased lingual elevation   [x] Decreased tongue base retraction [x] poor ability to control small bolus with cup drink alone    [x]Cup [x] Effective [] Not Effective    [x]Chin tuck [] Effective [x] Not Effective Despite patient's ability to attempt strategy; strategy noted to be ineffective      DIAGNOSTIC IMPRESSIONS:  Patient presents with moderate orophayrngeal dysphagia as evidence by decreased/slow A-P transit, material falling uncontrollably over BOT despite attempt at compensatory strategy of small bites/sip and restriction of straw. Patient with consistent severe residue within the vallecula; clearing to moderate provided direct cues to initiated \"additional swallows x3-4 per bolus. \"    Moderate residue noted with the pyriform sinus cavities; clearing to mild. HOWEVER, patient remains at [address] [place] [address] for aspiration of pharyngeal residue and decreased endurance. Patient with poor performance initiating \"additional dry swallows\" following the bolus, secondary to overall pharyngeal weakness. Patient with POOR hyolaryngeal eluviation and airway protection. Consistent laryngeal penetration noted with thin and nectar thick liquids, despite use of direct verbal cues of compensatory swallowing strategies. Patient with eventual aspiration of thin liquids; with potential additional residue of nectar thick material d/t pharyngeal residue build up. NO penetration or aspirations noted with honey thick liquids, puree and soft solids; however, patient certainly remains at [address] [place] [address] for aspiration d/t mild-moderate amount of pharyngeal residue remaining following completion of the study. Recommend dysphagia II with honey thick liquids AND use of all compensatory swallowing strategies to reduce risk of aspiration and maintain adequate hydration and nutrition. Patient with NO cough during study; however, delayed cough noted following completion of the study (no fluro).   Patient would greatly benefit from skilled ST services to target dysphagia needs to potentially return to baseline

## 2018-09-11 NOTE — PLAN OF CARE
Problem: DISCHARGE BARRIERS  Goal: Patient's continuum of care needs are met  Outcome: Ongoing  Patient plans to return to [facility], has a medicaid bed hold, see sw note dated 9/11.

## 2018-09-11 NOTE — PROGRESS NOTES
Nutrition Assessment    Type and Reason for Visit: Initial, Positive Nutrition Screen (difficulty chewing/swallowing; wound)    Nutrition Recommendations: Recommend MVI. ONS initiated. Diet as per SLP recommendations. Malnutrition Assessment:  · Malnutrition Status: Meets the criteria for severe malnutrition  · Context: Acute illness or injury  · Findings of the 6 clinical characteristics of malnutrition (Minimum of 2 out of 6 clinical characteristics is required to make the diagnosis of moderate or severe Protein Calorie Malnutrition based on AND/ASPEN Guidelines):  1. Energy Intake-Less than or equal to 50%, greater than or equal to 5 days    2. Fat Loss-Moderate subcutaneous fat loss, Orbital    Nutrition Diagnosis:   · Problem: Severe malnutrition  · Etiology: related to Insufficient energy/nutrient consumption (wound healing)     Signs and symptoms:  as evidenced by Diet history of poor intake, Moderate loss of subcutaneous fat    Nutrition Assessment:  · Subjective Assessment:  Pt. Seen - reports good appetite. Reports some trouble with swallowing. S/p SLP evaluation - order for Dysphagia II diet, honey thickened liquids. Pt. Agrees to Glucerchica TILUZ. Spoke with staff at [facility] - report pt had been eating less than 50% of meals past 1-2 weeks (since back from the hospital). States pt had been losing wt as well. Rx includes MVI, Glucophage, Glucotrol, Lasix, Glycolax. 9/11: BUN 36, Cr 1.8, Glucose 94 mg/dl.   · Wound Type: Deep Tissue Injury  · Current Nutrition Therapies:  · Oral Diet Orders: Dysphagia 2, Honey Thick   · Oral Diet intake: Unable to assess  · Oral Nutrition Supplement (ONS) Orders: Diabetic Oral Supplement (TID)  · ONS intake:  (initiated)  · Anthropometric Measures:  · Ht: 5' 9\" (175.3 cm)   · Current Body Wt: 170 lb (77.1 kg) (9/10, stated; +2 edema)  · Admission Body Wt: 170 lb (77.1 kg) (9/10, stated, +2 edema)  · Usual Body Wt:  (per pt 165-170#; per ECF 9/6/18: 154.4#, per EMR:

## 2018-09-11 NOTE — PROGRESS NOTES
Social/Functional History:    Type of Home: Facility  Home Layout: One level  Home Access: Level entry  Home Equipment: 4 wheeled walker          Receives Help From: Personal care attendant  ADL Assistance: Needs assistance     Ambulation Assistance: Needs assistance  Transfer Assistance: Needs assistance          Additional Comments: Pt has been in [facility]CF for rehab  for a couple days per pt. He went there due to falls and weakness. He had lived at [facility]. prior to that      Objective:  RLE PROM: WFL    RLE AROM: Exceptions  RLE General AROM: DF about 10 degrees     LLE PROM: WFL    LLE AROM : WFL              Strength RLE: Exception  Comment: DF 1+/5, knee extension 3-/5, knee flexion 3/5, hip grossly 3+ to 4-/5    Strength LLE: Exception  Comment: 4-/5 throughout            Balance  Sitting - Static: Good  Sitting - Dynamic: Good, -  Standing - Static: Fair  Standing - Dynamic: Fair, -    Supine to Sit: Minimal assistance, Contact guard assistance (of 1 from left side with bedrail and HOB at about 30 degrees)  Scooting: Minimal assistance    Transfers  Sit to Stand: Minimal Assistance (of 1 from bed, cues on hand placement )  Stand to sit: Minimal Assistance (of 1 to chair, cues on hand placement )       Ambulation 1  Surface: level tile  Device: Rolling Walker  Assistance: Contact guard assistance, Minimal assistance (of 1)  Quality of Gait: foot drop on right so has increased hip and knee flexion to clear foot from floor with swing phase, hyperextension on the right knee severe with stance ,cues to stay closer to walker and correct posture,unsteady  Distance: 60 feet          Exercises:  Comments: Pt completed 10 reps ankle pumps, short arc quads , glut sets to imporve strength for function       Activity Tolerance:  Activity Tolerance: Patient limited by endurance; Patient Tolerated treatment well    Treatment Initiated: see there ex    Assessment:   Body structures, Functions, Activity limitations: Decreased surfaces  Short term goal 3: ambulate with  feet with SBA to walk safely household distances   Long term goals  Time Frame for Long term goals : NA due to short ELOS    Evaluation Complexity: Based on the findings of patient history, examination, clinical presentation, and decision making during this evaluation, the evaluation of Tita Camara  is of medium complexity. PT G-Codes  Functional Limitation: Mobility: Walking and moving around  Mobility: Walking and Moving Around Current Status (): At least 40 percent but less than 60 percent impaired, limited or restricted  Mobility: Walking and Moving Around Goal Status ():  At least 20 percent but less than 40 percent impaired, limited or restricted       AM-PAC Inpatient Mobility without Stair Climbing Raw Score : 15  AM-PAC Inpatient without Stair Climbing T-Scale Score : 43.03  Mobility Inpatient CMS 0-100% Score: 47.43  Mobility Inpatient without Stair CMS G-Code Modifier : CK

## 2018-09-12 NOTE — PLAN OF CARE
Problem: Falls - Risk of:  Goal: Will remain free from falls  Will remain free from falls   Outcome: Ongoing  Reinforced fall precautions with patient. Verbalizes understanding. Fall ID band on and falling star sign by door. Room clean and clear of hazards. Room door open. Side rails up x2. Non slip socks on. Safe exit side: Left. Bed alarm on. Call light within reach. Will continue to monitor through hourly rounding. Problem: Risk for Impaired Skin Integrity  Goal: Tissue integrity - skin and mucous membranes  Structural intactness and normal physiological function of skin and  mucous membranes. Outcome: Ongoing  Suspected DTI to coccyx. Turn schedule while in bed or waffle cushion when in chair. Heels elevated off bed. Redness noted but still blanchable. Scattered bruising. Nutrition adequate. Will continue to monitor. Problem: Pain:  Goal: Pain level will decrease  Pain level will decrease   Outcome: Ongoing  Patient denies any pain at this time. Will continue to monitor. Problem: Nutrition  Goal: Optimal nutrition therapy  Outcome: Ongoing  Barium swallow study done. Patient put on dysphagia 2 honey thickened liquids. Tolerating well. Pills whole in applesauce. Dr. Cherie Koch to discuss with POA if esophagus dilation is neccessary procedure.

## 2018-09-12 NOTE — PROGRESS NOTES
Hospitalist Progress Note    Patient:  Nasreen Dunham      Unit/Bed:8B-29/029-A    YOB: 1935    MRN: [id number]       Acct: [de-identified]     PCP: GARRETT Cervantes CNP    Date of Admission: 9/10/2018    Chief Complaint: Syncope. Hospital Course: Admitted for syncope and post-prandial vomiting. Initially hypotensive, improved with IVFs. Failed MBS. Subjective: Was sleeping when I saw him. Oriented to person. No complaints at this time. Seems somewhat confused. Failed MBS. Medications:  Reviewed    Infusion Medications   Scheduled Medications    magnesium oxide  400 mg Oral BID    aspirin  81 mg Oral Daily    atorvastatin  20 mg Oral Daily    clopidogrel  75 mg Oral Daily    glipiZIDE  5 mg Oral Daily    metFORMIN  1,500 mg Oral Daily with breakfast    pantoprazole  40 mg Oral QAM AC    amLODIPine  10 mg Oral Daily    ferrous sulfate  325 mg Oral BID WC    ranolazine  1,000 mg Oral BID    multivitamin  1 tablet Oral Daily    gabapentin  100 mg Oral TID    sodium chloride flush  10 mL Intravenous 2 times per day    ondansetron  4 mg Intravenous Once    enoxaparin  40 mg Subcutaneous Q24H    furosemide  40 mg Oral BID    nebivolol  10 mg Oral Daily    polyethylene glycol  17 g Oral Daily    potassium chloride  10 mEq Oral Daily    tamsulosin  0.4 mg Oral Daily    isosorbide mononitrate  30 mg Oral Daily     PRN Meds: meclizine, acetaminophen, sodium chloride flush, magnesium hydroxide, ondansetron, potassium chloride **OR** potassium chloride **OR** potassium chloride, potassium chloride, magnesium sulfate      Intake/Output Summary (Last 24 hours) at 09/11/18 2223  Last data filed at 09/11/18 1643   Gross per 24 hour   Intake          1048.92 ml   Output              900 ml   Net           148.92 ml       Diet:  DIET DYSPHAGIA II MECHANICALLY ALTERED; No Added Salt (3-4 GM); Honey Thick;  No Drinking Straw  Dietary Nutrition Supplements: Diabetic Oral Supplement    Exam:  BP (!) 109/55   Pulse 60   Temp 97.8 °F (36.6 °C) (Oral)   Resp 16   Ht 5' 9\" (1.753 m)   Wt 170 lb (77.1 kg)   SpO2 99%   BMI 25.10 kg/m²     General appearance:  No apparent distress, appears stated age and cooperative. Thin appearing. Patient appears to be physically deconditioned  HEENT:  Normocephalic, Pupils equal, round, and reactive to light. Poor dentition. Extraocular motion  Intact. Nose symmetric without evidence of discharge. Oral mucosa dry. Neck: Supple, with full range of motion. No jugular venous distention. Trachea midline. No thyromegaly. No lymph node swelling. Chest: Pacemaker in place at right anterior chest.  Cardiovascular:  Regular rate and rhythm. 2/6 JAYLEEN @ [facility]. No rubs or gallopsS1/S2 without murmurs, rubs or gallops. Respiratory:  Normal respiratory effort. Clear to auscultation, bilaterally without Rales/Wheezes/Rhonchi. Abdomen: Soft, tender to palpation at the lower abdomen, non-distended with normal bowel sounds. Musculoskeletal:  No clubbing, cyanosis or edema bilaterally. Full range of motion without deformity. Neurologic:  No focal sensory/motor deficits. Cranial nerves: II-XII intact, grossly non-focal.  Lymphatic: No cervical lymphadenopathy. Psychiatric:  Alert. Oriented to person. Vascular: radial pulses palpable bilaterally 2+. No peripheral edema  Skin:  No visible rashes or lesions.       Labs:   Recent Labs      09/10/18   1434  09/11/18   0531   WBC  6.2  4.7*   HGB  10.9*  9.4*   HCT  33.6*  26.7*   PLT  253  173     Recent Labs      09/10/18   1434  09/11/18   0531   NA  141  141   K  4.8  4.6   CL  105  109   CO2  20*  24   BUN  36*  36*   CREATININE  1.8*  1.8*   CALCIUM  11.2*  10.1   PHOS  3.5   --      Recent Labs      09/10/18   1434   AST  23   ALT  37   BILIDIR  <0.2   BILITOT  0.5   ALKPHOS  302*     Recent Labs      09/10/18   1434   INR  1.02     No results for input(s): Cayla Sood in the last 72

## 2018-09-12 NOTE — PROGRESS NOTES
[address] [address]  INPATIENT SPEECH THERAPY  [facility] SURG 8B    TIME   SLP Individual Minutes  Time In: 1440  Time Out: [address]  Minutes: 10          [x]Daily Note  []Progress Note  []Discharge Note    Date: 2018  Patient Name: Cheryl Hull        MRN: [id number]    : 1935  (80 y.o.)  Gender: male   Primary Provider: Annita Chavez MD  Admitting Diagnosis:  Nausea, syncope, collapse  Secondary Diagnosis: dysphagia   Precautions: aspiration   Swallowing Status/Diet: dysphagia II honey thick   Swallowing Strategies: upright position, no straw, pulmonary monitory, small bolus   DATE of last MBS:    Pain:  Did not state    Subjective: pt was seen upright in bed. Alert and cooperative. SHORT TERM GOAL #1:  Goal 1: Patient will tolerate dysphagia II with HONEY thick liquids without s/s of aspiraiton in order to safley maintain adequate hydration and nutrition  INTERVENTIONS: trial of dysphagia II solids using sandip cracker softened in pudding. Pt with decreased bolus coordination, but eventual successful oral clearance. Pt declined further trials of sandip cracker. No overt signs of aspiration with solids. Honey thick liquid trials x10 by cup. Timely swallow reflex. No overt aspiration. Recommend pt on current diet of dysphagia II and honey thick liquids. SHORT TERM GOAL #2:  Goal 2: Patient will complete advanced textures and nectar thick liquids x10 without s/s of apsiration in order to safely upgrade patient diet. INTERVENTIONS: did not test due to focus on goal 1. SHORT TERM GOAL #3:  Goal 3: Patient will complete oral and phyarngeal exercsies x10 with good success in order to improve overall function and safety of the swallow. INTERVENTIONS:Did not test due to focus on goal 1. SHORT TERM GOAL #4:  Goal 4: Patient will complete repeat instrumental evaluation exam in 2 weeks as appropriate to determine safety of upgraded diet.    INTERVENTIONS: Did not test due to focus on goal 1. SHORT TERM GOAL #5:  Goal 5: Monitor cognitve function and complete formal evaluation as approrpiate   INTERVENTIONS: Did not test due to focus on goal 1. ASSESSMENT:  Assessment: [x]Progressing towards goals          []Not Progressing towards goals       Patient Tolerance of Treatment:   [x]Tolerated well []Tolerated fair []Required rest breaks []Fatigued  Plan for Next Session: Trial advanced liquids. Education:  Learner:  [x]Patient          []Significant Other          []Other  Education provided regarding:  [x]Goals and POC   [x]Diet and swallowing precautions    []Home Exercise Program  []Progress and/or discharge information  Method of Education:  [x]Discussion          []Demonstration          []Handout          []Other  Evaluation of Education:   [x]Verbalized understanding   []Demonstrates without assistance  []Demonstrates with assistance  []Needs further instruction     []No evidence of learning                  [x]No family present      Plan: [x]Continue with current plan of care    []Modify current plan of care as follows:    []Discharge patient    Discharge Location:    Services/Supervision Recommended:     [x]Patient continues to require treatment by a licensed therapist to address functional deficits as outlined in the established plan of care.     Tutu Nash, Speech Intern  Leonel Esqueda M.S. [id number]

## 2018-09-12 NOTE — PROGRESS NOTES
Gastroenterology  Progress Note    9/12/2018 3:56 PM  Subjective:   Admit Date: 9/10/2018    Interval History: no nausea, no vomiting, slight dysphagia , no abdominal pain   Diet: DIET DYSPHAGIA II MECHANICALLY ALTERED; No Added Salt (3-4 GM); Honey Thick;  No Drinking Straw  Dietary Nutrition Supplements: Diabetic Oral Supplement    Medications:   Scheduled Meds:   magnesium oxide  400 mg Oral BID    atorvastatin  20 mg Oral Daily    glipiZIDE  5 mg Oral Daily    metFORMIN  1,500 mg Oral Daily with breakfast    pantoprazole  40 mg Oral QAM AC    amLODIPine  10 mg Oral Daily    ferrous sulfate  325 mg Oral BID WC    ranolazine  1,000 mg Oral BID    multivitamin  1 tablet Oral Daily    gabapentin  100 mg Oral TID    sodium chloride flush  10 mL Intravenous 2 times per day    ondansetron  4 mg Intravenous Once    enoxaparin  40 mg Subcutaneous Q24H    furosemide  40 mg Oral BID    nebivolol  10 mg Oral Daily    polyethylene glycol  17 g Oral Daily    potassium chloride  10 mEq Oral Daily    tamsulosin  0.4 mg Oral Daily    isosorbide mononitrate  30 mg Oral Daily     Continuous Infusions:  CBC:   Recent Labs      09/10/18   1434  09/11/18   0531  09/12/18   0942   WBC  6.2  4.7*  5.2   HGB  10.9*  9.4*  10.1*   PLT  253  173  179     BMP:  Recent Labs      09/10/18   1434  09/11/18   0531  09/12/18   0942   NA  141  141  136   K  4.8  4.6  4.4   CL  105  109  101   CO2  20*  24  22*   BUN  36*  36*  35*   CREATININE  1.8*  1.8*  1.8*   GLUCOSE  100  94  152*     Hepatic: Recent Labs      09/10/18   1434   AST  23   ALT  37   BILITOT  0.5   ALKPHOS  302*     INR:   Recent Labs      09/10/18   1434   INR  1.02     Xray:   Endoscopy Finding:      Objective:   Vitals: BP (!) 117/58   Pulse 60   Temp 97.8 °F (36.6 °C) (Oral)   Resp 18   Ht 5' 9\" (1.753 m)   Wt 168 lb 9 oz (76.5 kg)   SpO2 100%   BMI 24.89 kg/m²     Intake/Output Summary (Last 24 hours) at 09/12/18 2330  Last data filed at 09/12/18 1536   Gross per 24 hour   Intake              660 ml   Output             2100 ml   Net            -1440 ml     General appearance: alert and cooperative with exam  Lungs: clear to auscultation bilaterally  Heart: regular rate and rhythm, S1, S2 normal, no murmur, click, rub or gallop  Abdomen: soft, non-tender; bowel sounds normal; no masses,  no organomegaly  Extremities: extremities normal, atraumatic, no cyanosis or edema    Assessment and Plan:   1.  Dysphagia , mild , plan for EGD with dilation      Follow up in GI Clinic after discharge in 2 week(s)    Patient Active Problem List:     Postural dizziness with near syncope     DM2 (diabetes mellitus, type 2) ([facility])     Hypertension     Pacemaker     Peripheral vascular disease (HCC)     Lactic acidosis     Iron deficiency anemia     CAD (coronary artery disease)     Heart failure with preserved ejection fraction (HCC)     GERD (gastroesophageal reflux disease)     Dyslipidemia     Lymphoma ([address]     Osteoarthritis of both knees     Left atrial dilation     GLORIA (acute kidney injury) ([address]     Frequent falls     Physical deconditioning     Altered awareness, transient     Fever     SIRS (systemic inflammatory response syndrome) (HCC)     Pancytopenia (HCC)     Altered mental status     Hepatomegaly     Urinary retention     Hematuria     GLORIA (acute kidney injury) ([address]     Hyperkalemia     Hypercalcemia     Calculus of gallbladder without cholecystitis without obstruction     Syncope and collapse     Non-intractable vomiting with nausea     Gallbladder dilatation     Nonintractable headache     Elevated troponin     Osteoarthritis of multiple joints     Chronic diastolic (congestive) heart failure (HCC)     S/P placement of cardiac pacemaker     Chronic kidney disease, stage 3     Severe malnutrition ([address]      Karan Hagan MD

## 2018-09-12 NOTE — PLAN OF CARE
Problem: Discharge Planning:  Intervention: Assess knowledge level of healthcare   [facility] Palliative Care           Progress Note    Patient Name:  Tita Camara  Medical Record Number:  [id number]  YOB: 1935    Date:  9/12/2018  Time:  10:10 AM  Completed By:  Almas Gonzalez RN    Reason for Palliative Care Evaluation:  Code status, goals    Current Issues:  []  Pain  []  Fatigue  []  Nausea  []  Anxiety  []  Depression  []  Shortness of Breath  []  Constipation  [x]  Appetite, pt reports difficulty swallowing  []  Other:    Advance Directives  [] [facility]  [x] Living Will  [x] Medical POA    Current Code Status  [] Full Resuscitation  [x] DNR-Comfort Care-Arrest, Pt/ Zenaida Munroe request Limited Code: NO SHOCK, NO CPR, NO INTUBATION, NO RESUSCITATIVE MEDS after discussing code status options and goals. [] DNR-Comfort Care  [] Limited   [] No CPR   [] No shock   [] No ET intubation/reintubation   [] No resuscitative medications   [] Other limitation:    Performance Status:  40    Family/Patient Discussion:  Spoke with pt and Zenaida Munroe and reviewed goals of care and medical issues of concern including swallowing difficulty, periodic confusion, CHF, recurrent infection/hospitalizations, etc. Cindy Montenegro offers that pt has had several recent admissions and has declined over past 2-3 mo. We discuss options for treatments and also goals of pt. At this time pt and DPOA agree to pursue endoscopy if doctor feels necessary. We review benefits and risks briefly and inform pt/DPOA that physician will further discuss with them if he feels indicated. We also review code status options and benefits and risks. At this time pt/DPOA request Limited Code as above. Dr. Shayna Dougherty on unit and updated and placed order for Limited Code . Armida Ponce RN present at bedside during conversation with pt and aware of pt wishes. Emotional support given. Will follow supportively.      Plan/Follow-Up:  Spiritual care consult.                                 Follow supportively    Gayathri Berrios RN  9/12/2018,  10:10 AM

## 2018-09-12 NOTE — CONSULTS
(GLYCOLAX) powder, Take 17 g by mouth daily as needed  meclizine (ANTIVERT) 25 MG tablet, Take 25 mg by mouth 2 times daily as needed  Multiple Vitamins-Minerals (MULTIVITAMIN ADULTS 50+ PO), Take by mouth daily  furosemide (LASIX) 20 MG tablet, Take 2 tablets by mouth 2 times daily (Patient taking differently: Take 20 mg by mouth 2 times daily )  RANEXA 1000 MG extended release tablet, TAKE 1 TABLET BY MOUTH TWICE DAILY  BYSTOLIC 10 MG tablet, TAKE 1 TABLET BY MOUTH DAILY  ferrous sulfate 325 (65 Fe) MG tablet, Take 1 tablet by mouth 2 times daily (with meals)  amLODIPine (NORVASC) 10 MG tablet, Take 1 tablet by mouth daily  potassium chloride (KLOR-CON M) 20 MEQ extended release tablet, Take 0.5 tablets by mouth daily (Patient taking differently: Take 40 mEq by mouth daily )  aspirin 81 MG tablet, Take 81 mg by mouth daily  atorvastatin (LIPITOR) 20 MG tablet, Take 20 mg by mouth daily  clopidogrel (PLAVIX) 75 MG tablet, Take 75 mg by mouth daily  glipiZIDE (GLUCOTROL) 5 MG tablet, Take 5 mg by mouth daily  magnesium oxide (MAG-OX) 400 MG tablet, Take 400 mg by mouth 2 times daily   metFORMIN (GLUCOPHAGE) 500 MG tablet, Take 1,500 mg by mouth daily (with breakfast)  omeprazole (PRILOSEC) 40 MG delayed release capsule, Take 40 mg by mouth 2 times daily   isosorbide mononitrate (ISMO;MONOKET) 10 MG tablet, Take 1 tablet by mouth daily    Allergies:    Patient has no known allergies. Social History:    reports that he has never smoked. He has never used smokeless tobacco. He reports that he does not drink alcohol or use drugs. Family History:   family history is not on file.     REVIEW OF SYSTEMS:    Constitutional: negative for anorexia, chills and fevers,weight change  Respiratory: negative for cough, dyspnea on exertion, hemoptysis, shortness of breath and wheezing  Cardiovascular: negative for chest pain, orthopnea, palpitations and syncope  Gastrointestinal: negative for abdominal pain,nausea , vomiting, constipation, diarrhea. Hematologic/lymphatic: negative for bruising,prolonged bleeding,blood clots  Musculoskeletal:negative for muscle weakness, myalgias,wasting  Neurological: negative for coordination problems, dizziness, gait problems and vertigo  Behavioral/Psych:negative for mood/sleep disturbance      PHYSICAL EXAM:  Vitals:Patient Vitals for the past 24 hrs:   BP Temp Temp src Pulse Resp SpO2 Weight   09/12/18 1145 (!) 117/58 97.8 °F (36.6 °C) Oral 60 18 100 % -   09/12/18 1006 (!) 122/59 - - - - - -   09/12/18 0847 (!) 122/59 97.9 °F (36.6 °C) Oral 60 18 100 % -   09/12/18 0439 (!) 110/54 98 °F (36.7 °C) Oral 61 16 99 % 168 lb 9 oz (76.5 kg)   09/11/18 2142 (!) 109/55 97.8 °F (36.6 °C) Oral 60 16 99 % -   09/11/18 1640 (!) 110/56 97.4 °F (36.3 °C) Oral 60 18 99 % -       Last 3 weights: Wt Readings from Last 3 Encounters:   09/12/18 168 lb 9 oz (76.5 kg)   09/06/18 155 lb 12.8 oz (70.7 kg)   08/21/18 168 lb 9.6 oz (76.5 kg)     24 hour intake/output:  Intake/Output Summary (Last 24 hours) at 09/12/18 1202  Last data filed at 09/12/18 1029   Gross per 24 hour   Intake             1020 ml   Output             1175 ml   Net             -155 ml     BMI:Body mass index is 24.89 kg/m².     General Appearance: alert and oriented to person, place and time, well developed and well- nourished, in no acute distress  Skin: warm and dry, no rash or erythema  Eyes: pupils equal, round, and reactive to light, extraocular eye movements intact, conjunctivae normal  Neck: supple and non-tender without mass, no thyromegaly or thyroid nodules, no cervical lymphadenopathy  Pulmonary/Chest: clear to auscultation bilaterally- no wheezes, rales or rhonchi, normal air movement, no respiratory distress  Cardiovascular: normal rate, regular rhythm, normal S1 and S2, no murmurs, rubs, clicks, or gallops, distal pulses intact, no carotid bruits, Negative JVD  Radial Pulses: intact 2+  Abdomen: soft, non-tender, non-distended, normal bowel sounds, no masses or organomegaly  Extremities: no cyanosis, clubbing . Edema  Musculoskeletal: normal range of motion, no joint swelling, deformity or tenderness    LABS:  Recent Labs      09/10/18   1434  09/10/18   1813  09/11/18   0046   TROPONINT  0.023*  0.019*  0.019*     CBC: Lab Results   Component Value Date    WBC 5.2 09/12/2018    RBC 3.25 09/12/2018    RBC 4.12 11/07/2011    HGB 10.1 09/12/2018    HCT 30.5 09/12/2018    MCV 93.8 09/12/2018    MCH 31.1 09/12/2018    MCHC 33.1 09/12/2018    RDW 13.6 06/07/2018     09/12/2018    MPV 9.4 09/12/2018     BMP:  Lab Results   Component Value Date     09/12/2018    K 4.4 09/12/2018    K 3.9 08/21/2018     09/12/2018    CO2 22 09/12/2018    BUN 35 09/12/2018    LABALBU 3.5 09/10/2018    CREATININE 1.8 09/12/2018    CALCIUM 10.2 09/12/2018    LABGLOM 36 09/12/2018    GLUCOSE 152 09/12/2018    GLUCOSE 221 11/07/2011     Hepatic Function Panel:  Lab Results   Component Value Date    ALKPHOS 302 09/10/2018    ALT 37 09/10/2018    AST 23 09/10/2018    PROT 8.2 09/10/2018    BILITOT 0.5 09/10/2018    BILIDIR <0.2 09/10/2018    LABALBU 3.5 09/10/2018     Magnesium:    Lab Results   Component Value Date    MG 1.9 09/12/2018     Warfarin PT/INR:  No components found for: PTPATWAR, PTINRWAR  HgBA1c:    Lab Results   Component Value Date    LABA1C 5.2 01/05/2018     FLP:  Lab Results   Component Value Date    TRIG 112 05/01/2018    HDL 58 05/01/2018    LDLCALC 30 05/01/2018     TSH:    Lab Results   Component Value Date    TSH 1.390 08/08/2018     BNP: No results found for: BNP      ASSESSMENT/PLAN:  Recent echo - no AS ; EF 55-60 %  Monitor rhythm and interrogate pacer for rhythm disturbance  Patient said . Thymona Alvarez Thyra Hermanns \"he choked on food \"  Investigate for non cardiac near syncope      Rubi Castro MD FACC,KEM,FSVERONICA,FSCAI,KAVITA,MURIEL,FACP.   12:02 PM  9/12/2018

## 2018-09-12 NOTE — PROGRESS NOTES
Hospitalist Progress Note    Patient:  Otf Goncalves      Unit/Bed:8B-29/029-A    YOB: 1935    MRN: [id number]       Acct: [de-identified]     PCP: GARRETT Howell - CNP    Date of Admission: 9/10/2018    Chief Complaint: Syncope. Hospital Course: Admitted for syncope and post-prandial vomiting. Initially hypotensive, improved with IVFs. Failed MBS. Subjective: Seen with family in the room today. More alert today. Medications:  Reviewed    Infusion Medications   Scheduled Medications    magnesium oxide  400 mg Oral BID    atorvastatin  20 mg Oral Daily    glipiZIDE  5 mg Oral Daily    metFORMIN  1,500 mg Oral Daily with breakfast    pantoprazole  40 mg Oral QAM AC    amLODIPine  10 mg Oral Daily    ferrous sulfate  325 mg Oral BID WC    ranolazine  1,000 mg Oral BID    multivitamin  1 tablet Oral Daily    gabapentin  100 mg Oral TID    sodium chloride flush  10 mL Intravenous 2 times per day    ondansetron  4 mg Intravenous Once    enoxaparin  40 mg Subcutaneous Q24H    furosemide  40 mg Oral BID    nebivolol  10 mg Oral Daily    polyethylene glycol  17 g Oral Daily    potassium chloride  10 mEq Oral Daily    tamsulosin  0.4 mg Oral Daily    isosorbide mononitrate  30 mg Oral Daily     PRN Meds: meclizine, acetaminophen, sodium chloride flush, magnesium hydroxide, ondansetron, potassium chloride **OR** potassium chloride **OR** potassium chloride, potassium chloride, magnesium sulfate      Intake/Output Summary (Last 24 hours) at 09/12/18 1920  Last data filed at 09/12/18 1536   Gross per 24 hour   Intake              660 ml   Output             1500 ml   Net             -840 ml       Diet:  DIET DYSPHAGIA II MECHANICALLY ALTERED; No Added Salt (3-4 GM); Honey Thick;  No Drinking Straw  Dietary Nutrition Supplements: Diabetic Oral Supplement    Exam:  BP (!) 115/56   Pulse 61   Temp 98.3 °F (36.8 °C) (Oral)   Resp 18   Ht 5' 9\" (1.753 m)   Wt 168 lb 9 oz (76.5 kg)   SpO2 100%   BMI 24.89 kg/m²     General appearance:  No apparent distress, appears stated age and cooperative. Thin appearing. Patient appears to be physically deconditioned  HEENT:  Normocephalic, Pupils equal, round, and reactive to light. Poor dentition. Extraocular motion  Intact. Nose symmetric without evidence of discharge. Oral mucosa dry. Neck: Supple, with full range of motion. No jugular venous distention. Trachea midline. No thyromegaly. No lymph node swelling. Chest: Pacemaker in place at right anterior chest.  Cardiovascular:  Regular rate and rhythm. 2/6 JAYLEEN @ [facility]. No rubs or gallopsS1/S2 without murmurs, rubs or gallops. Respiratory:  Normal respiratory effort. Clear to auscultation, bilaterally without Rales/Wheezes/Rhonchi. Abdomen: Soft, tender to palpation at the lower abdomen, non-distended with normal bowel sounds. Musculoskeletal:  No clubbing, cyanosis or edema bilaterally. Full range of motion without deformity. Neurologic:  No focal sensory/motor deficits. Cranial nerves: II-XII intact, grossly non-focal.  Lymphatic: No cervical lymphadenopathy. Psychiatric:  Alert. Oriented to person. Vascular: radial pulses palpable bilaterally 2+. No peripheral edema  Skin:  No visible rashes or lesions. Labs:   Recent Labs      09/10/18   1434  09/11/18   0531  09/12/18   0942   WBC  6.2  4.7*  5.2   HGB  10.9*  9.4*  10.1*   HCT  33.6*  26.7*  30.5*   PLT  253  173  179     Recent Labs      09/10/18   1434  09/11/18   0531  09/12/18   0942   NA  141  141  136   K  4.8  4.6  4.4   CL  105  109  101   CO2  20*  24  22*   BUN  36*  36*  35*   CREATININE  1.8*  1.8*  1.8*   CALCIUM  11.2*  10.1  10.2   PHOS  3.5   --    --      Recent Labs      09/10/18   1434   AST  23   ALT  37   BILIDIR  <0.2   BILITOT  0.5   ALKPHOS  302*     Recent Labs      09/10/18   1434   INR  1.02     No results for input(s): CKTOTAL, TROPONINI in the last 72 hours.     Urinalysis:      Lab Results   Component Value Date    NITRU NEGATIVE 09/10/2018    WBCUA 2-4 09/10/2018    BACTERIA NONE 09/10/2018    RBCUA 5-10 09/10/2018    BLOODU MODERATE 09/10/2018    SPECGRAV 1.025 09/10/2018    GLUCOSEU NEGATIVE 09/10/2018       Radiology:  Fluoroscopy modified barium swallow with video   Final Result   1. Laryngeal penetration of nectar thick and thin barium with aspiration of thin barium. 2. Additional recommendations from the speech therapist will follow. **This report has been created using voice recognition software. It may contain minor errors which are inherent in voice recognition technology. **      Final report electronically signed by Dr. Julius Frazier on 9/11/2018 10:46 AM      [employer]   Final Result   Cholelithiasis. Thickening of the gallbladder wall, likely chronic. **This report has been created using voice recognition software. It may contain minor errors which are inherent in voice recognition technology. **      Final report electronically signed by Dr. Zach Tucker on 9/11/2018 9:14 AM      XR CHEST PORTABLE   Final Result   No acute findings               **This report has been created using voice recognition software. It may contain minor errors which are inherent in voice recognition technology. **      Final report electronically signed by Dr. Alton Newsome on 9/10/2018 3:02 PM          Diet: DIET DYSPHAGIA II MECHANICALLY ALTERED; No Added Salt (3-4 GM); Honey Thick;  No Drinking Straw  Dietary Nutrition Supplements: Diabetic Oral Supplement    DVT prophylaxis: [x] Lovenox                                 [] SCDs                                 [] SQ Heparin                                 [] Encourage ambulation           [] Already on Anticoagulation     Disposition:    [] Home       [] TCU       [] Rehab       [] Psych       [x] SNF       [x] [place]       [] Other-    Code Status: Limited    PT/OT Eval Status: ECF with

## 2018-09-12 NOTE — CONSULTS
[address] [place] [address]                                   CONSULTATION    PATIENT NAME: Sharon Gonzalez              :        1935  MED REC NO:   [id number]                           ROOM:       8636  ACCOUNT NO:   [de-identified]                           ADMIT DATE: 09/10/2018  PROVIDER:     Ian Vail M.D.    Meryl Augustus:  2018    HISTORY OF PRESENT ILLNESS:  The patient was seen in GI consult for  evaluation and treatment of emesis as well as difficulty to swallow. The  nursing staff still thinks the patient is having some choking, also he  described that himself. He felt slightly weak. He has vomited a few  times. He has no regurgitation. No vomiting of undigested food. He had  been having difficulty to swallow liquids and solids less. Choking  sometimes with coughing when swallowing liquids. Never had pneumonia  according to him. He lost few pounds. Appetite is not fine. Slight  epigastric discomfort, not really severe. He has no hematemesis. His  bowel movement according to him, described it as normal.  He is not aware  of having blood with the bowel movement, mucus discharge with the stool,  diarrhea, constipation, or recent change in bowel habits. He is not aware  of any endoscopy done on him recently. He had diarrhea for a while, but  that has resolved at this time. The patient was hypertensive in the ER. Has improved at the time of  evaluation. He is able to speak and talk at this time. He does not answer  the question all the time properly. Majority of the time, he said he has  no problems. He has no recent endoscopy according to him. He is at the  nursing home at this time. He has no children. He says somebody from his  family helps take care of him.     PAST MEDICAL HISTORY: Significant for anemia; coronary artery disease; iron  deficiency anemia; kidney disease; diabetes; gastric reflux; congestive  heart failure; hypertension; osteoarthritis; pacemaker placement  Reported in the medical record; open heart, coronary artery bypass surgery;  and dementia. FAMILY HISTORY:  Not able to provide at this time. ALLERGIES:  None reported. SOCIAL HISTORY:  Lives at a nursing home at this time. He is a . No smoking. No alcohol abuse. MEDICATIONS:  Include Neurontin, aspirin, MiraLax as needed, Antivert,  multivitamins, Ranexa, iron supplement, Norvasc, Lipitor, Plavix,  Glucotrol, Glucophage, Prilosec, and AcipHex. PHYSICAL EXAMINATION:  GENERAL:  Pleasant, appears to be comfortable, not short of breath, not  using accessory muscles. VITAL SIGNS:  Afebrile since admission. Blood pressure 110/56 reported by  the nursing staff, pulse 60, and respirations 18. HEENT:  Head is atraumatic. Sclerae anicteric. Oral cavity, no lesions  seen. NECK:  Supple. CHEST:  Clear to auscultation. CARDIOVASCULAR:  S1, S2. No murmur. ABDOMEN:  Soft and nontender. There is no rebound. No guarding. No  hepatomegaly. No splenomegaly. No stigmata of chronic liver disease. EXTREMITIES:  No clubbing or cyanosis. LABORATORY DATA:  Sodium and potassium are normal.  His BUN and creatinine  are 6.2 and 1.8. His troponin is 0.023. ALT and AST are normal.  Alkaline  phosphatase is 309. H and H 9.4 and 26.7. MCV is normal.  INR is normal.    Ultrasound of right upper quadrant reported cholelithiasis, thickening of  the gallbladder wall, likely chronic. Fluoroscopic modified barium  swallow:  Laryngeal penetration was not thick and thin barium with  aspiration of the thin barium. I observed the patient myself while he was eating. Dysphagia diet. He did  not aspirate. He did not cough. He was feeding himself slowly with no difficulity . IMPRESSION:  1. Emesis which resolved at this time. 2.  Question of dysphagia with aspirations due to history of pneumonia.    The patient is functioning and doing fine. He did not do the best with  swallow evaluations, possible high risk for aspiration at this time. 3.  History of anemia, stable at this time. PLAN:  We will need to discuss the findings with the family, the benefit of  upper endoscopy with possible dilation. Likely that will improve his  function, making his swallowing better and less possibility of aspiration. To do that effectively, he needs to be off Plavix and aspirin. We will put  those on hold until discussing the option of endoscopy with dilation in two  days with the family to make sure that the patient cannot tolerate that  with no complication. Thank you for allowing me to participate in this patient's care. Maria Isabel Osman M.D.    D: 09/11/2018 21:45:34       T: 09/12/2018 0:13:50     [email or web address]  Job#: [id number]     Doc#: [id number]    CC:   Karyn Stinson, CNP

## 2018-09-12 NOTE — PROGRESS NOTES
[facility]  INPATIENT OCCUPATIONAL THERAPY  [facility] MED SURG 8B  EVALUATION    Time:  Time In: 1018  Time Out: 1043  Timed Code Treatment Minutes: 10 Minutes  Minutes: 25          Date: 2018  Patient Name: Pascual Velasco,   Gender: male      MRN: [id number]  : 1935  (80 y.o.)  Referring Practitioner: Andreea Gaytan MD  Diagnosis: Syncope and Collapse  Additional Pertinent Hx: Pt admitted from [facility]. Pt had near syncope,nausea and trouble swallowing. h/o injury to right LE per pt when he was 13 yrs old. Pt with drop foot and weakness in knee on the R. Restrictions/Precautions:  Fall Risk, General Precautions                            Past Medical History:   Diagnosis Date    Anemia     Blood circulation, collateral     CAD (coronary artery disease)     Chronic diastolic (congestive) heart failure (HCC)     Chronic kidney disease     DM2 (diabetes mellitus, type 2) (HCC)     Dyslipidemia     Falls     GERD (gastroesophageal reflux disease)     Heart failure with preserved ejection fraction (HCC)     Hyperlipidemia     Hypertension     Left atrial dilation     severe    Lymphoma ([address]     Nonintractable headache     Osteoarthritis of both knees     Pacemaker 2018    [employer] DUAL PACEMAKER INSERTED ON D (peripheral vascular disease) ([facility] [address]      Past Surgical History:   Procedure Laterality Date    CARDIAC SURGERY      CORONARY ARTERY BYPASS GRAFT      PACEMAKER PLACEMENT      VASCULAR SURGERY             Subjective  Chart Reviewed: Yes  Patient assessed for rehabilitation services?: Yes  Family / Caregiver Present: Yes    Subjective: Pt up in chair upon OT arrival, agreeable to OT session.     General:  Overall Orientation Status: Within Functional Limits    Vision: Within Functional Limits    Hearing: Exceptions to [facility]  Hearing Exceptions: Hard of hearing/hearing concerns         Pain:  Pain Assessment  Patient Currently in Pain: Yes  Pain

## 2018-09-13 NOTE — PROGRESS NOTES
Call out to [facility] of this patient and acting POA. Updated him on Dr. Margoth Ramirez visit and the plan for Saturday EGD With dilitation and time to arrive  0700. POA is very appreciative on this information.

## 2018-09-13 NOTE — PROGRESS NOTES
1430: Pt resting in bed and denies discomfort. Pt states he is \"getting esophagus looked at on Saturday. \" Pt states no needs at this time. Emotional support given. No family there.

## 2018-09-13 NOTE — PROGRESS NOTES
[facility]  INPATIENT SPEECH THERAPY  [facility] MED SURG 8B    TIME   SLP Individual Minutes  Time In: 3  Time Out: 2548  Minutes: 20  Timed Code Treatment Minutes: 0 Minutes       [x]Daily Note  []Progress Note  []Discharge Note    Date: 2018  Patient Name: Dimitry Nguyen        MRN: [id number]    : 1935  (80 y.o.)  Gender: male   Primary Provider: Srinivas Perdomo MD  Admitting Diagnosis:  Nausea, syncope, collapse  Secondary Diagnosis: dysphagia   Precautions: aspiration   Swallowing Status/Diet: dysphagia II honey thick   Swallowing Strategies: upright position, no straw, pulmonary monitory, small bolus   DATE of last MBS:    Pain:  Did not state    Subjective: pt was seen upright in bed. Alert and cooperative. Patient with c/o of dislike to \"thickned stuff\"     SHORT TERM GOAL #1:  Goal 1: Patient will tolerate dysphagia II with HONEY thick liquids without s/s of aspiraiton in order to safley maintain adequate hydration and nutrition  INTERVENTIONS: trial of dysphagia II solids using sandip cracker softened in pudding. Pt with decreased bolus coordination, but eventual successful oral clearance. No overt signs of aspiration with solids. Honey thick liquid trials x10 by cup. Timely swallow reflex. No overt aspiration. SHORT TERM GOAL #2:  Goal 2: Patient will complete advanced textures and nectar thick liquids x10 without s/s of apsiration in order to safely upgrade patient diet. INTERVENTIONS:  Patient with request of \"grapes\" and questioning if on recommended diet. ST provided extensive education re:  Rational of dysphagia II diet and specific diet textures/foods. Patient will require additional education. Patient completed 4oz nectar thick liquids via cup demonstrating with overall good success, no s/s of aspiration. Belching noted following all PO intake (4 oz honey thick liquids, 4oz nectar thick liquids, puree and crackers).  Recommend continued plan to complete EGD. Recommend continued current diet dysphagia II with HONEY thick liquids, HH or OP ST and completion of EGD with repeat MBS in 1 week via Outpatient. SHORT TERM GOAL #3:  Goal 3: Patient will complete oral and phyarngeal exercsies x10 with good success in order to improve overall function and safety of the swallow. INTERVENTIONS: Education provided re: need to initiate multiple swallows between bites/drinks to clear pharyngeal residue. Patient with poor carryover and success with use of strategy. However, patient denied \"anything getting stuck\"     SHORT TERM GOAL #4:  Goal 4: Patient will complete repeat instrumental evaluation exam in 2 weeks as appropriate to determine safety of upgraded diet. INTERVENTIONS: Did not target due to focus on goal 1. SHORT TERM GOAL #5:  Goal 5: Monitor cognitve function and complete formal evaluation as approrpiate   INTERVENTIONS: Did not target due to focus on goal 1.           ASSESSMENT:  Assessment: [x]Progressing towards goals          []Not Progressing towards goals       Patient Tolerance of Treatment:   [x]Tolerated well []Tolerated fair []Required rest breaks []Fatigued  Plan for Next Session: Trial advanced liquids   Education:  Learner:  [x]Patient          []Significant Other          []Other  Education provided regarding:  [x]Goals and POC   [x]Diet and swallowing precautions    []Home Exercise Program  []Progress and/or discharge information  Method of Education:  [x]Discussion          []Demonstration          []Handout          []Other  Evaluation of Education:   [x]Verbalized understanding   []Demonstrates without assistance  []Demonstrates with assistance  []Needs further instruction     []No evidence of learning                  [x]No family present      Plan: [x]Continue with current plan of care    []Modify current plan of care as follows:    []Discharge patient    Discharge Location:    Services/Supervision Recommended:

## 2018-09-14 NOTE — CARE COORDINATION
9/14/18, 8:54 AM    Discharge plan discussed by  and . Discharge plan reviewed with patient/ family. Patient/ family verbalize understanding of discharge plan and are in agreement with plan. Understanding was demonstrated using the teach back method. Patient was discharged to [facility] on 9/13. Bandar Glass RN notified [facility] of discharge and ability to accept patient back in the evening. Bandar Glass RN also spoke with family, they agreed to provide transportation. Bandar Glass RN faxed AVS and MARS to [facility].
UPDATE:    Pacer interrogation today, planning EGD  With dilation Saturday, orthostatic blood pressures, daily weights, neuro checks, scheduled Zofran, cardiology cosnult, palliative care consult, SW also following.
smoked. He has never used smokeless tobacco.   Influenza Vaccination Screening Completed: n/a  Pneumonia Vaccination Screening Completed: yes  PCP: GARRETT Downing CNP  Readmission: No. Pt presently Obs status. Readmission Risk Score: 0%    Discharge Planning  Current Residence:  Nursing Home  Living Arrangements:  Other (Comment)   Support Systems:  Family Members, /  Current Services PTA:     Potential Assistance Needed:  N/A  Potential Assistance Purchasing Medications:  No  Does patient want to participate in local refill/ meds to beds program?  N/A  Type of Home Care Services:  None  Patient expects to be discharged to:  return to [facility]  Expected Discharge date:  09/11/18  Follow Up Appointment: Best Day/ Time: Wednesday AM    Discharge Plan: Met with pt today. From [facility] and he feels his needs are met. He plans to return there at discharge. SW is following.       Evaluation: yes

## 2018-09-15 NOTE — DISCHARGE SUMMARY
Hospital Medicine Discharge Summary      Patient Identification:   Moreno Gilbert   : 1935  MRN: [id number]   Account: [de-identified]      Patient's PCP: GARRETT Gonzalez CNP    Admit Date: 9/10/2018     Discharge Date: 2018      Admitting Physician: Helena Montana MD     Discharge Physician: Paige Rosas MD     Discharge Diagnoses: Active Hospital Problems    Diagnosis Date Noted    Dysphagia [R13.10]     Severe malnutrition ([address] [E43] 2018     Class: Acute    Syncope and collapse [R55] 09/10/2018    Non-intractable vomiting with nausea [R11.2]     Gallbladder dilatation [K82.8]     Nonintractable headache [R51]     Elevated troponin [R74.8]     Osteoarthritis of multiple joints [M15.9]     Chronic diastolic (congestive) heart failure (HCC) [I50.32]     S/P placement of cardiac pacemaker [Z95.0]     Chronic kidney disease, stage 3 [N18.3]        The patient was seen and examined on day of discharge and this discharge summary is in conjunction with any daily progress note from day of discharge. Hospital Course:   Moreno Gilbert is a 80 y.o. male admitted to [address] [place] [address] on 9/10/2018 for syncope and postprandial vomiting. Patient is from [address] [place] [address] there he was observed to have a possible syncopal event as well as multiple recent episodes of postprandial vomiting. In the emergency room he was found that the mildly hypotensive with blood pressure of 104/54 and a paced rhythm with heart rate of 60. Patient states that he nearly passed out when he choked while eating. He underwent a 5 barium swallow study with speech therapy and was found to have laryngeal penetration. Thus he was placed on dysphagia 2 with honey thick liquids. GI was consulted and determined that he would benefit from an EGD with possible dilation. He underwent a pacemaker interrogation which demonstrated a paced rhythm 100% of the time.   He is feeling quite well peripheral edema  Skin:  No visible rashes or lesions. Labs: For convenience and continuity at follow-up the following most recent labs are provided:      CBC:    Lab Results   Component Value Date    WBC 4.7 09/13/2018    HGB 9.1 09/13/2018    HCT 26.4 09/13/2018     09/13/2018       Renal:    Lab Results   Component Value Date     09/13/2018    K 4.3 09/13/2018    K 3.9 08/21/2018     09/13/2018    CO2 24 09/13/2018    BUN 32 09/13/2018    CREATININE 1.6 09/13/2018    CALCIUM 10.0 09/13/2018    PHOS 3.5 09/10/2018         Significant Diagnostic Studies    Radiology:   Fluoroscopy modified barium swallow with video   Final Result   1. Laryngeal penetration of nectar thick and thin barium with aspiration of thin barium. 2. Additional recommendations from the speech therapist will follow. **This report has been created using voice recognition software. It may contain minor errors which are inherent in voice recognition technology. **      Final report electronically signed by Dr. Azra Shoemaker on 9/11/2018 10:46 AM      [address] [employer]   Final Result   Cholelithiasis. Thickening of the gallbladder wall, likely chronic. **This report has been created using voice recognition software. It may contain minor errors which are inherent in voice recognition technology. **      Final report electronically signed by Dr. Eitan Rae on 9/11/2018 9:14 AM      XR CHEST PORTABLE   Final Result   No acute findings               **This report has been created using voice recognition software. It may contain minor errors which are inherent in voice recognition technology. **      Final report electronically signed by Dr. Fracisco Torres on 9/10/2018 3:02 PM             Consults:     IP CONSULT TO SOCIAL WORK  IP CONSULT TO GI  PALLIATIVE CARE EVAL  IP CONSULT TO CARDIOLOGY  IP CONSULT TO SPIRITUAL SERVICES    Disposition:    [] Home       [] TCU       [] Rehab       [] Psych       [x] SNF       [] [place]       [] Other-    Condition at Discharge: Stable    Code Status:  Prior     Patient Instructions:    Discharge lab work: none  Activity: activity as tolerated  Diet: Dietary Nutrition Supplements: Diabetic Oral Supplement      Follow-up visits: GARRETT Carver CNP  [address] [facility]  [phone]      As needed    [address] [id number]  [address], [address] [place] [address] [address]  [phone]      The office will call you with an appointment         Discharge Medications:      Felice Negrete   Home Medication Instructions WMM:[id number]    Printed on:09/15/18 1647   Medication Information                      acetaminophen (TYLENOL) 500 MG tablet  Take 1,000 mg by mouth 3 times daily as needed for Pain Total acetaminophen dose not to exceed 3000 mg per day. amLODIPine (NORVASC) 10 MG tablet  Take 1 tablet by mouth daily             aspirin 81 MG tablet  Take 81 mg by mouth daily             atorvastatin (LIPITOR) 20 MG tablet  Take 20 mg by mouth daily             BYSTOLIC 10 MG tablet  TAKE 1 TABLET BY MOUTH DAILY             clopidogrel (PLAVIX) 75 MG tablet  Take 75 mg by mouth daily             ferrous sulfate 325 (65 Fe) MG tablet  Take 1 tablet by mouth 2 times daily (with meals)             furosemide (LASIX) 20 MG tablet  Take 2 tablets by mouth 2 times daily             gabapentin (NEURONTIN) 100 MG capsule  Take 100 mg by mouth 3 times daily. Td Hartman              glipiZIDE (GLUCOTROL) 5 MG tablet  Take 5 mg by mouth daily             isosorbide mononitrate (ISMO;MONOKET) 10 MG tablet  Take 1 tablet by mouth daily             magnesium oxide (MAG-OX) 400 MG tablet  Take 400 mg by mouth 2 times daily              meclizine (ANTIVERT) 25 MG tablet  Take 25 mg by mouth 2 times daily as needed             metFORMIN (GLUCOPHAGE) 500 MG tablet  Take 1,500 mg by mouth daily (with breakfast)             Multiple Vitamins-Minerals (MULTIVITAMIN ADULTS 50+ PO)  Take by mouth daily             omeprazole (PRILOSEC) 40 MG delayed release capsule  Take 40 mg by mouth 2 times daily              polyethylene glycol (GLYCOLAX) powder  Take 17 g by mouth daily as needed             potassium chloride (KLOR-CON M) 20 MEQ extended release tablet  Take 0.5 tablets by mouth daily             RANEXA 1000 MG extended release tablet  TAKE 1 TABLET BY MOUTH TWICE DAILY             tamsulosin (FLOMAX) 0.4 MG capsule  Take 1 capsule by mouth daily             Trolamine Salicylate (ASPERCREME EX)  Apply topically daily in AM to bilateral knees                 Time Spent on discharge is more than 30 minutes in the examination, evaluation, counseling and review of medications and discharge plan. Signed: Thank you GARRETT Rutledge CNP for the opportunity to be involved in this patient's care.     Electronically signed by Machelle Adler MD on 9/15/2018 at 4:41 PM

## 2018-09-19 NOTE — PROGRESS NOTES
[facility]  [address]  [address] [place] [address] [address] [address]  Dept: [phone]  Dept Fax: [phone]  Loc: [phone]    Visit Date: 9/19/2018    Mr. Ami Lindo is a 80 y.o. male  who presented for:  Chief Complaint   Patient presents with    1 Month Follow-Up     cellultis of lower extremity unspecified laterality       HPI:   HPI   79 yo M follows up for cellulitis of the extremities. He was recently hospitalized for acute encephalopathy and urinary retention. EF 55-60%, RVSP 41 mmHg. Patient also had syncope and hypotension in the setting of emesis. No chest pain, angina, MONZON, orthopnea, PND, sob at rest, palpitations, LE edema, or syncope.         Current Outpatient Prescriptions:     tamsulosin (FLOMAX) 0.4 MG capsule, Take 1 capsule by mouth daily, Disp: 30 capsule, Rfl: 3    Trolamine Salicylate (ASPERCREME EX), Apply topically daily in AM to bilateral knees, Disp: , Rfl:     acetaminophen (TYLENOL) 500 MG tablet, Take 1,000 mg by mouth 3 times daily as needed for Pain Total acetaminophen dose not to exceed 3000 mg per day., Disp: , Rfl:     polyethylene glycol (GLYCOLAX) powder, Take 17 g by mouth daily as needed, Disp: , Rfl:     meclizine (ANTIVERT) 25 MG tablet, Take 25 mg by mouth 2 times daily as needed, Disp: , Rfl:     Multiple Vitamins-Minerals (MULTIVITAMIN ADULTS 50+ PO), Take by mouth daily, Disp: , Rfl:     furosemide (LASIX) 20 MG tablet, Take 2 tablets by mouth 2 times daily (Patient taking differently: Take 20 mg by mouth 2 times daily ), Disp: 60 tablet, Rfl: 3    BYSTOLIC 10 MG tablet, TAKE 1 TABLET BY MOUTH DAILY, Disp: 90 tablet, Rfl: 0    ferrous sulfate 325 (65 Fe) MG tablet, Take 1 tablet by mouth 2 times daily (with meals), Disp: 30 tablet, Rfl: 3    amLODIPine (NORVASC) 10 MG tablet, Take 1 tablet by mouth daily, Disp: 30 tablet, Rfl: 11    potassium chloride (KLOR-CON M) 20 MEQ extended release tablet, Take aortic valve, aneurysms, heart transplant, pacemakers, defibrillators, or sudden cardiac death. Past Surgical History   Past Surgical History:   Procedure Laterality Date    CARDIAC CATHETERIZATION  02/19/2018    CARDIAC SURGERY      CORONARY ARTERY BYPASS GRAFT      PACEMAKER PLACEMENT  01/12/2018    [facility]    VASCULAR SURGERY         Review of Systems   Constitutional: Negative for chills and fever  HENT: Negative for congestion, sinus pressure, sneezing and sore throat. Eyes: Negative for pain, discharge, redness and itching. Respiratory: Negative for apnea, cough  Gastrointestinal: Negative for blood in stool, constipation, diarrhea   Endocrine: Negative for cold intolerance, heat intolerance, polydipsia. Genitourinary: Negative for dysuria, enuresis, flank pain and hematuria. Musculoskeletal: Negative for arthralgias, joint swelling and neck pain. Neurological: Negative for numbness and headaches. Psychiatric/Behavioral: Negative for agitation, confusion, decreased concentration and dysphoric mood. Objective:     /64   Pulse 63   Ht 5' 9\" (1.753 m)   Wt 172 lb (78 kg)   BMI 25.40 kg/m²     Wt Readings from Last 3 Encounters:   09/19/18 172 lb (78 kg)   09/13/18 168 lb 2 oz (76.3 kg)   09/06/18 155 lb 12.8 oz (70.7 kg)     BP Readings from Last 3 Encounters:   09/19/18 118/64   09/19/18 120/60   09/13/18 (!) 144/65       Nursing note and vitals reviewed. Physical Exam   Constitutional: Oriented to person, place, and time. Appears well-developed and well-nourished. HENT:   Head: Normocephalic and atraumatic. Eyes: EOM are normal. Pupils are equal, round, and reactive to light. Neck: Normal range of motion. Neck supple. No JVD present. Cardiovascular: Normal rate, regular rhythm, normal heart sounds and intact distal pulses. No murmur heard. Pulmonary/Chest: Effort normal and breath sounds normal. No respiratory distress. No wheezes. No rales. Abdominal: Soft. Bowel sounds are normal. No distension. There is no tenderness. Musculoskeletal: Normal range of motion. No edema. Neurological: Alert and oriented to person, place, and time. No cranial nerve deficit. Coordination normal.   Skin: Skin is warm and dry. Psychiatric: Normal mood and affect.        Lab Results   Component Value Date    CKTOTAL 91 06/05/2018       Lab Results   Component Value Date    WBC 4.7 09/13/2018    RBC 2.91 09/13/2018    RBC 4.12 11/07/2011    HGB 9.1 09/13/2018    HCT 26.4 09/13/2018    MCV 90.7 09/13/2018    MCH 31.3 09/13/2018    MCHC 34.5 09/13/2018    RDW 13.6 06/07/2018     09/13/2018    MPV 9.6 09/13/2018       Lab Results   Component Value Date     09/13/2018    K 4.3 09/13/2018    K 3.9 08/21/2018     09/13/2018    CO2 24 09/13/2018    BUN 32 09/13/2018    LABALBU 3.5 09/10/2018    CREATININE 1.6 09/13/2018    CALCIUM 10.0 09/13/2018    LABGLOM 41 09/13/2018    GLUCOSE 102 09/13/2018    GLUCOSE 221 11/07/2011       Lab Results   Component Value Date    ALKPHOS 302 09/10/2018    ALT 37 09/10/2018    AST 23 09/10/2018    PROT 8.2 09/10/2018    BILITOT 0.5 09/10/2018    BILIDIR <0.2 09/10/2018    LABALBU 3.5 09/10/2018       Lab Results   Component Value Date    MG 1.9 09/12/2018       Lab Results   Component Value Date    INR 1.02 09/10/2018    INR 1.07 08/18/2018    INR 0.97 08/08/2018         Lab Results   Component Value Date    LABA1C 5.2 01/05/2018       Lab Results   Component Value Date    TRIG 112 05/01/2018    HDL 58 05/01/2018    LDLCALC 30 05/01/2018       Lab Results   Component Value Date    TSH 1.390 08/08/2018         Testing Reviewed:      I have individually reviewed the cardiac test below:    ECHO:   Results for orders placed during the hospital encounter of 12/26/17   ECHO Complete 2D W Doppler W Color    Narrative Transthoracic Echocardiography Report (TTE)     Demographics      Patient Name  [facility]        Gender Male                 Tiffany Cole      MR #          [id number]         Race                                                     Ethnicity      Account #     [de-identified]         Room Number      Accession     [id number]          Date of Study      12/26/2017   Number      Date of Birth 1935        Referring          Janes George MD                                   Physician          [facility] CNP      Age           80 year(s)        Anabelle Israel, RDCS,                                                      RDMS, RVT                                      Interpreting       Janes George MD                                   Physician     Procedure    Type of Study      TTE procedure:ECHOCARDIOGRAM COMPLETE 2D W DOPPLER W COLOR. Procedure Date  Date: 12/26/2017 Start: 01:24 PM    Study Location: Echo Lab  Technical Quality: Adequate visualization    Indications:Coronary artery disease. Additional Medical History:CAD/CABG, diabetic, PVD, HTN, HLD    Patient Status: Routine    Height: 69 inches Weight: 180 pounds BSA: 1.98 m^2 BMI: 26.58 kg/m^2    BP: 180/76 mmHg     Conclusions      Summary   Technically difficult examination. Ejection fraction was estimated at 55-60%. There were no regional left ventricular wall motion abnormalities and wall   thickness was within normal limits. Left atrial size was severely dilated. The mitral valve structure demonstrated posterior leaflet calcification   and restriction of motion. There was no mitral stenosis. There was trace to mild mitral regurgitation. There was trace to mild tricuspid regurgitation. IVC size is within normal limits with normal respiratory phasic changes. Assuming an RAP = 5 mmHg, the estimated RVSP = 41 mmHg.       Signature      ----------------------------------------------------------------   Electronically signed by Janes George MD (Interpreting   physician) on 12/26/2017 at 06:44 PM

## 2018-09-22 NOTE — PROGRESS NOTES
Subjective:      Patient ID: Eric Veliz is a 80 y.o. male. Chief Complaint   Patient presents with    Surgical Consult     Discharged [facility] 9/6/18-Calculus of gallbladder-seen in the hospital     HPI  Cole Welch is a 80-year-old male presents for follow-up after recent hospitalization. He had a recent syncope episode. During that time he was found to have some upper abdominal pain with dysphagia. Gallbladder ultrasound demonstrated cholelithiasis. Could not rule out any acute disease at that time. Patient became asymptomatic and due to medical issues continued with observation only. He had a lot of confusion. Confusion now has improved. Denies any abdominal pain. No nausea or vomiting. Tolerating diet. Planning to see GI soon for possible dilation secondary to dysphagia. Denies any current chest pain or shortness of breath. Normal bowel function. No hematochezia or melena. He states overall right now he feels pretty good. Family with patient today seemed to agree that he is doing well. Review of Systems   Constitutional: Negative for activity change, appetite change, chills, diaphoresis, fatigue, fever and unexpected weight change. HENT: Negative for congestion, dental problem, drooling, ear discharge, ear pain, facial swelling, hearing loss, mouth sores, nosebleeds, postnasal drip, rhinorrhea, sinus pressure, sneezing, sore throat, tinnitus, trouble swallowing and voice change. Eyes: Negative for photophobia, pain, discharge, redness, itching and visual disturbance. Respiratory: Negative for apnea, cough, choking, chest tightness, shortness of breath, wheezing and stridor. Cardiovascular: Negative for chest pain, palpitations and leg swelling. Gastrointestinal: Negative for abdominal distention, abdominal pain, anal bleeding, blood in stool, constipation, diarrhea, nausea, rectal pain and vomiting. Endocrine: Negative.     Genitourinary: Negative for decreased urine volume, difficulty urinating, discharge, dysuria, enuresis, flank pain, frequency, genital sores, hematuria, penile pain, penile swelling, scrotal swelling, testicular pain and urgency. Musculoskeletal: Positive for arthralgias, gait problem, joint swelling and myalgias. Negative for back pain, neck pain and neck stiffness. Skin: Negative for color change, pallor, rash and wound. Allergic/Immunologic: Negative. Neurological: Negative for dizziness, tremors, seizures, syncope, facial asymmetry, speech difficulty, weakness, light-headedness, numbness and headaches. Hematological: Negative for adenopathy. Bruises/bleeds easily. Psychiatric/Behavioral: Positive for confusion and decreased concentration. Negative for agitation, behavioral problems, dysphoric mood, hallucinations, self-injury, sleep disturbance and suicidal ideas. The patient is not nervous/anxious and is not hyperactive.       Past Medical History:   Diagnosis Date    Acute kidney failure (HCC)     Anemia     ASHD (arteriosclerotic heart disease)     Blood circulation, collateral     CAD (coronary artery disease)     Cardiomegaly     Chronic diastolic (congestive) heart failure (HCC)     Chronic kidney disease     DM2 (diabetes mellitus, type 2) (HCC)     Dyslipidemia     Falls     GERD (gastroesophageal reflux disease)     Heart failure with preserved ejection fraction (HCC)     Hyperlipidemia     Hypertension     Iron deficiency anemia     Left atrial dilation     severe    Lymphoma (HCC)     non hodgkin    Malaise     Nonintractable headache     Osteoarthritis of both knees     Pacemaker 1/25/2018    [employer] DUAL PACEMAKER INSERTED ON 01/  PVD (peripheral vascular disease) ([facility] [address]        Past Surgical History:   Procedure Laterality Date    CARDIAC CATHETERIZATION  02/19/2018    CARDIAC SURGERY      CORONARY ARTERY BYPASS GRAFT      PACEMAKER PLACEMENT  01/12/2018    [facility]    VASCULAR SURGERY

## 2018-09-24 NOTE — PROGRESS NOTES
[facility]. There was no charge for the repair.

## 2018-09-26 NOTE — PROGRESS NOTES
[facility]       Visit Date: 9/26/2018  Cardiologist:  Dr. Serafin Sanders   Primary Care Physician: Dr. Jen Breaux, APRN - CNP    Lisa Cuellar is a 80 y.o. male who presents today for:  Chief Complaint   Patient presents with    Congestive Heart Failure       HPI:   Lisa Cuellar is a 80 y.o. male who presents to the office for a follow up visit in the heart failure clinic. He has been hospitalized twice since the last clinic visit, neither for heart failure. He is now residing at [facility]. He has a trujillo cath. He would like to have this removed. He is wearing KAI hose. He has lost some weight. He has been on thickened liquids since he left the hospital awaiting and EGD with dilatation. He sleeps in a bed. He is working with therapy arm and leg exercises. He is not up walking much but has been riding the stationary bike for 20 minutes with minimal SOB.      Patient has:  Last hospital admission related to Heart Failure:  Jan 2018  Chest Pain: no  Worsening SOB/orthopnea/PND: no  Edema: no  Any extra diuretic use: no  Weight gain: no  Compliant checking home weight: yes at [facility]  Fatigue: no  Abdominal bloating: no  Appetite: good  Difficulty sleeping: no  Cough: no  Compliant checking blood pressure: no  Any refills on CHF medications needed at this time: no    Past Medical History:   Diagnosis Date    Acute kidney failure (HCC)     Anemia     ASHD (arteriosclerotic heart disease)     Blood circulation, collateral     CAD (coronary artery disease)     Cardiomegaly     Chronic diastolic (congestive) heart failure (HCC)     Chronic kidney disease     DM2 (diabetes mellitus, type 2) (HCC)     Dyslipidemia     Falls     GERD (gastroesophageal reflux disease)     Heart failure with preserved ejection fraction (HCC)     Hyperlipidemia     Hypertension     Iron deficiency anemia     Left atrial dilation     severe    Lymphoma (HCC)     non aspirin 81 MG tablet Take 81 mg by mouth daily      atorvastatin (LIPITOR) 20 MG tablet Take 20 mg by mouth daily      clopidogrel (PLAVIX) 75 MG tablet Take 75 mg by mouth daily      glipiZIDE (GLUCOTROL) 5 MG tablet Take 5 mg by mouth daily      magnesium oxide (MAG-OX) 400 MG tablet Take 400 mg by mouth 2 times daily       metFORMIN (GLUCOPHAGE) 500 MG tablet Take 1,500 mg by mouth daily (with breakfast)      omeprazole (PRILOSEC) 40 MG delayed release capsule Take 40 mg by mouth 2 times daily       isosorbide mononitrate (ISMO;MONOKET) 10 MG tablet Take 1 tablet by mouth daily 90 tablet 1     No current facility-administered medications for this visit. No Known Allergies    SUBJECTIVE:   Review of Systems   Constitutional: Negative for activity change, appetite change, fatigue and fever. HENT: Negative for congestion. Respiratory: Negative for apnea, cough, chest tightness, shortness of breath and wheezing. Cardiovascular: Negative for chest pain, palpitations and leg swelling. Gastrointestinal: Negative for abdominal distention, abdominal pain and nausea. Genitourinary: Negative for difficulty urinating and dysuria. Has a trujillo    Musculoskeletal: Positive for gait problem (wekness). Negative for arthralgias. Skin: Negative for color change. Neurological: Negative for dizziness, numbness and headaches. Psychiatric/Behavioral: Negative for agitation, confusion and sleep disturbance. The patient is not nervous/anxious. OBJECTIVE:   Today's Vitals:  BP (!) 126/54   Pulse 60   Ht 5' 9\" (1.753 m)   Wt 166 lb 3.2 oz (75.4 kg)   SpO2 97%   BMI 24.54 kg/m²     Physical Exam   Constitutional: He is oriented to person, place, and time. He appears well-developed and well-nourished. HENT:   Head: Normocephalic and atraumatic. Eyes: Pupils are equal, round, and reactive to light. Neck: Normal range of motion. No JVD present.    Cardiovascular: Normal rate and normal heart sounds. No murmur heard. +PPM right chest    Pulmonary/Chest: Effort normal. No respiratory distress. He has no rales. Abdominal: Soft. He exhibits no distension. There is no tenderness. Musculoskeletal: He exhibits edema (trace to +1 - wearing KAI hose ). He exhibits no tenderness. Neurological: He is alert and oriented to person, place, and time. Skin: Skin is warm and dry. Psychiatric: He has a normal mood and affect. His behavior is normal.   Vitals reviewed. Wt Readings from Last 3 Encounters:   09/26/18 166 lb 3.2 oz (75.4 kg)   09/19/18 172 lb (78 kg)   09/13/18 168 lb 2 oz (76.3 kg)     BP Readings from Last 3 Encounters:   09/26/18 (!) 126/54   09/19/18 118/64   09/19/18 120/60     Pulse Readings from Last 3 Encounters:   09/26/18 60   09/19/18 63   09/19/18 62     Body mass index is 24.54 kg/m². ECHO:   12/26/17  Summary   Technically difficult examination.   Ejection fraction was estimated at 55-60%.   There were no regional left ventricular wall motion abnormalities and wall   thickness was within normal limits.   Left atrial size was severely dilated.   The mitral valve structure demonstrated posterior leaflet calcification   and restriction of motion. There was no mitral stenosis.   There was trace to mild mitral regurgitation.  Donna Kymberly was trace to mild tricuspid regurgitation. Rona Ramires size is within normal limits with normal respiratory phasic changes.   Assuming an RAP = 5 mmHg, the estimated RVSP = 41 mmHg.      Signature      ----------------------------------------------------------------   Electronically signed by Naomi Harden MD (Interpreting   [email or web address]) on 12/26/2017 at 06:44 PM   ----------------------------------------------------------------      Findings      Mitral Valve   The mitral valve structure demonstrated posterior leaflet calcification   and restriction of motion.  DOPPLER: The transmitral velocity was within   the normal range with no evidence for 41 09/13/2018    GLUCOSE 102 09/13/2018    GLUCOSE 221 11/07/2011    CALCIUM 10.0 09/13/2018     Hepatic Function Panel:    Lab Results   Component Value Date    ALKPHOS 302 09/10/2018    ALT 37 09/10/2018    AST 23 09/10/2018    PROT 8.2 09/10/2018    BILITOT 0.5 09/10/2018    BILIDIR <0.2 09/10/2018    LABALBU 3.5 09/10/2018     Magnesium:    Lab Results   Component Value Date    MG 1.9 09/12/2018     PT/INR:    Lab Results   Component Value Date    INR 1.02 09/10/2018     Lipids:    Lab Results   Component Value Date    TRIG 112 05/01/2018    HDL 58 05/01/2018    LDLCALC 30 05/01/2018       ASSESSMENT AND PLAN:   The patient's condition/symptoms are Stable: No clinical evidence of fluid overload today. Continue current medical regimen without changes at present time.      Diagnosis Orders   1. CHF (congestive heart failure), NYHA class II, chronic, diastolic (HCC)         Continue:  · Continue current medications - Lasix 20 mg bid, Bystolic 10 mg daily, Isosorbide mononitrate 10 mg daily, Norvasc 10 mg daily, Potassium 10 mEq daily, on Plavix   · Daily weights  · Fluid restriction of 2 Liters per day  · Limit sodium in diet to around 0908-5279 mg/day  · Monitor BP  · Activity as tolerated     Patient was instructed to call the [facility] for changes in the following symptoms:   Weight gain of 3 pounds in 1 day or 5 pounds in 1 week  Increased shortness of breath  Shortness of breath while laying down  Cough  Chest pain  Swelling in feet, ankles or legs  Tenderness or bloating in the abdomen  Fatigue   Decreased appetite or nausea   Confusion      Return in about 2 months (around 11/26/2018). or sooner if needed     Patient given educational materials - see patient instructions. We discussed the importance of weighing oneself and recording daily. We also discussed the importance of a low sodium diet, higher sodium foods to avoid and better low sodium food options.    Patient verbalizes understanding of plan of care using teach back method, and is agreeable to the treatment plan.        Electronically signed by GARRETT Justice CNP on 9/26/2018 at 3:11 PM

## 2018-10-07 NOTE — PROGRESS NOTES
Mr. Anders García was seen in follow up for cystoscopy as per plan developed by Alexi Allen NP. Cystoscopy was performed without difficulty and it was well tolerated. Past Medical History:   Diagnosis Date    Acute kidney failure (HCC)     Anemia     ASHD (arteriosclerotic heart disease)     Blood circulation, collateral     CAD (coronary artery disease)     Cardiomegaly     Chronic diastolic (congestive) heart failure (HCC)     Chronic kidney disease     DM2 (diabetes mellitus, type 2) (HCC)     Dyslipidemia     Falls     GERD (gastroesophageal reflux disease)     Heart failure with preserved ejection fraction (HCC)     Hyperlipidemia     Hypertension     Iron deficiency anemia     Left atrial dilation     severe    Lymphoma (HCC)     non hodgkin    Malaise     Nonintractable headache     Osteoarthritis of both knees     Pacemaker 1/25/2018    [employer] DUAL PACEMAKER INSERTED ON 01/  PVD (peripheral vascular disease) ([facility] [address]        Past Surgical History:   Procedure Laterality Date    CARDIAC CATHETERIZATION  02/19/2018    CARDIAC SURGERY      CORONARY ARTERY BYPASS GRAFT      PACEMAKER PLACEMENT  01/12/2018    [facility] Summers    VASCULAR SURGERY         Current Outpatient Prescriptions on File Prior to Visit   Medication Sig Dispense Refill    tamsulosin (FLOMAX) 0.4 MG capsule Take 1 capsule by mouth daily 30 capsule 3    Trolamine Salicylate (ASPERCREME EX) Apply topically daily in AM to bilateral knees      gabapentin (NEURONTIN) 100 MG capsule Take 100 mg by mouth 3 times daily. Yousif Aurelio acetaminophen (TYLENOL) 500 MG tablet Take 1,000 mg by mouth every 6 hours as needed for Pain Total acetaminophen dose not to exceed 3000 mg per day.        polyethylene glycol (GLYCOLAX) powder Take 17 g by mouth as needed       meclizine (ANTIVERT) 25 MG tablet Take 25 mg by mouth 2 times daily as needed      Multiple Vitamins-Minerals (MULTIVITAMIN ADULTS 50+ PO) Take 1 tablet by
(NEURONTIN) 100 MG capsule, Take 100 mg by mouth 3 times daily. ., Disp: , Rfl:     acetaminophen (TYLENOL) 500 MG tablet, Take 1,000 mg by mouth every 6 hours as needed for Pain Total acetaminophen dose not to exceed 3000 mg per day. , Disp: , Rfl:     polyethylene glycol (GLYCOLAX) powder, Take 17 g by mouth as needed , Disp: , Rfl:     meclizine (ANTIVERT) 25 MG tablet, Take 25 mg by mouth 2 times daily as needed, Disp: , Rfl:     Multiple Vitamins-Minerals (MULTIVITAMIN ADULTS 50+ PO), Take 1 tablet by mouth daily , Disp: , Rfl:     furosemide (LASIX) 20 MG tablet, Take 2 tablets by mouth 2 times daily (Patient taking differently: Take 20 mg by mouth 2 times daily ), Disp: 60 tablet, Rfl: 3    RANEXA 1000 MG extended release tablet, TAKE 1 TABLET BY MOUTH TWICE DAILY, Disp: 180 tablet, Rfl: 0    BYSTOLIC 10 MG tablet, TAKE 1 TABLET BY MOUTH DAILY, Disp: 90 tablet, Rfl: 0    ferrous sulfate 325 (65 Fe) MG tablet, Take 1 tablet by mouth 2 times daily (with meals), Disp: 30 tablet, Rfl: 3    amLODIPine (NORVASC) 10 MG tablet, Take 1 tablet by mouth daily, Disp: 30 tablet, Rfl: 11    potassium chloride (KLOR-CON M) 20 MEQ extended release tablet, Take 0.5 tablets by mouth daily, Disp: 180 tablet, Rfl: 1    aspirin 81 MG tablet, Take 81 mg by mouth daily, Disp: , Rfl:     atorvastatin (LIPITOR) 20 MG tablet, Take 20 mg by mouth daily, Disp: , Rfl:     clopidogrel (PLAVIX) 75 MG tablet, Take 75 mg by mouth daily, Disp: , Rfl:     glipiZIDE (GLUCOTROL) 5 MG tablet, Take 5 mg by mouth daily, Disp: , Rfl:     magnesium oxide (MAG-OX) 400 MG tablet, Take 400 mg by mouth 2 times daily , Disp: , Rfl:     metFORMIN (GLUCOPHAGE) 500 MG tablet, Take 1,500 mg by mouth daily (with breakfast), Disp: , Rfl:     omeprazole (PRILOSEC) 40 MG delayed release capsule, Take 40 mg by mouth 2 times daily , Disp: , Rfl:     isosorbide mononitrate (ISMO;MONOKET) 10 MG tablet, Take 1 tablet by mouth daily, Disp: 90 tablet,

## 2018-10-09 NOTE — TELEPHONE ENCOUNTER
MEDICAL CLEARANCE FORM  Clearance From: Dr. Elizabeth Granados   Appointment Date  At nursing home Time   CORTNEY Duenas  1935  Surgeon:  Dr. Sanam Romero    Procedure:  Transurethral resection of prostate  Date:  11/20/18  Facility: [address] [place] [address] HISTORY  DM CAD PVD CVA DVT/PE MI CHFMalignant Hyperthemia HTN Tobacco/ETOH Sleep Apnea GERD Hyperlipidemia Renal Insufficiency COPD/Asthma Bleeding Disorder Pacemaker/AICD  II. CURRENT MEDICATIONS: Attach list or complete     Pt is on following meds that need special instructions for surgery:  Anticoagulants Heart Meds ASA Insulin Oral anti-diabetics NSAIDS Diuretics     K replacements  III. ALLERGIES:   IV.  FUNCTIONAL CAPACITY  >4 METS (CAN VACUUM/HOUSEWORK,CLIMB FLIGHT STAIRS WITHOUT DYSPNEA)  <4METS (FLIGHT OF STEPS CAUSES DYSPNEA/CARDIAC SYMPTOMS)   Stress Test Recommended:    Stress tests or Cardiac Cath in last 5 years:  Yes (attach report)  No   Results: WNL   ABN  Any Change in Cardiac symptoms: Yes  NO  Comments:    Revascularization in last 5 years: Yes  NO  CABG: Yes  No   Comments:     Stents:  Date: ________  Any change in cardiac symptoms  Yes  NO  Comments:   V.  REVIEW OF SYSTEMS:  (Pertinent positive or negative)    VI. PHYSICIAL EXAM  HEENT:1.  Dentations   Good Poor          HT:_______ WT:______      2. Neck Pathology: Rheumatoid DDD C-spine  BP:______ P:________  PULMONARY:  CARDIAC  ABDOMEN  EXTREMITIES  OTHER  VII.   Testing Ordered by Surgeon  Reviewed by Clearance Physician  Test      Result  Plan,if Abnormal  ___CBS     WNL  ABN____________________  ___BMP/BUN/CR    WNL  ABN____________________  ___K+      WNL  ABN____________________  ___UA      WNL  ABN____________________  ___CXR     WNL  ABN____________________  ___EKG     WNL  ABN____________________  ___MRSA     WNL  ABN____________________  VIII.  ___Acceptable risk for surgery ___Risk Unacceptable-Communication to Follow

## 2018-10-10 NOTE — TELEPHONE ENCOUNTER
Has MONZON, CHF, PAD. Cannot gauge functional status. Please check Lexiscan prior to procedure. Thanks.

## 2018-11-19 PROBLEM — R33.9 URINARY RETENTION: Status: ACTIVE | Noted: 2018-01-01

## 2018-11-19 PROBLEM — E87.1 HYPONATREMIA: Status: ACTIVE | Noted: 2018-01-01

## 2018-11-19 PROBLEM — N18.9 ACUTE KIDNEY INJURY SUPERIMPOSED ON CHRONIC KIDNEY DISEASE (HCC): Status: ACTIVE | Noted: 2018-01-01

## 2018-11-19 PROBLEM — E87.5 HYPERKALEMIA: Status: ACTIVE | Noted: 2018-01-01

## 2018-11-19 PROBLEM — E87.20 LACTIC ACIDOSIS: Status: ACTIVE | Noted: 2018-01-01

## 2018-11-19 NOTE — PROCEDURES
Bladder scan was completed by CISCO Potter at 1555. The residual amount of 918 ml was reported to Ignacia VALADEZ.

## 2018-11-19 NOTE — ED NOTES
Shabazz catheter site cleaned and pt repositioned was pillow support     Lee Guerra RN  11/19/18 0149

## 2018-11-19 NOTE — H&P
History & Physical        Patient:  Ce Howell  YOB: 1935    MRN: [id number]     Acct: [de-identified]    PCP: GARRETT Melendez CNP    Date of Admission: 11/19/2018    Date of Service: Pt seen/examined on 11/19/18  and Admitted to Inpatient with expected LOS greater than two midnights due to medical therapy. Chief Complaint:  Blood in the urine      History Of Present Illness:    80 y.o. male who presented to [facility] with blood in the urine; he has a chronic indwelling Trujillo cath that was reported to be changed 1 week ago; family noted the urine was discolored yesterday; today the pt \"did not look good\" and his urine was dark and milky; pt c/o penile pain; pt had stool on him upon arrival to ED; Michelle Clark in ER personally changed out the Trujillo cath and noted penile erythema and penile tenderness present;    irritation and tenderness to the inferior aspect of the urethral meatus; it appears torn and flayed open; pus visualized in the trujillo catheter bag as well as darkened urine. Once the Trujillo was replaced there was an immediate return of 900 ml of urine; pt is now stating that he is feeling better; he is currently receiving Rocephin 1 gram along with 0.9 NS; pt is hard of hearing but states he is feeling better now; he is being admitted to the Hospitalist Service for further care and evaluation.      Past Medical History:          Diagnosis Date    Acute kidney failure (HCC)     Anemia     ASHD (arteriosclerotic heart disease)     Blood circulation, collateral     CAD (coronary artery disease)     Cardiomegaly     Chronic diastolic (congestive) heart failure (HCC)     Chronic kidney disease     DM2 (diabetes mellitus, type 2) (HCC)     Dyslipidemia     Falls     GERD (gastroesophageal reflux disease)     Heart failure with preserved ejection fraction (HCC)     Hyperlipidemia     Hypertension     Iron deficiency anemia     Left atrial dilation severe    Lymphoma ([facility] [address]     non hodgkin    Malaise     Nonintractable headache     Osteoarthritis of both knees     Pacemaker 1/25/2018    [employer] DUAL PACEMAKER INSERTED ON 01/  PVD (peripheral vascular disease) ([facility] [address]        Past Surgical History:          Procedure Laterality Date    CARDIAC CATHETERIZATION  02/19/2018    CARDIAC SURGERY      CORONARY ARTERY BYPASS GRAFT      PACEMAKER PLACEMENT  01/12/2018    [facility]    VASCULAR SURGERY         Medications Prior to Admission:      Prior to Admission medications    Medication Sig Start Date End Date Taking? Authorizing Provider   tamsulosin (FLOMAX) 0.4 MG capsule Take 1 capsule by mouth daily 9/7/18   Claudio Gilman MD   Trolamine Salicylate (ASPERCREME EX) Apply topically daily in AM to bilateral knees    Historical Provider, MD   gabapentin (NEURONTIN) 100 MG capsule Take 100 mg by mouth 3 times daily. Ada Morocho Historical Provider, MD   acetaminophen (TYLENOL) 500 MG tablet Take 1,000 mg by mouth every 6 hours as needed for Pain Total acetaminophen dose not to exceed 3000 mg per day.      Historical Provider, MD   polyethylene glycol (GLYCOLAX) powder Take 17 g by mouth as needed     Historical Provider, MD   meclizine (ANTIVERT) 25 MG tablet Take 25 mg by mouth 2 times daily as needed    Historical Provider, MD   Multiple Vitamins-Minerals (MULTIVITAMIN ADULTS 50+ PO) Take 1 tablet by mouth daily     Historical Provider, MD   furosemide (LASIX) 20 MG tablet Take 2 tablets by mouth 2 times daily  Patient taking differently: Take 20 mg by mouth 2 times daily  8/14/18   Mely Mccoy MD   RANEXA 1000 MG extended release tablet TAKE 1 TABLET BY MOUTH TWICE DAILY 6/25/18   Mely Mccoy MD   BYSTOLIC 10 MG tablet TAKE 1 TABLET BY MOUTH DAILY 6/25/18   Mely Mccoy MD   ferrous sulfate 325 (65 Fe) MG tablet Take 1 tablet by mouth 2 times daily (with meals) 6/8/18   Brittaney Chavis MD   amLODIPine (NORVASC) 10 MG tablet Take 1 hematuria and Shabazz in place  Musculoskeletal ROS: negative  Neurological ROS: no syncope, no seizures, no numbness or tingling of hands, no numbness or tingling of feet, no paresis  Dermatology: no skin rash, no eczema  Endocrine: no polyuria, polydypsia, no heat/cold intolerance  Hematology: denies bruising easily, denies bleeding problems, denies clotting disorders    PHYSICAL EXAM:    BP (!) 100/43   Pulse 62   Temp 98 °F (36.7 °C) (Oral)   Resp 16   Ht 5' 9\" (1.753 m)   Wt 160 lb (72.6 kg)   SpO2 98%   BMI 23.63 kg/m²     General appearance:  No apparent distress, appears stated age and cooperative. HEENT:  Normal cephalic, atraumatic without obvious deformity. Pupils equal, round, and reactive to light. Extra ocular muscles intact. Conjunctivae/corneas clear. Oral mucous membranes extremely dry. Neck: Supple, with full range of motion. No jugular venous distention. Trachea midline. Respiratory:  Normal respiratory effort. Clear to auscultation, bilaterally without Rales/Wheezes/Rhonchi. Cardiovascular:  Regular rate and rhythm with normal S1/S2 without murmurs, rubs or gallops. Abdomen: Soft, non-tender, non-distended with normal bowel sounds. Shabazz in place; penile erythema and penile tenderness present; there is irritation and tenderness to the inferior aspect of the urethral meatus; it looks torn and flayed open  Musculoskeletal:  No clubbing, cyanosis or edema bilaterally. Full range of motion without deformity. Skin: Skin color, texture, turgor normal.  No rashes or lesions. Neurologic:  Neurovascularly intact without any focal sensory/motor deficits.  Cranial nerves: II-XII intact, grossly non-focal.  Psychiatric:  Alert and oriented, thought content appropriate, normal insight  Capillary Refill: Brisk,< 3 seconds   Peripheral Pulses: +2 palpable, equal bilaterally       Labs:     Recent Labs      11/19/18   1415   WBC  7.1   HGB  8.8*   HCT  26.7*   PLT  244     Recent Labs 11/19/18   1415   NA  130*   K  5.3*   CL  95*   CO2  21*   BUN  87*   CREATININE  4.1*   CALCIUM  9.5     Recent Labs      11/19/18   1415   AST  17   ALT  6*   BILITOT  0.5   ALKPHOS  98     No results for input(s): INR in the last 72 hours. No results for input(s): Solomon Aver in the last 72 hours. Urinalysis:      Lab Results   Component Value Date    NITRU NEGATIVE 11/19/2018    WBCUA 2-4 09/10/2018    BACTERIA NONE 09/10/2018    RBCUA 5-10 09/10/2018    BLOODU LARGE 11/19/2018    SPECGRAV 1.025 09/10/2018    GLUCOSEU NEGATIVE 11/19/2018       Intake & Output:  No intake/output data recorded. I/O this shift:  In: -   Out: 400 [Urine:400]      Radiology:     CXR: I have reviewed the CXR with the following interpretation: none  EKG:  I have reviewed the EKG with the following interpretation: none    CT ABDOMEN PELVIS WO CONTRAST Additional Contrast? None   Final Result      1. Few calcified layering gallstones. Correlation with serology as clinically warranted. 2. Left hydronephrosis and hydroureter. No obstructing ureteral stone. There is significant distention of the bladder with wall thickening and infiltration. Small amount of air within the bladder wall consistent with cystitis. Small amount of air within    the bladder correlate for recent intervention. 3. Scattered stool in the colon to ball in the rectum. Correlate for constipation. **This report has been created using voice recognition software. It may contain minor errors which are inherent in voice recognition technology. **      Final report electronically signed by Dr. Crow Luis on 11/19/2018 3:47 PM               DVT prophylaxis: [] Lovenox                                 [x] SCDs                                 [] SQ Heparin                                 [] Encourage ambulation           [] Already on Anticoagulation    Code Status: Prior      PT/OT Eval Status:     Disposition:    [] Home       [] TCU       []

## 2018-11-19 NOTE — ED NOTES
Bed: 037A  Expected date:   Expected time:   [facility]  Comments:     Fariba Mcclendon RN  11/19/18 9500

## 2018-11-19 NOTE — ED NOTES
Pt transported to [facility] on cart in stable condition. Floor contacted before transport. Spoke with [place].      eSpideh Flores  11/19/18 4410

## 2018-11-19 NOTE — ED PROVIDER NOTES
[address] [facility]       Chief Complaint   Patient presents with    Hematuria    Other     \"doesnt look good\"       Nurses Notes reviewed and I agreeexcept as noted in the HPI. HISTORY OF PRESENT ILLNESS    Neil Quinn is a 80 y.o. male who presents to the emergency department for a trujillo catheter problem, penile pain, and hematuria/discolored urine. The patient was sent here via his nursing home because the patient did not look well this morning and his urine looks dark and \"milky\". The nurse stated that his trujillo catheter was replaced one week ago and since then, his urine has been this color. His urologist is Dr. Elizabet Hanks. The patient reports penile pain/pain around his catheter as well as lower abdominal pain. Upon evaluation the patient's trujillo catheter bag showed a dark almost red/brown tinted urine with pus located in the urine and his abdomen was distended. Patient does present with a cough with deep inspiration as well. Patient states that he has been having normal bowel movements. He has a history of hypertension, CAD, lymphoma, GERD, pacemaker, anemia, CKD, CHF, and cardiomegaly. He denies any fever, congestion, chills, nausea, vomiting, diarrhea, chest pain, or shortness of breath. Patient has no other pertinent past medical history and has no known allergies. REVIEW OF SYSTEMS     Review of Systems   Constitutional: Negative for appetite change, chills, fatigue and fever. HENT: Negative for congestion, ear pain, rhinorrhea and sore throat. Eyes: Negative for discharge, redness and visual disturbance. Respiratory: Positive for cough (cough with inspiration). Negative for shortness of breath and wheezing. Cardiovascular: Negative for chest pain, palpitations and leg swelling. Gastrointestinal: Positive for abdominal distention and abdominal pain (lower). Negative for diarrhea, nausea and vomiting.    Genitourinary: Positive for penile pain (trujillo catheter no cervical adenopathy. Neurological: He is alert and oriented to person, place, and time. He has normal strength. Gait normal. GCS eye subscore is 4. GCS verbal subscore is 5. GCS motor subscore is 6. No gross deficits observed   Skin: Skin is warm, dry and intact. No rash noted. He is not diaphoretic. No pallor. Psychiatric: He has a normal mood and affect. His speech is normal and behavior is normal. Thought content normal.   Nursing note and vitals reviewed. DIFFERENTIAL DIAGNOSIS:   Including but not limited to: sepsis, infection, trujillo catheter problem, UTI, urine retention, pyelonephritis, GLORIA, CKD, and nephrolithiasis. DIAGNOSTIC RESULTS     EKG: [facility] who either signs or Co-signs this chart in the absence of a cardiologist.  EKG interpreted by Dr. Liat Lakhani. Rate: 61 bpm  TN interval: 284 ms  QRS duration: 96 ms  QTc: 465 ms  P-R-T axes: *, 25, 73  Atrial paced rhythm with prolonged AV conduction  Nonspecific ST and T wave abnormality  No STEMI. RADIOLOGY: non-plain film images(s) such as CT,Ultrasound and MRI are read by the radiologist.  Plain radiographic images are visualized and preliminarily interpreted by the emergency physician unless otherwise stated below. CT ABDOMEN PELVIS WO CONTRAST Additional Contrast? None   Final Result      1. Few calcified layering gallstones. Correlation with serology as clinically warranted. 2. Left hydronephrosis and hydroureter. No obstructing ureteral stone. There is significant distention of the bladder with wall thickening and infiltration. Small amount of air within the bladder wall consistent with cystitis. Small amount of air within    the bladder correlate for recent intervention. 3. Scattered stool in the colon to ball in the rectum. Correlate for constipation. **This report has been created using voice recognition software.  It may contain minor errors which are Oral Oral Oral   SpO2: 98% 99%     Weight:   164 lb (74.4 kg)    Height:         Patient was seen and evaluated by me at bedside for a trujillo catheter problem, penile pain, and hematuria/discolored urine. History and physical exam were obtained which the patient had tenderness to his penis around the trujillo catheter insertion. There is is irritation and tenderness to the inferior aspect of the urethral meatus; it appears torn and flayed open. Pus visualized in the trujillo catheter bag as well as darkened urine. Abdomen is distended consistent with urinary retention. I ordered labs which revealed a 2.6 lacticsepsis, RBC of 2.90, hemoglobin of 8.8, creatinine of 4.1, BUN of 87, NA of 130, and potassium of 5.3. A Ct of his abdomen revealed few calcified layering gallstones. Correlation with serology as clinically warranted. Left hydronephrosis and hydroureter. No obstructing ureteral stone. There is significant distention of the bladder with wall thickening and infiltration. Small amount of air within the bladder wall consistent with cystitis. Small amount of air within the bladder correlate for recent intervention. Scattered stool in the colon to ball in the rectum. Correlate for constipation. I replaced his trujillo catheter due to his physical exam findings, radiologic findings, lab findings, and his pain. He tolerated the procedure well with no complications as explained below. I discussed with Gloria Valentine CNP (urology) who advised admission and changing the trujillo. Based on the patient's presentation and workup here in the department, it was decided to bring the patient into the hospital for admission. We did discuss this plan with the patient and available family and they did agree to the plan of admission. Cari Bass NP of the hospitalist service was consulted regarding this patient and did graciously accept this patient in the hospital for admission.   Patient was admitted to the hospital in fair condition. CRITICAL CARE:   None    CONSULTS:  I discussed patient with Simon Guerra CNP (urology) who will see the patient in consult. He reassured me it is fine to change the catheter despite the appearance of the urethral meatus. He stated it sounds like penile erosion that occurs when there is poor catheter care. Colleen Sullivan NP of the hospitalist service was consulted and accepted the patient for admission. PROCEDURES:  Urethral Catheterization  Date/Time: 11/19/2018 4:44 PM  Performed by: Sundar Daniel  Authorized by: Sundar Daniel     Consent:     Consent obtained:  Verbal and emergent situation    Consent given by:  Patient    Risks discussed:  Infection, pain, urethral injury and false passage  Pre-procedure details:     Procedure purpose:  Therapeutic    Preparation: Patient was prepped and draped in usual sterile fashion    Anesthesia (see MAR for exact dosages): Anesthesia method:  None  Procedure details:     Provider performed due to:  Nurse unable to complete    Catheter insertion:  Indwelling    Catheter type: Shabazz    Catheter size:  16 Fr    Bladder irrigation: no      Number of attempts:  1    Urine characteristics:  Bloody, dark, yellow, cloudy, foul smelling and blood-tinged (pus)  Post-procedure details:     Patient tolerance of procedure: Tolerated well, no immediate complications  Comments:      Patient experienced reduction of abdominal pain after procedure. FINAL IMPRESSION      1. Acute kidney injury superimposed on chronic kidney disease ([address]    2. Urinary retention    3. Urinary tract infection associated with indwelling urethral catheter, initial encounter ([address]    4. 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, initial encounter ([address]          DISPOSITION/PLAN     1. Acute kidney injury superimposed on chronic kidney disease ([address]    2. Urinary retention    3.  Urinary tract infection associated with indwelling urethral catheter, initial encounter ([facility] [facility]    4. 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, initial encounter ([facility] [address]      Admit      (Please note that portions of this note were completed with a voice recognition program.  Efforts were made to edit the dictations but occasionally words aremis-transcribed.)    Scribe:  Negra  11/19/18 3:17 PM Scribing for and in the presence of ESTHER Johnston. Signed by: Shannon Rivera 11/19/18 8:23 PM    Provider:  I personally performed the services described in the documentation, reviewed and edited the documentation which was dictated to the scribe in my presence, and it accuratelyrecords my words and actions.     ESTHER Johsnton 11/19/18 8:23 PM    ESTHER [place]  11/19/18 2037

## 2018-11-20 NOTE — PROGRESS NOTES
[address] [address]  SPEECH THERAPY  [facility] MED 5K  Bedside Swallowing Evaluation      SLP Individual Minutes  Time In: 802  Time Out: 7876  Minutes: 11  Timed Code Treatment Minutes: 0 Minutes       Date: 2018  Patient Name: Chon Nowak      CSN: [id number]   : 1935  (80 y.o.)  Gender: male   Referring Physician:  Dr. Allison Camarillo   Diagnosis: GLORIA   Secondary Diagnosis:  Dysphagia  History of Present Illness/Injury: Patient admitted to [facility] with above dx. See physician H&P for full report. Patient with hx of dysphagia and MBS completed 18 at [facility] IP; recommending dysphagia I with honey thick liquids d/t silent aspiration of thin liquids and pharyngeal residue. ST consulted this hospital admission d/t patient with report of \"food getting stuck\" and s/s of aspiration with thin liquids per nursing. ST to complete BSE and determine safety of oral diet and further POC.     Past Medical History:   Diagnosis Date    Acute kidney failure (HCC)     Anemia     ASHD (arteriosclerotic heart disease)     Blood circulation, collateral     CAD (coronary artery disease)     Cardiomegaly     Chronic diastolic (congestive) heart failure (HCC)     Chronic kidney disease     DM2 (diabetes mellitus, type 2) (HCC)     Dyslipidemia     Falls     GERD (gastroesophageal reflux disease)     Heart failure with preserved ejection fraction (HCC)     Hyperlipidemia     Hypertension     Iron deficiency anemia     Left atrial dilation     severe    Lymphoma (HCC)     non hodgkin    Malaise     Nonintractable headache     Osteoarthritis of both knees     Pacemaker 2018    [employer] DUAL PACEMAKER INSERTED ON D (peripheral vascular disease) (HCC)        Pain:  0/10    Current Diet: Dysphagia I with thin liquids (Dysphagia I requested by patient)     Respiratory Status: [] Independent [x] Nasal Cannula [] Oxygen Mask      [] Tracheostomy [] Other:     [] Ventilator/Settings:    Behavioral Observation: [x] Alert [] Oriented [] Confused [] Lethargic      [] Dysarthric [x] Limited Direction Following [] Agitated      [] Other:    ORAL MECHANISM EVALUATION:         Comments:  Facial / Labial [x]WFL [] Impaired []DNT    Lingual [x]WFL [] Impaired []DNT    Dentition []WFL [x] Impaired []DNT    Velum [x]WFL [] Impaired []DNT    Vocal Quality []WFL [x] Impaired []DNT    Sensation []WFL [x] Impaired []DNT    Cough []WFL [x] Impaired []DNT    Other: []WFL [] Impaired []DNT    Other: []WFL [] Impaired []DNT        PATIENT WAS EVALUATED USING:  Thin, nectar, puree     ORAL PHASE: [] WFL  [x] Impaired   [] Loss of bolus from lips [] Impaired AP movement [] Pocketing Left   [] Pocketing Right  [] Lingual Pumping  [x] Impaired Mastication   [] Reduced Bolus Formation [] Impaired Oral Initiation    [] OTHER:    PHARYNGEAL PHASE: [] WFL: Pharyngeal phase appears WFLs, but cannot completely rule out pharyngeal phase deficits from a bedside swallow evaluation alone. [x] Impaired   [x] Delayed Swallow  [x] Decreased Hyolaryngeal Elevation [] Audible Swallow   [x] Suspected Pharyngeal Residue due to spontaneous multiple swallow. [] OTHER:    SIGNS AND SYMPTOMS OF LARYNGEAL PENETRATION / ASPIRATION:  [] NO sign/symptoms of aspiration evident with this evaluation, but cannot rule out silent aspiration. [x] Throat Clear  [x] Immediate Cough [] Delayed Cough [x] Wet Vocal Quality  [x] Change in Pulmonary Status  [] OTHER:    IMPRESSIONS: Patient presents with moderate oropharyngeal dysphagia as evidence by the findings outlined above. Patient reporting, \"Food is getting stuck. \"  Patient also with refusal of hard solids and requesting puree. Patient with hx of dysphagia; most recent MBS completed 09/11 documenting silent and audible aspiration of thin liquids, laryngeal penetration of nectar and recommendation dysphagia I with honey thick liquids.   Per ALLYSON Hanna, \"Patient from [facility] with recommendation of mechanical soft with return to thin liquids. \" RN Spencer French reports, \"Patient with rhonchi in lungs and intermittent coughing with thin liquids. \"  Patient sleeping upon arrival; pleasantly woke with verbal cues. Patient agreeable to PO trials. Patient completed PO trial of thin liquids; immediate wheezing noted. Following cue to \"cough\" patient with immediate wet vocal quality and SOB. Patient completed additional PO trials of 4oz nectar thick juice, no s/s of aspiration. Patient reporting, \"That went better. \"  No difficulty noted with puree x4. Patient refused hard solid reporting \"cracker is even worse. \"      Recommend dysphagia I with NECTAR thick liquids with plan to complete MBS 11/21 d/t hx of dysphagia and hx of silent aspiration with thin liquids. Dr. Jossie Jimenez aware and agreeable to ST POC. Dr. Jossie Jimenez also with report, \"GI has been consulted as well d/t esophageal dysphagia. \"      RECOMMENDATIONS:     Modified Barium Swallow: [x] Is indicated to further assess      DIET RECOMMENDATIONS:  Dysphagia I and nectar thick liquids, NO straws     STRATEGIES: [] Strategies pending MBS results. [x] Full upright position  [x] Small bite/sip [x] No Straw [] Multiple Swallow  [] Chin tuck [] Head turn [x] Pulmonary monitoring [] Oral care after all meals  [] Supervision  [] Medication in applesauce [x]Direct 1:1 Supervision  [] Spoon all liquids [] Alternate solid / liquid [x] Limit distractions [x] Monitor for fatigue  [] PMV in place for all po [] OTHER:      EDUCATION:   Learner: [x]Patient [] Significant other [] Son/Daughter [] Parent     [] Other:   Education: [x] Reviewed results and recommendations of this evaluation     [x] Reviewed diet and strategies     [x] Reviewed signs, symptoms and risk of aspiration     [] Demonstrated how to thick liquid appropriately.      [x] Reviewed goals and Plan of Care     [] OTHER:   Method: [x] Discussion [x] Demonstration [] Hand-out     [] OTHER:   Evaluation

## 2018-11-20 NOTE — CARE COORDINATION
11/20/18, 11:35 AM    DISCHARGE BARRIERS    Patient admitted from [facility]. Spoke with Karishma Gardiner at facility-patient is a long term resident and is a medicaid bed hold. He does not have any skilled days available and will return under his medicaid benefit. Call to son to confirm discharge plan-left message for him to contact SW. Full assessment deferred.

## 2018-11-20 NOTE — PROGRESS NOTES
Hospitalist Progress Note    Patient:  [facility]      Unit/Bed:5K-04/004-A    YOB: 1935    MRN: [id number]       Acct: [de-identified]     PCP: GARRETT Lutz CNP    Date of Admission: 11/19/2018    Chief Complaint: blood in urine    Hospital Course:     Please see H/P for details. In summary this is a 80 y.o. Male,  who presented to [facility] on 11/19/18 with blood in the urine; he has a chronic indwelling Trujillo cath for about 4-6 weeks now for urinary retention, that was reported to be changed 1 week ago; per H/P note, family noted the urine was discolored 1 day prior admission; and patient \"did not look good\" and his urine was dark and milky on day of admission; pt c/o penile pain; pt had stool on him upon arrival to ED; Marilee Saldana in ER personally changed out the Trujillo cath and noted penile erythema and penile tenderness present;  irritation and tenderness to the inferior aspect of the urethral meatus; it appears torn and flayed open; pus visualized in the trujillo catheter bag as well as darkened urine. Once the Trujillo was replaced there was an immediate return of 900 ml of urine; pt is now stating that he is feeling better; he is currently receiving Rocephin 1 gram along with 0.9 NS; pt is hard of hearing but states he is feeling better now; he is being admitted to the Hospitalist Service for further care and evaluation on 11/19/18. Pt also had GLORIA on admission, creatinine was 4.1 ( baseline creatinine between 1.3 to 1.8. Creatinine on 9/13/18 was 1.6). Urology was consulted on admission. He was seen by GARRETT Ryan CNP ( Urology) on 11/20/2018, who recommend leave trujillo catheter, bacitracin on penis tip TID prn, cont rocephin, renal US 11/21/18. Urine culture came back (+) proteus species, >100,000 CFU/mL. CT abd/pelvis on admission showed left hydronephrosis and hydroureter. No obstructing ureteral stone.  There is significant distention of the bladder with wall thickening and infiltration. Small amount of air within the bladder wall consistent with cystitis. Small amount of air within the bladder correlate for recent intervention. Scattered stool in the colon to ball in the rectum. Correlate for constipation. Subjective:     Pt seen and examined. Pt reports still having penile pain. Denies fever, chills, N/V, abd pain. He does report dry cough x 2 weeks, accompanied by sob when coughing. Denies smoking. He also reports choking sensation x 1 month, accompanied by dysphagia and odynophagia. Pt denies melena, rectal bleeding and hematemesis. Denies chest pain, sob, palpitations. Medications:  Reviewed    Infusion Medications    sodium chloride 75 mL/hr at 11/20/18 0431    dextrose       Scheduled Medications    neomycin-bacitracin-polymyxin   Topical BID    cefTRIAXone (ROCEPHIN) IV  1 g Intravenous Q24H    guaiFENesin  600 mg Oral BID    amLODIPine  10 mg Oral Daily    atorvastatin  20 mg Oral Daily    nebivolol  10 mg Oral Daily    ferrous sulfate  325 mg Oral BID WC    multivitamin  1 tablet Oral Daily    pantoprazole  40 mg Oral QAM AC    ranolazine  1,000 mg Oral BID    tamsulosin  0.4 mg Oral Daily    sodium chloride flush  10 mL Intravenous 2 times per day    insulin lispro  0-6 Units Subcutaneous TID WC    insulin lispro  0-3 Units Subcutaneous Nightly     PRN Meds: sodium chloride flush, magnesium hydroxide, ondansetron, glucose, dextrose, glucagon (rDNA), dextrose      Intake/Output Summary (Last 24 hours) at 11/20/18 1329  Last data filed at 11/20/18 0957   Gross per 24 hour   Intake             1672 ml   Output             1750 ml   Net              -78 ml       Diet:  Dietary Nutrition Supplements: Low Calorie High Protein Supplement  DIET CARB CONTROL;  Dysphagia I Pureed    Exam:  BP (!) 108/55   Pulse 68   Temp 97.8 °F (36.6 °C) (Oral)   Resp 16   Ht 5' 9\" (1.753 m)   Wt 164 lb (74.4 kg)   SpO2 97%   BMI 24.22 kg/m² General appearance: alert, not in acute distress   HEENT: Pupils equal, round, and reactive to light. Conjunctivae clear. Clear oral mucosa   Neck: Supple, with full range of motion. No jugular venous distention. Trachea midline. Respiratory:  Normal respiratory effort. (+) rhonchi, no wheezing, no rales   Cardiovascular: Regular rate and rhythm with normal S1/S2 without murmurs, rubs or gallops. Abdomen: Soft, non-tender, non-distended with normal bowel sounds. : (+) erythema on tip of penis, (+) trujillo cath in placed   Buttocks: (+) erythema on sacral area   Musculoskeletal: passive and active ROM x 4 extremities. Neuro exam: alert and oriented x 3   Exam of extremities: peripheral pulses normal, no pedal edema, no clubbing or cyanosis. SCD in placed. Chaperoned by Nurse Maris     Labs:   Recent Labs      11/19/18   1415  11/20/18   0702   WBC  7.1  7.2   HGB  8.8*  8.0*   HCT  26.7*  24.2*   PLT  244  197     Recent Labs      11/19/18   1415  11/20/18   0702   NA  130*  136   K  5.3*  4.5   CL  95*  102   CO2  21*  21*   BUN  87*  74*   CREATININE  4.1*  3.5*   CALCIUM  9.5  8.8     Recent Labs      11/19/18   1415   AST  17   ALT  6*   BILITOT  0.5   ALKPHOS  98     No results for input(s): INR in the last 72 hours. No results for input(s): Delcie Folsom in the last 72 hours. Urinalysis:    Lab Results   Component Value Date    NITRU NEGATIVE 11/19/2018    WBCUA > 200 11/19/2018    BACTERIA MANY 11/19/2018    RBCUA 10-15 11/19/2018    BLOODU LARGE 11/19/2018    SPECGRAV 1.025 09/10/2018    GLUCOSEU NEGATIVE 11/19/2018     Proteus species     Urine Culture Reflex 11/19/2018  4:50  [facility] [address] [facility]   [place] count: >100,000 CFU/mL    Testing Performed By     Lab - [address] Name Director Address Valid Date Range   957-CI - [address] Saravanan Heredia MD [facility] [address] 08/30/17 1255-Present   Narrative Source: cath urine       Site: catheter          Current Antibiotics: Ciprofloxacin       Radiology:  CT ABDOMEN PELVIS WO CONTRAST Additional Contrast? None   Final Result      1. Few calcified layering gallstones. Correlation with serology as clinically warranted. 2. Left hydronephrosis and hydroureter. No obstructing ureteral stone. There is significant distention of the bladder with wall thickening and infiltration. Small amount of air within the bladder wall consistent with cystitis. Small amount of air within    the bladder correlate for recent intervention. 3. Scattered stool in the colon to ball in the rectum. Correlate for constipation. **This report has been created using voice recognition software. It may contain minor errors which are inherent in voice recognition technology. **      Final report electronically signed by Dr. Svetlana Rios on 11/19/2018 3:47 PM      US RENAL LIMITED    (Results Pending)   XR CHEST STANDARD (2 VW)    (Results Pending)             Assessment/Plan: This is a 80 y.o. Male admitted for GLORIA and UTI     1. GLORIA, possibly due to urinary retention, improving    -cont IVF  -BMP in am   -nephro consult    2. Proteus sp UTI like due to indwelling Trujillo cath     -cont Rocephin  -awaiting urine cx final report     3. Urinary retention w left hydronephrosis and hydroureter    -Urology on-board. Appreciate Urology input. GARRETT Wheat - CNP ( Urology) saw patient 11/20/2018, who recommend leave trujillo catheter, bacitracin on penis tip TID prn, cont rocephin, renal US 11/21/18. Urology said poor candidate for surgery due to high risk ( Patient had lexiscan Stress test, on 10/17/18 which showed mild apico-lateral perfusion defect worse with stress that resolves with rest suggestive of ischemia, no evidence of infarction is noted on this study, left ventricular systolic function was normal.). 4. Non-AG Metabolic acidosis, mild    -repeat BMP in am   -cont IVF    5. Penile erythema    -urology ordered neosporin oint  -monitor    6. pressure ulcer, stage 1    -start desitin paste  -turn patient q 2hours    7. Cough    -cxr  -procalcitonin elevated on admission. Repeat procalcitonin   -start Mucinex  -already on Rocephin     8. Dysphagia and odynophagia    -speech eval  -GI consult  -modify diet to dysphagia 1 pureed    9. Normocytic anemia    -Hgb dropped from  8.8 on admission to 8.0 on 11/20/2018, possibly from IVF  -baseline Hgb between 9-10  -pt is asymptomatic   -check iron, B12 and folate      10. HTN    -BP low normal  -cont amlodipine and Bystolic w holding parameters  -VS per protocol     11. Hyperlipidemia    -cont lipitor   -low fat diet     12. CAD    -lexiscan stress test 10/17/18 showed mild apico-lateral perfusion defect worse with stress that   resolves with rest suggestive of ischemia in the distal LAD and/or distal   LCX territory. No evidence of infarction is noted on this study. Left ventricular systolic function was normal.  -on asa and plavix, however due to hematuria, plavix on hold  -cont statin and bystolic  -cardio consult for further eval and mx and cardio clearance for EGD w dilatation   -pt is asymptomatic     Anticipated Discharge in : 3-5 days       Diet: Dietary Nutrition Supplements: Low Calorie High Protein Supplement  DIET CARB CONTROL;  Dysphagia I Pureed    DVT prophylaxis: [] Lovenox                                 [x] SCDs                                 [] SQ Heparin                                 [] Encourage ambulation           [] Already on Anticoagulation     Disposition:    [] Home       [] TCU       [] Rehab       [] Psych       [] SNF       [] [place]       [x] Other-awaiting nephro, GI and speech consult     Code Status: Full Code      Electronically signed by Gisella Beltre MD on 11/20/2018 at 1:29 PM

## 2018-11-20 NOTE — CONSULTS
STR ED to [address] performed by: Akbar Evans ordered by: Oly Marie        Urology Consult Note      Reason for Consult:  Hydronephrosis   Requesting Physician:  Paty Jansen PA-C    History Obtained From:  patient, electronic medical record    Chief Complaint: Tip of penis discomfort    HISTORY OF PRESENT ILLNESS:                The patient is a 80 y.o. male with significant past medical history of coronary artery disease with pacemaker placement, urinary retention, and BPH with prostatic obstruction who presents with new hematuria to the ER. He was last seen in our office on 10/7/18 for a cystoscopy. His urine was dark in color with reports of milky urine and penile pain. Urethral erosion was noted at admission and catheter instructed to be changed in the ER since not performed at nursing home. There was an immediate release of around 1 Liter following catheter exchange. John Dos Santos does complain of catheter related pain but denies any abdominal or flank pain. He denies any dysuria, frequency, urgency, nocturia, or feelings of incomplete bladder emptying. He also denies any f/ch/n/v or cp. He does admit to SOB on exertion. He currently resides at [facility] and has a chronic trujillo catheter. Our office has seen John Dos Santos recently for BPH with indications for TURP. However, following a recent Lexiscan stress test, he is not a good surgical candidate due to high risk. Plans to leave catheter chronically vs. Suprapubic placement discussed previously. History gathered from patient and medical record. No family present.     Past Medical History:        Diagnosis Date    Acute kidney failure (HCC)     Anemia     ASHD (arteriosclerotic heart disease)     Blood circulation, collateral     CAD (coronary artery disease)     Cardiomegaly     Chronic diastolic (congestive) heart failure (HCC)     Chronic kidney disease     DM2 [facility]diabetes mellitus, type 2) ([facility] [address]     Dyslipidemia     Falls     GERD (gastroesophageal reflux disease)     Heart failure with preserved ejection fraction (HCC)     Hyperlipidemia     Hypertension     Iron deficiency anemia     Left atrial dilation     severe    Lymphoma (HCC)     non hodgkin    Malaise     Nonintractable headache     Osteoarthritis of both knees     Pacemaker 1/25/2018    [employer] DUAL PACEMAKER INSERTED ON 01/  PVD (peripheral vascular disease) ([facility] [address]      Past Surgical History:        Procedure Laterality Date    CARDIAC CATHETERIZATION  02/19/2018    CARDIAC SURGERY      CORONARY ARTERY BYPASS GRAFT      PACEMAKER PLACEMENT  01/12/2018    [facility]    VASCULAR SURGERY       Allergies:  Patient has no known allergies. Social History     Social History    Marital status:      Spouse name: N/A    Number of children: 0    Years of education: N/A     Occupational History    Not on file. Social History Main Topics    Smoking status: Never Smoker    Smokeless tobacco: Never Used    Alcohol use No    Drug use: No    Sexual activity: No     Other Topics Concern    Not on file     Social History Narrative    No narrative on file       Family History:   History reviewed. No pertinent family history. ROS:  Constitutional: Negative for chills, fatigue, fever, or weight loss. Eyes: Denies reported visual changes. ENT: Denies headache, difficulty swallowing, earache, and nosebleeds. Cardiovascular: Negative for chest pain, palpitations, tachycardia or edema. Respiratory: Denies cough or SOB. GI:The patient denies abdominal or flank pain, anorexia, nausea or vomiting. : see HPI. Musculoskeletal: Patient denies low back pain or painful or reduced range of ROM. Neurological: The patient denies any symptoms of neurological impairment or TIA. Psychiatric: Denies anxiety or depression. Skin: Denies rash or lesions. All remaining ROS negative.     PHYSICAL EXAM:  VITALS: 11/07/2011     BUN/Creatinine:    Lab Results   Component Value Date    BUN 74 11/20/2018    CREATININE 3.5 11/20/2018     Magnesium:    Lab Results   Component Value Date    MG 1.9 09/12/2018     Phosphorus:    Lab Results   Component Value Date    PHOS 3.5 09/10/2018     PT/INR:    Lab Results   Component Value Date    INR 1.02 09/10/2018     U/A:    Lab Results   Component Value Date    COLORU YELLOW 11/19/2018    PHUR 8.5 11/19/2018    LABCAST 0-4 HYALINE 09/10/2018    LABCAST 0-4 FINE GRAN 09/10/2018    WBCUA > 200 11/19/2018    RBCUA 10-15 11/19/2018    MUCUS THREADS 09/10/2018    YEAST NONE SEEN 11/19/2018    BACTERIA MANY 11/19/2018    SPECGRAV 1.025 09/10/2018    LEUKOCYTESUR LARGE 11/19/2018    UROBILINOGEN 0.2 11/19/2018    BILIRUBINUR NEGATIVE 11/19/2018    BLOODU LARGE 11/19/2018    GLUCOSEU NEGATIVE 11/19/2018    AMORPHOUS DEBRIS 09/10/2018       Imaging: The patient has had a CT Abdomen and Pelvis Without Contrast which I have reviewed along with its accompanying report. The study demonstrates left hydronephrosis and hydroureter without evidence of obstructing stone. Significant for bladder distention with wall thickening and air consistent with acute cystitis vs. Recent cystoscopy. IMPRESSION:   Urinary Retention  Urinary Tract Infection associated to indwelling catheter  Hydronephrosis  Hydroureter  BPH with obstruction  Erosion of urethra due to indwelling catheter    Plan:    Leave catheter and do not remove. Catheter care including securement of trujillo tubing to leg via statlock. Bacitracin to tip of penis TID prn. Use guaze to support meatus and avoid further erosion of urethra by indwelling catheter. Continue Rocephin - UC + Proteus Species    Renal US tomorrow to assess hydro. Creatinine improved today 3.5 from 4.1. Patient had Stress test, Wayne Ordonez on 10/17 which showed mild apico-lateral perfusion defect worse with stress that resolves with rest suggestive of ischemia.   Poor candidate for surgery due to high risk. Plan to leave catheter long term vs. Discuss option for SP cath placement.       Thank you for including us in the care of GARRETT FELIX  11/20/18 11:56 AM  Urology

## 2018-11-20 NOTE — PLAN OF CARE
Problem: DISCHARGE BARRIERS  Goal: Patient's continuum of care needs are met  Outcome[facility].

## 2018-11-20 NOTE — PROGRESS NOTES
muscle mass loss,    5. Fluid Accumulation-No significant fluid accumulation,    6.  Strength-Not measured    Nutrition Risk Level: High    Nutrient Needs:  · Estimated Daily Total Kcal: 1850+ (25+ kcals/kg current weight of 74 kg as of 11/19/18)  · Estimated Daily Protein (g): 1.0+ (74+ grams/kg current weight of 74 kg as of 11/19/18) - monitor renal labs.     Nutrition Diagnosis:   · Problem: Unintended weight loss  · Etiology: related to Difficulty swallowing     Signs and symptoms:  as evidenced by Patient report of, Weight loss (coughing with liquids)    Objective Information:  · Nutrition-Focused Physical Findings: chronic trujillo, no edema  · Wound Type: Deep Tissue Injury (coccyx DTI & penis breakdown)  · Current Nutrition Therapies:  · Oral Diet Orders:  (carb controlled)   · Oral Diet intake: 26-50%  · Oral Nutrition Supplement (ONS) Orders: Low Calorie, High Protein (Ensure high protein TID)  · ONS intake: Unable to assess (new order)  · Anthropometric Measures:  · Ht: 5' 9\" (175.3 cm)   · Current Body Wt: 164 lb (74.4 kg) (11/19/18, bedscale, no edema)  · Admission Body Wt: 164 lb (74.4 kg) (11/19/18, bedscale, no edema)  · Usual Body Wt: 168 lb (76.2 kg) (EMR, standing scale, 9/10/18)  · % Weight Change:  ,  4# (2% of body weight) per EMR in 2 months; 5# (3% of body weight) per pt in 2 weeks  · Ideal Body Wt: 160 lb (72.6 kg), % Ideal Body 103%  · BMI Classification: BMI 18.5 - 24.9 Normal Weight (24.3)    Nutrition Interventions:   Continue current diet, Start ONS (Recommend SLP swallow eval.)  Continued Inpatient Monitoring, Speech Therapy, Coordination of Care, Education Not Indicated    Nutrition Evaluation:   · Evaluation: Goals set   · Goals: Patient will safely swallow and consume 75% or greater of most meals and ONS during LOS    · Monitoring: Nutrition Progression, Meal Intake, Supplement Intake, Diet Tolerance, Skin Integrity, I&O, Weight, Pertinent Labs, Chewing/Swallowing, Monitor Bowel Function      Electronically signed by Jmaes Shah RD, LD on 11/20/18 at 10:42 AM    Contact Number: [phone]

## 2018-11-20 NOTE — PROGRESS NOTES
Chief Complaint:  Dysphagia and odynophagia    History of present Illness: Didi Glover is a 80 y.o. male, known to our practice, admitted to [address] [place] [address] for UTI and penile wound. GI consult was requested due to patient's complaints of dysphagia. We previously saw him in [address] [place] [address] on 9/11/18, also for complaints of dysphagia. The patient has also been evaluated by Speech Therapy and underwent MBS on 9/11/2018 - see results below. The patient was re-evaluated by Speech Therapy today and was noted to have impaired swallowing with oropharyngeal dysphagia and is scheduled for repeat MBS tomorrow morning. Currently, the patient is taking a Dysphagia I diet with nectar thickened liquids. When the patient was evaluated in September, EGD with esophageal dilation was recommended, however, the patient was noted to be on DAPT with Aspirin and Plavix. This could not be stopped while hospitalized to allow EGD with esophageal dilation. The patient was to follow up in the office in October, however, his insurance did not provide authorization for the visit and the patient has not been seen. EGD has not been done at this time. The patient is noted to be taking pantoprazole and denies GERD symptoms. He denies abdominal pain or N/V at this time. He complains of dysphagia with liquids, solid foods and medications. The RN is the bedside and he was able to take medications in pudding without difficulty. He denies changes in weight or appetite. He denies changes in bowel habits, constipation, or diarrhea. He states he did see \"black\" stools 1-2 months ago. The RN reports his stool today was brown. He denies hematochezia. He reports his mother had cancer but he cannot recall what kind. He denies ever having an EGD or colonoscopy. The patient is noted to be anemic with Hgb 8.0 and Hct 24.2. This is down from 9.4 and 26.7 during his September admission. Patient does take oral iron replacement.       Past Medical History:  has a past medical history of Acute kidney failure ([facility] Anemia; ASHD (arteriosclerotic heart disease); Blood circulation, collateral; CAD (coronary artery disease); Cardiomegaly; Chronic diastolic (congestive) heart failure ([facility] Chronic kidney disease; DM2 (diabetes mellitus, type 2) ([facility] Dyslipidemia; Falls; GERD (gastroesophageal reflux disease); [facility] failure with preserved ejection fraction ([facility] Hyperlipidemia; Hypertension; Iron deficiency anemia; Left atrial dilation; Lymphoma ([facility] Malaise; Nonintractable headache; Osteoarthritis of both knees; Pacemaker; and PVD (peripheral vascular disease) ([facility] Past Surgical History:   Past Surgical History:   Procedure Laterality Date    CARDIAC CATHETERIZATION  02/19/2018    CARDIAC SURGERY      CORONARY ARTERY BYPASS GRAFT      PACEMAKER PLACEMENT  01/12/2018    [facility]    VASCULAR SURGERY         Social History:  reports that he has never smoked. He has never used smokeless tobacco. He reports that he does not drink alcohol or use drugs. Family History: family history is not on file. Review of Systems:   General: Denies fever, chills, night sweats, weight loss, malaise, fatigue  (+) intermittent confusion  HEENT: Denies sore throat, sinus problems, allergic rhinosinusitis  Card: Denies chest pain, palpitations, orthopnea/PND. Denies h/o murmurs  (+) hx of CAD, CHF  Pulm: Denies cough, shortness of breath, MONZON  GI:  per HPI and denies h/o jaundice  : Denies polyuria, dysuria, hematuria  Endo: Denies diabetes, thyroid disease. Heme: Denies  h/o bleeding or clotting problems. (+) anemia, history of lymphoma  Neuro: Denies h/o CVA, TIA  Skin: Denies rashes, ulcers  Musculoskeletal: Denies muscle, joint, back pain. Allergies: Patient has no known allergies. Home Meds:   Prior to Admission medications    Medication Sig Start Date End Date Taking?  Authorizing Provider   tamsulosin (FLOMAX) 0.4 MG capsule Take 1 capsule by tablet Take 1,000 mg by mouth every 6 hours as needed for Pain Total acetaminophen dose not to exceed 3000 mg per day.      Historical Provider, MD   polyethylene glycol (GLYCOLAX) powder Take 17 g by mouth as needed     Historical Provider, MD   meclizine (ANTIVERT) 25 MG tablet Take 25 mg by mouth 2 times daily as needed    Historical Provider, MD       Current Meds:  Current Facility-Administered Medications:     neomycin-bacitracin-polymyxin (NEOSPORIN) ointment, , Topical, BID, GARRETT Martins CNP, 1 g at 11/20/18 1501    cefTRIAXone (ROCEPHIN) 1 g IVPB in 50 mL D5W minibag, 1 g, Intravenous, Q24H, GARRETT Martins CNP, Stopped at 11/20/18 1536    guaiFENesin (MUCINEX) extended release tablet 600 mg, 600 mg, Oral, BID, Yoko Carter MD, 600 mg at 11/20/18 1501    zinc oxide 40 % paste, , Topical, BID, Yoko Carter MD    amLODIPine (NORVASC) tablet 10 mg, 10 mg, Oral, Daily, Yoko Carter MD    atorvastatin (LIPITOR) tablet 20 mg, 20 mg, Oral, Daily, GARRETT Acevedo - CNP, 20 mg at 11/20/18 1005    nebivolol (BYSTOLIC) tablet 10 mg, 10 mg, Oral, Daily, Yoko Carter MD    ferrous sulfate tablet 325 mg, 325 mg, Oral, BID , GARRETT Acevedo - CNP, 325 mg at 11/20/18 1718    multivitamin 1 tablet, 1 tablet, Oral, Daily, GARRETT Acevedo - CNP, 1 tablet at 11/20/18 1005    pantoprazole (PROTONIX) tablet 40 mg, 40 mg, Oral, QAM , GARRETT Acevedo - CNP, 40 mg at 11/20/18 1968    ranolazine (RANEXA) extended release tablet 1,000 mg, 1,000 mg, Oral, BID, GARRETT Acevedo - CNP, 1,000 mg at 11/20/18 1005    tamsulosin (FLOMAX) capsule 0.4 mg, 0.4 mg, Oral, Daily, GARRETT Acevedo CNP, 0.4 mg at 11/20/18 1005    sodium chloride flush 0.9 % injection 10 mL, 10 mL, Intravenous, 2 times per day, GARRETT Acevedo CNP    sodium chloride flush 0.9 % injection 10 mL, 10 mL, Intravenous, PRN, GARRETT Knight CNP   oriented, no gross focal motor deficits    Labs:   Lab Results   Component Value Date    WBC 7.2 11/20/2018    HGB 8.0 11/20/2018    HCT 24.2 11/20/2018    MCV 92.7 11/20/2018     11/20/2018     Lab Results   Component Value Date     11/20/2018    K 4.5 11/20/2018     11/20/2018    CO2 21 11/20/2018    BUN 74 11/20/2018    CREATININE 3.5 11/20/2018    GLUCOSE 192 11/20/2018    GLUCOSE 221 11/07/2011    CALCIUM 8.8 11/20/2018     Lab Results   Component Value Date    ALKPHOS 98 11/19/2018    ALT 6 11/19/2018    AST 17 11/19/2018    PROT 6.9 11/19/2018    BILITOT 0.5 11/19/2018    BILIDIR <0.2 09/10/2018    LABALBU 2.6 11/19/2018     Lab Results   Component Value Date    LACTA 3.9 09/10/2018     Lab Results   Component Value Date    AMYLASE 53 08/08/2018     Lab Results   Component Value Date    LIPASE 18.8 09/10/2018     Lab Results   Component Value Date    INR 1.02 09/10/2018     Results for Demarco Villatoro (MRN [id number]) as of 11/20/2018 17:35   Ref. Range 11/20/2018 13:32   Ferritin Latest Ref Range: 22 - 322 ng/mL 933 (H)   Iron Latest Ref Range: 65 - 195 ug/dL 33 (L)   Folate Latest Ref Range: 4.8 - 24.2 ng/mL > 20.0   Vitamin B-12 Latest Ref Range: 211 - 911 pg/mL 681     Radiology:  PROCEDURE: CT ABDOMEN PELVIS WO CONTRAST  11/19/2018   CLINICAL INFORMATION: pain .   COMPARISON: 9/2/2018   TECHNIQUE: Axial 5 mm CT images were obtained through the abdomen and pelvis. No contrast was given. Coronal reconstructions were obtained.   All CT scans at this facility use dose modulation, iterative reconstruction, and/or weight-based dosing when appropriate to reduce radiation dose to as low as reasonably achievable.     FINDINGS:   Limitations: Evaluation of the solid and hollow viscera is limited due to the lack of IV contrast.   Minimal atelectasis predominantly in the left lung base correlation advised. Small hiatal hernia. Vascular calcifications. Calcified granulomas in the spleen. Ventilator/Settings:     Behavioral Observation:      [x] Alert    [x] Oriented           [] Confused        [] Lethargic                                                  [] Dysarthric       [] Limited Direction Following  [] Agitated          [] Other:     PATIENT WAS EVALUATED USING:  Thin, nectar, honey, puree, soft solids, hard solids      ORAL PREPARATION PHASE:       [] WFL                [x] Impaired              [x] Slow mastication        [x] Uncoordinated mastication    [] Decreased bolus control              [x] Decreased bolus formation    [] Loss of bolus from lips / Anterior spillage              [] OTHER:     ORAL PHASE:           [] WFL                [x] Impaired              [] Residue in the anterior sulcus           [] Residue in the lateral sulcus - right              [] Residue in the lateral sulcus - left     [] Uncoordinated AP movement              [x] Slow AP movement                           [x] Decreased lingual elevation              [x] Decreased tongue base retraction    [x] Uncontrolled bolus / diffuse fall lover tongue base              [] Piecemeal deglutition               [] OTHER:              ORAL PHASE UGO SCORE: (Dysphagia outcome and severity scale)  [x] 3 = Moderate dysphagia; Total assist with strategies; Two or more consistencies restricted; Moderate oral residue clears with cue;       PHARYNGEAL PHASE:        [] WFL                [x] Impaired              [] Absent Swallow                     [x] Delayed Swallow       [] Decreased airway protection              [x] Decreased epiglottic inversion          [x] Decreased hyolaryngeal elevation              [x] Residue in the valleculae-severe; cleared to moderate              [x] Residue in the pyriform sinus-moderate; cleared to mild                    [] Cricopharyngeal dysfunction             [] Residue along posterior pharyngeal wall              [] Residue along the ariepiglottic folds.                [] noted with the pyriform sinus cavities; clearing to mild. HOWEVER, patient remains at [address] [place] [address] for aspiration of pharyngeal residue and decreased endurance. Patient with poor performance initiating \"additional dry swallows\" following the bolus, secondary to overall pharyngeal weakness. Patient with POOR hyolaryngeal eluviation and airway protection. Consistent laryngeal penetration noted with thin and nectar thick liquids, despite use of direct verbal cues of compensatory swallowing strategies. Patient with eventual aspiration of thin liquids; with potential additional residue of nectar thick material d/t pharyngeal residue build up. NO penetration or aspirations noted with honey thick liquids, puree and soft solids; however, patient certainly remains at [address] [place] [address] for aspiration d/t mild-moderate amount of pharyngeal residue remaining following completion of the study. Recommend dysphagia II with honey thick liquids AND use of all compensatory swallowing strategies to reduce risk of aspiration and maintain adequate hydration and nutrition. Patient with NO cough during study; however, delayed cough noted following completion of the study (no fluro). Patient would greatly benefit from skilled ST services to target dysphagia needs to potentially return to baseline swallowing function. Also recommend GI f/u due to small amount of reflux and build up of residue at the level of the UES.        DIET RECOMMENDATIONS:  Dysphagia II with HONEY thick liquids      STRATEGIES:              [x] Full upright position                [x] Small bite/sip    [x] No Straw                                [x] Multiple Swallow          [x] Pulmonary monitoring            [x] Oral care after all meals  [x] Supervision                            [x] Medication in applesauce                    [x] Alternate solid / liquid [x] Limit distractions          [x] Monitor for fatigue     EDUCATION:              Learner:           [x]Patient

## 2018-11-20 NOTE — PLAN OF CARE
Problem: Falls - Risk of:  Goal: Will remain free from falls  Will remain free from falls   Outcome: Ongoing  Patient free from falls. Alert to name and place, occasionally alert to time and situation. Problem: DAILY CARE  Goal: Daily care needs are met  Outcome: Ongoing  Patient assists with daily care needs    Problem: SKIN INTEGRITY  Goal: Skin integrity is maintained or improved  Outcome: Ongoing  Patient has DTI to coccyx-intact. Patient does well turning every 2 hours. Skin intact otherwise    Problem: KNOWLEDGE DEFICIT  Goal: Patient/S.O. demonstrates understanding of disease process, treatment plan, medications, and discharge instructions. Outcome: Ongoing  Patient instructed on care of hydronephrosis-urology to see patient in morning. 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 Problem: Nutrition  Goal: Optimal nutrition therapy  Outcome: Ongoing  Patient to have swallow eval, able to tolerate carb control diet    Comments: Care plan reviewed with patient. Patient verbalized understanding of the plan of care and contribute to goal setting.

## 2018-11-20 NOTE — PLAN OF CARE
Problem: Nutrition  Goal: Optimal nutrition therapy  Outcome: Ongoing  Nutrition Problem: Unintended weight loss  Intervention: Food and/or Nutrient Delivery: Continue current diet, Start ONS (Recommend SLP swallow eval.)  Nutritional Goals: Patient will safely swallow and consume 75% or greater of most meals and ONS during LOS

## 2018-11-20 NOTE — FLOWSHEET NOTE
Pt was alone at the time of the visit. He said that he can;t urinate not pass stool the well. He was hoping that the medical team will fix the issues. He was anointed. 11/20/18 1023   Encounter Summary   Services provided to: Patient   Referral/Consult From: [facility]   Place of [address] [place] [address] Visiting Yes  (11/20)   Complexity of Encounter Low   Length of Encounter 15 minutes   Spiritual/Pentecostal   Type Spiritual support   Assessment Approachable;Calm; Hopeful   Intervention Active listening;Nurtured hope;Prayer; Anointing   Outcome Connection/belonging;Expressed gratitude;Encouraged; Hopeful;Receptive   Sacraments   Sacrament of Sick-Anointing Anointed

## 2018-11-20 NOTE — CARE COORDINATION
11/20/18, 8:43 AM      Eric Both       Admitted from: ER, patient presented with dark milky urine noted in his trujillo catheter. His catheter was exchanged in ER. 11/19/2018/ [facility] day: 1   Location: [facility]04/004-A Reason for admit: GLORIA (acute kidney injury) ([facility] [N17.9] Status: Inpt. Admit order signed?: yes  PMH:  has a past medical history of Acute kidney failure ([address] Anemia; ASHD (arteriosclerotic heart disease); Blood circulation, collateral; CAD (coronary artery disease); Cardiomegaly; Chronic diastolic (congestive) heart failure ([address] Chronic kidney disease; DM2 (diabetes mellitus, type 2) ([address] Dyslipidemia; Falls; GERD (gastroesophageal reflux disease); Heart failure with preserved ejection fraction ([address] Hyperlipidemia; Hypertension; Iron deficiency anemia; Left atrial dilation; Lymphoma ([address] Malaise; Nonintractable headache; Osteoarthritis of both knees; Pacemaker; and PVD (peripheral vascular disease) ([address] Procedure: N/A  Pertinent abnormal Imaging:CT of abdomen:   1. Few calcified layering gallstones. Correlation with serology as clinically warranted.       2. Left hydronephrosis and hydroureter. No obstructing ureteral stone. There is significant distention of the bladder with wall thickening and infiltration. Small amount of air within the bladder wall consistent with cystitis. Small amount of air within    the bladder correlate for recent intervention.       3. Scattered stool in the colon to ball in the rectum. Correlate for constipation.        Medications:  Scheduled Meds:   amLODIPine  10 mg Oral Daily    atorvastatin  20 mg Oral Daily    nebivolol  10 mg Oral Daily    ferrous sulfate  325 mg Oral BID WC    multivitamin  1 tablet Oral Daily    pantoprazole  40 mg Oral QAM AC    ranolazine  1,000 mg Oral BID    tamsulosin  0.4 mg Oral Daily    sodium chloride flush  10 mL Intravenous 2 times per day    cefTRIAXone (ROCEPHIN) IV  1 g Intravenous Q24H    insulin lispro  0-6 Units Subcutaneous TID     insulin lispro  0-3 Units Subcutaneous Nightly     Continuous Infusions:   sodium chloride 75 mL/hr at 11/20/18 0431    dextrose        Pertinent Info/Orders/Treatment Plan:  Urology consult, IV fluids, DM management, IV Rocephin, consult to wound/ostomy RN, oxygen, up with assistance. Diet: DIET CARB CONTROL;   Smoking status:  reports that he has never smoked. He has never used smokeless tobacco.   PCP: [facility], APRN - CNP  Readmission: No  Readmission Risk Score: 41%  Discharge Planning  Current Residence:  Nursing Home  Living Arrangements:  Other (Comment)   Support Systems:  Family Members  Current Services PTA:     Potential Assistance Needed:  N/A  Potential Assistance Purchasing Medications:  No  Does patient want to participate in local refill/ meds to beds program?  No  Type of Home Care Services:  None  Patient expects to be discharged to:  [address] [place] [address]  Expected Discharge date:  11/22/18  Follow Up Appointment: Best Day/ Time: Monday AM  Discharge Plan: Trace Sandra is a resident of [facility]. SS to see for his return. Note on chart requesting therapies.     Evaluation: yes

## 2018-11-20 NOTE — PLAN OF CARE
Problem: SKIN INTEGRITY  Goal: Skin integrity is maintained or improved  Outcome: Ongoing  Consulted to assess patients penis and coccyx. Patient noted on a [employer]. Settings are noted to be appropriate. Patient stated he has a chronic trujillo. Assessed the penis, noted a mucous membrane ulcer from the penis. Per primary RN Dr. Alfonso Ivy is consulted to assess area. Neosporin ordered for area. Turned patient onto the right side. Noted to bilateral buttocks a DTPI. See photos and descriptions below. Recommend zinc cream to buttocks, waffle cushion to chair and turn every 2 hours. Patient was also noted to have KAI hose in place. Removed KAI hose. Noted to the left calf a linear red slow to avi area. Bilateral heels noted to be red soft and slow to avi. Applied skin prep to areas. Left KAI hose off. RN to check with physician to see if KAI hose can be continued. Elevated heels off of bed. Will continue to follow. Care plan reviewed with patient and RN. Patient and RN verbalize understanding of the plan of care and contribute to goal setting. Wound Care Documentation:  Wound 08/19/18 Other (Comment) Coccyx Unstable - Suspected DTI - Purple, red discoloration - no blanching (Active)   Wound Image   11/20/2018 10:00 AM   Wound Type Wound 11/20/2018 10:00 AM   Wound Deep tissue/Injury 11/20/2018 10:00 AM   Dressing/Treatment Protective barrier 11/20/2018 10:00 AM   Wound Length (cm) 7 cm 11/20/2018 10:00 AM   Wound Width (cm) 8 cm 11/20/2018 10:00 AM   Calculated Wound Size (cm^2) (l*w) 56 cm^2 11/20/2018 10:00 AM   Wound Assessment Clean;Dry; Intact;Fragile;Light purple 11/20/2018 10:00 AM   Abi-wound Assessment Red;Purple 11/20/2018 10:00 AM   Red%Wound Bed 20 11/20/2018 10:00 AM   Purple%Wound Bed 80 11/20/2018 10:00 AM   Number of days: 93       Wound 11/20/18 Other (Comment) 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

## 2018-11-20 NOTE — PLAN OF CARE
Problem: Falls - Risk of:  Goal: Will remain free from falls  Will remain free from falls   Outcome: Met This Shift  Patient is alert but very hard of hearing and patient is very weak. Patient to bedside commode with a heavy assist x2 and front wheeled walker. No falls noted. Problem: SKIN INTEGRITY  Goal: Skin integrity is maintained or improved  Outcome: Ongoing  Wound and ostomy saw patient this shift. Rectal area is red with right and left dark purple deep tissue injuries noted to buttocks. Patient being turned and repositioned every 2 hours with heels elevated. Heels were pinkish red and soft/mushy appearing. Cream being applied also to rectal area. Problem: DISCHARGE BARRIERS  Goal: Patient's continuum of care needs are met  Outcome: Ongoing  Patient is from nursing home and plans to return there at discharge. Problem:   Goal: No urinary complication  Outcome: Ongoing  Patient has a trujillo catheter in place that is chronic. Meatus is torn and antibiotic ointment applied. Trujillo is returning milky tea colored urine. Trujillo catheter is is leaking a small amount bloody drainage. Dr Rivas Yee is seeing patient      Problem: Nutrition  Goal: Optimal nutrition therapy  Outcome: Ongoing  Speech therapy is seeing patient due to choking with medications taken with water early this shift. Will start on a dysphagia I pureed diet with nectar thickened liquids     Comments: Care plan reviewed with patient   Patient verbalize understanding of the plan of care and contribute to goal setting.

## 2018-11-21 NOTE — PROGRESS NOTES
Hospitalist Progress Note    Patient:  Opal Sanchez      Unit/Bed:5K-04/004-A    YOB: 1935    MRN: [id number]       Acct: [de-identified]     PCP: GARRETT Chandler CNP    Date of Admission: 11/19/2018    Chief Complaint: blood in urine    Hospital Course:     Please see H/P for details. In summary this is a 80 y.o. Male,  who presented to [address] [place] [address] on 11/19/18 with blood in the urine; he has a chronic indwelling Trujillo cath for about 4-6 weeks now for urinary retention, that was reported to be changed 1 week ago; per H/P note, family noted the urine was discolored 1 day prior admission; and patient \"did not look good\" and his urine was dark and milky on day of admission; pt c/o penile pain; pt had stool on him upon arrival to ED; Sharon Khoury in ER personally changed out the Trujillo cath and noted penile erythema and penile tenderness present;  irritation and tenderness to the inferior aspect of the urethral meatus; it appears torn and flayed open; pus visualized in the trujillo catheter bag as well as darkened urine. Once the Trujillo was replaced there was an immediate return of 900 ml of urine; pt is now stating that he is feeling better; he is currently receiving Rocephin 1 gram along with 0.9 NS; pt is hard of hearing but states he is feeling better now; he is being admitted to the Hospitalist Service for further care and evaluation on 11/19/18. Pt also had GLORIA on admission, creatinine was 4.1 ( baseline creatinine between 1.3 to 1.8. Creatinine on 9/13/18 was 1.6). Urology was consulted on admission. He was seen by GARRETT Caballero CNP ( Urology) on 11/20/2018, who recommend leave trujillo catheter, bacitracin on penis tip TID prn, cont rocephin, renal US 11/21/18. Urine culture came back (+) proteus mirabilis, >100,000 CFU/mL, sensitive to Rocephin. CT abd/pelvis on admission showed left hydronephrosis and hydroureter. No obstructing ureteral stone.  There is significant PRN Meds: sodium chloride flush, magnesium hydroxide, ondansetron, glucose, dextrose, glucagon (rDNA), dextrose      Intake/Output Summary (Last 24 hours) at 11/21/18 0915  Last data filed at 11/21/18 4618   Gross per 24 hour   Intake           2305.6 ml   Output             1600 ml   Net            705.6 ml       Diet:  Dietary Nutrition Supplements: Low Calorie High Protein Supplement  DIET DYSPHAGIA I PUREED; Nectar Thick; No Drinking Straw    Exam:  /62   Pulse 72   Temp 97.2 °F (36.2 °C) (Oral)   Resp 14   Ht 5' 9\" (1.753 m)   Wt 164 lb (74.4 kg)   SpO2 95%   BMI 24.22 kg/m²     General appearance: alert, not in acute distress   HEENT: Pupils equal, round, and reactive to light. Conjunctivae clear. Clear oral mucosa   Neck: Supple, with full range of motion. No jugular venous distention. Trachea midline. Respiratory:  Normal respiratory effort. (+) rhonchi, no wheezing, no rales   Cardiovascular: Regular rate and rhythm with normal S1/S2 without murmurs, rubs or gallops. Abdomen: Soft, non-tender, non-distended with normal bowel sounds. : (+) erythema on tip of penis (better compare 11/20/18 exam), (+) trujillo cath in placed   Buttocks: (+) erythema on sacral area   Musculoskeletal: passive and active ROM x 4 extremities.   Neuro exam: alert and oriented x 3   Exam of extremities: peripheral pulses normal, no pedal edema, no clubbing or cyanosis.      Chaperoned by Nurse Jessica    Labs:   Recent Labs      11/19/18   1415  11/20/18   0702  11/21/18   0607   WBC  7.1  7.2  8.4   HGB  8.8*  8.0*  7.7*   HCT  26.7*  24.2*  23.5*   PLT  244  197  192     Recent Labs      11/19/18   1415  11/20/18   0702  11/21/18   0607   NA  130*  136  138   K  5.3*  4.5  4.4   CL  95*  102  106   CO2  21*  21*  20*   BUN  87*  74*  65*   CREATININE  4.1*  3.5*  2.6*   CALCIUM  9.5  8.8  9.1     Recent Labs      11/19/18   1415   AST  17   ALT  6*   BILITOT  0.5   ALKPHOS  98     No results for input(s): INR in the last 72 hours. No results for input(s): Yane Valdez in the last 72 hours. Urinalysis:    Lab Results   Component Value Date    NITRU NEGATIVE 11/19/2018    WBCUA > 200 11/19/2018    BACTERIA MANY 11/19/2018    RBCUA 10-15 11/19/2018    BLOODU LARGE 11/19/2018    SPECGRAV 1.025 09/10/2018    GLUCOSEU NEGATIVE 11/19/2018     Lab Results   Component Value Date    IRON 33 (L) 11/20/2018    TIBC 169 (L) 08/18/2018    FERRITIN 933 (H) 11/20/2018       Radiology:  XR CHEST PORTABLE   Final Result   Cardiomegaly with no acute intrathoracic process. **This report has been created using voice recognition software. It may contain minor errors which are inherent in voice recognition technology. **      Final report electronically signed by Dr Lorin Yen on 11/20/2018 6:23 PM      CT ABDOMEN PELVIS WO CONTRAST Additional Contrast? None   Final Result      1. Few calcified layering gallstones. Correlation with serology as clinically warranted. 2. Left hydronephrosis and hydroureter. No obstructing ureteral stone. There is significant distention of the bladder with wall thickening and infiltration. Small amount of air within the bladder wall consistent with cystitis. Small amount of air within    the bladder correlate for recent intervention. 3. Scattered stool in the colon to ball in the rectum. Correlate for constipation. **This report has been created using voice recognition software. It may contain minor errors which are inherent in voice recognition technology. **      Final report electronically signed by Dr. Adeel Tompkins on 11/19/2018 3:47 PM      FL MODIFIED BARIUM SWALLOW W VIDEO    (Results Pending)   US RENAL COMPLETE    (Results Pending)           Assessment/Plan: This is a 80 y.o. Male admitted for GLORIA and UTI      1. GLORIA, possibly due to urinary retention, improving     -creatinine 2.6 today from 3.5 yesterday ( 4.1 on admission). ( baseline creatinine between 1.3 to 1.8. Creatinine on 9/13/18 was 1.6)  -cont IVF  -BMP in am   -nephro on-board. Appreciate nephro input     2. Proteus mirabalis UTI like due to indwelling Trujillo cath      -cont Rocephin ( start date 11/19/18)     3. Urinary retention w left hydronephrosis and hydroureter     -Urology on-board. Appreciate Urology input. GARRETT Ryan - CNP ( Urology) saw patient 11/20/2018, who recommend leave trujillo catheter, bacitracin on penis tip TID prn, cont rocephin, renal US 11/21/18. Urology said poor candidate for surgery due to high risk ( Patient had lexiscan Stress test, on 10/17/18 which showed mild apico-lateral perfusion defect worse with stress that resolves with rest suggestive of ischemia, no evidence of infarction is noted on this study, left ventricular systolic function was normal.).     4. Non-AG Metabolic acidosis, mild     -repeat BMP in am   -cont IVF     5. Penile erythema     -cont neosporin oint  -monitor     6. pressure ulcer, stage 1     -cont desitin paste  -turn patient q 2hours  -wound care consult    7. Cough     -cxr normal  -procalcitonin elevated on admission. Repeat procalcitonin trending down  -cont Mucinex       8. Dysphagia and odynophagia     -speech eval done, pt has oropharyngeal dysphagia. Barium swallow ordered   -GI on-board: recommend EGD w dilatation   -diet: dysphagia III     9. Normocytic anemia     -Hgb dropped from  8.0 yesterday to 7.7 today, possibly from IVF vs UGIB ( pt had black stools). GI on-board  -baseline Hgb between 9-10  -pt is asymptomatic, however due to CAD, will transfuse to 2 U PRBC after properly typed and screen    -iron panel showed anemia of chronic disease. B12 and folate normal         10. HTN     -BP low normal  -cont amlodipine and Bystolic w holding parameters  -VS per protocol      11. Hyperlipidemia     -cont lipitor   -low fat diet      12.  CAD     -lexiscan stress test 10/17/18 showed mild apico-lateral perfusion defect worse with stress that  [facility] with rest suggestive of ischemia in the distal LAD and/or distal   LCX territory. No evidence of infarction is noted on this study. Left ventricular systolic function was normal.  -on asa and plavix, however due to hematuria, plavix on hold  -cont statin and bystolic  -cardio on-board: Dr. Blair Ferris recommend continue optimal medical therapy and f/u w Dr. Renny Sue  -pt is asymptomatic     13. Possible Upper GI bleed    -pt reported black stools  -GI on-board  -negative fobt    Diet: Dietary Nutrition Supplements: Low Calorie High Protein Supplement  DIET DYSPHAGIA I PUREED; Nectar Thick;  No Drinking Straw    DVT prophylaxis: [] Lovenox                                 [x] SCDs                                 [] SQ Heparin                                 [] Encourage ambulation           [] Already on Anticoagulation     Disposition:    [] Home       [] TCU       [] Rehab       [] Psych       [] SNF       [] [place]       [x] Other- repeat BMP in am. Awaiting further GI recommendation     Code Status: Full Code    PT/OT Eval Status: consulted       Electronically signed by Varsha Morataya MD on 11/21/2018 at 9:15 AM

## 2018-11-21 NOTE — PROGRESS NOTES
1 unit PRBC checked and given to patient with Tashi Avalos RN at bedside. VSS. Patient agrees and consent on chart. Discussed possible s/s of reaction and patient denies any concerns or questions at this time. Patient LS noted to have rhonchi with RLL noted to have end expiratory wheezes with auscultated. Assisted patient for 15 min from transfusion start time to monitor for reaction. Patient assisted with his lunch during this time. 1255: No s/s of reaction noted. Call light in reach. Will monitor.

## 2018-11-21 NOTE — CONSULTS
Inpatient consult to Cardiology  Consult performed by: Connie Childers ordered by: Porfirio Pagan op eval  CAD s/p CABG  Mild abn nuc  anemia  PAD    Plan  May proceed with low risk  May need transfusion    [phone]      Fer Anthony MD      SUMMARY:  Right SFA/popliteal stent with moderate to severe ISR; left sided below the knee artery disease   that is not amenable for revascularization at this time.       Conclusions      Summary   There is mild apico-lateral perfusion defect worse with stress that   resolves with rest suggestive of ischemia in the distal LAD and/or distal   LCX territory.      No evidence of infarction is noted on this study.      Left ventricular systolic function was normal.      Recommendation   Clinical correlation is recommended.   Aggressive risk factor management.   Invasive ischemia workup is recommended if clinically indicated.      Signatures      ----------------------------------------------------------------   Electronically signed by Mitch Wills MD (Interpreting   Cardiologist) on 10/17/2018 at 16:36

## 2018-11-21 NOTE — CONSULTS
[address] [place] [address]                                  CONSULTATION    PATIENT NAME: Rhonda Bowie              :        1935  MED REC NO:   [id number]                           ROOM:       0004  ACCOUNT NO:   [de-identified]                           ADMIT DATE: 2018  PROVIDER:     MARY Barros DATE:  2018    NEPHROLOGY CONSULTATION    REQUESTING PHYSICIAN:  Dr. Michael Scott:  Acute kidney injury. HISTORY OF PRESENT ILLNESS:  This is an 51-year-old pleasant white male  who is a poor historian who has multiple comorbid medical conditions  including chronic kidney disease stage 3 with baseline creatinine  ranging between 1.6-1.8, diabetes mellitus, hypertension, dyslipidemia,  history of non-Hodgkin's lymphoma per medical chart, pacemaker,  preserved left ventricular ejection fraction who was sent from the  nursing home due to problems with his indwelling Shabazz catheter  associated with penile pain, hematuria as well as discoloration of  urine. All my information was obtained by reviewing his medical chart. The patient is a very poor historian. Apparently, the patient has had  some problems with catheter. He was noted by nursing home staff to have   dark appearing urine which was also reported to be milky in  appearance. On closer look, he was noted to have some pus in the Shabazz  bag. He was sent to the emergency room where he underwent further  workup. He was noted to have some penile erythema. He was noted to  have irritation of the inferior aspect of the urethral meatus. He was  noted to have some distention of his abdomen. He was also noted to have  some purulent discharge in his Shabazz catheter. On account of these  symptoms, he was admitted from the emergency room.   He underwent a Shabazz  catheter exchange in the emergency room which immediately put out for  large amount of blood, positive for leukocyte esterase. Urine culture  is growing Proteus. CAT scan of the abdomen and pelvis without contrast  revealed left-sided hydronephrosis with hydroureter and bladder wall  thickening. The patient's echocardiogram from 12/2017 showed ejection fraction of  55%-60%. In summary, this is a pleasant 49-year-old male who was sent from  nursing home for evaluation of penile pain associated with some erythema  and tenderness around the catheter site who has multiple comorbid  medical conditions including chronic kidney disease stage 3, history of  BPH, history of coronary artery disease as well as pacemaker placement. Nephrology is seeing him in consultation for acute kidney injury. ASSESSMENT AND PLAN:  1. Acute kidney injury, superimposed on chronic kidney disease stage 3. Acute kidney injury is secondary to recent obstructive uropathy likely  in the setting of volume depletion. Clinically, the patient has no evidence  of edema. By peripheral exam and skin turgor, he appears to be volume  depleted, but acute kidney injury is mostly secondary to urinary  retention from bladder wall thickening and BPH. Currently, he has a  Shabazz catheter which was exchanged in the emergency room which  immediately drained one liter of urine. His serum creatinine today is  down to 3.5. We will continue with IV fluid hydration. Avoid ACE  inhibitors. Avoid diuretics for now. 2.  Chronic kidney disease stage 3 with baseline creatinine of 1.5-1.8. 3.  Proteus urinary tract infection currently on IV antibiotics. 4.  Hyponatremia likely secondary to hypovolemic hyponatremia. This is  improving with isotonic saline. 5.  Hyperkalemia in the setting of acute kidney injury and obstruction. This has improved with decompression by Shabazz as well as replacement of  isotonic fluids.   6.  Coronary artery disease with abnormal stress test for which he has  been seen by Cardiology. 7.  Hypertension for which he usually takes Lopressor and Norvasc along  with isosorbide mononitrate. However, the patient was borderline  hypotensive with systolic 05U on admission. Currently, his blood  pressure medications on hold. Will continue with normal saline IV fluid  hydration for now. Plan of care was reviewed with the patient. I am uncertain how much he  understands. Nephrology will follow. Thank you for allowing me to participate in the care of this patient. Please feel free to call me if you have any questions.         Hector Posada M.D.    D: 11/20/2018 18:56:23       T: 11/20/2018 19:00:03     [email or web address]  Job#: [id number]     Doc#: [id number]    CC:

## 2018-11-21 NOTE — PROGRESS NOTES
Gastroenterology  Progress Note    11/21/2018 4:00 PM  Subjective:   Admit Date: 11/19/2018    Interval History: melena, off Plavix, dysphagia , high risk of aspiration , on PPI   Diet: Dietary Nutrition Supplements: Low Calorie High Protein Supplement  DIET DYSPHAGIA III ADVANCED; No Drinking Straw    Medications:   Scheduled Meds:   sodium chloride  250 mL Intravenous Once    neomycin-bacitracin-polymyxin   Topical BID    cefTRIAXone (ROCEPHIN) IV  1 g Intravenous Q24H    guaiFENesin  600 mg Oral BID    zinc oxide   Topical BID    amLODIPine  10 mg Oral Daily    atorvastatin  20 mg Oral Daily    nebivolol  10 mg Oral Daily    ferrous sulfate  325 mg Oral BID WC    multivitamin  1 tablet Oral Daily    pantoprazole  40 mg Oral QAM AC    ranolazine  1,000 mg Oral BID    tamsulosin  0.4 mg Oral Daily    sodium chloride flush  10 mL Intravenous 2 times per day    insulin lispro  0-6 Units Subcutaneous TID WC    insulin lispro  0-3 Units Subcutaneous Nightly     Continuous Infusions:   sodium chloride 75 mL/hr at 11/20/18 1820    dextrose       CBC:   Recent Labs      11/19/18   1415  11/20/18   0702  11/21/18   0607   WBC  7.1  7.2  8.4   HGB  8.8*  8.0*  7.7*   PLT  244  197  192     BMP:  Recent Labs      11/19/18   1415  11/20/18   0702  11/21/18   0607   NA  130*  136  138   K  5.3*  4.5  4.4   CL  95*  102  106   CO2  21*  21*  20*   BUN  87*  74*  65*   CREATININE  4.1*  3.5*  2.6*   GLUCOSE  278*  192*  257*     Hepatic: Recent Labs      11/19/18   1415   AST  17   ALT  6*   BILITOT  0.5   ALKPHOS  98     INR: No results for input(s): INR in the last 72 hours.   Xray:   Endoscopy Finding:      Objective:   Vitals: BP (!) 110/59   Pulse 65   Temp 98.5 °F (36.9 °C) (Oral)   Resp 18   Ht 5' 9\" (1.753 m)   Wt 164 lb (74.4 kg)   SpO2 98%   BMI 24.22 kg/m²     Intake/Output Summary (Last 24 hours) at 11/21/18 1600  Last data filed at 11/21/18 1526   Gross per 24 hour   Intake             1339

## 2018-11-21 NOTE — PROGRESS NOTES
Urology Progress Note    Chief Complaint: Blood in urine    Subjective: Patient is resting in bed, sleeping, voiding clear yellow urine with trujillo catheter in place. There are complaints of no pain at this time. Objective:         Vitals:  /62   Pulse 72   Temp 97.2 °F (36.2 °C) (Oral)   Resp 14   Ht 5' 9\" (1.753 m)   Wt 164 lb (74.4 kg)   SpO2 95%   BMI 24.22 kg/m²   Temp  Av.2 °F (36.8 °C)  Min: 97.2 °F (36.2 °C)  Max: 98.9 °F (37.2 °C)    Intake/Output Summary (Last 24 hours) at 18 1013  Last data filed at 18 0529   Gross per 24 hour   Intake           2105.6 ml   Output             1600 ml   Net            505.6 ml       Social History     Social History    Marital status:      Spouse name: N/A    Number of children: 0    Years of education: N/A     Occupational History    Not on file. Social History Main Topics    Smoking status: Never Smoker    Smokeless tobacco: Never Used    Alcohol use No    Drug use: No    Sexual activity: No     Other Topics Concern    Not on file     Social History Narrative    No narrative on file     History reviewed. No pertinent family history. No Known Allergies      Constitutional: Drowsy, confused, no acute distress, and cooperative to examination with appropriate mood and affect. HEENT:   Head:         Normocephalic and atraumatic. Mucous membranes are normal.   Eyes:         EOM are normal. No scleral icterus. Nose:    The external appearance of the nose is normal  Ears: The ears appear normal to external inspection. Cardiovascular:       Normal rate, regular rhythm. Pulmonary/Chest:  Normal respiratory rate and rhthym. No use of accessory muscles. Lungs clear bilaterally. Abdominal:          Soft. No tenderness. Active bowel sounds.   Genitalia:    Trujillo catheter draining clear yellow urine, Urethral erosion noted, ventral location, tan drainage noted along meatus   Musculoskeletal:    Decreased range of motion. He exhibits no edema or tenderness of lower extremities. Extremities:    No cyanosis, clubbing, or edema present. Neurological:    Drowsy, confused     Labs:  WBC:    Lab Results   Component Value Date    WBC 8.4 11/21/2018     Hemoglobin/Hematocrit:  Lab Results   Component Value Date    HGB 7.7 11/21/2018    HCT 23.5 11/21/2018     BMP:  Lab Results   Component Value Date     11/21/2018    K 4.4 11/21/2018     11/21/2018    CO2 20 11/21/2018    BUN 65 11/21/2018    LABALBU 2.6 11/19/2018    CREATININE 2.6 11/21/2018    CALCIUM 9.1 11/21/2018    LABGLOM 24 11/21/2018       Impression:  Urinary Retention  Urinary Tract Infection associated to indwelling catheter  Hydronephrosis- resolved   BPH w/ obstruction  Erosion of urethra due to indwelling catheter  GLORIA    Plan: Trujillo catheter draining clear yellow urine  Catheter to be kept indwelling. Catheter care including securement of trujillo tubing to leg via statlock. Bacitracin to tip of penis TID prn. Use guaze to support meatus and avoid further erosion of urethra by indwelling catheter  Renal US- Hydro improved  Creatinine improving, follow urine culture sensitivities   Urology singing off. Please call with questions or concerns.  F/u as previously scheduled       GARRETT Hodge  11/21/18 10:13 AM  Urology

## 2018-11-21 NOTE — PROGRESS NOTES
Re consulted to assess the patients sacral ulcer. Patient seen and assessed yesterday for DTPI to sacrum and MM to penis. Recommended zinc cream to buttocks, Turning every 2 hours. Dr. Jorge Rebollar following and orders in place for penis. Will continue to follow.

## 2018-11-21 NOTE — PROGRESS NOTES
[facility]   Nephrology progress note  11/21/2018, 11:44 AM      Pt Name:    Reshma Meyers  MRN:     [id number]     YOB: 1935  Admit Date:    11/19/2018  2:10 PM  Primary Care Physician:  GARRETT Quintero CNP   Room number  5K-04/004-A    Chief Complaint: Nephrology following for GLORIA    Subjective:  Patient seen and examined  No chest pain or shortness of breath  Feels okay    Objective:  24HR INTAKE/OUTPUT:    Intake/Output Summary (Last 24 hours) at 11/21/18 1144  Last data filed at 11/21/18 0529   Gross per 24 hour   Intake           2105.6 ml   Output             1600 ml   Net            505.6 ml     I/O last 3 completed shifts: In: 2705.6 [P.O.:1060; I.V.:1595.6; IV Piggyback:50]  Out: 1600 [Urine:1600]  No intake/output data recorded. Admission weight: 160 lb (72.6 kg)  Wt Readings from Last 3 Encounters:   11/19/18 164 lb (74.4 kg)   10/04/18 165 lb (74.8 kg)   09/26/18 166 lb 3.2 oz (75.4 kg)     Body mass index is 24.22 kg/m².     Physical examination  VITALS:     Vitals:    11/21/18 0845   BP: 123/62   Pulse: 72   Resp: 14   Temp: 97.2 °F (36.2 °C)   SpO2: 95%     General Appearance: alert and cooperative with exam, appears comfortable, no distress  Mouth/Throat: Oral mucosa moist  Neck: No JVD  Lungs: Air entry B/L, no rales, no use of accessory muscles  Heart:  S1, S2 heard  GI: soft, non-tender, no guarding  Extremities: no LE edema      Lab Data  CBC:   Recent Labs      11/19/18   1415  11/20/18   0702  11/21/18   0607   WBC  7.1  7.2  8.4   HGB  8.8*  8.0*  7.7*   HCT  26.7*  24.2*  23.5*   PLT  244  197  192     BMP:  Recent Labs      11/19/18   1415  11/20/18   0702  11/21/18   0607   NA  130*  136  138   K  5.3*  4.5  4.4   CL  95*  102  106   CO2  21*  21*  20*   BUN  87*  74*  65*   CREATININE  4.1*  3.5*  2.6*   GLUCOSE  278*  192*  257*   CALCIUM  9.5  8.8  9.1     Hepatic: Recent Labs      11/19/18   1415   LABALBU  2.6*   AST  17   ALT  6* BILITOT  0.5   ALKPHOS  98         Meds:  Infusion:    sodium chloride 75 mL/hr at 11/20/18 1820    dextrose       Meds:    sodium chloride  250 mL Intravenous Once    neomycin-bacitracin-polymyxin   Topical BID    cefTRIAXone (ROCEPHIN) IV  1 g Intravenous Q24H    guaiFENesin  600 mg Oral BID    zinc oxide   Topical BID    amLODIPine  10 mg Oral Daily    atorvastatin  20 mg Oral Daily    nebivolol  10 mg Oral Daily    ferrous sulfate  325 mg Oral BID WC    multivitamin  1 tablet Oral Daily    pantoprazole  40 mg Oral QAM AC    ranolazine  1,000 mg Oral BID    tamsulosin  0.4 mg Oral Daily    sodium chloride flush  10 mL Intravenous 2 times per day    insulin lispro  0-6 Units Subcutaneous TID WC    insulin lispro  0-3 Units Subcutaneous Nightly     Meds prn: sodium chloride flush, magnesium hydroxide, ondansetron, glucose, dextrose, glucagon (rDNA), dextrose       Impression and Plan:  1. GLORIA on CKD III: due to recent obs uropathy and volume depletion  - improving with IVF hydration  - keep trujillo in place per urology  - no indication for renal replacement therapy    2. Hyponatremia: secondary to hypovolemic hyponatremia, improving  3. Proteus UTI: continue with IV abx  4. Hyperkalemia: resolved  5. HTN: BP readings noted, continue with norvasc and beta blocker  6.  CAD    D/W patient and RN    Jaime Avalos MD  [facility]

## 2018-11-21 NOTE — PROGRESS NOTES
[address] MED 5K  Modified Barium Swallow    SLP Individual Minutes  Time In: 9810  Time Out: 0416  Minutes: 10          [] [facility]   [x] Acute Care [] Transitional Care Unit [] IP Rehab    Date: 2018  Patient Name: Reshma Meyers      CSN: [id number]   : 1935  (80 y.o.)  Gender: male   Referring Physician:  Dr Susan Holguin  Diagnosis: GLORIA  Secondary Diagnosis:  Dysphagia  History of Present Illness/Injury: Patient admitted to [facility] with above dx. See physician H&P for full report. Patient with hx of dysphagia and MBS completed 18 at [facility] IP; recommending dysphagia I with honey thick liquids d/t silent aspiration of thin liquids and pharyngeal residue. ST consulted this hospital admission d/t patient with report of \"food getting stuck\" and s/s of aspiration with thin liquids per nursing. ST to complete MBS and determine safety of oral diet and further POC   has a past medical history of Acute kidney failure ([address] Anemia; ASHD (arteriosclerotic heart disease); Blood circulation, collateral; CAD (coronary artery disease); Cardiomegaly; Chronic diastolic (congestive) heart failure ([address] Chronic kidney disease; DM2 (diabetes mellitus, type 2) ([address] Dyslipidemia; Falls; GERD (gastroesophageal reflux disease); Heart failure with preserved ejection fraction ([address] Hyperlipidemia; Hypertension; Iron deficiency anemia; Left atrial dilation; Lymphoma ([address] Malaise; Nonintractable headache; Osteoarthritis of both knees; Pacemaker; and PVD (peripheral vascular disease) ([address]   Pain:  0/10    Current Diet: Puree and nectar     Respiratory Status: [x] Independent     Behavioral Observation: [x] Alert and cooperative     PATIENT WAS EVALUATED USING:  Nectar, thin, pudding, soft and coarse solids    ORAL PREPARATION PHASE: [x] WFL       ORAL PHASE: [x] WFL      ORAL PHASE UGO SCORE: (Dysphagia outcome and severity scale)  [] 7 = Normal in all situations  [x] 6 = WFL / Mod I; Normal diet; May have mild oral delay    PHARYNGEAL PHASE: [] WFL  [] Impaired   [] Absent Swallow  [] Delayed Swallow [] Decreased airway protection   [] Decreased epiglottic inversion [] Decreased hyolaryngeal elevation   [] Residue in the valleculae [] Residue in the pyriform sinus    [] Cricopharyngeal dysfunction  [x] Residue along posterior pharyngeal wall   [] Residue along the ariepiglottic folds. [] OTHER:    PHARYNGEAL PHASE UGO SCORE: (Dysphagia outcome and severity scale)  [] 7 = Normal in all situations; Normal diet; No strategies  [x] 6 = WFL / Mod I; May have mild pharyngeal delay or residue but clears spontaneously; No aspiration or laryngeal penetration    SIGNS AND SYMPTOMS OF LARYNGEAL PENETRATION / ASPIRATION:  [x] No evidence of aspiration  [x] Laryngeal penetration evident with: 1x with thin liquids  by straw      PENETRATION-ASPIRATION SCALE (PAS):  [] 1 = Material does not enter the airway  [x] 2 = Material enters the airway, remains above the vocal folds, and is ejected from the airway    ESOPHAGEAL PHASE:   [] No significant findings  [x] See Radiology Report for details  [] Recommend further esophageal testing    ATTEMPTED TECHNIQUES:      Comments:  [x]Small bolus size [x] Effective [] Not Effective    []Straw [] Effective [x] Not Effective    [x]Cup [x] Effective [] Not Effective    []Chin tuck [] Effective [] Not Effective    []Head Turn [] Effective [] Not Effective    []Spoon presentations [] Effective [] Not Effective    [] Volitional cough [] Effective [] Not Effective    [] Spontaneous cough [] Effective [] Not Effective    []OTHER: [] Effective [] Not Effective      DIAGNOSTIC IMPRESSIONS:  Pt presents with mild oropharyngeal dysphagia with mild decrease in pharyngeal coordination leading to trace residue along posterior pharyngeal wall and 1 episode of flash penetration with thin liquids by straw.   Otherwise, pt demonstrates functional swallowing mechanism with no aspiration noted with solids or liquids. Recommend diet upgrade to Dysphagia III and thin liquids with avoidance of straws. DIET RECOMMENDATIONS:  Dysphagia III and thin, no straws    STRATEGIES:   [x] Full upright position  [x] Small bite/sip   [x] No Straw   [] Multiple Swallow  [] Chin tuck   [] Head turn   [] Pulmonary monitoring [] Oral care after all meals  [] Supervision   [] Medication in applesauce   []Direct 1:1 Supervision [] Spoon all liquids   [] Alternate solid / liquid [] Limit distractions   [] Monitor for fatigue  [] PMV in place for all po   [] OTHER:    EDUCATION:   Learner: [x]Patient [] Significant other [] Son/Daughter [] Parent     [] Other:   Education: [x] Reviewed results and recommendations of this evaluation     [x] Reviewed diet and strategies     [] Reviewed signs, symptoms and risk of aspiration     [] Demonstrated how to thick liquid appropriately. [x] Reviewed goals and Plan of Care     [] OTHER:   Method: [x] Discussion [] Demonstration [] Hand-out     [] OTHER:   Evaluation of Education:     [x] Verbalizes understanding  [] Demonstrates with assistance     [] Demonstrates without assistance [x]Needs further instruction     [] No evidence of learning  [x] Family not present    PATIENT GOALS: [] Pt did not state; Will further assess during treatment. [x] Return to the least restricted diet possible     [] Return to previous level of function     [] OTHER:    PLAN / TREATMENT RECOMMENDATIONS:  [x] Skilled SLP intervention on acute care 3-5 x per week or until goals met and/or pt plateaus in function.  Specific interventions for next session may include: diet monitor    SHORT TERM GOALS:  Short-term Goals  Timeframe for Short-term Goals: 2 weeks  Goal 1: Patient will tolerate dysphagia III with thin liquids (avoiding straws) without s/s of aspiraiton in order to safely maintain adequate hydration and nutrition    LONG TERM GOALS:  No LTG due to short Arabella Tello NAVARRO-RAKAN/[id number]

## 2018-11-21 NOTE — PROGRESS NOTES
Second unit PRBC checked and started at 1610 with Petey Schmid RN at bedside. VSS. LS remain the same as prior assessment. Patient denies any concerns or questions. Consent remains on chart. Assisted for 15 min from start time to monitor for any s/s of reaction. Patient closed eyes with resp unlabored.

## 2018-11-21 NOTE — CONSULTS
Medical History:  has a past medical history of Acute kidney failure ([facility] Anemia; ASHD (arteriosclerotic heart disease); Blood circulation, collateral; CAD (coronary artery disease); Cardiomegaly; Chronic diastolic (congestive) heart failure ([facility] Chronic kidney disease; DM2 (diabetes mellitus, type 2) ([facility] Dyslipidemia; Falls; GERD (gastroesophageal reflux disease); [facility] failure with preserved ejection fraction ([facility] Hyperlipidemia; Hypertension; Iron deficiency anemia; Left atrial dilation; Lymphoma ([facility] Malaise; Nonintractable headache; Osteoarthritis of both knees; Pacemaker; and PVD (peripheral vascular disease) ([facility]    Past Surgical History:   Past Surgical History         Past Surgical History:   Procedure Laterality Date    CARDIAC CATHETERIZATION   02/19/2018    CARDIAC SURGERY        CORONARY ARTERY BYPASS GRAFT        PACEMAKER PLACEMENT   01/12/2018     [facility]    VASCULAR SURGERY                Social History:  reports that he has never smoked. He has never used smokeless tobacco. He reports that he does not drink alcohol or use drugs.     Family History: family history is not on file.     Review of Systems:   General: Denies fever, chills, night sweats, weight loss, malaise, fatigue  (+) intermittent confusion  HEENT: Denies sore throat, sinus problems, allergic rhinosinusitis  Card: Denies chest pain, palpitations, orthopnea/PND. Denies h/o murmurs  (+) hx of CAD, CHF  Pulm: Denies cough, shortness of breath, MONZON  GI:  per HPI and denies h/o jaundice  : Denies polyuria, dysuria, hematuria  Endo: Denies diabetes, thyroid disease. Heme: Denies  h/o bleeding or clotting problems.    (+) anemia, history of lymphoma  Neuro: Denies h/o CVA, TIA  Skin: Denies rashes, ulcers  Musculoskeletal: Denies muscle, joint, back pain.     Allergies: Patient has no known allergies.     Home Meds:   Home Medications           Prior to Admission medications    Medication Sig Start Date End Date Taking? Authorizing Provider   tamsulosin (FLOMAX) 0.4 MG capsule Take 1 capsule by mouth daily 9/7/18   Yes Pattie Denise MD   Trolamine Salicylate (ASPERCREME EX) Apply topically daily in AM to bilateral knees     Yes Historical Provider, MD   gabapentin (NEURONTIN) 100 MG capsule Take 100 mg by mouth 3 times daily. .     Yes Historical Provider, MD   Multiple Vitamins-Minerals (MULTIVITAMIN ADULTS 50+ PO) Take 1 tablet by mouth daily      Yes Historical Provider, MD   furosemide (LASIX) 20 MG tablet Take 2 tablets by mouth 2 times daily  Patient taking differently: Take 20 mg by mouth 2 times daily  8/14/18   Yes Bola Cross MD   RANEXA 1000 MG extended release tablet TAKE 1 TABLET BY MOUTH TWICE DAILY 6/25/18   Yes Bola Cross MD   BYSTOLIC 10 MG tablet TAKE 1 TABLET BY MOUTH DAILY 6/25/18   Yes Bola Cross MD   ferrous sulfate 325 (65 Fe) MG tablet Take 1 tablet by mouth 2 times daily (with meals) 6/8/18   Yes Kesha Horvath MD   amLODIPine (NORVASC) 10 MG tablet Take 1 tablet by mouth daily 4/17/18   Yes Bola Cross MD   potassium chloride (KLOR-CON M) 20 MEQ extended release tablet Take 0.5 tablets by mouth daily 4/16/18   Yes Bola Cross MD   aspirin 81 MG tablet Take 81 mg by mouth daily     Yes Historical Provider, MD   atorvastatin (LIPITOR) 20 MG tablet Take 20 mg by mouth daily     Yes Historical Provider, MD   clopidogrel (PLAVIX) 75 MG tablet Take 75 mg by mouth daily     Yes Historical Provider, MD   glipiZIDE (GLUCOTROL) 5 MG tablet Take 5 mg by mouth daily     Yes Historical Provider, MD   magnesium oxide (MAG-OX) 400 MG tablet Take 400 mg by mouth 2 times daily      Yes Historical Provider, MD   metFORMIN (GLUCOPHAGE) 500 MG tablet Take 1,500 mg by mouth daily (with breakfast)     Yes Historical Provider, MD   omeprazole (PRILOSEC) 40 MG delayed release capsule Take 40 mg by mouth 2 times daily      Yes Historical Provider, MD   isosorbide mononitrate (ISMO;MONOKET) 10 MG tablet Take 1 tablet by mouth daily 12/15/17   Yes Merle Joseph MD   acetaminophen (TYLENOL) 500 MG tablet Take 1,000 mg by mouth every 6 hours as needed for Pain Total acetaminophen dose not to exceed 3000 mg per day.        Historical Provider, MD   polyethylene glycol (GLYCOLAX) powder Take 17 g by mouth as needed        Historical Provider, MD   meclizine (ANTIVERT) 25 MG tablet Take 25 mg by mouth 2 times daily as needed       Historical Provider, MD            Current Meds:  Current Medication     Current Facility-Administered Medications:     neomycin-bacitracin-polymyxin (NEOSPORIN) ointment, , Topical, BID, GARRETT Maurice Ace - CNP, 1 g at 11/20/18 1501    cefTRIAXone (ROCEPHIN) 1 g IVPB in 50 mL D5W minibag, 1 g, Intravenous, Q24H, GARRETT Maurice Ace - CNP, Stopped at 11/20/18 1536    guaiFENesin (MUCINEX) extended release tablet 600 mg, 600 mg, Oral, BID, Yoko Simms MD, 600 mg at 11/20/18 1501    zinc oxide 40 % paste, , Topical, BID, Yoko Simms MD    amLODIPine (NORVASC) tablet 10 mg, 10 mg, Oral, Daily, Yoko Simms MD    atorvastatin (LIPITOR) tablet 20 mg, 20 mg, Oral, Daily, GARRETT Acevedo - CNP, 20 mg at 11/20/18 1005    nebivolol (BYSTOLIC) tablet 10 mg, 10 mg, Oral, Daily, Yoko Simms MD    ferrous sulfate tablet 325 mg, 325 mg, Oral, BID , GARRETT Acevedo - CNP, 325 mg at 11/20/18 1718    multivitamin 1 tablet, 1 tablet, Oral, Daily, GARRETT Acevedo - CNP, 1 tablet at 11/20/18 1005    pantoprazole (PROTONIX) tablet 40 mg, 40 mg, Oral, QAM Tiffani APRN - CNP, 40 mg at 11/20/18 3485    ranolazine (RANEXA) extended release tablet 1,000 mg, 1,000 mg, Oral, BID, GARRETT Acevedo - CNP, 1,000 mg at 11/20/18 1005    tamsulosin (FLOMAX) capsule 0.4 mg, 0.4 mg, Oral, Daily, Tiffani Jorgensen, GARRETT - CNP, 0.4 mg at 11/20/18 1005    sodium chloride flush 0.9 % injection 10 mL, 10 mL, Intravenous, 2 times per day, GARRETT Song CNP    sodium chloride flush 0.9 % injection 10 mL, 10 mL, Intravenous, PRN, Tiffani Jorgensen APRN - CNP    magnesium hydroxide (MILK OF MAGNESIA) 400 MG/5ML suspension 30 mL, 30 mL, Oral, Daily PRN, Tiffani Jorgensen APRN - CNP    ondansetron (ZOFRAN) injection 4 mg, 4 mg, Intravenous, Q6H PRN, Tiffani Jorgensen APRN - CNP    0.9 % sodium chloride infusion, , Intravenous, Continuous, Tiffani Jorgensen APRN - CNP, Last Rate: 75 mL/hr at 11/20/18 0431    insulin lispro (HUMALOG) injection vial 0-6 Units, 0-6 Units, Subcutaneous, TID WC, Tiffani Jorgensen APRN - CNP, 3 Units at 11/20/18 1723    insulin lispro (HUMALOG) injection vial 0-3 Units, 0-3 Units, Subcutaneous, Nightly, Tiffani Jorgensen APRN - CNP, 1 Units at 11/19/18 2127    glucose (GLUTOSE) 40 % oral gel 15 g, 15 g, Oral, PRN, Tiffani Jorgensen APRN - CNP    dextrose 50 % solution 12.5 g, 12.5 g, Intravenous, PRN, Tiffani Jorgensen APRN - CNP    glucagon (rDNA) injection 1 mg, 1 mg, Intramuscular, PRN, Tiffani Jorgensen APRN - CNP    dextrose 5 % solution, 100 mL/hr, Intravenous, PRN, Tiffani Jorgensen APRN - CNP        Vital Signs:      Vitals:     11/20/18 1706   BP: (!) 114/55   Pulse: 71   Resp: 16   Temp: 98.9 °F (37.2 °C)   SpO2: 96%         Weight:      Wt Readings from Last 3 Encounters:   11/19/18 164 lb (74.4 kg)   10/04/18 165 lb (74.8 kg)   09/26/18 166 lb 3.2 oz (75.4 kg)         Physical Exam:  General Appearance:  awake, alert, oriented, in no acute distress and well developed, well nourished elderly  male. Pt is lying comfortably in bed. HEENT: Atraumatic, Pupils reactive, No pallor/icterus. Oral mucosa moist/No thrush  Neck: No thyroid enlargement, No cervical or supraclavicular lymphaedenopathy  CVS: Regular rate and rhythm, No murmurs. No rubs or gallops  RS: Good b/l air entry, Clear but diminished to auscultation b/l. No rhonchi or wheezing noted.   Abd: soft, non-tender, non-distended, no visible veins, scars, No hepatosplenomegaly or palpable masses, bowel sounds active. Ext: No clubbing, cyanosis, edema  CNS: alert, oriented, no gross focal motor deficits     Labs:         Lab Results   Component Value Date     WBC 7.2 11/20/2018     HGB 8.0 11/20/2018     HCT 24.2 11/20/2018     MCV 92.7 11/20/2018      11/20/2018            Lab Results   Component Value Date      11/20/2018     K 4.5 11/20/2018      11/20/2018     CO2 21 11/20/2018     BUN 74 11/20/2018     CREATININE 3.5 11/20/2018     GLUCOSE 192 11/20/2018     GLUCOSE 221 11/07/2011     CALCIUM 8.8 11/20/2018            Lab Results   Component Value Date     ALKPHOS 98 11/19/2018     ALT 6 11/19/2018     AST 17 11/19/2018     PROT 6.9 11/19/2018     BILITOT 0.5 11/19/2018     BILIDIR <0.2 09/10/2018     LABALBU 2.6 11/19/2018            Lab Results   Component Value Date     LACTA 3.9 09/10/2018      Lab Results   Component Value Date     AMYLASE 53 08/08/2018            Lab Results   Component Value Date     LIPASE 18.8 09/10/2018            Lab Results   Component Value Date     INR 1.02 09/10/2018      Results for Stuart Whittaker (MRN [id number]) as of 11/20/2018 17:35    Ref. Range 11/20/2018 13:32   Ferritin Latest Ref Range: 22 - 322 ng/mL 933 (H)   Iron Latest Ref Range: 65 - 195 ug/dL 33 (L)   Folate Latest Ref Range: 4.8 - 24.2 ng/mL > 20.0   Vitamin B-12 Latest Ref Range: 211 - 911 pg/mL 681      Radiology:  PROCEDURE: CT ABDOMEN PELVIS WO CONTRAST  11/19/2018   CLINICAL INFORMATION: pain .   COMPARISON: 9/2/2018   TECHNIQUE: Axial 5 mm CT images were obtained through the abdomen and pelvis. No contrast was given.  Coronal reconstructions were obtained.   All CT scans at this facility use dose modulation, iterative reconstruction, and/or weight-based dosing when appropriate to reduce radiation dose to as low as reasonably achievable.     FINDINGS:   Limitations: Evaluation of the solid and hollow viscera is limited due to the lack of IV contrast.   Minimal atelectasis predominantly in the left lung base correlation advised. Small hiatal hernia. Vascular calcifications. Calcified granulomas in the spleen. Partial atrophy of the pancreas. Few calcified layering gallstones. Liver, adrenal glands are unremarkable. Small right kidney. Mild periureteric stranding of the right ureter. Left hydronephrosis and hydroureter. No obstructing ureteral stone. There is significant distention of the bladder with wall thickening and infiltration. Small amount of air within the bladder wall consistent with cystitis. Small amount of air within the   bladder correlate for recent intervention. Posterior bladder diverticulum. Normal caliber abdominal aorta with atherosclerotic changes. No free intraperitoneal air. Stool and air throughout the colon. No wall thickening. Appendix is not seen. Stool ball in the rectum. Small fat-containing inguinal hernias bilaterally. Degenerative changes lumbar spine. SI joints are symmetric.      Impression     1. Few calcified layering gallstones. Correlation with serology as clinically warranted.   2. Left hydronephrosis and hydroureter. No obstructing ureteral stone. There is significant distention of the bladder with wall thickening and infiltration. Small amount of air within the bladder wall consistent with cystitis. Small amount of air within the bladder correlate for recent intervention.   3. Scattered stool in the colon to ball in the rectum. Correlate for constipation.     **This report has been created using voice recognition software. It may contain minor errors which are inherent in voice recognition technology. **     Final report electronically signed by Dr. Geo Francisco on 11/19/2018 3:47 PM     PROCEDURE: FL MODIFIED BARIUM SWALLOW W Amarilys Arevalo 9/11/2018  CLINICAL INFORMATION: Dysphasia   TECHNIQUE: Fluoroscopy was provided for modified barium swallowing study performed by speech therapy. With the patient in the lateral projection, swallowing mechanism was evaluated using barium of various consistencies. The radiologist was available for   the entire examination. One spot image was obtained. Total fluoroscopy time 3 minutes 9 seconds.   Speech therapist: Cheryle Bergamo  COMPARISON: None.   FINDINGS: Oral, pharyngeal and esophageal structures appear normal. There is laryngeal penetration of nectar thick and thin barium with aspiration of thin barium.      Impression  1. Laryngeal penetration of nectar thick and thin barium with aspiration of thin barium. 2. Additional recommendations from the speech therapist will follow.      **This report has been created using voice recognition software. It may contain minor errors which are inherent in voice recognition technology. **     Final report electronically signed by Dr. Denise Kelly on 2018 10:46 AM     SPEECH THERAPY  [facility] MED SURG 8B  Modified Barium Swallow     SLP Individual Minutes  Time In: 8772  Time Out: 8298  Minutes: 10  Timed Code Treatment Minutes: 0 Minutes     [] Outpatient [facility]     Date: 2018  Patient Name: Jaspreet Huddleston: [id number]   : [id number]  (57 y.o.)  Gender: male   Referring Physician:  Dr. Crystal Magaña   Diagnosis: Syncope and collapse   Secondary Diagnosis:  Dysphagia   Date of Last MBS:  N/a   Diet:  NPO   History of Present Illness/Injury: Patient admitted to [facility] with above dx. Abdoulaye Soto with multiple previous hospital admissions and multiple BSE's. ST consulted d/t patient c/o of \"I have trouble swallowing and things are getting stuck and hard to get down. \" BSE completed this morning with furthur recommendations to complete MBS d/t s/s of aspiration and hx of dysphagia   Please see medical history questionnaire for information related to prior medical history, allergies and medications  Pain:  0/10     Current Diet:  NPO     Respiratory Status:   [x] Independent   [] Nasal Cannula           [] Oxygen Mask                                       [] Tracheostomy            [] Other:                                      [] Ventilator/Settings:     Behavioral Observation:      [x] Alert    [x] Oriented           [] Confused        [] Lethargic                                                  [] Dysarthric       [] Limited Direction Following  [] Agitated          [] Other:     PATIENT WAS EVALUATED USING:  Thin, nectar, honey, puree, soft solids, hard solids      ORAL PREPARATION PHASE:       [] WFL                [x] Impaired              [x] Slow mastication        [x] Uncoordinated mastication    [] Decreased bolus control              [x] Decreased bolus formation    [] Loss of bolus from lips / Anterior spillage              [] OTHER:     ORAL PHASE:           [] WFL                [x] Impaired              [] Residue in the anterior sulcus           [] Residue in the lateral sulcus - right              [] Residue in the lateral sulcus - left     [] Uncoordinated AP movement              [x] Slow AP movement                           [x] Decreased lingual elevation              [x] Decreased tongue base retraction    [x] Uncontrolled bolus / diffuse fall lover tongue base              [] Piecemeal deglutition               [] OTHER:              ORAL PHASE UGO SCORE: (Dysphagia outcome and severity scale)  [x] 3 = Moderate dysphagia; Total assist with strategies; Two or more consistencies restricted;  Moderate oral residue clears with cue;       PHARYNGEAL PHASE:        [] WFL                [x] Impaired              [] Absent Swallow                     [x] Delayed Swallow       [] Decreased airway protection              [x] Decreased epiglottic inversion          [x] Decreased hyolaryngeal elevation              [x] Residue in the valleculae-severe; cleared to moderate              [x] Residue in the pyriform sinus-moderate; cleared to mild                    [] Cricopharyngeal dysfunction             [] Residue along posterior pharyngeal wall              [] Residue along the ariepiglottic folds.               [] OTHER:     PHARYNGEAL PHASE UGO SCORE: (Dysphagia outcome and severity scale)  [x] 3 = Moderate dysphagia:  Two or more diet consistencies restricted; May exhibit one or more of the following:  Moderate residue clears with cue; Airway penetration to the level of the vocal folds without cough with two or more consistencies;  Aspiration with two consistencies with weak or no reflexive cough; Aspiration of one consistency, no cough and airway penetration with one consistency, no cough.      SIGNS AND SYMPTOMS OF LARYNGEAL PENETRATION / ASPIRATION:  [x] Laryngeal penetration evident with: thin, nectar  [x] Audible aspiration evident with: thin   [x] Silent aspiration evident with: thin      PENETRATION-ASPIRATION SCALE (PAS):  [x] 7 = Material enters the airway, passes below the vocal folds, and is not ejected from the trachea despite effort     ESOPHAGEAL PHASE:          [x] Recommend further esophageal testing-patient with narrowing of the UES.  Patient reports, \"It is stuck right her (pointing to hie esophagus) following the test.\"      ATTEMPTED TECHNIQUES:                                                                        Comments:  [x]Small bolus size [] Effective [x] Not Effective     []Straw [] Effective [] Not Effective Straw NOT attempted due to patient with poor ability to control small bolus with cup drink alone    [x]Cup [x] Effective [] Not Effective     [x]Chin tuck [] Effective [x] Not Effective Despite patient's ability to attempt strategy; strategy noted to be ineffective       DIAGNOSTIC IMPRESSIONS:  Patient presents with moderate orophayrngeal dysphagia as evidence by decreased/slow A-P transit, material falling uncontrollably over BOT despite attempt at compensatory strategy of small bites/sip and restriction of straw. Patient with consistent severe residue within the vallecula; clearing to moderate provided direct cues to initiated \"additional swallows x3-4 per bolus. \"    Moderate residue noted with the pyriform sinus cavities; clearing to mild.  HOWEVER, patient remains at [address] [place] [address] for aspiration of pharyngeal residue and decreased endurance.  Patient with poor performance initiating \"additional dry swallows\" following the bolus, secondary to overall pharyngeal weakness.  Patient with POOR hyolaryngeal eluviation and airway protection.  Consistent laryngeal penetration noted with thin and nectar thick liquids, despite use of direct verbal cues of compensatory swallowing strategies.  Patient with eventual aspiration of thin liquids; with potential additional residue of nectar thick material d/t pharyngeal residue build up.  NO penetration or aspirations noted with honey thick liquids, puree and soft solids; however, patient certainly remains at [address] [place] [address] for aspiration d/t mild-moderate amount of pharyngeal residue remaining following completion of the study.  Recommend dysphagia II with honey thick liquids AND use of all compensatory swallowing strategies to reduce risk of aspiration and maintain adequate hydration and nutrition.  Patient with NO cough during study; however, delayed cough noted following completion of the study (no fluro).  Patient would greatly benefit from skilled ST services to target dysphagia needs to potentially return to baseline swallowing function.  Also recommend GI f/u due to small amount of reflux and build up of residue at the level of the UES.        DIET RECOMMENDATIONS:  LLPHGHILN II with HONEY thick liquids      STRATEGIES:              [x] Full upright position                [x] Small bite/sip    [x] No Straw                                [x] Multiple Swallow          [x] Pulmonary monitoring            [x] Oral care after all meals  [x] Supervision                            [x] Medication in applesauce                    [x] Alternate solid / liquid [x] Limit distractions          [x] Monitor for fatigue     EDUCATION:              Learner:           [x]Patient [] Significant other         [] Son/Daughter [] Parent                                      [] Other:              Education:       [x] Reviewed results and recommendations of this evaluation                                      [x] Reviewed diet and strategies                                      [x] Reviewed signs, symptoms and risk of aspiration                                      [x] Demonstrated how to thick liquid appropriately.                                     [x] Reviewed goals and Plan of Care                                      [] JERRY:              Method:           [x] Discussion      [x] Demonstration           [] Hand-out                                      [] DEYZG:              Evaluation of Education:                                      [x] Verbalizes understanding      [x] Demonstrates with assistance                                      [] Demonstrates without assistance      [x]Needs further instruction                                      [] No evidence of learning                     [x] Family not present     PATIENT GOALS:     [] Pt did not state; Will further assess during treatment.                                      [] Return to the least restricted diet possible                                      [x] Return to previous level of function                                      [] OTHER:     PLAN / TREATMENT RECOMMENDATIONS:  [x] Skilled SLP intervention on acute care 3-5 x per week or until goals met and/or pt plateaus in function.  Specific interventions for next session may include: dysphagia tx   SHORT TERM GOALS:  Short-term Goals  Timeframe for Short-term Goals: 2 weeks  Goal 1: Patient will tolerate dysphagia II with HONEY thick liquids without s/s of aspiraiton in order to safley maintain adequate hydration and nutrition  Goal 2: Patient will complete advanced textures and nectar thick liquids x10 without s/s of apsiration in order to safely upgrade patient diet. Goal 3: Patient will complete oral and phyarngeal exercsies x10 with good success in order to improve overall function and safety of the swallow. Goal 4: Patient will complete repeat instrumental evaluation exam in 2 weeks as appropriate to determine safety of upgraded diet. Goal 5: Monitor cognitve function and complete formal evaluation as approrpiate    LONG TERM GOALS:  Long-term Goals  Goal 1: No LTGS due to ELOS    SLP G-Codes  Functional Assessment Tool Used: functional communication measurse   Score: 5  Functional Limitations: Swallowing  Swallow Current Status (): At least 20 percent but less than 40 percent impaired, limited or restricted  Swallow Goal Status (): At least 1 percent but less than 20 percent impaired, limited or restricted     [place] MARIELA Mcman[facility] [id number]     ASSESSMENT AND PLAN:  1. Oropharyngeal dysphagia per ST evaluation - may benefit from esophageal dilation. Will need to be off DAPT 3-5 days to proceed with dilation. Discussed with Hospitalist; would like to consult Cardiology before holding DAPT as pt had borderline abnormal Lexiscan stress test recently. Continue Speech Therapy recommendations until clearance to hold DAPT received. 2.  Anemia - possible history of melena. Will obtain FOB. If positive, recommend EGD be done to evaluate for UGI bleed - this can be done while on DAPT. Monitor H&H and transfuse prn to keep Hgb > 8.0 d/t pt's history of CAD. 3.  UTI with chronic indwelling Shabazz - ATB per Attending. 4.  Urinary retention  - Urology managing  5. Hydronephrosis and hydroureter - Urology managing. 6.  GLORIA - Attending managing  7.   History of GERD - continue

## 2018-11-21 NOTE — PLAN OF CARE
Problem: Falls - Risk of:  Goal: Will remain free from falls  Will remain free from falls   Outcome: Met This Shift  Patient has not fallen so far this shift. Patient in bed with rails up and call light in reach. Problem: DAILY CARE  Goal: Daily care needs are met  Outcome: Met This Shift  Patient needs assist with ADLs d/t deconditioned state. Patient [facility] resident. Problem: SKIN INTEGRITY  Goal: Skin integrity is maintained or improved  Outcome: Ongoing  Patient has DTI for coccyx/buttocks and wound to urethral meatus. No new s/s of skin breakdown noted so far this shift. Problem: KNOWLEDGE DEFICIT  Goal: Patient/S.O. demonstrates understanding of disease process, treatment plan, medications, and discharge instructions. Outcome: Met This Shift  Patient asking pertinent questions about POC. Patient willing to learn from staff. Problem: DISCHARGE BARRIERS  Goal: Patient's continuum of care needs are met  Outcome: Met This Shift  Patient plans to go to [facility]. Problem:   Goal: No urinary complication  Outcome: Ongoing  No s/s of urinary dysfunction. Patient has chronic trujillo draining moderate amounts of urine. Problem: Pain:  Goal: Pain level will decrease  Pain level will decrease   Outcome: Ongoing  Patient complains of chronic pain to penis and buttock. Patient denies the need for pain meds this shift. Patient able to rest with eyes closed and resp unlabored. Comments: Care plan reviewed with patient. Patient verbalizes understanding of the plan of care and contribute to goal setting.

## 2018-11-21 NOTE — CONSULTS
[address] [place] [address]                                  CONSULTATION    PATIENT NAME: Rhonda Bowie              :        1935  MED REC NO:   [id number]                           ROOM:       0004  ACCOUNT NO:   [de-identified]                           ADMIT DATE: 2018  PROVIDER:     Abdulkadir Rodriguez. MARY Talavera Cam:  2018    REASON FOR CONSULTATION:  Preoperative risk assessment for EGD and  esophageal dilatation. HISTORY OF PRESENT ILLNESS:  This is an 80-year-old gentleman, presented  to [facility] three days ago for blood in the urine and he  has chronic indwelling Shabazz catheter and was reported to be changed a  week ago and family noted the patient has discolored urine. So in view  of that, he came in and got admitted. The patient is complaining of  also swallowing difficulty and dysphagia. Shabazz catheter has been  changed here and the patient is doing well now. He is urinating well. He is also found to have urinary tract infection. He is on antibiotics  for that. Cardiology evaluation was thought for possible EGD and  esophageal dilatation. The patient denied having chest pain. He  follows with Dr. Felisha Snyder and the patient is known to have coronary artery  disease, status post coronary artery bypass several years ago and  peripheral arterial disease, status post peripheral intervention. The  patient has no chest pain but has significant hard of hearing. He has  no shortness of breath, no orthopnea, no paroxysmal nocturnal dyspnea. No leg swelling. He had recent abnormal stress test, done because of  mildly elevated troponin. The stress test shows small area of mild  ischemia, but the patient does not have any angina or angina equivalent,  so he was on medical therapy. REVIEW OF SYSTEMS:  Negative except the above-mentioned ones.     PAST MEDICAL HISTORY:  Include the segment  changes, no acute EKG abnormality. He had an echocardiogram in 12/2017. Ejection fraction 55%. He had blood test, sodium 133, potassium 4.4,  BUN 65, creatinine 2.6. Cardiac enzymes:  No troponin done on this  admission, but he had troponin in 09/2018, mildly elevated and stress  test was done because of that. Hematology:  White blood cell 8.4,  hemoglobin 7.7, platelet 262. ASSESSMENT:  1. Preoperative risk assessment. 2.  Coronary artery disease, status post previous bypass, stable, no  angina or angina equivalent. 3.  Peripheral arterial disease, status peripheral intervention. 4.  Urinary retention with indwelling Shabazz catheter in place. 5.  Severe anemia to be addressed by the hospitalist onboard, discussed  with. 6.  Atrial paced rhythm. Device interrogation done in 10/2018 and  basically that was acceptable. PLAN AND RECOMMENDATION:  At this level, the patient has mild ischemia  on stress test, done because of abnormal troponin elevation. The  patient has no angina or angina equivalent. From cardiac point of view,  the patient may proceed for EGD and esophageal dilatation with low risk  of having major cardiovascular event, but certainly there is always a  risk around 1%. May need a blood transfusion in view of marked anemia  and may need to be _____. I discussed with the hospitalist and may need  to continue with his optimal medical therapy and basically followup with  Dr. Felisha Snyder as an outpatient. At this level, I do not intend to change  any of his medications and does not need any invasive ischemia workup at  this level because the patient is asymptomatic and it is a minor  procedure and the patient may proceed to the procedure with just  basically low risk. I discussed with the patient the plan of care and  family by the bedside. Thank you for allowing me to participate in the care of this patient. I  will follow up with you as needed. Sumaya Hammond.  Delta Collado, M.D.    D: 11/21/2018 11:29:46       T: 11/21/2018 13:12:58     [email or web address]  Job#: [id number]     Doc#: [id number]    CC:

## 2018-11-21 NOTE — PLAN OF CARE
Problem: Discharge Planning:  Intervention: Assess knowledge level of healthcare   [facility] Palliative Care           Progress Note    Patient Name:  Norman Pastrana  Medical Record Number:  [id number]  YOB: 1935    Date:  11/21/2018  Time:  1:53 PM  Completed By:  Solomon Kemp RN    Reason for Palliative Care Evaluation:  Goals of care and code status    Current Issues:  []  Pain  [x]  Fatigue  []  Nausea  []  Anxiety  []  Depression  []  Shortness of Breath  []  Constipation  []  Appetite  []  Other:    Advance Directives  [] [address] DNR Form  [x] Living Will  [x] Medical POA    Current Code Status  [x] Full Resuscitation  [] DNR-Comfort Care-Arrest  [] DNR-Comfort Care  [] Limited   [] No CPR   [] No shock   [] No ET intubation/reintubation   [] No resuscitative medications   [] Other limitation:    Performance Status: 40  Family/Patient Discussion:  Pt sitting in bed and ate about 30% of diet, \"just not hungry. \" Pt states he has been living at home and family brings food into him. Pt is aware of being in hospital at this time, and remembers nephew visiting this am. Pt receiving blood at this time. Denies needs at this time. Pt seems to have bouts of confusion during conversation and does not appear to completely comprehend medical issues . Call placed to [facility] Ayala to clarify goals of care for pt. Await callback. Yina VALADEZ updated    Plan/Follow-Up:  Spiritual care consult.                                 Follow supportively    Solomon Kemp RN  11/21/2018,  1:53 PM

## 2018-11-21 NOTE — CARE COORDINATION
11/21/18, 10:59 AM      [facility] day: 2  Location: [facility]04/004-A Reason for admit: GLORIA (acute kidney injury) ([facility] [address] [N17.9]   Procedure: MBS per Speech Therapy  Treatment Plan of Care: GI, Nephrology, and Cardiology following, urology recommendations ordered-they have signed off, ST, SS, Palliative Care, trujillo catheter care, wound care, DM management, daily labs, discharge planning. PCP: GARRETT Park CNP  Readmission Risk Score: 42%  Discharge Plan: Return to [facility]. Patient is a bed hold.

## 2018-11-22 NOTE — PROGRESS NOTES
Renal Progress Note    Assessment and Plan: 1. Acute kidney injury from volume contraction better   2. BPH  3. Proteus septicemia   4. Proteus UTI  5. Dehydartion better   6. Chronic indwell Shabazz catheter from BPH  7. Penile erosion from chronic Shabazz catheter  PLAN:  Reviewed labs   Reviewed medications   No changes for now   Continue IV fluid   AM labs  Will follow   Discussed with staff    Patient Active Problem List:     Postural dizziness with near syncope     DM2 (diabetes mellitus, type 2) (HCC)     Hypertension     Dehydration     Pacemaker     Peripheral vascular disease (HCC)     Lactic acidosis     Iron deficiency anemia     CAD (coronary artery disease)     Heart failure with preserved ejection fraction (HCC)     GERD (gastroesophageal reflux disease)     Dyslipidemia     Lymphoma (HCC)     Osteoarthritis of both knees     Left atrial dilation     GLORIA (acute kidney injury) ([address]     Frequent falls     Physical deconditioning     Altered awareness, transient     Fever     SIRS (systemic inflammatory response syndrome) (HCC)     Pancytopenia (HCC)     Altered mental status     Hepatomegaly     Urinary retention     Hematuria     Acute kidney injury superimposed on chronic kidney disease (HCC)     Hyperkalemia     Hypercalcemia     Calculus of gallbladder without cholecystitis without obstruction     Syncope and collapse     Non-intractable vomiting with nausea     Gallbladder dilatation     Nonintractable headache     Osteoarthritis of multiple joints     Chronic diastolic (congestive) heart failure (HCC)     S/P placement of cardiac pacemaker     Chronic kidney disease, stage 3 (HCC)     Severe malnutrition (HCC)     Dysphagia     Urinary retention     Hyperkalemia     Hyponatremia     Lactic acidosis     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)     Urinary tract infection associated with indwelling urethral catheter ([address]     Other acute kidney failure (HCC)     Pre-op evaluation      Subjective:   Admit Date: 11/19/2018    Interval History:   Seeing for acute kidney injury   Awake and alert   Complains of eye irritation  On eye drop  Stable blood pressure   Good urine output       Medications:   Scheduled Meds:   neomycin-bacitracin-polymyxin   Topical BID    insulin lispro  0-12 Units Subcutaneous TID WC    insulin lispro  0-6 Units Subcutaneous Nightly    trimethoprim-polymyxin b  1 drop Both Eyes 6 times per day    zinc oxide   Topical BID    cefTRIAXone (ROCEPHIN) IV  1 g Intravenous Q24H    guaiFENesin  600 mg Oral BID    amLODIPine  10 mg Oral Daily    atorvastatin  20 mg Oral Daily    nebivolol  10 mg Oral Daily    ferrous sulfate  325 mg Oral BID WC    multivitamin  1 tablet Oral Daily    pantoprazole  40 mg Oral QAM AC    ranolazine  1,000 mg Oral BID    tamsulosin  0.4 mg Oral Daily    sodium chloride flush  10 mL Intravenous 2 times per day     Continuous Infusions:   sodium chloride 75 mL/hr at 11/22/18 0017    dextrose         CBC:   Recent Labs      11/19/18   1415  11/20/18   0702  11/21/18   0607  11/22/18   0112   WBC  7.1  7.2  8.4   --    HGB  8.8*  8.0*  7.7*  10.0*   PLT  244  197  192   --      CMP:  Recent Labs      11/20/18   0702  11/21/18   0607  11/22/18   0603   NA  136  138  138   K  4.5  4.4  4.0   CL  102  106  104   CO2  21*  20*  18*   BUN  74*  65*  45*   CREATININE  3.5*  2.6*  1.8*   GLUCOSE  192*  257*  199*   CALCIUM  8.8  9.1  9.2   LABGLOM  17*  24*  36*     Troponin: No results for input(s): TROPONINI in the last 72 hours. BNP: No results for input(s): BNP in the last 72 hours. INR: No results for input(s): INR in the last 72 hours. Lipids: No results for input(s): CHOL, LDLDIRECT, TRIG, HDL, AMYLASE, LIPASE in the last 72 hours.   Liver: Recent Labs      11/19/18   1415   AST  17   ALT  6*   ALKPHOS  98   PROT  6.9   LABALBU  2.6*   BILITOT  0.5     Iron:    Recent Labs      11/20/18   1332   FERRITIN  933* Objective:   Vitals: BP (!) 120/57   Pulse 65   Temp 98.5 °F (36.9 °C) (Oral)   Resp 18   Ht 5' 9\" (1.753 m)   Wt 164 lb (74.4 kg)   SpO2 97%   BMI 24.22 kg/m²    Wt Readings from Last 3 Encounters:   11/19/18 164 lb (74.4 kg)   10/04/18 165 lb (74.8 kg)   09/26/18 166 lb 3.2 oz (75.4 kg)      24HR INTAKE/OUTPUT:    Intake/Output Summary (Last 24 hours) at 11/22/18 1053  Last data filed at 11/22/18 0606   Gross per 24 hour   Intake           3003.5 ml   Output             1850 ml   Net           1153.5 ml       Constitutional:  Alert, awake, no apparent distress   Skin:normal   HEENT:Pupils are reactive . Throat is clear   Neck:supple with no thyromegaly  Cardiovascular:  S1, S2 without murmur  Respiratory:  Clear  Abdomen: +bs, soft, non tender  Ext: No LE edema  Musculoskeletal:Intact  Neuro:Alert and awake with no deficit      Electronically signed by Petra Way MD on 11/22/2018 at 10:53 AM

## 2018-11-22 NOTE — CONSULTS
Ophthalmology Consultation  [address] [facility]  [id number]  Issa Hayes M.D. Re:   OPHTHALMOLOGY CONSULTATION      The patient is a 80 y.o. male who was admitted with hematuria. The eye issue for which I was consulted is as follows: For past few days he has noted a foreign body sensation in right eye > left eye. This has increased. Associated with increased tearing. Keeps right eye shut due to discomfort. He was administered artificial tears today and he stated that this made his eye feel significantly better. Past ocular history:  None significant      Past Medical History:   Diagnosis Date    Acute kidney failure (HCC)     Anemia     ASHD (arteriosclerotic heart disease)     Blood circulation, collateral     CAD (coronary artery disease)     Cardiomegaly     Chronic diastolic (congestive) heart failure (HCC)     Chronic kidney disease     DM2 (diabetes mellitus, type 2) (HCC)     Dyslipidemia     Falls     GERD (gastroesophageal reflux disease)     Heart failure with preserved ejection fraction (HCC)     Hyperlipidemia     Hypertension     Iron deficiency anemia     Left atrial dilation     severe    Lymphoma (HCC)     non hodgkin    Malaise     Nonintractable headache     Osteoarthritis of both knees     Pacemaker 1/25/2018    [employer] DUAL PACEMAKER INSERTED ON 01/  PVD (peripheral vascular disease) ([address]      Past Surgical History:   Procedure Laterality Date    CARDIAC CATHETERIZATION  02/19/2018    CARDIAC SURGERY      CORONARY ARTERY BYPASS GRAFT      PACEMAKER PLACEMENT  01/12/2018    [facility]    VASCULAR SURGERY       No Known Allergies    Medications reviewed in chart    Review of Systems - reviewed from chart    Patient is oriented to person, place, time, and general circumstances.     Ophthalmic exam:  Vision right eye: without correction (glasses) 20/30  Vision left eye: without correction (glasses) 20/50  Intraocular pressure: 13 mm Hg OD,  13 mm Hg OS  Pupils: Equally round and reactive to light, No afferent pupil defect  Extraocular motility: extra-ocular movements intact, OU  Confrontation visual fields:  Full in each eye    Lids/lashes/lacrimal system: normal    Anterior Segment:    Conjuctiva/sclera: slight generalized injection, OD; normal, OS. Cornea: normal, OU. No fluorescein staining. No foreign bodies. Anterior chamber: normal, OU   Iris: normal, OU      Assessment:    Conjunctival Foreign Body, right eye -- NO FOREIGN BODY FOUND on exam.  Mild conjunctival injection, but otherwise no acute findings. Comment / Plan / Recommendation:    May use Artificial Tears on prn basis up to 6x daily. Also a warm compress may help relieve symptoms. I will recheck if symptoms worsen or do not resolve.

## 2018-11-22 NOTE — PLAN OF CARE
Problem: Falls - Risk of:  Goal: Will remain free from falls  Will remain free from falls   Outcome: Ongoing  Fall assessment completed. Patient using call light appropriately to call for assistance with ambulation to bathroom. Personal items within reach. Patient is also compliant with use of non-skid slippers. Bed alarm is on. Goal: Absence of physical injury  Absence of physical injury   Outcome: Ongoing  No falls noted this shift. Patient ambulates with x1 staff assistance without difficulty. Bed kept in low position. Safe environment maintained. Bedside table & call light in reach. Uses call light appropriately when needing assistance. Bed alarm is on. Problem: DAILY CARE  Goal: Daily care needs are met  Outcome: Ongoing  Assisted with daily care. Able to feed self. Turns self in bed. Is also assisted with turning and pillows placed. Problem: SKIN INTEGRITY  Goal: Skin integrity is maintained or improved  Outcome: Ongoing  Zinc applied to buttocks for DTI. Being turned every 2 hours. Pillow support. Heels elevated off bed surface. Alternating pressure relief mattress is on bed. Problem: KNOWLEDGE DEFICIT  Goal: Patient/S.O. demonstrates understanding of disease process, treatment plan, medications, and discharge instructions. Outcome: Ongoing  Patient demonstrates baseline knowledge of admitting diagnosis. Patient is active with plan of care. Has occasional confusion but re orients well. Problem: DISCHARGE BARRIERS  Goal: Patient's continuum of care needs are met  Outcome: Ongoing  Patient is from [facility] and will return at discharge. Case management and social service following for discharge needs. Problem:   Goal: No urinary complication  Outcome: Ongoing  Patient trujillo is chronic. Draining clear yellow urine. Discharge around trujillo noted. Neosporin ointment applied after trujillo care given.      Problem: Nutrition  Goal: Optimal nutrition therapy  Outcome: Ongoing  Appetite is fair.    Problem: Pain:  Goal: Pain level will decrease  Pain level will decrease   Outcome: Met This Shift  Denies pain. Comments: Care plan reviewed with patient. Patient verbalize understanding of the plan of care and contribute to goal setting.

## 2018-11-22 NOTE — PROGRESS NOTES
Gastroenterology  Progress Note    11/22/2018 11:09 AM  Subjective:   Admit Date: 11/19/2018    Interval History: melena, off Plavix, dysphagia , high risk of aspiration , on PPI   Diet: Dietary Nutrition Supplements: Low Calorie High Protein Supplement  DIET DYSPHAGIA III ADVANCED; No Drinking Straw    Medications:   Scheduled Meds:   neomycin-bacitracin-polymyxin   Topical BID    insulin lispro  0-12 Units Subcutaneous TID WC    insulin lispro  0-6 Units Subcutaneous Nightly    trimethoprim-polymyxin b  1 drop Both Eyes 6 times per day    zinc oxide   Topical BID    cefTRIAXone (ROCEPHIN) IV  1 g Intravenous Q24H    guaiFENesin  600 mg Oral BID    amLODIPine  10 mg Oral Daily    atorvastatin  20 mg Oral Daily    nebivolol  10 mg Oral Daily    ferrous sulfate  325 mg Oral BID WC    multivitamin  1 tablet Oral Daily    pantoprazole  40 mg Oral QAM AC    ranolazine  1,000 mg Oral BID    tamsulosin  0.4 mg Oral Daily    sodium chloride flush  10 mL Intravenous 2 times per day     Continuous Infusions:   sodium chloride 75 mL/hr at 11/22/18 0017    dextrose       CBC:   Recent Labs      11/19/18   1415  11/20/18   0702  11/21/18   0607  11/22/18   0112   WBC  7.1  7.2  8.4   --    HGB  8.8*  8.0*  7.7*  10.0*   PLT  244  197  192   --      BMP:    Recent Labs      11/20/18   0702  11/21/18   0607  11/22/18   0603   NA  136  138  138   K  4.5  4.4  4.0   CL  102  106  104   CO2  21*  20*  18*   BUN  74*  65*  45*   CREATININE  3.5*  2.6*  1.8*   GLUCOSE  192*  257*  199*     Hepatic:   Recent Labs      11/19/18   1415   AST  17   ALT  6*   BILITOT  0.5   ALKPHOS  98     INR: No results for input(s): INR in the last 72 hours.   Xray:   Endoscopy Finding:      Objective:   Vitals: BP (!) 120/57   Pulse 65   Temp 98.5 °F (36.9 °C) (Oral)   Resp 18   Ht 5' 9\" (1.753 m)   Wt 164 lb (74.4 kg)   SpO2 97%   BMI 24.22 kg/m²     Intake/Output Summary (Last 24 hours) at 11/22/18 1109  Last data filed at 11/22/18 0606   Gross per 24 hour   Intake           3003.5 ml   Output             1850 ml   Net           1153.5 ml     General appearance: alert and cooperative with exam  Lungs: clear to auscultation bilaterally  Heart: regular rate and rhythm, S1, S2 normal, no murmur, click, rub or gallop  Abdomen: soft, non-tender; bowel sounds normal; no masses,  no organomegaly  Extremities: extremities normal, atraumatic, no cyanosis or edema    Assessment and Plan:   1. GI bleeding, of Plavix, plan for EGD  2.  Dysphagia and GERD , plan for  AM       Follow up in GI Clinic after discharge in  week(s)    Patient Active Problem List:     Postural dizziness with near syncope     DM2 (diabetes mellitus, type 2) (HCC)     Hypertension     Dehydration     Pacemaker     Peripheral vascular disease (HCC)     Lactic acidosis     Iron deficiency anemia     CAD (coronary artery disease)     Heart failure with preserved ejection fraction (HCC)     GERD (gastroesophageal reflux disease)     Dyslipidemia     Lymphoma ([address]     Osteoarthritis of both knees     Left atrial dilation     GLORIA (acute kidney injury) ([address]     Frequent falls     Physical deconditioning     Altered awareness, transient     Fever     SIRS (systemic inflammatory response syndrome) (HCC)     Pancytopenia (HCC)     Altered mental status     Hepatomegaly     Urinary retention     Hematuria     Acute kidney injury superimposed on chronic kidney disease (HCC)     Hyperkalemia     Hypercalcemia     Calculus of gallbladder without cholecystitis without obstruction     Syncope and collapse     Non-intractable vomiting with nausea     Gallbladder dilatation     Nonintractable headache     Osteoarthritis of multiple joints     Chronic diastolic (congestive) heart failure (HCC)     S/P placement of cardiac pacemaker     Chronic kidney disease, stage 3 (HCC)     Severe malnutrition (HCC)     Dysphagia     Urinary retention     Hyperkalemia     Hyponatremia     Lactic acidosis     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

## 2018-11-22 NOTE — PROGRESS NOTES
distention of the bladder with wall thickening and infiltration. Small amount of air within the bladder wall consistent with cystitis. Small amount of air within the bladder correlate for recent intervention. Scattered stool in the colon to ball in the rectum. Correlate for constipation. Nephrology ( Dr. Saleem Adams) saw the patient on 11/201/8 for GLORIA on CKD stage 3. Dr. Saleem Adams recommend continue IVF and avoid diuretics and ACEI. Patient was evaluated by speech therapist who has oropharyngeal dsyphagia per ST evaluation, was placed on dysphagia diet and barium swallow was ordered. Quentin Stephen, GARRETT - [facility]OD[facility]/Dr. Charlyn Litten)  saw the patient on 11/201/8, who recommend EGD dilatation pending cardio clearance as patient had abnormal stress test on 10/2018. Cardiologist ( Dr. Cipriano Griggs) saw the patient on 11/21/2018, per Dr. Cipriano Griggs, patient may proceed with EGD with esophageal dilatation. Also, has dry cough on this admission, CXR unremarkable. Subjective:     Pt seen and examined. Pt is c/o burning sensation on both eyes. He said this has been ongoing for past 1 month, but he never complaint about it per cousin, who's at bedside. This is accompanied by watery eyes, rt eye>left eye and also redness on both eyes. Denies eye injury and blurry vision. He still having dry cough. Denies chest pain, sob, fever, chills, N/V, abd pain. Had BM this morning, denies hard or loose stools. Had black stools this morning per pt. He's also c/o left elbow pain that started yesterday. Denies elbow injury. Feels sore on left elbow per pt. Pt reports penile pain is improving w the neosporin.        Medications:  Reviewed    Infusion Medications    sodium chloride 75 mL/hr at 11/22/18 0017    dextrose       Scheduled Medications    neomycin-bacitracin-polymyxin   Topical BID    insulin lispro  0-12 Units Subcutaneous TID WC    insulin lispro  0-6 Units Subcutaneous Nightly    cefTRIAXone (ROCEPHIN) IV  1 g Intravenous Q24H    guaiFENesin 600 mg Oral BID    zinc oxide   Topical BID    amLODIPine  10 mg Oral Daily    atorvastatin  20 mg Oral Daily    nebivolol  10 mg Oral Daily    ferrous sulfate  325 mg Oral BID WC    multivitamin  1 tablet Oral Daily    pantoprazole  40 mg Oral QAM AC    ranolazine  1,000 mg Oral BID    tamsulosin  0.4 mg Oral Daily    sodium chloride flush  10 mL Intravenous 2 times per day     PRN Meds: sodium chloride flush, magnesium hydroxide, ondansetron, glucose, dextrose, glucagon (rDNA), dextrose      Intake/Output Summary (Last 24 hours) at 11/22/18 6348  Last data filed at 11/22/18 0606   Gross per 24 hour   Intake           3003.5 ml   Output             1850 ml   Net           1153.5 ml       Diet:  Dietary Nutrition Supplements: Low Calorie High Protein Supplement  DIET DYSPHAGIA III ADVANCED; No Drinking Straw    Exam:  BP (!) 120/57   Pulse 65   Temp 98.5 °F (36.9 °C) (Oral)   Resp 18   Ht 5' 9\" (1.753 m)   Wt 164 lb (74.4 kg)   SpO2 97%   BMI 24.22 kg/m²     General appearance: alert, not in acute distress   HEENT: Pupils equal, round, and reactive to light. (+) watery eye discharge on both eyes. (+) minimal conjunctival redness on both eyes. (+) whitish plaque on top of tongue. Neck: Supple, with full range of motion. No jugular venous distention. Trachea midline. Respiratory:  Normal respiratory effort. (+) rhonchi ( better compare 11/21/18 exam), no wheezing, no rales   Cardiovascular: Regular rate and rhythm with normal S1/S2 without murmurs, rubs or gallops. Abdomen: Soft, non-tender, non-distended with normal bowel sounds. : (+) erythema on tip of penis (better compare 11/21/18 exam), (+) trujillo cath in placed w roughly 200 cc clear urine. Buttocks: (+) erythema on sacral area ( better compare 11/21/18 exam)  Musculoskeletal: passive and active ROM x 4 extremities.  Elbow: no tenderness, no swelling, no erythema on both elbows, full active and passive ROM on B/L elbow extension and flexion  Neuro exam: alert and oriented x 3   Exam of extremities: peripheral pulses normal, no pedal edema, no clubbing or cyanosis.      Chaperoned by Nurse Rodolfo Sharma:   Recent Labs      11/19/18   1415  11/20/18   0702  11/21/18   0607  11/22/18   0112   WBC  7.1  7.2  8.4   --    HGB  8.8*  8.0*  7.7*  10.0*   HCT  26.7*  24.2*  23.5*  29.0*   PLT  244  197  192   --      Recent Labs      11/20/18   0702  11/21/18   0607  11/22/18   0603   NA  136  138  138   K  4.5  4.4  4.0   CL  102  106  104   CO2  21*  20*  18*   BUN  74*  65*  45*   CREATININE  3.5*  2.6*  1.8*   CALCIUM  8.8  9.1  9.2     Recent Labs      11/19/18   1415   AST  17   ALT  6*   BILITOT  0.5   ALKPHOS  98     No results for input(s): INR in the last 72 hours. No results for input(s): [place] Gretchen in the last 72 hours. Urinalysis:    Lab Results   Component Value Date    NITRU NEGATIVE 11/19/2018    WBCUA > 200 11/19/2018    BACTERIA MANY 11/19/2018    RBCUA 10-15 11/19/2018    BLOODU LARGE 11/19/2018    SPECGRAV 1.025 09/10/2018    GLUCOSEU NEGATIVE 11/19/2018       Radiology:  FL MODIFIED BARIUM SWALLOW W VIDEO   Final Result   1. Laryngeal penetration of thin barium without evidence of aspiration. 2. Additional recommendations from the speech therapist will follow. **This report has been created using voice recognition software. It may contain minor errors which are inherent in voice recognition technology. **      Final report electronically signed by Dr. Pravin Law on 11/21/2018 10:49 AM      US RENAL COMPLETE   Final Result       1. The right kidney is asymmetrically smaller than the left. 2. Abnormal appearance of the urinary bladder which has a diffusely thickened wall and increased vascularity throughout. There are also multiple diverticula. 3. Elevated bilateral resistive indices which may be seen with medical renal disease. 4. Incidental note made of splenomegaly.                **This report has been created using voice recognition software. It may contain minor errors which are inherent in voice recognition technology. **      Final report electronically signed by Dr Salud Zhu on 11/21/2018 9:56 AM      XR CHEST PORTABLE   Final Result   Cardiomegaly with no acute intrathoracic process. **This report has been created using voice recognition software. It may contain minor errors which are inherent in voice recognition technology. **      Final report electronically signed by Dr Salud Zhu on 11/20/2018 6:23 PM      CT ABDOMEN PELVIS WO CONTRAST Additional Contrast? None   Final Result      1. Few calcified layering gallstones. Correlation with serology as clinically warranted. 2. Left hydronephrosis and hydroureter. No obstructing ureteral stone. There is significant distention of the bladder with wall thickening and infiltration. Small amount of air within the bladder wall consistent with cystitis. Small amount of air within    the bladder correlate for recent intervention. 3. Scattered stool in the colon to ball in the rectum. Correlate for constipation. **This report has been created using voice recognition software. It may contain minor errors which are inherent in voice recognition technology. **      Final report electronically signed by Dr. Yunior Reyez on 11/19/2018 3:47 PM            Assessment/Plan: This is a 80 y.o. Male admitted for GLORIA and UTI      1. GLORIA, possibly due to urinary retention, improving     -creatinine 1.8 today from 2.6 yesterday ( 4.1 on admission). ( baseline creatinine between 1.3 to 1.8. Creatinine on 9/13/18 was 1.6)  -cont IVF  -BMP in am   -nephro on-board. Appreciate nephro input  -will switch Protonix to pepcid due to GLORIA     2. Proteus mirabalis UTI likely due to indwelling Shabazz cath      -cont Rocephin ( start date 11/19/18)     3. Urinary retention w left hydronephrosis and hydroureter     -Urology on-board.  Appreciate Urology input. GARRETT Perales - CNP ( Urology) saw patient 11/20/2018, who recommend leave trujillo catheter, bacitracin on penis tip TID prn, cont rocephin, renal US 11/21/18. Urology said poor candidate for surgery due to high risk ( Patient had lexiscan Stress test, on 10/17/18 which showed mild apico-lateral perfusion defect worse with stress that resolves with rest suggestive of ischemia, no evidence of infarction is noted on this study, left ventricular systolic function was normal.).     4. Non-AG Metabolic acidosis     -worsen compare yesterday, possibly related to DM type w hyperglycemia, accu-checks on 200s  -ABG showed mild respiratory alkalosis, compensated   -ketones normal  -slightly elevated bet-hydroxybutyrate  -repeat BMP in am   -cont IVF     5. Penile erythema, improving     -cont neosporin oint  -monitor     6. pressure ulcer, stage 1     -cont desitin paste  -turn patient q 2hours       7. Cough     -cxr normal  -procalcitonin elevated on admission. Repeat procalcitonin trending down  -cont Mucinex        8. Dysphagia and odynophagia     -speech eval done, pt has oropharyngeal dysphagia. Barium swallow showed  Laryngeal penetration of thin barium without evidence of aspiration. -GI on-board: scheduled for EGD w dilatation   -diet: dysphagia III     9. Normocytic anemia     -Hgb dropped from  8.0 on 11/20/18 to 7.7 11/21/18, possibly from IVF vs UGIB ( pt had black stools). S/p 2 units PRBC, H/H after PRBC transfusion: Hgb 10 and Hct 29  - discussed case w Dr. Gerry Gil, scheduled for EGD, per Dr. Gerry Gil, if unremarkable EGD, plan for colonoscopy. Appreciate Dr. Obdulio Lewis input.   -baseline Hgb between 9-10  -pt is asymptomatic,   -iron panel showed anemia of chronic disease. B12 and folate normal         10. HTN     -BP low normal  -cont amlodipine and Bystolic w holding parameters  -VS per protocol      11. Hyperlipidemia     -cont lipitor   -low fat diet      12.  CAD     -lexiscan stress test 10/17/18 showed mild apico-lateral perfusion defect worse with stress that   resolves with rest suggestive of ischemia in the distal LAD and/or distal   LCX territory. No evidence of infarction is noted on this study. Left ventricular systolic function was normal.  -on asa and plavix, however due to hematuria, plavix on hold  -cont statin and bystolic  -cardio on-board: Dr. Shelia Cerrato recommend continue optimal medical therapy and f/u w Dr. Max Douglas. Appreciate Dr. Billy Stone input  -pt is asymptomatic      13. Possible Upper GI bleed     -pt reported black stools  -GI on-board. Scheduled for EGD  -negative fobt    14. B/L conjunctivitis, possibly viral vs allergic conjunctivitis, cannot totally r/o bacterial    -start polytrim eye drop  -ophtha consult for further eval and mx     15. DM type 2    Lab Results   Component Value Date    LABA1C 5.2 01/05/2018     -accu-check uncontrolled, mostly 200s, start medium dose SSI  -cont accu-check     16. Oral candidiasis    -start oral nystatin mouthwash  -monitor    17.  Left elbow pain    -elbow exam unremarkable  -left elbow xray  -start lidocaine oint        Diet: Dietary Nutrition Supplements: Low Calorie High Protein Supplement  DIET DYSPHAGIA III ADVANCED; No Drinking Straw    DVT prophylaxis: [] Lovenox                                 [x] SCDs                                 [] SQ Heparin                                 [] Encourage ambulation           [] Already on Anticoagulation     Disposition:    [] Home       [] TCU       [] Rehab       [] Psych       [] SNF       [] [place]       [x] Other-scheduled for EGD 11/23/18    Code Status: Full Code    PT/OT Eval Status: consulted         Electronically signed by Lex Dennis MD on 11/22/2018 at 8:32 AM

## 2018-11-23 NOTE — PROGRESS NOTES
[address] [address]  INPATIENT PHYSICAL THERAPY  EVALUATION  [facility] ONC MED 5K - 5K-04/004-A    Time In: 1200  Time Out: 1209  Timed Code Treatment Minutes: 0 Minutes  Minutes: 9          Date: 2018  Patient Name: Stephanie Mansfield,  Gender:  male        MRN: [id number]  : 1935  (80 y.o.)      Referring Practitioner:  Dr Fabian Balbuena  Diagnosis: GLORIA  Additional Pertinent Hx: Pt admitted from NH with blood in urine. Pt had EGD this am.     Past Medical History:   Diagnosis Date    Acute kidney failure ([facility] [address]     Anemia     ASHD (arteriosclerotic heart disease)     Blood circulation, collateral     CAD (coronary artery disease)     Cardiomegaly     Chronic diastolic (congestive) heart failure (HCC)     Chronic kidney disease     DM2 (diabetes mellitus, type 2) (HCC)     Dyslipidemia     Falls     GERD (gastroesophageal reflux disease)     Heart failure with preserved ejection fraction (HCC)     Hyperlipidemia     Hypertension     Iron deficiency anemia     Left atrial dilation     severe    Lymphoma (HCC)     non hodgkin    Malaise     Nonintractable headache     Osteoarthritis of both knees     Pacemaker 2018    [employer] DUAL PACEMAKER INSERTED ON D (peripheral vascular disease) ([facility] [address]      Past Surgical History:   Procedure Laterality Date    CARDIAC CATHETERIZATION  2018    CARDIAC SURGERY      CORONARY ARTERY BYPASS GRAFT      PACEMAKER PLACEMENT  2018    [facility]-Dr Summers    [place] DILATION OF SALIVARY DUCT N/A 2018    EGD DILATION SAVORY performed by Rory Jordan MD at [facility]    VASCULAR SURGERY         Restrictions/Precautions:  General Precautions, Fall Risk                            Subjective:  Chart Reviewed: Yes  Patient assessed for rehabilitation services?: Yes  Family / Caregiver Present: No  Subjective: RN approved session. Pt agreeable although very sleepy    General:  Follows Commands: Within Functional Limits Hearing: Within functional limits         Pain:  Yes. Pain Assessment  Pain Assessment: Faces  Crenshaw-Baker Pain Rating: Hurts even more  Pain Type: Acute pain  Pain Location: Penis       Social/Functional History:    Type of Home: Facility  Home Layout: One level  Home Access: Level entry  Home Equipment: Rolling walker                   Ambulation Assistance: Needs assistance  Transfer Assistance: Needs assistance          Additional Comments: Pt reports walking with walker at NH without asisstence, pt very sleepy and unsure if it was a RW or stw       Objective:       RLE AROM: WFL         LLE AROM : WFL                                  Strength RLE: Exception  Comment: 4-/5    Strength LLE: Exception  Comment: 4-/5                                   Balance  Sitting - Static: Good  Sitting - Dynamic: Good, -  Standing - Static: Fair, +  Standing - Dynamic: Fair    Rolling to Right: Minimal assistance (with bedrail)  Supine to Sit: Minimal assistance (from right side with bedrail, assist at trunk)  Scooting: Contact guard assistance    Transfers  Sit to Stand: Minimal Assistance (of 1 from bed , tactile cues on hand placement ')  Stand to sit: Minimal Assistance (to chair, tactile cues on hand placement )       Ambulation 1  Surface: level tile  Device: Standard Walker  Assistance: Minimal assistance  Quality of Gait: min unsteadeinss, pt very groggy for the ECGD this am. small strides and limited heel strike ,very slow moving   Distance: 4 feet                                                 Exercises: Activity Tolerance:  Activity Tolerance: Patient limited by pain; Patient limited by fatigue    Treatment Initiated: none    Assessment:   Body structures, Functions, Activity limitations: Decreased functional mobility , Decreased strength, Decreased balance, Decreased endurance  Assessment: Pt with generalzied weakness and fatigue from the EGD this am.Rec PT to imporve strength and moving around  Mobility: Walking and Moving Around Current Status (): At least 40 percent but less than 60 percent impaired, limited or restricted  Mobility: Walking and Moving Around Goal Status ():  At least 20 percent but less than 40 percent impaired, limited or restricted       AM-PAC Inpatient Mobility without Stair Climbing Raw Score : 15  AM-PAC Inpatient without Stair Climbing T-Scale Score : 43.03  Mobility Inpatient CMS 0-100% Score: 47.43  Mobility Inpatient without Stair CMS G-Code Modifier : CK

## 2018-11-23 NOTE — PRE SEDATION
[address] [address]  Sedation/Analgesia History & Physical    Patient: Stephanie Mansfield : 1935  Med Rec#: [id number] Acc#: [id number]   Provider Performing Procedure: Rory Jordan  Primary Care Physician: GARRETT Thompson CNP    PRE-PROCEDURE   Full CODE [x]Yes  DNR-CCA/DNR-CC []Yes   Brief History/Pre-Procedure Diagnosis: GI bleeding and dysphagia           MEDICAL HISTORY  []CAD/Valve  []Liver Disease  []Lung Disease []Diabetes  []Hypertension []Renal Disease  []Additional information:       has a past medical history of Acute kidney failure ([facility] [address] Anemia; ASHD (arteriosclerotic heart disease); Blood circulation, collateral; CAD (coronary artery disease); Cardiomegaly; Chronic diastolic (congestive) heart failure ([facility] [address] Chronic kidney disease; DM2 (diabetes mellitus, type 2) ([facility] [address] Dyslipidemia; Falls; GERD (gastroesophageal reflux disease); Heart failure with preserved ejection fraction ([facility] [address] Hyperlipidemia; Hypertension; Iron deficiency anemia; Left atrial dilation; Lymphoma ([facility] [address] Malaise; Nonintractable headache; Osteoarthritis of both knees; Pacemaker; and PVD (peripheral vascular disease) ([facility] [address] SURGICAL HISTORY   has a past surgical history that includes Coronary artery bypass graft; pacemaker placement (2018); Cardiac surgery; vascular surgery; and Cardiac catheterization (2018).   Additional information:       ALLERGIES   Allergies as of 2018    (No Known Allergies)     Additional information:       MEDICATIONS   Coumadin Use Last 7 Days [x]No []Yes  Antiplatelet drug therapy use last 7 days  [x]No []Yes  Other anticoagulant use last 7 days  [x]No []Yes    Current Facility-Administered Medications:     neomycin-bacitracin-polymyxin (NEOSPORIN) ointment, , Topical, BID, GARRETT Recinos CNP    insulin lispro (HUMALOG) injection vial 0-12 Units, 0-12 Units, Subcutaneous, TID DEVORA, Yoko Ambriz MD, 4 Units at 18 1701    insulin lispro (HUMALOG) injection vial 0-6 Units, 0-6 Units, Subcutaneous, Nightly, Yoko March MD, 1 Units at 11/22/18 2129    trimethoprim-polymyxin b (POLYTRIM) ophthalmic solution 1 drop, 1 drop, Both Eyes, 6 times per day, Yoko March MD, 1 drop at 11/23/18 0504    magic (miracle) mouthwash, 5 mL, Swish & Spit, 4x Daily PRN, Yoko March MD, 5 mL at 11/22/18 1017    lidocaine (XYLOCAINE) 5 % ointment, , Topical, Daily PRN, Yoko March MD    zinc oxide (PINXAV) 30 % ointment, , Topical, BID, Yoko March MD    famotidine (PEPCID) tablet 20 mg, 20 mg, Oral, BID, Yoko March MD    cefTRIAXone (ROCEPHIN) 1 g IVPB in 50 mL D5W minibag, 1 g, Intravenous, Q24H, GARRETT Key CNP, Stopped at 11/22/18 2200    guaiFENesin (MUCINEX) extended release tablet 600 mg, 600 mg, Oral, BID, Yoko March MD, 600 mg at 11/22/18 2128    amLODIPine (NORVASC) tablet 10 mg, 10 mg, Oral, Daily, Yoko March MD, 10 mg at 11/22/18 0804    atorvastatin (LIPITOR) tablet 20 mg, 20 mg, Oral, Daily, GARRETT Acevedo CNP, 20 mg at 11/22/18 0806    nebivolol (BYSTOLIC) tablet 10 mg, 10 mg, Oral, Daily, Yoko March MD, 10 mg at 11/22/18 0805    ferrous sulfate tablet 325 mg, 325 mg, Oral, BID WC, GARRETT Acevedo CNP, 325 mg at 11/22/18 1701    multivitamin 1 tablet, 1 tablet, Oral, Daily, GARRETT Acevedo CNP, 1 tablet at 11/22/18 0804    ranolazine (RANEXA) extended release tablet 1,000 mg, 1,000 mg, Oral, BID, GARRETT Acevedo CNP, 1,000 mg at 11/22/18 2128    tamsulosin (FLOMAX) capsule 0.4 mg, 0.4 mg, Oral, Daily, GARRETT Acevedo - CNP, 0.4 mg at 11/22/18 0804    sodium chloride flush 0.9 % injection 10 mL, 10 mL, Intravenous, 2 times per day, GARRETT Rahman - CNP    sodium chloride flush 0.9 % injection 10 mL, 10 mL, Intravenous, PRN, Tiffani Jorgensen APRN - CNP    magnesium hydroxide (MILK OF MAGNESIA) 400 MG/5ML alternatives, and the possible risks and/or complications of the planned procedure and the anesthesia methods. The patient and/or patient representative appear to understand and agree to proceed. SEDATION  Planned agent:[x]Midazolam []Meperidine [x]Sublimaze []Morphine  []Diazepam  []Other:     ASA Classification: Class 3 - A patient with severe systemic disease that limits activity but is not incapacitating    Airway Assessment: Mallampati Class II - (soft palate, fauces & uvula are visible)    Monitoring and Safety: The patient will be placed on a cardiac monitor and vital signs, pulse oximetry and level of consciousness will be continuously evaluated throughout the procedure. The patient will be closely monitored until recovery from the medications is complete and the patient has returned to baseline status. Respiratory therapy will be on standby during the procedure. [x]Pre-procedure diagnostic studies complete and results available. Comment:    [x]Previous sedation/anesthesia experiences assessed. Comment:    [x]The patient is an appropriate candidate to undergo the planned procedure sedation and anesthesia. (Refer to nursing sedation/analgesia documentation record)  [x]Formulation and discussion of sedation/procedure plan, risks, and expectations with patient and/or responsible adult completed. [x]Patient examined immediately prior to the procedure.  (Refer to nursing sedation/analgesia documentation record)    She Lopez MD   Electronically signed 11/23/2018 at 7:30 AM

## 2018-11-23 NOTE — PLAN OF CARE
Problem: Falls - Risk of:  Goal: Will remain free from falls  Will remain free from falls   Outcome: Ongoing  Pt free from falls. Pt up with 1 assist and walker. Bed/chair alarm in use    Problem: Risk for Impaired Skin Integrity  Goal: Tissue integrity - skin and mucous membranes  Structural intactness and normal physiological function of skin and  mucous membranes. Outcome: Ongoing  Pt has dvt, pink ángel applied. Pt turned every 2 hours . Problem: DAILY CARE  Goal: Daily care needs are met  Outcome: Ongoing  Pt needs help with most adls    Problem: KNOWLEDGE DEFICIT  Goal: Patient/S.O. demonstrates understanding of disease process, treatment plan, medications, and discharge instructions. Outcome: Ongoing  Pt understanding the plan of care    Problem: DISCHARGE BARRIERS  Goal: Patient's continuum of care needs are met  Outcome: Ongoing  Pt plans on returning to nursing home at discharge    Problem:   Goal: No urinary complication  Outcome: Ongoing  Shabazz remains in place . Urine yellow with sediment    Problem: Nutrition  Goal: Optimal nutrition therapy  Outcome: Ongoing  Pt eating small amounts of food and decrease apptitite    Comments: Care plan reviewed with patient. Patient verbalize understanding of the plan of care and contribute to goal setting.

## 2018-11-23 NOTE — PROGRESS NOTES
Renal Progress Note    Assessment and Plan: 1. Acute kidney injury from volume contraction much better today  2. BPH  3. Proteus septicemia   4. Proteus UTI  5. Dehydartion improved   6. Chronic indwell Shabazz catheter from BPH  7. Penile erosion from chronic Shabazz catheter  8. Deconditioing   PLAN:  Reviewed labs   Reviewed medications   No changes for now   Decrease IV fluid to 50 ml/hr   AM labs  Will follow   Discussed with staff    Patient Active Problem List:     Postural dizziness with near syncope     DM2 (diabetes mellitus, type 2) (HCC)     Hypertension     Dehydration     Pacemaker     Peripheral vascular disease (HCC)     Lactic acidosis     Iron deficiency anemia     CAD (coronary artery disease)     Heart failure with preserved ejection fraction (HCC)     GERD (gastroesophageal reflux disease)     Dyslipidemia     Lymphoma (HCC)     Osteoarthritis of both knees     Left atrial dilation     GLORIA (acute kidney injury) ([address]     Frequent falls     Physical deconditioning     Altered awareness, transient     Fever     SIRS (systemic inflammatory response syndrome) (HCC)     Pancytopenia (HCC)     Altered mental status     Hepatomegaly     Urinary retention     Hematuria     Acute kidney injury superimposed on chronic kidney disease (HCC)     Hyperkalemia     Hypercalcemia     Calculus of gallbladder without cholecystitis without obstruction     Syncope and collapse     Non-intractable vomiting with nausea     Gallbladder dilatation     Nonintractable headache     Osteoarthritis of multiple joints     Chronic diastolic (congestive) heart failure (HCC)     S/P placement of cardiac pacemaker     Chronic kidney disease, stage 3 (HCC)     Severe malnutrition (HCC)     Dysphagia     Urinary retention     Hyperkalemia     Hyponatremia     Lactic acidosis     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)     Urinary tract infection associated with indwelling urethral catheter ([address]     Other acute kidney failure [facility])     Pre-op evaluation      Subjective:   Admit Date: 11/19/2018    Interval History:   Seeing for acute kidney injury   Very sleepy  Updated by the staff  Had EGD with esophogeal dilatation this morning   Stable blood pressure   Good urine output       Medications:   Scheduled Meds:   nystatin  5 mL Oral 4x Daily    famotidine  20 mg Oral Daily    neomycin-bacitracin-polymyxin   Topical BID    insulin lispro  0-12 Units Subcutaneous TID WC    insulin lispro  0-6 Units Subcutaneous Nightly    trimethoprim-polymyxin b  1 drop Both Eyes 6 times per day    zinc oxide   Topical BID    cefTRIAXone (ROCEPHIN) IV  1 g Intravenous Q24H    guaiFENesin  600 mg Oral BID    amLODIPine  10 mg Oral Daily    atorvastatin  20 mg Oral Daily    nebivolol  10 mg Oral Daily    ferrous sulfate  325 mg Oral BID WC    multivitamin  1 tablet Oral Daily    ranolazine  1,000 mg Oral BID    tamsulosin  0.4 mg Oral Daily    sodium chloride flush  10 mL Intravenous 2 times per day     Continuous Infusions:   sodium chloride 75 mL/hr at 11/22/18 1537    dextrose         CBC:   Recent Labs      11/21/18   0607  11/22/18   0112  11/23/18   0534   WBC  8.4   --   9.9   HGB  7.7*  10.0*  10.7*   PLT  192   --   214     CMP:    Recent Labs      11/21/18   0607  11/22/18   0603  11/23/18   0534   NA  138  138  138   K  4.4  4.0  4.1  4.1   CL  106  104  107   CO2  20*  18*  18*   BUN  65*  45*  33*   CREATININE  2.6*  1.8*  1.4*   GLUCOSE  257*  199*  224*   CALCIUM  9.1  9.2  8.6   LABGLOM  24*  36*  48*     Troponin: No results for input(s): TROPONINI in the last 72 hours. BNP: No results for input(s): BNP in the last 72 hours. INR: No results for input(s): INR in the last 72 hours. Lipids: No results for input(s): CHOL, LDLDIRECT, TRIG, HDL, AMYLASE, LIPASE in the last 72 hours. Liver: No results for input(s): AST, ALT, ALKPHOS, PROT, LABALBU, BILITOT in the last 72 hours.     Invalid input(s): BILDIR  Iron:    Recent Labs      11/20/18   1332   FERRITIN  933*       Objective:   Vitals: BP (!) 161/70   Pulse 67   Temp 96.4 °F (35.8 °C) (Oral)   Resp 16   Ht 5' 9\" (1.753 m)   Wt 164 lb (74.4 kg)   SpO2 97%   BMI 24.22 kg/m²    Wt Readings from Last 3 Encounters:   11/19/18 164 lb (74.4 kg)   10/04/18 165 lb (74.8 kg)   09/26/18 166 lb 3.2 oz (75.4 kg)      24HR INTAKE/OUTPUT:      Intake/Output Summary (Last 24 hours) at 11/23/18 1010  Last data filed at 11/23/18 0539   Gross per 24 hour   Intake          2203.68 ml   Output             1700 ml   Net           503.68 ml       Constitutional:  Sleeping   Skin:normal   HEENT:Pupils are reactive . Throat is clear   Neck:supple with no thyromegaly  Cardiovascular:  S1, S2 without murmur  Respiratory:  Clear  Abdomen: +bs, soft,   Ext: No LE edema  Musculoskeletal:Intact  Neuro:Deferred      Electronically signed by Anselmo Esqueda MD on 11/23/2018 at 10:10 AM

## 2018-11-23 NOTE — PROGRESS NOTES
Nutrition Assessment    Type and Reason for Visit: Reassess (PO Monitor)    Nutrition Recommendations: Diet per SLP. Discontinued Ensure High Protein per pt request. Started Magic Cups TID. Continue Multivitamin w/minerals daily. Nutrition Assessment: Patient admitted with GLORIA- improving (BUN 33, CR. 1.4). Pt with hx CKD & dysphagia. SLP following and recommends Dysphagia III diet. Pt from [facility]. Pt declining from a nutritional standpoint as evidence by patient consuming less than 50% energy intake x4 days since admission. Per RN, pt only ate 25% breakfast today. Pt states he is just not hungry. Pt at risk for further compromise due to increased nutrient needs for wound on coccyx. Pt states he dislikes the Ensure High Protein but is amenable to try the Magic Cup instead- will modify order. On MVI daily. Labs: POC Glucose 175-233 today, Hg 10.7. Rx includes: Iron, Humalog, ATB, & IV fluids. Malnutrition Assessment:  · Malnutrition Status: At risk for malnutrition    Nutrition Risk Level: High    Nutrient Needs:  · Estimated Daily Total Kcal: 1850+ (25+ kcals/kg current weight of 74 kg as of 11/19/18)  · Estimated Daily Protein (g): 1.0+ (74+ grams/kg current weight of 74 kg as of 11/19/18) - monitor renal labs. Nutrition Diagnosis:   · Problem: Inadequate oral intake  · Etiology: related to Insufficient energy/nutrient consumption     Signs and symptoms:  as evidenced by Diet history of poor intake, Intake 0-25%, Intake 25-50%    Objective Information:  · Nutrition-Focused Physical Findings: chronic trujillo, no edema  · Wound Type: Deep Tissue Injury (DTI on coccyx; split on tip of penis)  · Current Nutrition Therapies:  · Oral Diet Orders: Dysphagia 3, No Straws   · Oral Diet intake: 1-25%  · Oral Nutrition Supplement (ONS) Orders: Frozen Oral Supplement (Patient dislikes Ensure High Protein.  Pt amenable to try Magic Cup TID)  · ONS intake:  (initiated today)  · Anthropometric Measures:  · Ht: 5' 9\"

## 2018-11-23 NOTE — PROCEDURES
[address] [id number]                                 PROCEDURE NOTE    PATIENT NAME: Ruth Hilton              :        1935  MED REC NO:   [id number]                           ROOM:       0004  ACCOUNT NO:   [de-identified]                           ADMIT DATE: 2018  PROVIDER:     Isabelle Rodriguez M.D.    Tommie Jackson OF PROCEDURE:  2018    INDICATIONS:  The patient with a history of dysphagia, melenic stools  possibly to be because of the iron supplement the patient has been  taking. Passed swallow evaluation over having difficulty to swallow and  he is on thick liquid. Plan today for upper endoscopy with possible  dilation to evaluate. SURGEON:  Isabelle Rodriguez M.D. ASA CLASSIFICATION:  III. DESCRIPTION OF PROCEDURE:  The patient was brought to the GI lab. Consent was obtained. The risks involved with the procedure were  explained to the patient. Informed consent was obtained. The patient  was monitored during the procedure with pulse oximetry, blood pressure  monitoring, and oxygen by nasal cannula. Sedation by incremental doses  of IV Versed, total 2 mg of Versed and 50 mcg of fentanyl, given in  incremental dosage during the procedure to achieve continuous conscious  sedation. PROCEDURE PERFORMED:  EGD with dilation up to a size 51-Chinese. A standard video upper scope was advanced under direct vision from oral  cavity up to third part of the duodenum. Esophagus features mild acid  reflux, not very severe. No well-defined stricture. Avascular  dilatation seen in the direction of the upper sphincter which helped the  patient to swallow. Scope was advanced into the stomach. Mild gastritis seen, more  prominent of the body as well as fundus. Small hiatus hernia seen. No  active GI bleed seen.   Scope was advanced to the duodenum, which appears  normal.  Scope was withdrawn through the antrum. Guidewire was used. The scope was withdrawn to the room. Then, American dilator starting  from size #48 until size 51-Barbadian introduced over the guidewire. The  dilator withdrawn. Scope was advanced again, inspected the site of  dilation. No bleeding. No perforation. Procedure terminated. Scope  was withdrawn with no immediate complications. IMPRESSION:  1. Dysphagia. Successful dilation until size 51-Barbadian. 2.  Esophageal candidiasis. 3.  Gastritis. PLAN:  1. Start the patient on nystatin swish and swallow for five days. 2.  Resume diet. 3.  Reevaluate by the Speech Therapy to see if diet can be advanced. 4.  Start Plavix and aspirin. No biopsy obtained post endoscopy. Justus Watson M.D.    D: 11/23/2018 8:01:59       T: 11/23/2018 9:22:00     [email or web address]  Job#: [id number]     Doc#: [id number]    CC:   Pinky Bourgeois CNP       Hospitalist Service

## 2018-11-23 NOTE — DISCHARGE INSTR - COC
Continuity of Care Form    Patient Name: John Nowak   :  1935  MRN:  [id number]    Admit date:  2018  Discharge date:  2018    Code Status Order: Full Code   Advance Directives:   [facility] Documentation     Date/Time Healthcare Directive Type of Healthcare Directive Copy in [address] Agent's Name Healthcare Agent's Phone Number    18 0837  Yes, patient has an advance directive for healthcare treatment  Durable power of  for health care;Living will  No, copy requested from medical records  --  --  --          Admitting Physician:  Robin Camargo MD  PCP: GARRETT Valle CNP    Discharging Nurse: [facility] Unit/Room#: 5K-04/004-A  Discharging Unit Phone Number: [phone]    Emergency Contact:   Extended Emergency Contact Information  Primary Emergency Contact: Rashel Eduins of [address] [place] [address] Phone: [phone]  Mobile Phone: [phone]  Relation: Other  Hearing or visual needs: None  Other needs: None  Preferred language: English   needed? No  Secondary Emergency Contact: Camacho Rodriguez of [facility] Phone: [phone]  Mobile Phone: [phone]  Relation: Other  Hearing or visual needs: None  Other needs: None  Preferred language: English   needed? No    Past Surgical History:  Past Surgical History:   Procedure Laterality Date    CARDIAC CATHETERIZATION  2018    CARDIAC SURGERY      CORONARY ARTERY BYPASS GRAFT      PACEMAKER PLACEMENT  2018    [facility]    VASCULAR SURGERY         Immunization History: There is no immunization history for the selected administration types on file for this patient.     Active Problems:  Patient Active Problem List   Diagnosis Code    Postural dizziness with near syncope R42, R55    DM2 (diabetes mellitus, type 2) ([facility] E11.9    Hypertension I10    Dehydration E86.0  Pacemaker Z95.0    Peripheral vascular disease ([facility] I73.9    Lactic acidosis E87.2    Iron deficiency anemia D50.9    CAD (coronary artery disease) I25.10    Heart failure with preserved ejection fraction (HCC) I50.30    GERD (gastroesophageal reflux disease) K21.9    Dyslipidemia E78.5    Lymphoma ([facility]) C85.90    Osteoarthritis of both knees M17.0    Left atrial dilation I51.7    GLORIA (acute kidney injury) ([facility]) N17.9    Frequent falls R29.6    Physical deconditioning R53.81    Altered awareness, transient R40.4    Fever R50.9    SIRS (systemic inflammatory response syndrome) ([facility]) R65.10    Pancytopenia ([facility]) D61.818    Altered mental status R41.82    Hepatomegaly R16.0    Urinary retention R33.9    Hematuria R31.9    Acute kidney injury superimposed on chronic kidney disease (HCC) N17.9, N18.9    Hyperkalemia E87.5    Hypercalcemia E83.52    Calculus of gallbladder without cholecystitis without obstruction K80.20    Syncope and collapse R55    Non-intractable vomiting with nausea R11.2    Gallbladder dilatation K82.8    Nonintractable headache R51    Osteoarthritis of multiple joints M15.9    Chronic diastolic (congestive) heart failure ([facility]) I50.32    S/P placement of cardiac pacemaker Z95.0    Chronic kidney disease, stage 3 ([facility]) N18.3    Severe malnutrition ([facility]) E43    Dysphagia R13.10    Urinary retention R33.9    Hyperkalemia E87.5    Hyponatremia E87.1    Lactic acidosis E87.2    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 ([facility]) T83.89XA, N36.8    Urinary tract infection associated with indwelling urethral catheter ([facility]) T83.511A, N39.0    Other acute kidney failure ([facility]) N17.8    Pre-op evaluation Z01.818       Isolation/Infection:   Isolation          No Isolation            Nurse Assessment:  Last Vital Signs: BP (!) 161/70   Pulse 67   Temp 96.4 °F (35.8 °C) (Oral)   Resp 16   Ht 5' 9\" (1.753 m)   Wt 164 lb (74.4 kg)   SpO2 97%   BMI 24.22 kg/m²     Last documented pain score (0-10 scale): Pain Level: 0  Last Weight:   Wt Readings from Last 1 Encounters:   11/19/18 164 lb (74.4 kg)     Mental Status:  oriented  To name and location -- disoriented to time   IV Access:  - None    Nursing Mobility/ADLs:  Walking   Assisted  Transfer  Assisted  Bathing  Assisted  Dressing  Assisted  Toileting  Assisted  Feeding  Assisted  Med Admin  Independent  Med Delivery   whole and prefers mixed with pudding    Wound Care Documentation and Therapy:  Wound 08/19/18 Other (Comment) Coccyx Unstable - Suspected DTI - Purple, red discoloration - no blanching (Active)   Wound Image   11/20/2018 10:00 AM   Wound Type Wound 11/23/2018  4:00 AM   Wound Deep tissue/Injury 11/21/2018  8:45 AM   Dressing/Treatment Open to air; Other (Comment) 11/23/2018  4:00 AM   Wound Length (cm) 7 cm 11/20/2018 10:00 AM   Wound Width (cm) 8 cm 11/20/2018 10:00 AM   Calculated Wound Size (cm^2) (l*w) 56 cm^2 11/20/2018 10:00 AM   Wound Assessment Clean;Dry; Intact;Fragile;Light purple 11/20/2018 10:00 AM   Abi-wound Assessment Red;Purple 11/20/2018 10:00 AM   Red%Wound Bed 20 11/20/2018 10:00 AM   Purple%Wound Bed 80 11/20/2018 10:00 AM   Number of days: 96       Wound 11/20/18 Other (Comment) 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 (Active)   Wound Image   11/20/2018 10:00 AM   Wound Type Wound 11/23/2018  4:00 AM   Wound Other 11/21/2018  8:20 PM   Dressing Status Other (Comment) 11/22/2018  3:45 PM   Dressing/Treatment Pharmaceutical agent (see eMar) 11/22/2018  3:45 PM   Wound Cleansed Wound cleanser 11/21/2018  8:20 PM   Wound Length (cm) 1.5 cm 11/20/2018 10:00 AM   Wound Width (cm) 1 cm 11/20/2018 10:00 AM   Calculated Wound Size (cm^2) (l*w) 1.5 cm^2 11/20/2018 10:00 AM   Wound Assessment Fragile;Pink 11/20/2018 10:00 AM   Abi-wound Assessment Intact; Full blisters 11/20/2018 10:00 AM   Number of days: 2        Elimination:  Continence:   · Bowel: Yes  · Bladder: No  Urinary Catheter: Indication for Use of Catheter: Urology/Urologist seeing this patient or inserted indwelling catheter   Colostomy/Ileostomy/Ileal Conduit: No       Date of Last BM: 11/25/18      Intake/Output Summary (Last 24 hours) at 11/23/18 0837  Last data filed at 11/23/18 0539   Gross per 24 hour   Intake          2203.68 ml   Output             1700 ml   Net           503.68 ml     I/O last 3 completed shifts: In: 2203.7 [P.O.:860; I.V.:1343.7]  Out: 1700 [Urine:1700]    Safety Concerns: At Risk for Falls    Impairments/Disabilities:      None    Nutrition Therapy:  Current Nutrition Therapy:   - Oral Diet:  General    Routes of Feeding: Oral  Liquids: Thin Liquids  Daily Fluid Restriction: no  Last Modified Barium Swallow with Video (Video Swallowing Test): 11/21/2018    Treatments at the Time of Hospital Discharge:   Respiratory Treatments: n/a  Oxygen Therapy:  is not on home oxygen therapy. Ventilator:    - No ventilator support    Rehab Therapies: Physical Therapy, Occupational Therapy and Speech/Language Therapy  Weight Bearing Status/Restrictions: No weight bearing restirctions  Other Medical Equipment (for information only, NOT a DME order): Walker, gait belt  Other Treatments: turn and reposition every 2 hours.  Protective cream to coccyx    Patient's personal belongings (please select all that are sent with patient):  None    RN SIGNATURE:  Electronically signed by Eliot Betancur RN on 11/26/2018 at 11:38 AM    CASE MANAGEMENT/SOCIAL WORK SECTION    Inpatient Status Date: 11/19/18    Readmission Risk Assessment Score:  Readmission Risk              Risk of Unplanned Readmission:        39           Discharging to Facility/ Agency   · Name: [facility]  · Address: [address]  · Phone: [phone]  · Fax: [phone]    Dialysis Facility (if applicable)   · Name:  · Address:  · Dialysis Schedule:  · Phone:  · Fax:    Case Manager/ signature: Electronically signed by LAURIE Cha on 11/23/18 at 8:38 AM    PHYSICIAN SECTION    Prognosis: Good    Condition at Discharge: Stable    Rehab Potential (if transferring to Rehab): Good    Recommended Labs or Other Treatments After Discharge:   -  Renal function panel within 1 week. -  Activity: activity as tolerated  -  Dietary Nutrition Supplements: Frozen Oral Supplement  -  DIET GENERAL    Physician Certification: I certify the above information and transfer of Oxana Mahoney  is necessary for the continuing treatment of the diagnosis listed and that he requires [facility] for greater 30 days.      Update Admission H&P: No change in H&P    PHYSICIAN SIGNATURE:  Electronically signed by Jhonny Harrington MD on 11/26/18 at 1:52 PM

## 2018-11-23 NOTE — PROGRESS NOTES
Hospitalist Progress Note    Patient:  Martín Delgado      Unit/Bed:5K-04/004-A    YOB: 1935    MRN: [id number]       Acct: [de-identified]     PCP: GARRETT Rosenthal CNP    Date of Admission: 11/19/2018    Chief Complaint: blood in urine    Hospital Course:     Please see H/P for details. In summary this is a 80 y.o. Male,  who presented to [facility] [address] [facility] on 11/19/18 with blood in the urine; he has a chronic indwelling Trujillo cath for about 4-6 weeks now for urinary retention, that was reported to be changed 1 week ago; per H/P note, family noted the urine was discolored 1 day prior admission; and patient \"did not look good\" and his urine was dark and milky on day of admission; pt c/o penile pain; pt had stool on him upon arrival to ED; Edin Maldonado in ER personally changed out the Trujillo cath and noted penile erythema and penile tenderness present;  irritation and tenderness to the inferior aspect of the urethral meatus; it appears torn and flayed open; pus visualized in the trujillo catheter bag as well as darkened urine. Once the Trujillo was replaced there was an immediate return of 900 ml of urine; pt is now stating that he is feeling better; he is currently receiving Rocephin 1 gram along with 0.9 NS; pt is hard of hearing but states he is feeling better now; he is being admitted to the Hospitalist Service for further care and evaluation on 11/19/18. Pt also had GLORIA on admission, creatinine was 4.1 ( baseline creatinine between 1.3 to 1.8. Creatinine on 9/13/18 was 1.6). Urology was consulted on admission. He was seen by GARRETT Maurice Ace, CNP ( Urology) on 11/20/2018, who recommend leave trujillo catheter, bacitracin on penis tip TID prn, cont rocephin, renal US 11/21/18. Urine culture came back (+) proteus mirabilis, >100,000 CFU/mL, sensitive to Rocephin. CT abd/pelvis on admission showed left hydronephrosis and hydroureter. No obstructing ureteral stone.  There is significant distention of the bladder with wall thickening and infiltration. Small amount of air within the bladder wall consistent with cystitis. Small amount of air within the bladder correlate for recent intervention. Scattered stool in the colon to ball in the rectum. Correlate for constipation. Nephrology ( Dr. Christiano Salinas) saw the patient on 11/201/8 for GLORIA on CKD stage 3. Dr. Christiano Salinas recommend continue IVF and avoid diuretics and ACEI. Patient was evaluated by speech therapist who has oropharyngeal dsyphagia per ST evaluation, was placed on dysphagia diet and barium swallow was ordered. Marialuisa Sanders, GARRETT - CNP/Dr. Gerry Gil)  saw the patient on 11/201/8, who recommend EGD dilatation pending cardio clearance as patient had abnormal stress test on 10/2018. Cardiologist ( Dr. Zheng Newton) saw the patient on 11/21/2018, per Dr. Zheng Newton, patient may proceed with EGD with esophageal dilatation. Also, has dry cough on this admission, CXR unremarkable. Patient had EGD on 11/23/2018 for dysphagia/odynophagia and black stools. Pt was also c/o burning sensation on eys accompanied by watery eyes and redness, no blurry vision, on 11/22/18, started polytrim eye drop and patient was seen by Leydi De La Cruz [address] [id number], who said mild conjunctival injection, but otherwise no acute findings. He was also c/o left elbow pain on 11/22/18, left elbow xray normal.     Subjective:     Pt seen and examined, pt reports still with burning eyes, improving with the polytrim eye drop, denies blurry vision, eye discharge. Denies fever, chills. Penile pain getting better per pt. Cough improving as well per pt. Denies elbow pain today. Denies abd pain, N/V.        Medications:  Reviewed    Infusion Medications    sodium chloride 50 mL/hr at 11/23/18 1029    dextrose       Scheduled Medications    nystatin  5 mL Oral 4x Daily    famotidine  20 mg Oral Daily    sodium bicarbonate  650 mg Oral BID    neomycin-bacitracin-polymyxin   Topical BID    insulin lispro  0-12 Units Subcutaneous TID     insulin lispro  0-6 Units Subcutaneous Nightly    trimethoprim-polymyxin b  1 drop Both Eyes 6 times per day    zinc oxide   Topical BID    cefTRIAXone (ROCEPHIN) IV  1 g Intravenous Q24H    guaiFENesin  600 mg Oral BID    amLODIPine  10 mg Oral Daily    atorvastatin  20 mg Oral Daily    nebivolol  10 mg Oral Daily    ferrous sulfate  325 mg Oral BID     multivitamin  1 tablet Oral Daily    ranolazine  1,000 mg Oral BID    tamsulosin  0.4 mg Oral Daily    sodium chloride flush  10 mL Intravenous 2 times per day     PRN Meds: magic (miracle) mouthwash, lidocaine, sodium chloride flush, magnesium hydroxide, ondansetron, glucose, dextrose, glucagon (rDNA), dextrose      Intake/Output Summary (Last 24 hours) at 11/23/18 1055  Last data filed at 11/23/18 0539   Gross per 24 hour   Intake          2203.68 ml   Output             1700 ml   Net           503.68 ml       Diet:  Dietary Nutrition Supplements: Low Calorie High Protein Supplement  DIET DYSPHAGIA III ADVANCED; No Drinking Straw    Exam:  BP (!) 161/70   Pulse 67   Temp 96.4 °F (35.8 °C) (Oral)   Resp 16   Ht 5' 9\" (1.753 m)   Wt 164 lb (74.4 kg)   SpO2 95%   BMI 24.22 kg/m²     General appearance: alert, not in acute distress   HEENT: Pupils equal, round, and reactive to light. no eye discharge on both eyes, (+) minimal conjunctival redness on both eyes. (+) whitish plaque on top of tongue. Neck: Supple, with full range of motion. No jugular venous distention. Trachea midline. Respiratory:  Normal respiratory effort. (+) rhonchi ( better compare 11/22/18 exam), no wheezing, no rales   Cardiovascular: Regular rate and rhythm with normal S1/S2 without murmurs, rubs or gallops. Abdomen: Soft, non-tender, non-distended with normal bowel sounds. : no erythema or swelling on tip of penis, (+) trujillo cath in placed w roughly 800 cc clear urine.    Buttocks: (+) erythema on sacral area ( better compare 11/22/18 exam)  Musculoskeletal: passive and active ROM x 4 extremities. Elbow: (+) tenderness on left lateral epicondyle, no swelling, no erythema on both elbows, full active and passive ROM on B/L elbow extension and flexion  Neuro exam: alert and oriented x 3   Exam of extremities: peripheral pulses normal, no pedal edema, no clubbing or cyanosis.      Chaperoned by Nurse Rom Bernal    Labs:   Recent Labs      11/21/18   0607  11/22/18   0112  11/23/18   0534   WBC  8.4   --   9.9   HGB  7.7*  10.0*  10.7*   HCT  23.5*  29.0*  31.7*   PLT  192   --   214     Recent Labs      11/21/18   0607  11/22/18   0603  11/23/18   0534   NA  138  138  138   K  4.4  4.0  4.1  4.1   CL  106  104  107   CO2  20*  18*  18*   BUN  65*  45*  33*   CREATININE  2.6*  1.8*  1.4*   CALCIUM  9.1  9.2  8.6     No results for input(s): AST, ALT, BILIDIR, BILITOT, ALKPHOS in the last 72 hours. No results for input(s): INR in the last 72 hours. No results for input(s): Madalynn Gustavo in the last 72 hours. Urinalysis:    Lab Results   Component Value Date    NITRU NEGATIVE 11/19/2018    WBCUA > 200 11/19/2018    BACTERIA MANY 11/19/2018    RBCUA 10-15 11/19/2018    BLOODU LARGE 11/19/2018    SPECGRAV 1.025 09/10/2018    GLUCOSEU NEGATIVE 11/19/2018       Radiology:  XR ELBOW LEFT (2 VIEWS)   Final Result   No acute abnormality            **This report has been created using voice recognition software. It may contain minor errors which are inherent in voice recognition technology. **      Final report electronically signed by Dr. Miguel Angel Koo on 11/22/2018 9:45 AM      FL MODIFIED BARIUM SWALLOW W VIDEO   Final Result   1. Laryngeal penetration of thin barium without evidence of aspiration. 2. Additional recommendations from the speech therapist will follow. **This report has been created using voice recognition software. It may contain minor errors which are inherent in voice recognition technology. **      Final report electronically signed by Dr. Celeste Arriaga on 11/21/2018 10:49 AM      US RENAL COMPLETE   Final Result       1. The right kidney is asymmetrically smaller than the left. 2. Abnormal appearance of the urinary bladder which has a diffusely thickened wall and increased vascularity throughout. There are also multiple diverticula. 3. Elevated bilateral resistive indices which may be seen with medical renal disease. 4. Incidental note made of splenomegaly. **This report has been created using voice recognition software. It may contain minor errors which are inherent in voice recognition technology. **      Final report electronically signed by Dr Michael Doe on 11/21/2018 9:56 AM      XR CHEST PORTABLE   Final Result   Cardiomegaly with no acute intrathoracic process. **This report has been created using voice recognition software. It may contain minor errors which are inherent in voice recognition technology. **      Final report electronically signed by Dr Michael Doe on 11/20/2018 6:23 PM      CT ABDOMEN PELVIS WO CONTRAST Additional Contrast? None   Final Result      1. Few calcified layering gallstones. Correlation with serology as clinically warranted. 2. Left hydronephrosis and hydroureter. No obstructing ureteral stone. There is significant distention of the bladder with wall thickening and infiltration. Small amount of air within the bladder wall consistent with cystitis. Small amount of air within    the bladder correlate for recent intervention. 3. Scattered stool in the colon to ball in the rectum. Correlate for constipation. **This report has been created using voice recognition software. It may contain minor errors which are inherent in voice recognition technology. **      Final report electronically signed by Dr. Thiago Rojas on 11/19/2018 3:47 PM            Assessment/Plan: This is a 80 y.o. Male admitted for GLORIA and UTI      1.  GLORIA, possibly due to urinary retention, improving     -creatinine 1.4 today from 1.8 yesterday ( 4.1 on admission). ( baseline creatinine between 1.3 to 1.8. Creatinine on 9/13/18 was 1.6)  -cont IVF  -BMP in am   -nephro on-board. Appreciate nephro input  -will switch Protonix to pepcid due to GLORIA     2. Proteus mirabalis UTI likely due to indwelling Trujillo cath      -cont Rocephin ( start date 11/19/18)     3. Urinary retention w left hydronephrosis and hydroureter     -Urology on-board. Appreciate Urology input. Steve Lim, GARRETT - CNP ( Urology) saw patient 11/20/2018, who recommend leave trujillo catheter, bacitracin on penis tip TID prn, cont rocephin, renal US 11/21/18. Urology said poor candidate for surgery due to high risk ( Patient had lexiscan Stress test, on 10/17/18 which showed mild apico-lateral perfusion defect worse with stress that resolves with rest suggestive of ischemia, no evidence of infarction is noted on this study, left ventricular systolic function was normal.).     4. Non-AG Metabolic acidosis     -no change with CO2, CO of 18  -possibly related to DM type w hyperglycemia, accu-checks on 200s. Started medium dose SSI 11/22/18, blood sugars still above the goal increase SSI to high dose  -ABG on 11/22/18 showed mild respiratory alkalosis, compensated   -ketones normal  -slightly elevated beta-hydroxybutyrate  -repeat BMP in am   -cont IVF  -start sodium bicarb 650 mg po bid      5. Penile erythema, improving     -cont neosporin oint  -monitor     6. pressure ulcer, stage 1     -cont desitin paste  -turn patient q 2hours        7. Cough, improving     -cxr normal  -procalcitonin elevated on admission. Repeat procalcitonin trending down  -cont Mucinex        8. Dysphagia and odynophagia     -speech eval done, pt has oropharyngeal dysphagia. Barium swallow showed  Laryngeal penetration of thin barium without evidence of aspiration. -s/p EGD 11/23/2018    -diet: dysphagia III     9.  Normocytic anemia     -Hgb ADVANCED; No Drinking Straw    DVT prophylaxis: [] Lovenox                                 [] SCDs                                 [] SQ Heparin                                 [] Encourage ambulation           [] Already on Anticoagulation     Disposition:    [] Home       [] TCU       [] Rehab       [] Psych       [] SNF       [] [place]       [x] Other-Pt may need subacute/SNF placement, SW consult for SNF placement    Code Status: Full Code    PT/OT Eval Status: on-board.  PT recommend subacute/SNF       Electronically signed by Aydee Galloway MD on 11/23/2018 at 10:55 AM

## 2018-11-23 NOTE — BRIEF OP NOTE
Brief Postoperative Note  ______________________________________________________________    Patient: Altamease Rubinstein  YOB: 1935  MRN: [id number]  Date of Procedure: 11/23/2018    Pre-Op Diagnosis: dysphagia, gerd    Post-Op Diagnosis: Same       Procedure(s):  EGD DILATION SAVORY    Anesthesia:  IV sedation     Surgeon(s):  Elvira Boyer MD    Assistant: Tasneem Choudhary     Estimated Blood Loss (mL): mome    Complications: None    Specimens:   * No specimens in log *    Implants:  * No implants in log *      Drains:   Urethral Catheter 16 fr (Active)   Catheter Indications Acute urinary retention/obstruction 11/22/2018  3:10 PM   Site Assessment Edema;Pink 11/22/2018  3:10 PM   Urine Color Angelica 11/22/2018 11:08 PM   Urine Appearance Cloudy 11/21/2018  9:37 PM   Urine Odor Malodorous 11/21/2018  9:37 PM   Output (mL) 550 mL 11/23/2018  5:39 AM       [REMOVED] Urethral Catheter Straight-tip (Removed)       Findings:  Esophageal dilation and esophageal  Candidiasis     Elvira Boyer MD  Date: 11/23/2018  Time: 7:42 AM

## 2018-11-23 NOTE — CARE COORDINATION
11/23/18, 2:04 PM      Sherrill Arita day: 4  Location: -04/004-A Reason for admit: GLORIA (acute kidney injury) [facility]) [N17.9]   Procedure: 11/23  EGD with dilation by Dr Jennifer Genao of Care: PT and OT following, walked 4 feet with PT, IV fluids, follow labs, creat. 1.4 ( was  4.1), CO2 18, Rocephin for UTI, skin prevention measures, follow stage I pressure ulcer buttocks. PCP: GARRETT Chance CNP  Readmission Risk Score: 45%      Discharge Plan: Spoke with Harjeet Spaulding and updated him re: POC and IMM, plans for Irais Wheeler return to [facility] when medically stable. POA recommends patient may need PT and OT continued at [facility] to keep patient moving forward.

## 2018-11-23 NOTE — PROGRESS NOTES
Pharmacy Renal Adjustment    Norman Pastrana is a 80 y.o. male. Pharmacy renally adjust the following medications per P&T approved policy: famotidine    Recent Labs      11/22/18   0603  11/23/18   0534   BUN  45*  33*       Recent Labs      11/22/18   0603  11/23/18   0534   CREATININE  1.8*  1.4*       Estimated Creatinine Clearance: 40 mL/min (A) (based on SCr of 1.4 mg/dL (H)).   Calculated CrCl:    Height:   Ht Readings from Last 1 Encounters:   11/19/18 5' 9\" (1.753 m)     Weight:  Wt Readings from Last 1 Encounters:   11/19/18 164 lb (74.4 kg)       CKD stage: 3         Baseline SCr: 1.3-1.8    Plan: Adjustments based on renal function:          Decrease famotidine 20 mg BID to 20 mg daily    Dominique Valerio PharmLUZ, BCPS   11/23/2018  9:33 AM

## 2018-11-23 NOTE — PROGRESS NOTES
Attempted to turn and reposition patient q 2 hours thru night, but patient frequently displaces pillows per self. Pillows used to support for positioning found on floor multiple times thru night. Pinxav applied to buttocks. Heels elevated off of bed.

## 2018-11-23 NOTE — FLOWSHEET NOTE
Pt was alone at the time of the visit. The nurse said that pt was weak for he a number of procedures. Prayer was appreciated. 11/23/18 9586   Encounter Summary   Services provided to: Patient   Referral/Consult From: Rounding   Continue Visiting Yes  (11/23 )   Complexity of Encounter Low   Length of Encounter 15 minutes   Spiritual/Mandaen   Type Spiritual support   Assessment Approachable;Calm   Intervention Prayer;Nurtured hope   Outcome Connection/belonging;Expressed gratitude;Encouraged;Receptive; Hopeful   Advance Directives (For Healthcare)   Healthcare Directive No, patient does not have an advance directive for healthcare treatment

## 2018-11-23 NOTE — PROGRESS NOTES
[facility]  SPEECH THERAPY MISSED TREATMENT NOTE  [facility]      Date: 2018  Patient Name: Alexandra Vela        MRN: [id number]    : 1935  (80 y.o.)    REASON FOR MISSED TREATMENT:    Off floor for EGD this am.  Will check back on next therapy date.     Amanda Fong M.S. [id number]

## 2018-11-24 NOTE — PROGRESS NOTES
Gastroenterology  Progress Note    11/24/2018 9:54 AM  Subjective:   Admit Date: 11/19/2018    Interval History: melena resolved no GI bleeding per EGD, esophageal dilation done, clinically improved , on PPI   Diet: DIET DYSPHAGIA III ADVANCED; No Drinking Straw  Dietary Nutrition Supplements: Frozen Oral Supplement    Medications:   Scheduled Meds:   glipiZIDE  5 mg Oral QAM AC    nystatin  5 mL Oral 4x Daily    famotidine  20 mg Oral Daily    sodium bicarbonate  650 mg Oral BID    aspirin  81 mg Oral Daily    clopidogrel  75 mg Oral Daily    insulin lispro  0-18 Units Subcutaneous TID WC    insulin lispro  0-9 Units Subcutaneous Nightly    neomycin-bacitracin-polymyxin   Topical BID    zinc oxide   Topical BID    cefTRIAXone (ROCEPHIN) IV  1 g Intravenous Q24H    guaiFENesin  600 mg Oral BID    amLODIPine  10 mg Oral Daily    atorvastatin  20 mg Oral Daily    nebivolol  10 mg Oral Daily    ferrous sulfate  325 mg Oral BID WC    multivitamin  1 tablet Oral Daily    ranolazine  1,000 mg Oral BID    tamsulosin  0.4 mg Oral Daily    sodium chloride flush  10 mL Intravenous 2 times per day     Continuous Infusions:   sodium chloride 50 mL/hr at 11/23/18 2323    dextrose       CBC:   Recent Labs      11/22/18   0112  11/23/18   0534  11/24/18   0551   WBC   --   9.9  10.0   HGB  10.0*  10.7*  11.2*   PLT   --   214  224     BMP:    Recent Labs      11/22/18   0603  11/23/18   0534  11/24/18   0551   NA  138  138  139   K  4.0  4.1  4.1  4.4  4.4   CL  104  107  108   CO2  18*  18*  19*   BUN  45*  33*  21   CREATININE  1.8*  1.4*  1.2   GLUCOSE  199*  224*  195*     Hepatic:   No results for input(s): AST, ALT, ALB, BILITOT, ALKPHOS in the last 72 hours. INR: No results for input(s): INR in the last 72 hours.   Xray:   Endoscopy Finding:      PROCEDURE PERFORMED:  EGD with dilation up to a size 51-[place].     A standard video upper scope was advanced under direct vision from oral  cavity up to third part of the duodenum. Esophagus features mild acid  reflux, not very severe. No well-defined stricture. Avascular  dilatation seen in the direction of the upper sphincter which helped the  patient to swallow.     Scope was advanced into the stomach. Mild gastritis seen, more  prominent of the body as well as fundus. Small hiatus hernia seen. No  active GI bleed seen. Scope was advanced to the duodenum, which appears  normal.  Scope was withdrawn through the antrum. Guidewire was used. The scope was withdrawn to the room. Then, American dilator starting  from size #48 until size 51-[place]ian introduced over the guidewire. The  dilator withdrawn. Scope was advanced again, inspected the site of  dilation. No bleeding. No perforation. Procedure terminated. Scope  was withdrawn with no immediate complications.     IMPRESSION:  1. Dysphagia. Successful dilation until size 51-Bel[place]ian. 2.  Esophageal candidiasis. 3.  Gastritis.     PLAN:  1. Start the patient on nystatin swish and swallow for five days. 2.  Resume diet. 3.  Reevaluate by the Speech Therapy to see if diet can be advanced. 4.  Start Plavix and aspirin. No biopsy obtained post endoscopy.           Cesar Burgess M.D. Objective:   Vitals: BP (!) 159/65   Pulse 62   Temp 98.7 °F (37.1 °C) (Oral)   Resp 18   Ht 5' 9\" (1.753 m)   Wt 164 lb (74.4 kg)   SpO2 99%   BMI 24.22 kg/m²     Intake/Output Summary (Last 24 hours) at 11/24/18 0954  Last data filed at 11/24/18 0600   Gross per 24 hour   Intake          1679.56 ml   Output             2050 ml   Net          -370.44 ml     General appearance: alert and cooperative with exam  Lungs: clear to auscultation bilaterally  Heart: regular rate and rhythm, S1, S2 normal, no murmur, click, rub or gallop  Abdomen: soft, non-tender; bowel sounds normal; no masses,  no organomegaly  Extremities: extremities normal, atraumatic, no cyanosis or edema    Assessment and Plan:   1.  No active GI bleeding by EGD, esophageal dilation done restart Plavix. 2. Watch for GE bleeding .        Follow up in GI Clinic after discharge in  week(s)    Patient Active Problem List:     Postural dizziness with near syncope     DM2 (diabetes mellitus, type 2) (HCC)     Hypertension     Dehydration     Pacemaker     Peripheral vascular disease (HCC)     Lactic acidosis     Iron deficiency anemia     CAD (coronary artery disease)     Heart failure with preserved ejection fraction (HCC)     GERD (gastroesophageal reflux disease)     Dyslipidemia     Lymphoma (HCC)     Osteoarthritis of both knees     Left atrial dilation     GLORIA (acute kidney injury) ([address]     Frequent falls     Physical deconditioning     Altered awareness, transient     Fever     SIRS (systemic inflammatory response syndrome) (HCC)     Pancytopenia (HCC)     Altered mental status     Hepatomegaly     Urinary retention     Hematuria     Acute kidney injury superimposed on chronic kidney disease (HCC)     Hyperkalemia     Hypercalcemia     Calculus of gallbladder without cholecystitis without obstruction     Syncope and collapse     Non-intractable vomiting with nausea     Gallbladder dilatation     Nonintractable headache     Osteoarthritis of multiple joints     Chronic diastolic (congestive) heart failure (HCC)     S/P placement of cardiac pacemaker     Chronic kidney disease, stage 3 (HCC)     Severe malnutrition (HCC)     Dysphagia     Urinary retention     Hyperkalemia     Hyponatremia     Lactic acidosis     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

## 2018-11-24 NOTE — PROGRESS NOTES
Hospitalist Progress Note    Patient:  Gerardo Perdomo      Unit/Bed:5K-04/004-A    YOB: 1935    MRN: [id number]       Acct: [de-identified]     PCP: GARRETT Camacho CNP    Date of Admission: 11/19/2018    Chief Complaint: blood in urine    Hospital Course:     Please see H/P for details. In summary this is a 80 y.o. Male,  who presented to [facility] on 11/19/18 with blood in the urine; he has a chronic indwelling Trujillo cath for about 4-6 weeks now for urinary retention, that was reported to be changed 1 week ago; per H/P note, family noted the urine was discolored 1 day prior admission; and patient \"did not look good\" and his urine was dark and milky on day of admission; pt c/o penile pain; pt had stool on him upon arrival to ED; Cheryle Settler in ER personally changed out the Trujillo cath and noted penile erythema and penile tenderness present;  irritation and tenderness to the inferior aspect of the urethral meatus; it appears torn and flayed open; pus visualized in the trujillo catheter bag as well as darkened urine. Once the Trujillo was replaced there was an immediate return of 900 ml of urine; pt is now stating that he is feeling better; he is currently receiving Rocephin 1 gram along with 0.9 NS; pt is hard of hearing but states he is feeling better now; he is being admitted to the Hospitalist Service for further care and evaluation on 11/19/18. Pt also had GLORIA on admission, creatinine was 4.1 ( baseline creatinine between 1.3 to 1.8. Creatinine on 9/13/18 was 1.6). Urology was consulted on admission. He was seen by GARRETT Jackson CNP ( Urology) on 11/20/2018, who recommend leave trujillo catheter, bacitracin on penis tip TID prn, cont rocephin, renal US 11/21/18. Urine culture came back (+) proteus mirabilis, >100,000 CFU/mL, sensitive to Rocephin. CT abd/pelvis on admission showed left hydronephrosis and hydroureter. No obstructing ureteral stone.  There is significant distention of the bladder with wall thickening and infiltration. Small amount of air within the bladder wall consistent with cystitis. Small amount of air within the bladder correlate for recent intervention. Scattered stool in the colon to ball in the rectum. Correlate for constipation. Nephrology ( Dr. Monica Ayala) saw the patient on 11/201/8 for GLORIA on CKD stage 3. Dr. Monica Ayala recommend continue IVF and avoid diuretics and ACEI. Patient was evaluated by speech therapist who has oropharyngeal dsyphagia per ST evaluation, was placed on dysphagia diet and barium swallow was ordered. GARRETT Rock - [facility]/Dr. Sukumar Bowles)  saw the patient on 11/201/8, who recommend EGD dilatation pending cardio clearance as patient had abnormal stress test on 10/2018. Cardiologist ( Dr. She See) saw the patient on 11/21/2018, per Dr. She See, patient may proceed with EGD with esophageal dilatation. Also, has dry cough on this admission, CXR unremarkable. Patient had EGD on 11/23/2018 for dysphagia/odynophagia and black stools. Pt was also c/o burning sensation on eys accompanied by watery eyes and redness, no blurry vision, on 11/22/18, started polytrim eye drop and patient was seen by Niranjan Wan, Dr. Johanna Vegas, who said mild conjunctival injection, but otherwise no acute findings. He was also c/o left elbow pain on 11/22/18, left elbow xray normal.    Patient had EGD 11/23/18, which showed dysphagia w successful dilation until size 51-[place], Esophageal candidiasis, gastritis. Subjective:     Pt seen and examined, pt reports still with burning eyes improving. denies blurry vision and eye discharge. Denies fever, chills. Penile pain getting better per pt. Cough improving as well per pt. Denies elbow pain today. Denies abd pain, N/V. Last BM was yesterday, denies constipation and diarrhea. Denies hematochezia and melena.        Medications:  Reviewed    Infusion Medications    sodium chloride 50 mL/hr at 11/23/18 0298    dextrose Scheduled Medications    nystatin  5 mL Oral 4x Daily    famotidine  20 mg Oral Daily    sodium bicarbonate  650 mg Oral BID    aspirin  81 mg Oral Daily    clopidogrel  75 mg Oral Daily    insulin lispro  0-18 Units Subcutaneous TID     insulin lispro  0-9 Units Subcutaneous Nightly    neomycin-bacitracin-polymyxin   Topical BID    trimethoprim-polymyxin b  1 drop Both Eyes 6 times per day    zinc oxide   Topical BID    cefTRIAXone (ROCEPHIN) IV  1 g Intravenous Q24H    guaiFENesin  600 mg Oral BID    amLODIPine  10 mg Oral Daily    atorvastatin  20 mg Oral Daily    nebivolol  10 mg Oral Daily    ferrous sulfate  325 mg Oral BID     multivitamin  1 tablet Oral Daily    ranolazine  1,000 mg Oral BID    tamsulosin  0.4 mg Oral Daily    sodium chloride flush  10 mL Intravenous 2 times per day     PRN Meds: polyvinyl alcohol, magic (miracle) mouthwash, lidocaine, sodium chloride flush, magnesium hydroxide, ondansetron, glucose, dextrose, glucagon (rDNA), dextrose      Intake/Output Summary (Last 24 hours) at 11/24/18 3648  Last data filed at 11/24/18 0600   Gross per 24 hour   Intake          1679.56 ml   Output             2050 ml   Net          -370.44 ml       Diet:  DIET DYSPHAGIA III ADVANCED; No Drinking Straw  Dietary Nutrition Supplements: Frozen Oral Supplement    Exam:  BP (!) 142/66   Pulse 62   Temp 98.7 °F (37.1 °C) (Oral)   Resp 18   Ht 5' 9\" (1.753 m)   Wt 164 lb (74.4 kg)   SpO2 98%   BMI 24.22 kg/m²     General appearance: alert, not in acute distress   HEENT: Pupils equal, round, and reactive to light. no eye discharge on both eyes, (+) minimal conjunctival redness on both eyes. (+) whitish plaque on top of tongue ( better compare 11/23/18 exam)  Neck: Supple, with full range of motion. No jugular venous distention. Trachea midline.   Respiratory:  Normal respiratory effort. (+) rhonchi on both lung fields ( better compare 11/23/18 exam), no wheezing, no rales Cardiovascular: Regular rate and rhythm with normal S1/S2 without murmurs, rubs or gallops. Abdomen: Soft, non-tender, non-distended with normal bowel sounds. : no erythema or swelling on tip of penis, (+) trujillo cath in placed. Buttocks: (+) blackish-purplish discoloration on sacral area, w some very superficial skin breakage  Musculoskeletal: passive and active ROM x 4 extremities. Neuro exam: alert and oriented x 3   Exam of extremities: peripheral pulses normal, no pedal edema, no clubbing or cyanosis.      Chaperoned by Nurse Dalila Scott      Labs:   Recent Labs      11/22/18   0112  11/23/18   0534  11/24/18   0551   WBC   --   9.9  10.0   HGB  10.0*  10.7*  11.2*   HCT  29.0*  31.7*  33.7*   PLT   --   214  224     Recent Labs      11/22/18   0603  11/23/18   0534  11/24/18   0551   NA  138  138  139   K  4.0  4.1  4.1  4.4  4.4   CL  104  107  108   CO2  18*  18*  19*   BUN  45*  33*  21   CREATININE  1.8*  1.4*  1.2   CALCIUM  9.2  8.6  8.9     No results for input(s): AST, ALT, BILIDIR, BILITOT, ALKPHOS in the last 72 hours. No results for input(s): INR in the last 72 hours. No results for input(s): Madalynn Gustavo in the last 72 hours. Urinalysis:    Lab Results   Component Value Date    NITRU NEGATIVE 11/19/2018    WBCUA > 200 11/19/2018    BACTERIA MANY 11/19/2018    RBCUA 10-15 11/19/2018    BLOODU LARGE 11/19/2018    SPECGRAV 1.025 09/10/2018    GLUCOSEU NEGATIVE 11/19/2018       Radiology:  XR ELBOW LEFT (2 VIEWS)   Final Result   No acute abnormality            **This report has been created using voice recognition software. It may contain minor errors which are inherent in voice recognition technology. **      Final report electronically signed by Dr. Miguel Angel Koo on 11/22/2018 9:45 AM      FL MODIFIED BARIUM SWALLOW W VIDEO   Final Result   1. Laryngeal penetration of thin barium without evidence of aspiration. 2. Additional recommendations from the speech therapist will follow. **This report has been created using voice recognition software. It may contain minor errors which are inherent in voice recognition technology. **      Final report electronically signed by Dr. Vaishali Solano on 11/21/2018 10:49 AM      US RENAL COMPLETE   Final Result       1. The right kidney is asymmetrically smaller than the left. 2. Abnormal appearance of the urinary bladder which has a diffusely thickened wall and increased vascularity throughout. There are also multiple diverticula. 3. Elevated bilateral resistive indices which may be seen with medical renal disease. 4. Incidental note made of splenomegaly. **This report has been created using voice recognition software. It may contain minor errors which are inherent in voice recognition technology. **      Final report electronically signed by Dr Nicolle Mao on 11/21/2018 9:56 AM      XR CHEST PORTABLE   Final Result   Cardiomegaly with no acute intrathoracic process. **This report has been created using voice recognition software. It may contain minor errors which are inherent in voice recognition technology. **      Final report electronically signed by Dr Nicolle Mao on 11/20/2018 6:23 PM      CT ABDOMEN PELVIS WO CONTRAST Additional Contrast? None   Final Result      1. Few calcified layering gallstones. Correlation with serology as clinically warranted. 2. Left hydronephrosis and hydroureter. No obstructing ureteral stone. There is significant distention of the bladder with wall thickening and infiltration. Small amount of air within the bladder wall consistent with cystitis. Small amount of air within    the bladder correlate for recent intervention. 3. Scattered stool in the colon to ball in the rectum. Correlate for constipation. **This report has been created using voice recognition software. It may contain minor errors which are inherent in voice recognition technology. **      Final report electronically signed by Dr. Jah Rico on 11/19/2018 3:47 PM          Assessment/Plan: This is a 80 y.o. Male admitted for GLORIA and UTI      1. GLORIA, possibly due to urinary retention, improving     -creatinine 1.2 today from 1.4 yesterday ( 4.1 on admission). ( baseline creatinine between 1.3 to 1.8. Creatinine on 9/13/18 was 1.6)  -cont IVF  -BMP in am   -nephro on-board. Appreciate nephro input    2. Proteus mirabalis UTI likely due to indwelling Trujillo cath      -cont Rocephin ( start date 11/19/18)     3. Urinary retention w left hydronephrosis and hydroureter     -Urology on-board. Appreciate Urology input. GARRETT Barreto - CNP ( Urology) saw patient 11/20/2018, who recommend leave trujillo catheter, bacitracin on penis tip TID prn, cont rocephin, renal US 11/21/18. Urology said poor candidate for surgery due to high risk ( Patient had lexiscan Stress test, on 10/17/18 which showed mild apico-lateral perfusion defect worse with stress that resolves with rest suggestive of ischemia, no evidence of infarction is noted on this study, left ventricular systolic function was normal.).     4. Non-AG Metabolic acidosis     -CO2 slightly improving, from 18 to 19 today  -possibly related to DM type w hyperglycemia, accu-checks on 200s. Started medium dose SSI 11/22/18, blood sugars still above the goal increased SSI to high dose on 11/23/18, blood sugars still above goal ( 190s to 200s). Resume pt's home med ( glipizide 5 mg po daily). -ABG on 11/22/18 showed mild respiratory alkalosis, compensated   -ketones normal  -slightly elevated beta-hydroxybutyrate  -repeat BMP in am   -cont IVF  -cont sodium bicarb 650 mg po bid      5. Penile erythema, improved     -cont neosporin oint  -monitor     6. pressure ulcer, stage 1     -cont desitin paste  -turn patient q 2hours        7. Cough, improving     -cxr normal  -procalcitonin elevated on admission. Repeat procalcitonin trending down  -cont Mucinex        8. Dysphagia and odynophagia, -s/p EGD w dilatation 11/23/2018       -speech eval done, pt has oropharyngeal dysphagia. Barium swallow showed  Laryngeal penetration of thin barium without evidence of aspiration.  -diet: dysphagia III  -re-consulted speech therapy if able to advance diet    9. Esophageal candidiasis     -seen in EGD 11/23/2018   -GI started nystatin swish and swallow 11/23/18 x 5 days    10. Gastritis    -cont pepcid     11. Normocytic anemia     -Hgb dropped from  8.0 on 11/20/18 to 7.7 11/21/18, possibly from IVF vs UGIB ( pt had black stools). S/p 2 units PRBC, H/H after PRBC transfusion: Hgb 10 and Hct 29. Hgb today 11.2.   - discussed case w Dr. Ej Ellison on 11/22/18, per Dr. Ej Ellison, if unremarkable EGD, plan for colonoscopy. Appreciate Dr. Allan Fontana input.   -baseline Hgb between 9-10  -pt is asymptomatic,   -iron panel showed anemia of chronic disease. B12 and folate normal       12. HTN, fairly controlled      -cont amlodipine and Bystolic w holding parameters  -VS per protocol      13. Hyperlipidemia     -cont lipitor   -low fat diet      14. CAD     -lexiscan stress test 10/17/18 showed mild apico-lateral perfusion defect worse with stress that   resolves with rest suggestive of ischemia in the distal LAD and/or distal   LCX territory. No evidence of infarction is noted on this study. Left ventricular systolic function was normal.  -on asa and plavix, however due to hematuria, plavix on hold  -cont statin, bystolic, asa and plavix   -cardio on-board: Dr. Regulo Mendoza recommend continue optimal medical therapy and f/u w Dr. Dang Velásquez. Appreciate Dr. Alexandra Duenas input  -pt is asymptomatic      15. Possible Upper GI bleed     -pt reported black stools  -s/p EGD 11/23/2018   -negative fobt     16. B/L conjunctivitis, possibly viral vs allergic conjunctivitis     -DC polytrim. Start artificial tears   -ophtha on-board. Appreciate Dr. Soco Castellanos input     17.  DM type 2               Lab Results   Component Value Date     LABA1C 5.2 01/05/2018      -accu-check still uncontrolled in spite on medium dose SSI, increase SSI to high dose   -cont accu-check      18. Oral candidiasis     -on nystatin swish and swallow  -monitor     17. Left elbow pain, improved      -elbow exam unremarkable  -left elbow xray normal  -cont lidocaine oint prn   -left elbow brace         Diet: DIET DYSPHAGIA III ADVANCED; No Drinking Straw  Dietary Nutrition Supplements: Frozen Oral Supplement    DVT prophylaxis: [] Lovenox                                 [x] SCDs                                 [] SQ Heparin                                 [] Encourage ambulation           [] Already on Anticoagulation     Disposition:    [] Home       [] TCU       [] Rehab       [] Psych       [] SNF       [] [facility]       [x] Other-Pt may need subacute/SNF placement, awaiting SW consult for SNF. Awaiting re-evaluation w speech therapy if can advance diet ( currently on dysphagia III diet)     Code Status: Full Code    PT/OT Eval Status: on-board.  PT recommend subacute/SNF           Electronically signed by Joe Montes MD on 11/24/2018 at 8:21 AM

## 2018-11-24 NOTE — PROGRESS NOTES
Naeem Pelletier 60  INPATIENT OCCUPATIONAL THERAPY  [facility] ONC MED 5K  EVALUATION    Time:  Time In: 4594  Time Out: 1020  Timed Code Treatment Minutes: 15 Minutes  Minutes: 30          Date: 2018  Patient Name: Jacoby Perkins,   Gender: male      MRN: [id number]  : 1935  (80 y.o.)  Referring Practitioner: Dr. Ellie Parker MD  Diagnosis: GLORIA  Additional Pertinent Hx: 80 y.o. male who presents to the emergency department for a trujillo catheter problem, penile pain, and hematuria/discolored urine. The patient was sent here via his nursing home because the patient did not look well this morning and his urine looks dark and \"milky\". The nurse stated that his trujillo catheter was replaced one week ago and since then, his urine has been this color. His urologist is Dr. Altagracia Cormier. The patient reports penile pain/pain around his catheter as well as lower abdominal pain. Upon evaluation the patient's trujillo catheter bag showed a dark almost red/brown tinted urine with pus located in the urine and his abdomen was distended.      Restrictions/Precautions:  General Precautions, Fall Risk                            Past Medical History:   Diagnosis Date    Acute kidney failure ([address]     Anemia     ASHD (arteriosclerotic heart disease)     Blood circulation, collateral     CAD (coronary artery disease)     Cardiomegaly     Chronic diastolic (congestive) heart failure (HCC)     Chronic kidney disease     DM2 (diabetes mellitus, type 2) (HCC)     Dyslipidemia     Falls     GERD (gastroesophageal reflux disease)     Heart failure with preserved ejection fraction (HCC)     Hyperlipidemia     Hypertension     Iron deficiency anemia     Left atrial dilation     severe    Lymphoma (HCC)     non hodgkin    Malaise     Nonintractable headache     Osteoarthritis of both knees     Pacemaker 2018    [employer] DUAL PACEMAKER INSERTED ON D (peripheral vascular disease) (HCC)      Past Surgical History:   Procedure Laterality Date    CARDIAC CATHETERIZATION  02/19/2018    CARDIAC SURGERY      CORONARY ARTERY BYPASS GRAFT      PACEMAKER PLACEMENT  01/12/2018    [facility]-Dr Kristopher BURDEN DILATION OF SALIVARY DUCT N/A 11/23/2018    EGD DILATION SAVORY performed by She Lopez MD at [facility]    VASCULAR SURGERY             Subjective  Chart Reviewed: Yes  Patient assessed for rehabilitation services?: Yes    Subjective: Pleasant and cooperative  Comments: RN approved session. Pt was asking or help to go to the bathroom. Pt did not report having any pain. He does have a skin sore at his coccyx. His nurse was aware. He had eaten some of his breakfast already. General:  Overall Orientation Status: Within Normal Limits    Vision: Impaired  Vision Exceptions: Wears glasses for reading    Hearing: Exceptions to [facility]  Hearing Exceptions: Hard of hearing/hearing concerns         Pain:  Pain Assessment  Patient Currently in Pain: Denies       Social/Functional History:  Lives With: Alone  Type of Home: Facility [facility] for about 1 year)  Home Layout: One level  Home Access: Level entry  Home Equipment: Rolling walker, Standard walker     Bathroom Shower/Tub: Walk-in shower  Bathroom Toilet: Standard  Bathroom Equipment: Grab bars in shower  Bathroom Accessibility: Accessible  IADL Comments: Pt was having help with self care.      Receives Help From: Other (comment) (staff as available)  ADL Assistance: Needs assistance  Bath: Minimal assistance  Dressing: Minimal assistance  Homemaking Assistance: Needs assistance  Meal Prep: Maximal  Laundry: Maximal  Vacuuming: Maximal  Cleaning: Maximal  Gardening: Maximal  Yard Work: Maximal  Driving: Maximal  Shopping: Maximal  Homemaking Responsibilities: No    Ambulation Assistance: Needs assistance  Transfer Assistance: Needs assistance    Active : No  Occupation: Retired  Type of occupation: Restaurant owner  Leisure & Hobbies: Watching the [facility] [employer] [employer] sports teams; following baseball  Additional Comments: Pt reports walking with walker at NH without asisstence, pt very sleepy and unsure if it was a RW or stw     Objective  Vision - Basic Assessment  Prior Vision: Wears glasses only for reading     Overall Cognitive Status: WFL         Sensation  Overall Sensation Status: WFL    Posture: Fair (Forward head noted)    Observation/Palpation  Posture: Fair (Forward head noted)    Hand Dominance: Right    LUE PROM (degrees)  LUE PROM: WNL       LUE AROM (degrees)  LUE AROM : WNL  Left Hand PROM (degrees)  Left Hand PROM: WNL  Left Hand AROM (degrees)  Left Hand AROM: WNL    RUE PROM (degrees)  RUE PROM: WNL  RUE AROM (degrees)  RUE AROM : WNL  Right Hand PROM (degrees)  Right Hand PROM: WNL  Right Hand AROM (degrees)  Right Hand AROM: WNL    LUE Strength  L Hand Grasp: 4/5  LUE Strength Comment: 3+/5 deltoid; 3+/5 pectoral; 4-/5 biceps/triceps                  RUE Strength  R Hand Grasp: 4/5  RUE Strength Comment: 3+/5 deltoid; 3+/5 pectoral; 4-/5 biceps/triceps                               Movements Are Fluid And Coordinated: Yes     Fine Motor Skills  Fine Motor Comment: No difficulty noted with using B hands for his toileting         ADL  Grooming: Setup (wiped his hands and face with a wet washcloth)  LE Dressing: Maximum assistance (don/doffing slipper socks and donning KAI hose)  Toileting: Modified independent  (no difficulty noted with wiping himself after using the bathroom.  )     Bed mobility  Rolling to Left: Stand by assistance (using the bedrail)  Supine to Sit: Minimal assistance (using the bedrail)  Scooting: Stand by assistance    Transfers  Sit to stand: Stand by assistance (from the edge of bed)  Stand to sit: Stand by assistance (to the chair)  Toilet Transfers  Toilet - Technique: Ambulating  Equipment Used: Grab bars  Toilet Transfer: Contact guard assistance    Balance  Sitting Balance: Supervision  Standing Balance: Stand by assistance (preparing to walk)     Time: 30 seconds  Activity: preparing to walk     Functional Mobility  Functional - Mobility Device: Standard Walker  Activity: To/from bathroom  Assist Level: Contact guard assistance  Functional Mobility Comments: Pt walked with a standard walker and was advancing he walker too far. Verbal cues provided for taking smaller steps. Activity Tolerance:  Activity Tolerance: Patient limited by fatigue  Activity Tolerance: Pt was able to remain in the chair with his legs elevated following session. He was fatigued from his trip to the bathroom. He took a rest break while having help with donning his KAI hose. Treatment Initiated:  Pt was having help with donning his KAI hose as he rested in the chair. He had several episodes of sneezing and was provided with a wet washcloth to wipe off his hands and face. He refused any other ADLs at this time. He reclined in the chair at the end of session. Assessment:  Assessment: Patient would benefit from continued skilled OT services to address above deficits. He presents with decreased functional mobility and strength and ADLs. He had been doing his transfers and walking in his room on his own prior to admission. He needed CGA for toilet transfers. He had help with his ADLs while at [facility]. He needed MAX A for donning/doffing slipper socks at this time. He needed a rest break after making a trip to the bathroom.       Performance deficits / Impairments: Decreased functional mobility , Decreased ADL status, Decreased endurance, Decreased strength  Prognosis: Good    Clinical Decision Making: Clinical Decision making was of Low Complexity as the result of analysis of data from a problem focused assessment, a consideration of a limited number of treatment options, no significant comorbidities affecting the plan of care and no modification or assistance required to complete the evaluation. Discharge Recommendations:  Discharge Recommendations: Continue to assess pending progress, Patient would benefit from continued therapy after discharge    Patient Education:  Patient Education: OT POC; pt's goal; benefit of having salve placed on his tailbone to help healing    Equipment Recommendations:  Equipment Needed: No    Safety:  Safety Devices in place: Yes  Type of devices: Left in chair, Call light within reach, Nurse notified, Chair alarm in place, Gait belt, Patient at risk for falls    Plan:  Times per week: 3-5x  Current Treatment Recommendations: Strengthening, Functional Mobility Training, Endurance Training, Self-Care / ADL  Plan Comment: Pt would benefit from continued skilled OT when medically stable and discharged from Acute. Specific instructions for Next Treatment: Functional mobility; ADLs and standing tolerance; upper body exercises    Goals:  Patient goals : \"I want to get stronger. \" pt states. Short term goals  Time Frame for Short term goals: 1 week  Short term goal 1: Pt will demonstrate various transfers including to/from the standard toilet seat using a grabbar with SBA to increase his independence with toileting routine. Short term goal 2: Pt will complete simple standing ADLs for over 5 minute duration with SBA to increase his endurance for ease of showering or grooming. Short term goal 3: Pt will complete BUE red theraband exercises while following any handout needed to increase his strength for ease of doing self care. Long term goals  Time Frame for Long term goals : None secondray to short estimated length of stay. Evaluation Complexity: Based on the findings of patient history, examination, clinical presentation, and decision making during this evaluation, this patient is of low complexity. OT G-codes  Functional Limitation: Self care  Self Care Current Status ():  At least 20 percent but less than 40 percent impaired, limited or restricted  Self Care Goal Status ():  At least 1 percent but less than 20 percent impaired, limited or restricted  AM-PAC Inpatient Daily Activity Raw Score: 21  AM-PAC Inpatient ADL T-Scale Score : 44.27  ADL Inpatient CMS 0-100% Score: 32.79  ADL Inpatient CMS G-Code Modifier : CJ

## 2018-11-24 NOTE — PROGRESS NOTES
Renal Progress Note    Assessment and Plan: 1. Acute kidney injury from volume contraction. Improving nicely. 2.BPH  3. Proteus septicemia   4. Proteus UTI  5. Dehydartion improved   6. Chronic indwell Shabazz catheter from BPH  7. Penile erosion from chronic Shabazz catheter  8. Deconditioing       PLAN:  Reviewed labs   Reviewed medications   No changes for now   Decrease IV fluid to 50 ml/hr   AM labs  Will follow   Discussed with staff  It is OK with nephrology for the primary physician to discharge the patient when ready. Follow up visit with Dr. Lu Stahl in 14-21 days with CBC and BMP drawn 48 hours prior to the visit. Faith Lowery,       Patient Active Problem List:     Postural dizziness with near syncope     DM2 (diabetes mellitus, type 2) (HCC)     Hypertension     Dehydration     Pacemaker     Peripheral vascular disease (HCC)     Lactic acidosis     Iron deficiency anemia     CAD (coronary artery disease)     Heart failure with preserved ejection fraction (HCC)     GERD (gastroesophageal reflux disease)     Dyslipidemia     Lymphoma (HCC)     Osteoarthritis of both knees     Left atrial dilation     GLROIA (acute kidney injury) ([address]     Frequent falls     Physical deconditioning     Altered awareness, transient     Fever     SIRS (systemic inflammatory response syndrome) (HCC)     Pancytopenia (HCC)     Altered mental status     Hepatomegaly     Urinary retention     Hematuria     Acute kidney injury superimposed on chronic kidney disease (HCC)     Hyperkalemia     Hypercalcemia     Calculus of gallbladder without cholecystitis without obstruction     Syncope and collapse     Non-intractable vomiting with nausea     Gallbladder dilatation     Nonintractable headache     Osteoarthritis of multiple joints     Chronic diastolic (congestive) heart failure (HCC)     S/P placement of cardiac pacemaker     Chronic kidney disease, stage 3 (HCC)     Severe malnutrition ([address]     Dysphagia     Urinary retention

## 2018-11-24 NOTE — PLAN OF CARE
Problem: Falls - Risk of:  Goal: Will remain free from falls  Will remain free from falls   Outcome: Ongoing  Pt free from falls this shift. Safety precautions maintained bed alarm on, bed lowest position, and call light within reach     Problem: Risk for Impaired Skin Integrity  Goal: Tissue integrity - skin and mucous membranes  Structural intactness and normal physiological function of skin and  mucous membranes. Outcome: Ongoing  2 QH turns, heels off bed, bottoms are blue/purple, cream applied     Problem: DAILY CARE  Goal: Daily care needs are met  Outcome: Ongoing  Pt's daily care are met this shift    Problem: SKIN INTEGRITY  Goal: Skin integrity is maintained or improved  Outcome: Ongoing      Problem: KNOWLEDGE DEFICIT  Goal: Patient/S.O. demonstrates understanding of disease process, treatment plan, medications, and discharge instructions. Outcome: Ongoing      Problem: DISCHARGE BARRIERS  Goal: Patient's continuum of care needs are met  Outcome: Ongoing  No discharge barriers noted this shift     Problem:   Goal: No urinary complication  Outcome: Ongoing  Chronic trujillo, adequate zeferino urine with sediment noted in tubing     Problem: Nutrition  Goal: Optimal nutrition therapy  Outcome: Ongoing  Dysphagia 3 diet, no complications this shift     Problem: Pain:  Goal: Control of acute pain  Control of acute pain   Outcome: Ongoing  Denies any pain this shift     Problem: Discharge Planning:  Intervention: Assess knowledge level of healthcare  No discharge barriers noted this shift.  Discharge to [facility]       Comments: Reviewed plan of care with pt/ Pt verbalized understanding and voiced no concerns

## 2018-11-25 NOTE — PLAN OF CARE
Problem: Falls - Risk of:  Goal: Will remain free from falls  Will remain free from falls   Outcome: Ongoing  Patient has weak lower extremities. Patient up with walker & contact assist. Patient able to walk to bathroom & chair. Patient uses call light when wanting to move. Bed & chair alarm utilized for patient safety. Problem: Risk for Impaired Skin Integrity  Goal: Tissue integrity - skin and mucous membranes  Structural intactness and normal physiological function of skin and  mucous membranes. Outcome: Ongoing  White plaque on tongue is improving. Patient has scattered bruising. Patient has a DTI to buttocks present on admission. Top of gluteal fold has a stage 1 wound, pinax ointment applied, frequent turns. Problem: DAILY CARE  Goal: Daily care needs are met  Outcome: Ongoing  Patient needs hands on assistance with daily care. Problem: KNOWLEDGE DEFICIT  Goal: Patient/S.O. demonstrates understanding of disease process, treatment plan, medications, and discharge instructions. Outcome: Ongoing  Patient is alert to name & place, needs frequent reminders of POC. Problem: DISCHARGE BARRIERS  Goal: Patient's continuum of care needs are met  Outcome: Ongoing  Patient is returning to [facility] aware of discharge needs & is monitoring. Problem:   Goal: No urinary complication  Outcome: Ongoing  Patient has a chronic trujillo draining zeferino cloudy urine in adequate amount. Meatus of penis is torn with decreased swelling since admission. Light tan drainage noted. Problem: Nutrition  Goal: Optimal nutrition therapy  Outcome: Ongoing  Patient is eating 50% of meals & 100% of frozen supplements. Patient is on a dysphagia 3 diet. Comments: Care plan reviewed with patient . Patient verbalizes understanding but needs reminded  of the plan of care and contribute to goal setting.

## 2018-11-25 NOTE — PROGRESS NOTES
Hospitalist Progress Note    Patient:  Aylin Heard      Unit/Bed:5K-04/004-A    YOB: 1935    MRN: [id number]       Acct: [de-identified]     PCP: GARRETT Kim CNP    Date of Admission: 11/19/2018    Chief Complaint: blood in urine    Hospital Course:     Please see H/P for details. In summary this is a 80 y.o. Male,  who presented to [facility] on 11/19/18 with blood in the urine; he has a chronic indwelling Trujillo cath for about 4-6 weeks now for urinary retention, that was reported to be changed 1 week ago; per H/P note, family noted the urine was discolored 1 day prior admission; and patient \"did not look good\" and his urine was dark and milky on day of admission; pt c/o penile pain; pt had stool on him upon arrival to ED; Sandy Mon in ER personally changed out the Trujillo cath and noted penile erythema and penile tenderness present;  irritation and tenderness to the inferior aspect of the urethral meatus; it appears torn and flayed open; pus visualized in the trujillo catheter bag as well as darkened urine. Once the Trujillo was replaced there was an immediate return of 900 ml of urine; pt is now stating that he is feeling better; he is currently receiving Rocephin 1 gram along with 0.9 NS; pt is hard of hearing but states he is feeling better now; he is being admitted to the Hospitalist Service for further care and evaluation on 11/19/18. Pt also had GLORIA on admission, creatinine was 4.1 ( baseline creatinine between 1.3 to 1.8. Creatinine on 9/13/18 was 1.6). Urology was consulted on admission. He was seen by GARRETT Key CNP ( Urology) on 11/20/2018, who recommend leave trujillo catheter, bacitracin on penis tip TID prn, cont rocephin, renal US 11/21/18. Urine culture came back (+) proteus mirabilis, >100,000 CFU/mL, sensitive to Rocephin. CT abd/pelvis on admission showed left hydronephrosis and hydroureter. No obstructing ureteral stone.  There is significant Medications    sodium chloride 50 mL/hr at 11/23/18 2323    dextrose       Scheduled Medications    [START ON 11/26/2018] nebivolol  15 mg Oral Daily    glipiZIDE  5 mg Oral QAM AC    nystatin  5 mL Oral 4x Daily    famotidine  20 mg Oral Daily    sodium bicarbonate  650 mg Oral BID    aspirin  81 mg Oral Daily    clopidogrel  75 mg Oral Daily    insulin lispro  0-18 Units Subcutaneous TID WC    insulin lispro  0-9 Units Subcutaneous Nightly    neomycin-bacitracin-polymyxin   Topical BID    zinc oxide   Topical BID    cefTRIAXone (ROCEPHIN) IV  1 g Intravenous Q24H    guaiFENesin  600 mg Oral BID    amLODIPine  10 mg Oral Daily    atorvastatin  20 mg Oral Daily    ferrous sulfate  325 mg Oral BID WC    multivitamin  1 tablet Oral Daily    ranolazine  1,000 mg Oral BID    tamsulosin  0.4 mg Oral Daily    sodium chloride flush  10 mL Intravenous 2 times per day     PRN Meds: polyvinyl alcohol, magic (miracle) mouthwash, lidocaine, sodium chloride flush, magnesium hydroxide, ondansetron, glucose, dextrose, glucagon (rDNA), dextrose      Intake/Output Summary (Last 24 hours) at 11/25/18 0917  Last data filed at 11/24/18 1750   Gross per 24 hour   Intake              523 ml   Output              850 ml   Net             -327 ml       Diet:  DIET DYSPHAGIA III ADVANCED; No Drinking Straw  Dietary Nutrition Supplements: Frozen Oral Supplement    Exam:  BP (!) 146/66   Pulse 64   Temp 98.8 °F (37.1 °C) (Oral)   Resp 18   Ht 5' 9\" (1.753 m)   Wt 164 lb (74.4 kg)   SpO2 99%   BMI 24.22 kg/m²     General appearance: alert, not in acute distress   HEENT: Pupils equal, round, and reactive to light. no eye discharge on both eyes, (+) minimal conjunctival redness on both eyes. (+) minimal whitish plaque on top of tongue ( much better compare 11/24/18 exam)  Neck: Supple, with full range of motion. No jugular venous distention. Trachea midline. Respiratory:  Normal respiratory effort.  Clear lung to auscultation, no rales, rhonchi, wheezing. Cardiovascular: Regular rate and rhythm with normal S1/S2 without murmurs, rubs or gallops. Abdomen: Soft, non-tender, non-distended with normal bowel sounds. : no erythema or swelling on tip of penis, (+) trujillo cath in placed w roughly 1000 cc dark yellow urine w some sediment    Buttocks: (+) blackish-purplish discoloration on sacral area, w some very superficial skin breakage  Musculoskeletal: passive and active ROM x 4 extremities. Neuro exam: alert and oriented x 3   Exam of extremities: peripheral pulses normal, no pedal edema, no clubbing or cyanosis.      Chaperoned by Nurse Jania Freeman    Labs:   Recent Labs      11/23/18   0534  11/24/18   0551   WBC  9.9  10.0   HGB  10.7*  11.2*   HCT  31.7*  33.7*   PLT  214  224     Recent Labs      11/23/18   0534  11/24/18   0551  11/25/18   0625   NA  138  139  138   K  4.1  4.1  4.4  4.4  4.3   CL  107  108  106   CO2  18*  19*  21*   BUN  33*  21  15   CREATININE  1.4*  1.2  1.1   CALCIUM  8.6  8.9  8.9     No results for input(s): AST, ALT, BILIDIR, BILITOT, ALKPHOS in the last 72 hours. No results for input(s): INR in the last 72 hours. No results for input(s): Willia Beverage in the last 72 hours. Urinalysis:    Lab Results   Component Value Date    NITRU NEGATIVE 11/19/2018    WBCUA > 200 11/19/2018    BACTERIA MANY 11/19/2018    RBCUA 10-15 11/19/2018    BLOODU LARGE 11/19/2018    SPECGRAV 1.025 09/10/2018    GLUCOSEU NEGATIVE 11/19/2018       Radiology:  XR ELBOW LEFT (2 VIEWS)   Final Result   No acute abnormality            **This report has been created using voice recognition software. It may contain minor errors which are inherent in voice recognition technology. **      Final report electronically signed by Dr. Amor Marley on 11/22/2018 9:45 AM      FL MODIFIED BARIUM SWALLOW W VIDEO   Final Result   1. Laryngeal penetration of thin barium without evidence of aspiration.    2. Additional recommendations from the speech therapist will follow. **This report has been created using voice recognition software. It may contain minor errors which are inherent in voice recognition technology. **      Final report electronically signed by Dr. Radha Ross on 11/21/2018 10:49 AM      US RENAL COMPLETE   Final Result       1. The right kidney is asymmetrically smaller than the left. 2. Abnormal appearance of the urinary bladder which has a diffusely thickened wall and increased vascularity throughout. There are also multiple diverticula. 3. Elevated bilateral resistive indices which may be seen with medical renal disease. 4. Incidental note made of splenomegaly. **This report has been created using voice recognition software. It may contain minor errors which are inherent in voice recognition technology. **      Final report electronically signed by Dr Kevin Gonzalez on 11/21/2018 9:56 AM      XR CHEST PORTABLE   Final Result   Cardiomegaly with no acute intrathoracic process. **This report has been created using voice recognition software. It may contain minor errors which are inherent in voice recognition technology. **      Final report electronically signed by Dr Kevin Gonzalez on 11/20/2018 6:23 PM      CT ABDOMEN PELVIS WO CONTRAST Additional Contrast? None   Final Result      1. Few calcified layering gallstones. Correlation with serology as clinically warranted. 2. Left hydronephrosis and hydroureter. No obstructing ureteral stone. There is significant distention of the bladder with wall thickening and infiltration. Small amount of air within the bladder wall consistent with cystitis. Small amount of air within    the bladder correlate for recent intervention. 3. Scattered stool in the colon to ball in the rectum. Correlate for constipation. **This report has been created using voice recognition software.  It may contain minor errors which are inherent in voice recognition technology. **      Final report electronically signed by Dr. Joivta Palacio on 11/19/2018 3:47 PM              Assessment/Plan: This is a 80 y.o. Male admitted for GLOIRA and UTI      1. GLORIA, possibly due to urinary retention, improved     -improved after IVF and change of trujillo cath  -creatinine 1.1 today from 1.2 yesterday ( 4.1 on admission). ( baseline creatinine between 1.3 to 1.8. Creatinine on 9/13/18 was 1.6)  -nephro on-board. Appreciate nephro input     2. Proteus mirabalis UTI likely due to indwelling Trujillo cath      -on Rocephin ( start date 11/19/18)  -urine on trujillo bag today looks dark and has sediments, repeat UA showed large blood, protein and leukocyte esterase.   -continue Rocephin, awaits repeat urine cx      3. Urinary retention w left hydronephrosis and hydroureter     -Urology on-board. Appreciate Urology input. GARRETT San - CNP ( Urology) saw patient 11/20/2018, who recommend leave trujillo catheter, bacitracin on penis tip TID prn, cont rocephin, renal US 11/21/18. Urology said poor candidate for surgery due to high risk ( Patient had lexiscan Stress test, on 10/17/18 which showed mild apico-lateral perfusion defect worse with stress that resolves with rest suggestive of ischemia, no evidence of infarction is noted on this study, left ventricular systolic function was normal.).     4. Non-AG Metabolic acidosis, improving     -CO2 improving, from 19 yesterday to 21 today  -possibly related to DM type 2 w hyperglycemia, accu-checks on 200s. -ABG on 11/22/18 showed mild respiratory alkalosis, compensated   -ketones normal  -slightly elevated beta-hydroxybutyrate  -repeat BMP in am   -cont IVF  -cont sodium bicarb 650 mg po bid      5. Penile erythema, improved     -cont neosporin oint  -monitor     6. pressure ulcer, stage 1     -cont desitin paste  -turn patient q 2hours        7. Cough, improving     -cxr normal  -procalcitonin elevated on admission. Repeat procalcitonin trending down  -cont Mucinex        8. Dysphagia and odynophagia, -s/p EGD w dilatation 11/23/2018       -speech eval done, pt has oropharyngeal dysphagia. Barium swallow showed  Laryngeal penetration of thin barium without evidence of aspiration.  -diet: dysphagia III  -re-consulted speech therapy if able to advance diet. Awaiting repeat speech eval     9. Esophageal candidiasis      -seen in EGD 11/23/2018   -GI started nystatin swish and swallow 11/23/18 x 5 days     10. Gastritis     -cont pepcid     11. Normocytic anemia     -Hgb dropped from  8.0 on 11/20/18 to 7.7 11/21/18, possibly from IVF vs UGIB ( pt had black stools). S/p 2 units PRBC, H/H after PRBC transfusion: Hgb 10 and Hct 29. Hgb on 11/24/18 was 11.2.   - discussed case w Dr. Jose Enrique Allen on 11/22/18, per Dr. Jose Enrique Allen, if unremarkable EGD, plan for colonoscopy. Appreciate Dr. Guille Roque input.   -baseline Hgb between 9-10  -pt is asymptomatic,   -iron panel showed anemia of chronic disease. B12 and folate normal       12. HTN, fairly controlled      -cont amlodipine and Bystolic w holding parameters. Adjust BP meds prn.   -VS per protocol      13. Hyperlipidemia     -cont lipitor   -low fat diet      14. CAD     -lexiscan stress test 10/17/18 showed mild apico-lateral perfusion defect worse with stress that   resolves with rest suggestive of ischemia in the distal LAD and/or distal   LCX territory. No evidence of infarction is noted on this study. Left ventricular systolic function was normal.  -on asa and plavix, however due to hematuria, plavix on hold  -cont statin, bystolic, asa and plavix   -cardio on-board: Dr. Beltran Whiting recommend continue optimal medical therapy and f/u w Dr. Richelle Wheatley. Appreciate Dr. Ponce Connors input  -pt is asymptomatic      15. Possible Upper GI bleed     -pt reported black stools  -s/p EGD 11/23/2018   -negative fobt  -GI ordered repeat fobt, per GI if fobt (+), may need repeat EGD     16.  B/L conjunctivitis, possibly viral vs allergic conjunctivitis     -cont artificial tears   -ophtha on-board. Appreciate Dr. Perfecto Rebollar input     17. DM type 2               Lab Results   Component Value Date     LABA1C 5.2 01/05/2018     -accu-check still uncontrolled in spite on high dose SSI. Resumed pt's home med glipizide 5 mg po daily, however, blood sugar still uncontrolled, will increase glipizide 5 mg from daily to bid.   -cont accu-check              18. Oral candidiasis, improving     -on nystatin swish and swallow  -monitor     17. Left elbow pain, improved      -elbow exam unremarkable  -left elbow xray normal  -cont lidocaine oint prn   -left elbow brace     Diet: DIET DYSPHAGIA III ADVANCED; No Drinking Straw  Dietary Nutrition Supplements: Frozen Oral Supplement    DVT prophylaxis: [] Lovenox                                 [x] SCDs                                 [] SQ Heparin                                 [] Encourage ambulation           [] Already on Anticoagulation     Disposition:    [] Home       [] TCU       [] Rehab       [] Psych       [] SNF       [] [place]       [x] Other-awaiting speech therapy re-eval to see if diet can advance after EGD w dilatation. Currently on dysphagia III diet.      Code Status: Full Code    PT/OT Eval Status: on-board       Electronically signed by Nicky Davila MD on 11/25/2018 at 9:17 AM

## 2018-11-25 NOTE — PROGRESS NOTES
[facility]   [facility]   [address]   [place]   [phone]   Nephrology Hospital progress note       11/25/2018, 11:48 AM        Pt Name:    Bharati Graves  MRN:     [id number]     YOB: 1935  Admit Date:    11/19/2018  2:10 PM  Primary Care Physician:  GARRETT Wagner - CNP   Primary Nephrologist:          Dr. Jose Sovereign number  [id number]    ISOLATION: none     Admitting Chief Complaint:   Penile pain from trujillo catheter. History of Chief Complaint: This is an 44-year-old pleasant white male  who is a poor historian who has multiple comorbid medical conditions  including chronic kidney disease stage 3 with baseline creatinine  ranging between 1.6-1.8, diabetes mellitus, hypertension, dyslipidemia,  history of non-Hodgkin's lymphoma per medical chart, pacemaker,  preserved left ventricular ejection fraction who was sent from the  nursing home due to problems with his indwelling Trujillo catheter  associated with penile pain, hematuria as well as discoloration of  urine. All my information was obtained by reviewing his medical chart. The patient is a very poor historian. Apparently, the patient has had  some problems with catheter. He was noted by nursing home staff to have   dark appearing urine which was also reported to be milky in  appearance. On closer look, he was noted to have some pus in the Trujillo  bag. He was sent to the emergency room where he underwent further  workup. He was noted to have some penile erythema. He was noted to  have irritation of the inferior aspect of the urethral meatus. He was  noted to have some distention of his abdomen. He was also noted to have  some purulent discharge in his Trujillo catheter. On account of these  symptoms, he was admitted from the emergency room. He underwent a Trujillo  catheter exchange in the emergency room which immediately put out over a  liter of urine.   He was subsequently seen by Urology. Recently, the  patient underwent office-based cystoscopy which revealed BPH with  trabeculations. He was advised TURP by Dr. Michael Hussein however because of  his high cardiac risk status plan is now in place for a possible  suprapubic catheter in the future. At the time of my evaluation, the  patient is resting comfortably. Nephrology is seeing him for acute  kidney injury. Since admission, he has had urology and gastroenterology  evaluation. He had CAT scan of the abdomen and pelvis without contrast  which revealed left hydronephrosis and hydroureter with significant  distention of the bladder wall thickening and infiltration. He was also  noted to have some constipation. His urine culture is growing Proteus. He has greater than 100,000 colonies of Proteus. He also has elevated  procalcitonin levels of 13.1. His white count was 7.2. Currently, the  patient is on normal saline. He has a Shabazz catheter in place. Otherwise, the patient denies any chest pain or any shortness of breath. Denies any dizziness or lightheadedness. Denies any vomiting. Denies  any diarrhea. He is a poor historian again. Nephrology is treating:   Acute kidney injury     Subjective: The patient was relaxing in the bedside chair. He feels improved and no longer has pain.  His appetite and bowel habits are normal. His kidney function has recovered to normal.     Lab Results   Component Value Date    LABGLOM 64 11/25/2018    LABGLOM 58 11/24/2018    LABGLOM 48 11/23/2018    LABGLOM 36 11/22/2018    LABGLOM 24 11/21/2018    LABGLOM 17 11/20/2018          Baseline eGFR    Admit eGFR    Current eGFR          Objective:    Diet: renal    VITALS:  BP (!) 144/64   Pulse 62   Temp 98 °F (36.7 °C) (Oral)   Resp 18   Ht 5' 9\" (1.753 m)   Wt 164 lb (74.4 kg)   SpO2 97%   BMI 24.22 kg/m²   24HR INTAKE/OUTPUT:    Intake/Output Summary (Last 24 hours) at 11/25/18 1148  Last data filed at 11/25/18 0815   Gross per 24 hour   Intake              643 ml   Output              850 ml   Net             -207 ml     Admission weight: 160 lb (72.6 kg)  Wt Readings from Last 3 Encounters:   11/19/18 164 lb (74.4 kg)   10/04/18 165 lb (74.8 kg)   09/26/18 166 lb 3.2 oz (75.4 kg)     Body mass index is 24.22 kg/m². Physical examination    General:  Alert and cooperative with exam  HEENT:  Head: Normocephalic, no lesions, without obvious abnormality. Neck:   no adenopathy, no carotid bruit, no JVD, supple, symmetrical, trachea midline and thyroid not enlarged, symmetric, no tenderness/mass/nodules  Lungs:  clear to auscultation bilaterally  Heart:  regular rate and rhythm, S1, S2 normal, no murmur, click, rub or gallop  Abdomen:  soft, non-tender; bowel sounds normal; no masses,  no organomegaly  Extremities:  extremities normal, atraumatic, no cyanosis or edema  Neurologic:  Mental status: Alert, oriented, thought content appropriate  Psy:                 No evidence of depression. Mood is stable. Skin:                Warm and dry. No lesions or rashes noted. Muscles:         Hand  and leg strength are equal and strong bilaterally. Lab Data  CBC:   Recent Labs      11/23/18   0534  11/24/18   0551   WBC  9.9  10.0   HGB  10.7*  11.2*   PLT  214  224     BMP:  Recent Labs      11/23/18   0534  11/24/18   0551  11/25/18   0625   NA  138  139  138   K  4.1  4.1  4.4  4.4  4.3   CL  107  108  106   CO2  18*  19*  21*   BUN  33*  21  15   CREATININE  1.4*  1.2  1.1   GLUCOSE  224*  195*  219*   CALCIUM  8.6  8.9  8.9     TSH: No results for input(s): TSH in the last 72 hours. HgBa1c: No results for input(s): LABA1C in the last 72 hours. Hepatic: No results for input(s): LABALBU, AST, ALT, ALB, BILITOT, ALKPHOS in the last 72 hours. Troponin: No results for input(s): TROPONINI in the last 72 hours. BNP: No results for input(s): BNP in the last 72 hours.   Lipids: No results for input(s): CHOL, HDL in the last 72 hours.    Invalid input(s): LDLCALCU  INR: No results for input(s): INR in the last 72 hours. Assessment:    1. Acute kidney injury  2. DM II  3. CKD staqe II from DM   4. Urinary retention  5. anemia     Plan:    1. Continue current plan       Alli Deshpande DO  [facility].

## 2018-11-25 NOTE — PROGRESS NOTES
for constipation.     **This report has been created using voice recognition software. It may contain minor errors which are inherent in voice recognition technology. **     Final report electronically signed by Dr. Ike Valiente on 2018 3:47 PM     PROCEDURE: FL MODIFIED BARIUM SWALLOW W VIDEO  2018  CLINICAL INFORMATION: Dysphasia   TECHNIQUE: Fluoroscopy was provided for modified barium swallowing study performed by speech therapy. With the patient in the lateral projection, swallowing mechanism was evaluated using barium of various consistencies. The radiologist was available for   the entire examination. One spot image was obtained. Total fluoroscopy time 3 minutes 9 seconds.   Speech therapist: Renee Lynn  COMPARISON: None.   FINDINGS: Oral, pharyngeal and esophageal structures appear normal. There is laryngeal penetration of nectar thick and thin barium with aspiration of thin barium.      Impression  1. Laryngeal penetration of nectar thick and thin barium with aspiration of thin barium. 2. Additional recommendations from the speech therapist will follow.      **This report has been created using voice recognition software. It may contain minor errors which are inherent in voice recognition technology. **     Final report electronically signed by Dr. Emanuel Valentin on 2018 10:46 AM     SPEECH THERAPY  STRZ MED SURG 8B  Modified Barium Swallow     SLP Individual Minutes  Time In: 6996  Time Out: 4916  Minutes: 10  Timed Code Treatment Minutes: 0 Minutes     [] [facility]     Date: 2018  Patient Name: Apoorva Rosas: [id number]   : [birth year]  (16 y.o.)  Gender: male   Referring Physician:  Dr. Jarquin Novel   Diagnosis: Syncope and collapse   Secondary Diagnosis:  Dysphagia   Date of Last MBS:  N/a   Diet:  NPO   History of Present Illness/Injury: Patient admitted to [facility] with above dx. Misael Harrison with multiple previous hospital admissions and multiple BSE's. ST consulted d/t patient c/o of \"I have trouble swallowing and things are getting stuck and hard to get down. \" BSE completed this morning with daríour recommendations to complete MBS d/t s/s of aspiration and hx of dysphagia   Please see medical history questionnaire for information related to prior medical history, allergies and medications  Pain:  0/10     Current Diet:  NPO     Respiratory Status:   [x] Independent   [] Nasal Cannula           [] Oxygen Mask                                       [] Tracheostomy            [] Other:                                      [] Ventilator/Settings:     Behavioral Observation:      [x] Alert    [x] Oriented           [] Confused        [] Lethargic                                                  [] Dysarthric       [] Limited Direction Following  [] Agitated          [] Other:     PATIENT WAS EVALUATED USING:  Thin, nectar, honey, puree, soft solids, hard solids      ORAL PREPARATION PHASE:       [] WFL                [x] Impaired              [x] Slow mastication        [x] Uncoordinated mastication    [] Decreased bolus control              [x] Decreased bolus formation    [] Loss of bolus from lips / Anterior spillage              [] OTHER:     ORAL PHASE:           [] WFL                [x] Impaired              [] Residue in the anterior sulcus           [] Residue in the lateral sulcus - right              [] Residue in the lateral sulcus - left     [] Uncoordinated AP movement              [x] Slow AP movement                           [x] Decreased lingual elevation              [x] Decreased tongue base retraction    [x] Uncontrolled bolus / diffuse fall lover tongue base              [] Piecemeal deglutition               [] OTHER:              ORAL PHASE UGO SCORE: (Dysphagia outcome and severity scale)  [x] 3 = Moderate dysphagia; Total assist with strategies;  Two or more consistencies function.  Also recommend GI f/u due to small amount of reflux and build up of residue at the level of the UES.       DIET RECOMMENDATIONS:  FZTCGWUYL II with HONEY thick liquids      STRATEGIES:              [x] Full upright position                [x] Small bite/sip    [x] No Straw                                [x] Multiple Swallow          [x] Pulmonary monitoring            [x] Oral care after all meals  [x] Supervision                            [x] Medication in applesauce                    [x] Alternate solid / liquid [x] Limit distractions          [x] Monitor for fatigue     EDUCATION:              Learner:           [x]Patient [] Significant other         [] Son/Daughter [] Parent                                      [] Other:              Education:       [x] Reviewed results and recommendations of this evaluation                                      [x] Reviewed diet and strategies                                      [x] Reviewed signs, symptoms and risk of aspiration                                      [x] Demonstrated how to thick liquid appropriately.                                     [x] Reviewed goals and Plan of Care                                      [] XPDKG:              Method:           [x] Discussion      [x] Demonstration           [] Hand-out                                      [] UWFZE:              Evaluation of Education:                                      [x] Verbalizes understanding      [x] Demonstrates with assistance                                      [] Demonstrates without assistance      [x]Needs further instruction                                      [] No evidence of learning                     [x] Family not present     PATIENT GOALS:     [] Pt did not state;  Will further assess during treatment.                                      [] Return to the least restricted diet possible                                      [x] Return to previous level of function                                      [] OTHER:     PLAN / TREATMENT RECOMMENDATIONS:  [x] Skilled SLP intervention on acute care 3-5 x per week or until goals met and/or pt plateaus in function. Specific interventions for next session may include: dysphagia tx   SHORT TERM GOALS:  Short-term Goals  Timeframe for Short-term Goals: 2 weeks  Goal 1: Patient will tolerate dysphagia II with HONEY thick liquids without s/s of aspiraiton in order to safley maintain adequate hydration and nutrition  Goal 2: Patient will complete advanced textures and nectar thick liquids x10 without s/s of apsiration in order to safely upgrade patient diet. Goal 3: Patient will complete oral and phyarngeal exercsies x10 with good success in order to improve overall function and safety of the swallow. Goal 4: Patient will complete repeat instrumental evaluation exam in 2 weeks as appropriate to determine safety of upgraded diet. Goal 5: Monitor cognitve function and complete formal evaluation as approrpiate    LONG TERM GOALS:  Long-term Goals  Goal 1: No LTGS due to ELOS    SLP G-Codes  Functional Assessment Tool Used: functional communication measurse   Score: 5  Functional Limitations: Swallowing  Swallow Current Status (): At least 20 percent but less than 40 percent impaired, limited or restricted  Swallow Goal Status (): At least 1 percent but less than 20 percent impaired, limited or restricted     Leonard Lopez M.S. [id number] [address]    Endoscopy Finding:    PROCEDURE PERFORMED:  EGD with dilation up to a size 51-Irish.   A standard video upper scope was advanced under direct vision from oral  cavity up to third part of the duodenum. Esophagus features mild acid  reflux, not very severe. No well-defined stricture. Avascular  dilatation seen in the direction of the upper sphincter which helped the  patient to swallow.   Scope was advanced into the stomach.   Mild gastritis seen, more  prominent of the body as well as fundus. Small hiatus hernia seen. No  active GI bleed seen. Scope was advanced to the duodenum, which appears  normal.  Scope was withdrawn through the antrum. Guidewire was used. The scope was withdrawn to the room. Then, American dilator starting  from size #48 until size 51-Maori introduced over the guidewire. The  dilator withdrawn. Scope was advanced again, inspected the site of  dilation. No bleeding. No perforation. Procedure terminated. Scope  was withdrawn with no immediate complications.     IMPRESSION:  1. Dysphagia. Successful dilation until size 51-Maori. 2.  Esophageal candidiasis. 3.  Gastritis.     PLAN:  1. Start the patient on nystatin swish and swallow for five days. 2.  Resume diet. 3.  Reevaluate by the Speech Therapy to see if diet can be advanced. 4.  Start Plavix and aspirin. No biopsy obtained post endoscopy.  Nereyda Chaney M.D. Objective:   Vitals: BP (!) 148/62   Pulse 85   Temp 97.8 °F (36.6 °C) (Oral)   Resp 18   Ht 5' 9\" (1.753 m)   Wt 164 lb (74.4 kg)   SpO2 95%   BMI 24.22 kg/m²     Intake/Output Summary (Last 24 hours) at 11/25/18 1315  Last data filed at 11/25/18 0815   Gross per 24 hour   Intake              643 ml   Output              850 ml   Net             -207 ml     General appearance:   Pt sleeping upon entering room. Drowsy, slow to arouse. Took multiple attempts to have pt answer questions appropriately. Once fully awake, alert and oriented. Cooperative with exam.  Well groomed, well nourished elderly  male. Lungs: clear to auscultation bilaterally  Heart: regular rate and rhythm, S1, S2 normal, no murmur, click, rub or gallop  Abdomen: soft, non-tender; bowel sounds normal; no masses,  no organomegaly  Extremities: extremities normal, atraumatic, no cyanosis or edema    Assessment and Plan:   1. No active GI bleeding by EGD, esophageal dilation done restart Plavix.   2. Pt reports \"black\" stools this are all obtained by my self and  Laboratory data, Radiology results, medications all are reviewed by my self and care is discussed extensively with the patient  and the patients nurse and all agree with plan and in addition see orders and plans    Electronically signed by Rachel House MD

## 2018-11-26 NOTE — PLAN OF CARE
Problem: Falls - Risk of:  Goal: Will remain free from falls  Will remain free from falls   Outcome: Ongoing  Patient uses walker, gait belt contact guard while ambulating. Bed and chair alarms in use. Patient has remained free from falls this shift. Patient is alert and oriented to name and place. Bed to lowest position with door open. Patient care items and call light in reach. Patient uses call light appropriately for assist. Will continue to monitor. Please see fall assessment. Problem: Risk for Impaired Skin Integrity  Goal: Tissue integrity - skin and mucous membranes  Structural intactness and normal physiological function of skin and  mucous membranes. Outcome: Ongoing  Generalized bruising. DTI to buttocks. Pinax applied to area. Stage I on gluteal crease. Area washed with gabriel hex. Shabazz care given. Antibiotic cream applied. Encouraged pt. To turn in bed. Encouraged to ambulate. Heels elevated. Encouraged fluids. Will continue to assess needs. No new skin breakdown issues at this time. Problem: DAILY CARE  Goal: Daily care needs are met  Outcome: Ongoing  Pt. Able to perform activities of daily living with moderate assistance. Bed linens checked and changed as needed. Problem: DISCHARGE BARRIERS  Goal: Patient's continuum of care needs are met  Outcome: Ongoing  Patient plans to be discharged to a ECF. Pt. Discharge plans are ongoing. Patient informed of and included in their plan of care. Problem:   Goal: No urinary complication  Outcome: Ongoing  Shabazz catheter  adequate amounts of clear yellow urine. >30 ml/hr. I&O's monitored throughout shift. Shabazz care given. reed hex used. Problem: Pain:  Goal: Pain level will decrease  Pain level will decrease   Outcome: Ongoing  Pt. Denies pain at this time. Will continue to assess needs.

## 2018-11-26 NOTE — PROGRESS NOTES
asisstence, pt very sleepy and unsure if it was a RW or stw     Subjective:     Subjective: RN approved PT session. Patient in bed upon PT arrival, pleasant and agreeable to therapy. Pain:  Yes. Pain Assessment  Pain Level:  (pain not rated)  Pain Type: Acute pain  Pain Location: Buttocks       Social/Functional:  Lives With: Alone  Type of Home: Facility ([facility] for about 1 year)  Home Layout: One level  Home Access: Level entry  Home Equipment: Rolling walker, Standard walker     Objective:  Supine to Sit: Stand by assistance (with HOB flat + use of bed rail)  Sit to Supine: Minimal assistance (to bring LE back up onto bed)    Transfers  Sit to Stand: Contact guard assistance (verbal cues for hand placement)  Stand to sit: Contact guard assistance       Ambulation 1  Surface: level tile  Device: Rolling Walker  Assistance: Contact guard assistance  Quality of Gait: pt ambulates with a decreased overall rsoe, decreased B step length and foot clearance, R knee hyperextension during stance phase  Distance: 40'  Comments: verbal cues for safety with the walker       Overall Orientation Status: Within Normal Limits       Exercises:  Exercises  Comments: Patient completed the following B LE therex x 15 reps: ankle pumps, LAQs and seated marches for increased strength to improve functional mobility. Activity Tolerance:  Activity Tolerance: Patient Tolerated treatment well;Patient limited by endurance    Assessment: Body structures, Functions, Activity limitations: Decreased functional mobility , Decreased strength, Decreased balance, Decreased endurance  Assessment: Patient tolerated PT session fairly well and is motivated to participate. He was able to tolerate increased ambulation distance this date with overall CGA and occasional cues for safety. He will continue to benefit from PT to improve his balance, strength and independence.   Prognosis: Good     REQUIRES PT FOLLOW UP: Yes    Discharge Recommendations:  Discharge Recommendations: Continue to assess pending progress, Patient would benefit from continued therapy after discharge, Subacute/Skilled Nursing Facility    Patient Education:  Patient Education: POC, transfers, ambulation, LE therex, safety    Equipment Recommendations:  Equipment Needed: No    Safety:  Type of devices: All fall risk precautions in place, Gait belt, Nurse notified, Bed alarm in place, Left in bed, Call light within reach  Restraints  Initially in place: No    Plan:  Times per week: 3-5 X GM  Times per day: Daily  Specific instructions for Next Treatment: ther ex.   Current Treatment Recommendations: Strengthening, Gait Training, Balance Training, Functional Mobility Training, Transfer Training, Endurance Training, Home Exercise Program    Goals:  Patient goals : Go back to NH    Short term goals  Time Frame for Short term goals: 1 week  Short term goal 1: supine to sit and return with SBA to get in and out of bed   Short term goal 2: sit to stand with SBA to get on and off various surfaces  Short term goal 3: ambulate with walker 50 feet with SBA to walk househod distances     Long term goals  Time Frame for Long term goals : NA due to short ELOS            AM-PAC Inpatient Mobility without Stair Climbing Raw Score : 15  AM-PAC Inpatient without Stair Climbing T-Scale Score : 43.03  Mobility Inpatient CMS 0-100% Score: 47.43  Mobility Inpatient without Stair CMS G-Code Modifier : CK

## 2018-11-26 NOTE — PROGRESS NOTES
[facility]  SPEECH THERAPY  STRZ ONC MED 5K  Bedside Swallowing Evaluation      SLP Individual Minutes  Time In: 0146  Time Out: 7011  Minutes: 10          Date: 2018  Patient Name: Alexandra Vela      CSN: [id number]   : 1935  (80 y.o.)  Gender: male   Referring Physician: Chrissy Osman MD  Diagnosis: Acute kidney injury   Secondary Diagnosis:  Dysphagia   History of Present Illness/Injury: 80 y.o. male who presented to [facility] with blood in the urine; he has a chronic indwelling Shabazz cath that was reported to be changed 1 week ago; family noted the urine was discolored yesterday; today the pt \"did not look good\" and his urine was dark and milky;   Past Medical History:   Diagnosis Date    Acute kidney failure (HCC)     Anemia     ASHD (arteriosclerotic heart disease)     Blood circulation, collateral     CAD (coronary artery disease)     Cardiomegaly     Chronic diastolic (congestive) heart failure (HCC)     Chronic kidney disease     DM2 (diabetes mellitus, type 2) (HCC)     Dyslipidemia     Falls     GERD (gastroesophageal reflux disease)     Heart failure with preserved ejection fraction (HCC)     Hyperlipidemia     Hypertension     Iron deficiency anemia     Left atrial dilation     severe    Lymphoma (HCC)     non hodgkin    Malaise     Nonintractable headache     Osteoarthritis of both knees     Pacemaker 2018    [place] fishfishme DUAL PACEMAKER INSERTED ON D (peripheral vascular disease) ([facility])        Pain:  0/10    Current Diet: Dysphagia III, thin liquid (no straw)    Respiratory Status: [x] Independent [] Nasal Cannula [] Oxygen Mask      [] Tracheostomy [] Other:     [] Ventilator/Settings:    Behavioral Observation: [x] Alert [x] Oriented [] Confused [] Lethargic      [] Dysarthric [] Limited Direction Following [] Agitated      [] Other:    ORAL MECHANISM EVALUATION:         Comments:  Facial / Labial [x]WFL [] Impaired []DNT    Lingual [x]WFL [] Impaired []DNT    Dentition [x]WFL [] Impaired []DNT    Velum []WFL [] Impaired [x]DNT    Vocal Quality [x]WFL [] Impaired []DNT    Sensation []WFL [] Impaired [x]DNT    Cough []WFL [] Impaired [x]DNT    Other: []WFL [] Impaired []DNT    Other: []WFL [] Impaired []DNT        PATIENT WAS EVALUATED USING:  Ice chips, pudding, sandip cracker, thin liquid via cup and straw     ORAL PHASE: [x] WFL  [] Impaired   [] Loss of bolus from lips [] Impaired AP movement [] Pocketing Left   [] Pocketing Right  [] Lingual Pumping  [] Impaired Mastication   [] Reduced Bolus Formation [] Impaired Oral Initiation    [] OTHER:    PHARYNGEAL PHASE: [x] WFL: Pharyngeal phase appears WFLs, but cannot completely rule out pharyngeal phase deficits from a bedside swallow evaluation alone. [] Impaired   [] Delayed Swallow  [] Decreased Hyolaryngeal Elevation [] Audible Swallow   [] Suspected Pharyngeal Residue due to spontaneous multiple swallow. [] OTHER:    SIGNS AND SYMPTOMS OF LARYNGEAL PENETRATION / ASPIRATION:  [x] NO sign/symptoms of aspiration evident with this evaluation, but cannot rule out silent aspiration. [] Throat Clear  [] Immediate Cough [] Delayed Cough [] Wet Vocal Quality  [] Change in Pulmonary Status  [] OTHER:    IMPRESSIONS: Pt presents with relatively unremarkable oropharyngeal phases of swallowing. Pt educated on proper positioning and importance of small bites and sips during meals. No s/s of aspiration during po trials of solids or liquids. Pt with adequate bolus control, oral clearance, and timely swallow. RN and pt report that he has been tolerating meals with no concerns. Recommend ST services to continue to monitor this diet advancement  to regular solids and  Provide further pt education. RECOMMENDATIONS:     Modified Barium Swallow: [] Is indicated to further assess    [x] Is NOT indicated at this time; Will recommend as appropriate.     DIET RECOMMENDATIONS:

## 2018-11-26 NOTE — PROGRESS NOTES
1641    dextrose       Meds:    nebivolol  15 mg Oral Daily    glipiZIDE  5 mg Oral BID AC    nystatin  5 mL Oral 4x Daily    famotidine  20 mg Oral Daily    sodium bicarbonate  650 mg Oral BID    aspirin  81 mg Oral Daily    clopidogrel  75 mg Oral Daily    insulin lispro  0-18 Units Subcutaneous TID WC    insulin lispro  0-9 Units Subcutaneous Nightly    neomycin-bacitracin-polymyxin   Topical BID    zinc oxide   Topical BID    cefTRIAXone (ROCEPHIN) IV  1 g Intravenous Q24H    guaiFENesin  600 mg Oral BID    amLODIPine  10 mg Oral Daily    atorvastatin  20 mg Oral Daily    ferrous sulfate  325 mg Oral BID WC    multivitamin  1 tablet Oral Daily    ranolazine  1,000 mg Oral BID    tamsulosin  0.4 mg Oral Daily    sodium chloride flush  10 mL Intravenous 2 times per day     Meds prn: polyvinyl alcohol, magic (miracle) mouthwash, lidocaine, sodium chloride flush, magnesium hydroxide, ondansetron, glucose, dextrose, glucagon (rDNA), dextrose       Impression and Plan:  1. GLORIA on CKD III: due to recent obs uropathy and volume depletion  - resolved. Creat is down to 1.0 today    2. Chronic trujillo: per urology  - urology was considering possible SP catheter in the future    3. Hyponatremia: resolved  4. Proteus UTI: on IV abx  5. HTN: on norvasc and beta blocker  6.  Met acidosis: on oral sodium bicarbonate    D/W patient and RN    Michael Nieto MD  [facility]

## 2018-11-26 NOTE — DISCHARGE SUMMARY
Hospital Medicine Discharge Summary      Patient Identification:   Guerda Jordan   : 1935  MRN: [id number]   Account: [de-identified]      [facility], APRN - CNP    Admit Date: 2018     Discharge Date:  2018    Admitting Physician: Anali Szymanski MD     Discharge Physician: Daiana Arvizu MD     Discharge Diagnoses: Active Hospital Problems    Diagnosis Date Noted    Pre-op evaluation [I17.751]     Other acute kidney failure ([facility] [N17.8]     Urinary retention [R33.9] 2018    Hyperkalemia [E87.5] 2018    Hyponatremia [E87.1] 2018    Lactic acidosis [E87.2] 2018    Acute kidney injury superimposed on chronic kidney disease ([facility] [N17.9, N18.9] 2018    DM2 (diabetes mellitus, type 2) ([facility] [E11.9]        The patient was seen and examined on day of discharge and this discharge summary is in conjunction with any daily progress note from day of discharge. Hospital Course:   Guerda Jordan is 80 y.o. Male,  who presented to [address] [place] [address] on 18 with blood in the urine; he has a chronic indwelling Trujillo cath for about 4-6 weeks now for urinary retention, that was reported to be changed 1 week ago; per H/P note, family noted the urine was discolored 1 day prior admission; and patient \"did not look good\" and his urine was dark and milky on day of admission; pt c/o penile pain; pt had stool on him upon arrival to ED; Reina Rutledge in ER personally changed out the Trujillo cath and noted penile erythema and penile tenderness present;  irritation and tenderness to the inferior aspect of the urethral meatus; it appears torn and flayed open; pus visualized in the trujillo catheter bag as well as darkened urine.  Once the Trujillo was replaced there was an immediate return of 900 ml of urine; pt is now stating that he is feeling better; he is currently receiving Rocephin 1 gram along with 0.9 NS; pt is hard of hearing but states he is feeling better now; he is being admitted to the Hospitalist Service for further care and evaluation on 11/19/18. Pt also had GLORIA on admission, creatinine was 4.1 ( baseline creatinine between 1.3 to 1.8. Creatinine on 9/13/18 was 1.6). Urology was consulted on admission. He was seen by GARRETT Johnson CNP ( Urology) on 11/20/2018, who recommend leave trujillo catheter, bacitracin on penis tip TID prn, cont rocephin, renal US 11/21/18. Urine culture came back (+) proteus mirabilis, >100,000 CFU/mL, sensitive to Rocephin. CT abd/pelvis on admission showed left hydronephrosis and hydroureter. No obstructing ureteral stone. There is significant distention of the bladder with wall thickening and infiltration. Small amount of air within the bladder wall consistent with cystitis. Small amount of air within the bladder correlate for recent intervention. Scattered stool in the colon to ball in the rectum. Correlate for constipation. Nephrology ( Dr. Aimee Harrell) saw the patient on 11/201/8 for GLORIA on CKD stage 3. Dr. Aimee Harrell recommend continue IVF and avoid diuretics and ACEI. Patient was evaluated by speech therapist who has oropharyngeal dsyphagia per ST evaluation, was placed on dysphagia diet and barium swallow was ordered. GARRETT Mac CNP/Dr. Joann Enriquez)  saw the patient on 11/201/8, who recommend EGD dilatation pending cardio clearance as patient had abnormal stress test on 10/2018. Cardiologist ( Dr. Doristine Ganser) saw the patient on 11/21/2018, per Dr. Doristine Ganser, patient may proceed with EGD with esophageal dilatation. Also, has dry cough on this admission, CXR unremarkable. Patient had EGD on 11/23/2018 for dysphagia/odynophagia and black stools. Pt was also c/o burning sensation on eys accompanied by watery eyes and redness, no blurry vision, on 11/22/18, started polytrim eye drop and patient was seen by Bina Garrett, Dr. Eliel Mauro, who said mild conjunctival injection, but otherwise no acute findings.  Dr. Eilel Mauro deficits. Cranial nerves: II-XII intact, grossly non-focal.  Psychiatric:  Alert and oriented, thought content appropriate, normal insight  Capillary Refill: Brisk,< 3 seconds   Peripheral Pulses: +2 palpable, equal bilaterally       Labs: For convenience and continuity at follow-up the following most recent labs are provided:      CBC:    Lab Results   Component Value Date    WBC 9.8 11/26/2018    HGB 10.8 11/26/2018    HCT 33.0 11/26/2018     11/26/2018       Renal:  Lab Results   Component Value Date     11/26/2018    K 4.0 11/26/2018     11/26/2018    CO2 20 11/26/2018    BUN 11 11/26/2018    CREATININE 1.0 11/26/2018    CALCIUM 8.8 11/26/2018    PHOS 3.5 09/10/2018         Significant Diagnostic Studies    Radiology:   XR ELBOW LEFT (2 VIEWS)   Final Result   No acute abnormality            **This report has been created using voice recognition software. It may contain minor errors which are inherent in voice recognition technology. **      Final report electronically signed by Dr. Susan Almaguer on 11/22/2018 9:45 AM      FL MODIFIED BARIUM SWALLOW W VIDEO   Final Result   1. Laryngeal penetration of thin barium without evidence of aspiration. 2. Additional recommendations from the speech therapist will follow. **This report has been created using voice recognition software. It may contain minor errors which are inherent in voice recognition technology. **      Final report electronically signed by Dr. Celeste Arriaga on 11/21/2018 10:49 AM      US RENAL COMPLETE   Final Result       1. The right kidney is asymmetrically smaller than the left. 2. Abnormal appearance of the urinary bladder which has a diffusely thickened wall and increased vascularity throughout. There are also multiple diverticula. 3. Elevated bilateral resistive indices which may be seen with medical renal disease. 4. Incidental note made of splenomegaly.                **This report has been created using Supplements: Frozen Oral Supplement  DIET GENERAL;      Follow-up visits: David Cortes, APRN - [facility]  [address] [address] Alycia Mcguire  [phone]        SCHEDULE, [facility]      as previously scheduled          Discharge Medications:      Octavio Carrillo   Home Medication Instructions CQV:[id number]    Printed on:11/26/18 1216   Medication Information                      acetaminophen (TYLENOL) 500 MG tablet  Take 1,000 mg by mouth every 6 hours as needed for Pain Total acetaminophen dose not to exceed 3000 mg per day. amLODIPine (NORVASC) 10 MG tablet  Take 1 tablet by mouth daily             aspirin 81 MG tablet  Take 81 mg by mouth daily             atorvastatin (LIPITOR) 20 MG tablet  Take 20 mg by mouth daily             BYSTOLIC 10 MG tablet  TAKE 1 TABLET BY MOUTH DAILY             cefdinir (OMNICEF) 300 MG capsule  Take 1 capsule by mouth 2 times daily for 7 days             clopidogrel (PLAVIX) 75 MG tablet  Take 75 mg by mouth daily             ferrous sulfate 325 (65 Fe) MG tablet  Take 1 tablet by mouth 2 times daily (with meals)             gabapentin (NEURONTIN) 100 MG capsule  Take 100 mg by mouth 3 times daily. Eugenia Meyers              glipiZIDE (GLUCOTROL) 5 MG tablet  Take 5 mg by mouth daily             guaiFENesin (MUCINEX) 600 MG extended release tablet  Take 1 tablet by mouth 2 times daily for 15 days             isosorbide mononitrate (ISMO;MONOKET) 10 MG tablet  Take 1 tablet by mouth daily             Lactobacillus (PROBIOTIC ACIDOPHILUS) TABS  Take 1 tablet by mouth 2 times daily             magnesium oxide (MAG-OX) 400 MG tablet  Take 400 mg by mouth 2 times daily              meclizine (ANTIVERT) 25 MG tablet  Take 25 mg by mouth 2 times daily as needed             Multiple Vitamins-Minerals (MULTIVITAMIN ADULTS 50+ PO)  Take 1 tablet by mouth daily              nystatin (MYCOSTATIN) 796188 UNIT/ML suspension  Take 5 mLs by mouth 4 times daily for 7 days             omeprazole (PRILOSEC) 40 MG delayed release capsule  Take 40 mg by mouth 2 times daily              polyethylene glycol (GLYCOLAX) powder  Take 17 g by mouth as needed              polyvinyl alcohol (LIQUIFILM TEARS) 1.4 % ophthalmic solution  Place 1 drop into both eyes as needed for Dry Eyes             potassium chloride (KLOR-CON M) 20 MEQ extended release tablet  Take 0.5 tablets by mouth daily             RANEXA 1000 MG extended release tablet  TAKE 1 TABLET BY MOUTH TWICE DAILY             sodium bicarbonate 650 MG tablet  Take 1 tablet by mouth 2 times daily             tamsulosin (FLOMAX) 0.4 MG capsule  Take 1 capsule by mouth daily             torsemide (DEMADEX) 10 MG tablet  Take 1 tablet by mouth 2 times daily             Trolamine Salicylate (ASPERCREME EX)  Apply topically daily in AM to bilateral knees                 Time Spent on discharge is more than 1 hour in the examination, evaluation, counseling and review of medications and discharge plan. Signed: Thank you LEXI [facility], APRN - CNP for the opportunity to be involved in this patient's care.     Electronically signed by Andrew Whitlock MD on 11/26/2018 at 12:16 PM

## 2018-11-26 NOTE — PROGRESS NOTES
Renal Adjustment Follow-up    Recent Labs      11/25/18   0625  11/26/18   0642   BUN  15  11       Recent Labs      11/25/18   0625  11/26/18   0642   CREATININE  1.1  1.0       Estimated Creatinine Clearance: 56 mL/min (based on SCr of 1 mg/dL). Plan: Change patient to pepcid 20mg bid. Patient at baseline will close ivent.

## 2018-12-19 PROBLEM — E86.0 DEHYDRATION: Status: RESOLVED | Noted: 2018-01-04 | Resolved: 2018-01-01

## 2018-12-19 NOTE — PROGRESS NOTES
wall motion abnormalities and wall   thickness was within normal limits.   Left atrial size was severely dilated.   The mitral valve structure demonstrated posterior leaflet calcification   and restriction of motion. There was no mitral stenosis.   There was trace to mild mitral regurgitation.  [place] Bullocks was trace to mild tricuspid regurgitation. Opal Saas size is within normal limits with normal respiratory phasic changes.   Assuming an RAP = 5 mmHg, the estimated RVSP = 41 mmHg.      Signature      ----------------------------------------------------------------   Electronically signed by Sharon Kirk MD (Interpreting   [id number]) on 12/26/2017 at 06:44 PM   ----------------------------------------------------------------      Findings      Mitral Valve   The mitral valve structure demonstrated posterior leaflet calcification   and restriction of motion. DOPPLER: The transmitral velocity was within   the normal range with no evidence for mitral stenosis. There was trace to   mild mitral regurgitation.      Aortic Valve   The aortic valve was trileaflet, thickened, calcified, and demonstrated   reduced excursion. DOPPLER: Transaortic velocity was within the normal   range with no evidence of aortic stenosis. There was trace aortic   regurgitation.      Tricuspid Valve   The tricuspid valve structure was normal with normal leaflet separation.   DOPPLER: There was no evidence of tricuspid stenosis. There was trace to   mild tricuspid regurgitation. Assuming an RAP = 5 mmHg, the estimated RVSP   = 41 mmHg.      Pulmonic Valve   The pulmonic valve leaflets were not well seen. DOPPLER: The transpulmonic   velocity was within the normal range with no evidence for regurgitation.      Left Atrium   Left atrial size was severely dilated.      Left Ventricle   Normal left ventricle size and systolic function. Ejection fraction was   estimated at 55-60%.  There were no regional left ventricular wall motion   abnormalities and wall

## 2018-12-19 NOTE — PATIENT INSTRUCTIONS
Continue:  · Continue current medications  · Daily weights and record  · Fluid restriction of 2 Liters per day  · Limit sodium in diet to around 3384-4324 mg/day  · Monitor BP  · Activity as tolerated     Call the [facility] Failure [facility] for any of the following symptoms: [phone]  Weight gain of 3 pounds in 1 day or 5 pounds in 1 week  Increased shortness of breath  Shortness of breath while laying down  Cough  Chest pain  Swelling in feet, ankles or legs  Tenderness or bloating in the abdomen  Fatigue   Decreased appetite or nausea   Confusion

## 2019-01-01 ENCOUNTER — TELEPHONE (OUTPATIENT)
Dept: CARDIOLOGY CLINIC | Age: 84
End: 2019-01-01

## 2019-01-01 ENCOUNTER — HOSPITAL ENCOUNTER (OUTPATIENT)
Dept: GENERAL RADIOLOGY | Age: 84
Discharge: HOME | End: 2019-03-21
Payer: MEDICARE

## 2019-01-01 ENCOUNTER — OFFICE VISIT (OUTPATIENT)
Dept: UROLOGY | Age: 84
End: 2019-01-01
Payer: MEDICARE

## 2019-01-01 ENCOUNTER — HOSPITAL ENCOUNTER (INPATIENT)
Age: 84
LOS: 1 days | Discharge: NURSING, REHAB OR LONG-TERM CARE | DRG: 193 | End: 2019-04-20
Attending: FAMILY MEDICINE | Admitting: INTERNAL MEDICINE
Payer: MEDICARE

## 2019-01-01 ENCOUNTER — ANESTHESIA (OUTPATIENT)
Dept: OPERATING ROOM | Age: 84
End: 2019-01-01
Payer: MEDICARE

## 2019-01-01 ENCOUNTER — TELEPHONE (OUTPATIENT)
Dept: NEPHROLOGY | Age: 84
End: 2019-01-01

## 2019-01-01 ENCOUNTER — OFFICE VISIT (OUTPATIENT)
Dept: NEPHROLOGY | Age: 84
End: 2019-01-01
Payer: MEDICARE

## 2019-01-01 ENCOUNTER — ANESTHESIA EVENT (OUTPATIENT)
Dept: ENDOSCOPY | Age: 84
End: 2019-01-01
Payer: MEDICARE

## 2019-01-01 ENCOUNTER — APPOINTMENT (OUTPATIENT)
Dept: CT IMAGING | Age: 84
DRG: 871 | End: 2019-01-01
Payer: MEDICARE

## 2019-01-01 ENCOUNTER — APPOINTMENT (OUTPATIENT)
Dept: GENERAL RADIOLOGY | Age: 84
DRG: 177 | End: 2019-01-01
Payer: MEDICARE

## 2019-01-01 ENCOUNTER — HOSPITAL ENCOUNTER (INPATIENT)
Age: 84
LOS: 1 days | Discharge: HOSPICE | DRG: 177 | End: 2019-06-18
Attending: FAMILY MEDICINE | Admitting: INTERNAL MEDICINE
Payer: MEDICARE

## 2019-01-01 ENCOUNTER — APPOINTMENT (OUTPATIENT)
Dept: GENERAL RADIOLOGY | Age: 84
DRG: 871 | End: 2019-01-01
Payer: MEDICARE

## 2019-01-01 ENCOUNTER — ANESTHESIA EVENT (OUTPATIENT)
Dept: OPERATING ROOM | Age: 84
End: 2019-01-01
Payer: MEDICARE

## 2019-01-01 ENCOUNTER — TELEPHONE (OUTPATIENT)
Dept: UROLOGY | Age: 84
End: 2019-01-01

## 2019-01-01 ENCOUNTER — ANESTHESIA (OUTPATIENT)
Dept: ENDOSCOPY | Age: 84
End: 2019-01-01
Payer: MEDICARE

## 2019-01-01 ENCOUNTER — HOSPITAL ENCOUNTER (OUTPATIENT)
Dept: AUDIOLOGY | Age: 84
Discharge: HOME | End: 2019-02-15

## 2019-01-01 ENCOUNTER — APPOINTMENT (OUTPATIENT)
Dept: ULTRASOUND IMAGING | Age: 84
DRG: 871 | End: 2019-01-01
Payer: MEDICARE

## 2019-01-01 ENCOUNTER — HOSPITAL ENCOUNTER (OUTPATIENT)
Dept: GENERAL RADIOLOGY | Age: 84
Discharge: HOME | End: 2019-05-14
Payer: COMMERCIAL

## 2019-01-01 ENCOUNTER — HOSPITAL ENCOUNTER (INPATIENT)
Age: 84
LOS: 6 days | Discharge: NURSING, REHAB OR LONG-TERM CARE | DRG: 871 | End: 2019-04-03
Attending: INTERNAL MEDICINE | Admitting: INTERNAL MEDICINE
Payer: MEDICARE

## 2019-01-01 ENCOUNTER — NURSE ONLY (OUTPATIENT)
Dept: CARDIOLOGY CLINIC | Age: 84
End: 2019-01-01
Payer: MEDICARE

## 2019-01-01 ENCOUNTER — OFFICE VISIT (OUTPATIENT)
Dept: CARDIOLOGY CLINIC | Age: 84
End: 2019-01-01
Payer: MEDICARE

## 2019-01-01 ENCOUNTER — APPOINTMENT (OUTPATIENT)
Dept: CT IMAGING | Age: 84
DRG: 193 | End: 2019-01-01
Payer: MEDICARE

## 2019-01-01 ENCOUNTER — HOSPITAL ENCOUNTER (OUTPATIENT)
Age: 84
Setting detail: OUTPATIENT SURGERY
Discharge: NURSING, REHAB OR LONG-TERM CARE | End: 2019-02-28
Attending: UROLOGY | Admitting: UROLOGY
Payer: MEDICARE

## 2019-01-01 ENCOUNTER — HOSPITAL ENCOUNTER (OUTPATIENT)
Age: 84
Setting detail: OUTPATIENT SURGERY
Discharge: NURSING, REHAB OR LONG-TERM CARE | End: 2019-02-14
Attending: INTERNAL MEDICINE | Admitting: INTERNAL MEDICINE
Payer: MEDICARE

## 2019-01-01 VITALS
OXYGEN SATURATION: 60 % | SYSTOLIC BLOOD PRESSURE: 80 MMHG | DIASTOLIC BLOOD PRESSURE: 42 MMHG | RESPIRATION RATE: 18 BRPM | HEART RATE: 61 BPM | HEIGHT: 69 IN | WEIGHT: 180 LBS | BODY MASS INDEX: 26.66 KG/M2 | TEMPERATURE: 95.7 F

## 2019-01-01 VITALS
DIASTOLIC BLOOD PRESSURE: 62 MMHG | HEIGHT: 69 IN | WEIGHT: 180 LBS | SYSTOLIC BLOOD PRESSURE: 112 MMHG | BODY MASS INDEX: 26.66 KG/M2

## 2019-01-01 VITALS
WEIGHT: 182 LBS | BODY MASS INDEX: 26.88 KG/M2 | OXYGEN SATURATION: 100 % | SYSTOLIC BLOOD PRESSURE: 105 MMHG | HEART RATE: 73 BPM | DIASTOLIC BLOOD PRESSURE: 56 MMHG

## 2019-01-01 VITALS
TEMPERATURE: 98.6 F | RESPIRATION RATE: 6 BRPM | DIASTOLIC BLOOD PRESSURE: 56 MMHG | OXYGEN SATURATION: 100 % | SYSTOLIC BLOOD PRESSURE: 114 MMHG

## 2019-01-01 VITALS
RESPIRATION RATE: 20 BRPM | SYSTOLIC BLOOD PRESSURE: 160 MMHG | DIASTOLIC BLOOD PRESSURE: 72 MMHG | OXYGEN SATURATION: 96 %

## 2019-01-01 VITALS
DIASTOLIC BLOOD PRESSURE: 61 MMHG | HEART RATE: 60 BPM | SYSTOLIC BLOOD PRESSURE: 115 MMHG | OXYGEN SATURATION: 100 % | WEIGHT: 176.2 LBS | BODY MASS INDEX: 26.02 KG/M2

## 2019-01-01 VITALS
RESPIRATION RATE: 16 BRPM | WEIGHT: 185 LBS | OXYGEN SATURATION: 96 % | TEMPERATURE: 99.7 F | HEIGHT: 69 IN | HEART RATE: 66 BPM | BODY MASS INDEX: 27.4 KG/M2 | SYSTOLIC BLOOD PRESSURE: 135 MMHG | DIASTOLIC BLOOD PRESSURE: 60 MMHG

## 2019-01-01 VITALS
WEIGHT: 182.6 LBS | HEIGHT: 69 IN | SYSTOLIC BLOOD PRESSURE: 124 MMHG | DIASTOLIC BLOOD PRESSURE: 72 MMHG | BODY MASS INDEX: 27.05 KG/M2

## 2019-01-01 VITALS
OXYGEN SATURATION: 96 % | SYSTOLIC BLOOD PRESSURE: 114 MMHG | RESPIRATION RATE: 18 BRPM | TEMPERATURE: 97.6 F | HEART RATE: 74 BPM | DIASTOLIC BLOOD PRESSURE: 60 MMHG | BODY MASS INDEX: 27.85 KG/M2 | WEIGHT: 188 LBS | HEIGHT: 69 IN

## 2019-01-01 VITALS
OXYGEN SATURATION: 99 % | TEMPERATURE: 98.2 F | RESPIRATION RATE: 20 BRPM | HEIGHT: 69 IN | BODY MASS INDEX: 26.33 KG/M2 | HEART RATE: 61 BPM | WEIGHT: 177.8 LBS | DIASTOLIC BLOOD PRESSURE: 69 MMHG | SYSTOLIC BLOOD PRESSURE: 157 MMHG

## 2019-01-01 VITALS
BODY MASS INDEX: 26.66 KG/M2 | DIASTOLIC BLOOD PRESSURE: 62 MMHG | WEIGHT: 180 LBS | HEIGHT: 69 IN | SYSTOLIC BLOOD PRESSURE: 134 MMHG

## 2019-01-01 VITALS
SYSTOLIC BLOOD PRESSURE: 107 MMHG | OXYGEN SATURATION: 98 % | DIASTOLIC BLOOD PRESSURE: 57 MMHG | WEIGHT: 184.2 LBS | BODY MASS INDEX: 27.28 KG/M2 | HEART RATE: 80 BPM | RESPIRATION RATE: 12 BRPM | TEMPERATURE: 97.7 F | HEIGHT: 69 IN

## 2019-01-01 VITALS
WEIGHT: 182 LBS | SYSTOLIC BLOOD PRESSURE: 112 MMHG | DIASTOLIC BLOOD PRESSURE: 62 MMHG | HEART RATE: 68 BPM | BODY MASS INDEX: 26.96 KG/M2 | HEIGHT: 69 IN

## 2019-01-01 VITALS
DIASTOLIC BLOOD PRESSURE: 66 MMHG | WEIGHT: 184.3 LBS | BODY MASS INDEX: 27.22 KG/M2 | HEART RATE: 59 BPM | OXYGEN SATURATION: 96 % | SYSTOLIC BLOOD PRESSURE: 120 MMHG

## 2019-01-01 DIAGNOSIS — K62.5 RECTAL BLEEDING: ICD-10-CM

## 2019-01-01 DIAGNOSIS — I10 ESSENTIAL HYPERTENSION: ICD-10-CM

## 2019-01-01 DIAGNOSIS — D64.9 NORMOCYTIC ANEMIA: ICD-10-CM

## 2019-01-01 DIAGNOSIS — L92.9 PROUD FLESH: ICD-10-CM

## 2019-01-01 DIAGNOSIS — I73.9 PAD (PERIPHERAL ARTERY DISEASE) (HCC): ICD-10-CM

## 2019-01-01 DIAGNOSIS — N13.8 BPH WITH URINARY OBSTRUCTION: Primary | ICD-10-CM

## 2019-01-01 DIAGNOSIS — Z97.8 CHRONIC INDWELLING FOLEY CATHETER: ICD-10-CM

## 2019-01-01 DIAGNOSIS — E87.20 METABOLIC ACIDOSIS: ICD-10-CM

## 2019-01-01 DIAGNOSIS — J18.9 ATYPICAL PNEUMONIA: ICD-10-CM

## 2019-01-01 DIAGNOSIS — I50.32 CHF (CONGESTIVE HEART FAILURE), NYHA CLASS II, CHRONIC, DIASTOLIC (HCC): Primary | ICD-10-CM

## 2019-01-01 DIAGNOSIS — N39.0 URINARY TRACT INFECTION ASSOCIATED WITH INDWELLING URETHRAL CATHETER, INITIAL ENCOUNTER (HCC): ICD-10-CM

## 2019-01-01 DIAGNOSIS — K64.4 EXTERNAL HEMORRHOIDS: ICD-10-CM

## 2019-01-01 DIAGNOSIS — N13.8 BPH WITH OBSTRUCTION/LOWER URINARY TRACT SYMPTOMS: ICD-10-CM

## 2019-01-01 DIAGNOSIS — R13.12 OROPHARYNGEAL DYSPHAGIA: ICD-10-CM

## 2019-01-01 DIAGNOSIS — N40.1 BPH WITH URINARY OBSTRUCTION: Primary | ICD-10-CM

## 2019-01-01 DIAGNOSIS — R41.82 ALTERED MENTAL STATUS, UNSPECIFIED ALTERED MENTAL STATUS TYPE: ICD-10-CM

## 2019-01-01 DIAGNOSIS — E87.1 HYPONATREMIA: Primary | ICD-10-CM

## 2019-01-01 DIAGNOSIS — N40.1 BPH WITH OBSTRUCTION/LOWER URINARY TRACT SYMPTOMS: ICD-10-CM

## 2019-01-01 DIAGNOSIS — E87.1 HYPONATREMIA: ICD-10-CM

## 2019-01-01 DIAGNOSIS — Z95.0 PACEMAKER: Primary | ICD-10-CM

## 2019-01-01 DIAGNOSIS — K22.2 ESOPHAGEAL STRICTURE: ICD-10-CM

## 2019-01-01 DIAGNOSIS — Z87.19 S/P DILATATION OF ESOPHAGEAL STRICTURE: ICD-10-CM

## 2019-01-01 DIAGNOSIS — Z98.890 S/P DILATATION OF ESOPHAGEAL STRICTURE: ICD-10-CM

## 2019-01-01 DIAGNOSIS — T83.511A URINARY TRACT INFECTION ASSOCIATED WITH INDWELLING URETHRAL CATHETER, INITIAL ENCOUNTER (HCC): ICD-10-CM

## 2019-01-01 DIAGNOSIS — J18.9 PNEUMONITIS: Primary | ICD-10-CM

## 2019-01-01 DIAGNOSIS — G93.41 METABOLIC ENCEPHALOPATHY: ICD-10-CM

## 2019-01-01 DIAGNOSIS — I50.22 CHRONIC SYSTOLIC (CONGESTIVE) HEART FAILURE (HCC): ICD-10-CM

## 2019-01-01 DIAGNOSIS — N18.30 CKD (CHRONIC KIDNEY DISEASE), STAGE III (HCC): Primary | ICD-10-CM

## 2019-01-01 DIAGNOSIS — N13.8 BPH WITH OBSTRUCTION/LOWER URINARY TRACT SYMPTOMS: Primary | ICD-10-CM

## 2019-01-01 DIAGNOSIS — Z01.818 PRE-OP TESTING: Primary | ICD-10-CM

## 2019-01-01 DIAGNOSIS — N31.9 NEUROGENIC BLADDER: Primary | ICD-10-CM

## 2019-01-01 DIAGNOSIS — N18.9 ACUTE RENAL FAILURE WITH OTHER SPECIFIED PATHOLOGICAL KIDNEY LESION SUPERIMPOSED ON CHRONIC KIDNEY DISEASE, UNSPECIFIED CKD STAGE (HCC): Primary | ICD-10-CM

## 2019-01-01 DIAGNOSIS — R13.12 DYSPHAGIA, OROPHARYNGEAL: ICD-10-CM

## 2019-01-01 DIAGNOSIS — N40.1 BPH WITH OBSTRUCTION/LOWER URINARY TRACT SYMPTOMS: Primary | ICD-10-CM

## 2019-01-01 DIAGNOSIS — R33.9 RETENTION OF URINE: Primary | ICD-10-CM

## 2019-01-01 DIAGNOSIS — N18.30 CKD (CHRONIC KIDNEY DISEASE), STAGE III (HCC): ICD-10-CM

## 2019-01-01 DIAGNOSIS — G89.29 OTHER CHRONIC PAIN: ICD-10-CM

## 2019-01-01 DIAGNOSIS — Z95.0 CARDIAC PACEMAKER IN SITU: ICD-10-CM

## 2019-01-01 DIAGNOSIS — N17.8 ACUTE RENAL FAILURE WITH OTHER SPECIFIED PATHOLOGICAL KIDNEY LESION SUPERIMPOSED ON CHRONIC KIDNEY DISEASE, UNSPECIFIED CKD STAGE (HCC): Primary | ICD-10-CM

## 2019-01-01 DIAGNOSIS — N31.9 NEUROGENIC BLADDER: ICD-10-CM

## 2019-01-01 DIAGNOSIS — R41.82 ALTERED MENTAL STATUS, UNSPECIFIED ALTERED MENTAL STATUS TYPE: Primary | ICD-10-CM

## 2019-01-01 DIAGNOSIS — Z95.1 HX OF CABG: Primary | ICD-10-CM

## 2019-01-01 DIAGNOSIS — G89.18 ACUTE POSTOPERATIVE PAIN: Primary | ICD-10-CM

## 2019-01-01 DIAGNOSIS — N17.9 AKI (ACUTE KIDNEY INJURY) (HCC): ICD-10-CM

## 2019-01-01 LAB
ABO: NORMAL
ALBUMIN SERPL-MCNC: 2.1 G/DL (ref 3.5–5.1)
ALBUMIN SERPL-MCNC: 2.9 G/DL (ref 3.5–5.1)
ALBUMIN SERPL-MCNC: 3.1 G/DL (ref 3.5–5.1)
ALBUMIN SERPL-MCNC: 3.1 G/DL (ref 3.5–5.1)
ALLEN TEST: POSITIVE
ALP BLD-CCNC: 72 U/L (ref 38–126)
ALP BLD-CCNC: 73 U/L (ref 38–126)
ALP BLD-CCNC: 87 U/L (ref 38–126)
ALP BLD-CCNC: 90 U/L (ref 38–126)
ALT SERPL-CCNC: 12 U/L (ref 11–66)
ALT SERPL-CCNC: 13 U/L (ref 11–66)
ALT SERPL-CCNC: 6 U/L (ref 11–66)
ALT SERPL-CCNC: 8 U/L (ref 11–66)
AMMONIA: 56 UMOL/L (ref 11–60)
ANION GAP SERPL CALCULATED.3IONS-SCNC: 10 MEQ/L (ref 8–16)
ANION GAP SERPL CALCULATED.3IONS-SCNC: 10 MEQ/L (ref 8–16)
ANION GAP SERPL CALCULATED.3IONS-SCNC: 11 MEQ/L (ref 8–16)
ANION GAP SERPL CALCULATED.3IONS-SCNC: 11 MEQ/L (ref 8–16)
ANION GAP SERPL CALCULATED.3IONS-SCNC: 12 MEQ/L (ref 8–16)
ANION GAP SERPL CALCULATED.3IONS-SCNC: 13 MEQ/L (ref 8–16)
ANION GAP SERPL CALCULATED.3IONS-SCNC: 14 MEQ/L (ref 8–16)
ANION GAP SERPL CALCULATED.3IONS-SCNC: 15 MEQ/L (ref 8–16)
ANION GAP SERPL CALCULATED.3IONS-SCNC: 16 MEQ/L (ref 8–16)
ANION GAP SERPL CALCULATED.3IONS-SCNC: 17 MEQ/L (ref 8–16)
ANTIBODY SCREEN: NORMAL
APTT: 28.4 SECONDS (ref 22–38)
APTT: 29.6 SECONDS (ref 22–38)
APTT: 33 SECONDS (ref 22–38)
AST SERPL-CCNC: 12 U/L (ref 5–40)
AST SERPL-CCNC: 14 U/L (ref 5–40)
AST SERPL-CCNC: 15 U/L (ref 5–40)
AST SERPL-CCNC: 20 U/L (ref 5–40)
AVERAGE GLUCOSE: 108 MG/DL (ref 70–126)
BACTERIA: ABNORMAL
BACTERIA: ABNORMAL /HPF
BACTERIA: ABNORMAL /HPF
BASE EXCESS (CALCULATED): -3 MMOL/L (ref -2.5–2.5)
BASE EXCESS (CALCULATED): -3.7 MMOL/L (ref -2.5–2.5)
BASE EXCESS (CALCULATED): -6.4 MMOL/L (ref -2.5–2.5)
BASE EXCESS MIXED: 1.6 MMOL/L (ref -2–3)
BASOPHILS # BLD: 0.1 %
BASOPHILS # BLD: 0.2 %
BASOPHILS # BLD: 0.3 %
BASOPHILS # BLD: 0.4 %
BASOPHILS # BLD: 0.7 %
BASOPHILS ABSOLUTE: 0 THOU/MM3 (ref 0–0.1)
BASOPHILS ABSOLUTE: ABNORMAL /ΜL
BASOPHILS RELATIVE PERCENT: 0.5 %
BILIRUB SERPL-MCNC: 0.4 MG/DL (ref 0.3–1.2)
BILIRUB SERPL-MCNC: 0.6 MG/DL (ref 0.3–1.2)
BILIRUB SERPL-MCNC: 0.6 MG/DL (ref 0.3–1.2)
BILIRUB SERPL-MCNC: 0.8 MG/DL (ref 0.3–1.2)
BILIRUBIN DIRECT: 0.3 MG/DL (ref 0–0.3)
BILIRUBIN DIRECT: < 0.2 MG/DL (ref 0–0.3)
BILIRUBIN URINE: ABNORMAL
BILIRUBIN URINE: ABNORMAL
BILIRUBIN URINE: NEGATIVE
BILIRUBIN URINE: NORMAL MG/DL
BLOOD CULTURE, ROUTINE: NORMAL
BLOOD, URINE: ABNORMAL
BLOOD, URINE: NEGATIVE
BLOOD, URINE: NEGATIVE
BLOOD, URINE: NORMAL
BUN BLDV-MCNC: 10 MG/DL (ref 7–22)
BUN BLDV-MCNC: 14 MG/DL (ref 7–22)
BUN BLDV-MCNC: 20 MG/DL
BUN BLDV-MCNC: 22 MG/DL
BUN BLDV-MCNC: 24 MG/DL (ref 7–22)
BUN BLDV-MCNC: 25 MG/DL (ref 7–22)
BUN BLDV-MCNC: 30 MG/DL (ref 7–22)
BUN BLDV-MCNC: 31 MG/DL (ref 7–22)
BUN BLDV-MCNC: 31 MG/DL (ref 7–22)
BUN BLDV-MCNC: 32 MG/DL (ref 7–22)
BUN BLDV-MCNC: 33 MG/DL (ref 7–22)
BUN BLDV-MCNC: 33 MG/DL (ref 7–22)
BUN BLDV-MCNC: 34 MG/DL
BUN BLDV-MCNC: 35 MG/DL (ref 7–22)
BUN BLDV-MCNC: 36 MG/DL (ref 7–22)
BUN BLDV-MCNC: 39 MG/DL (ref 7–22)
BUN BLDV-MCNC: 40 MG/DL (ref 7–22)
BUN BLDV-MCNC: 45 MG/DL (ref 7–22)
BUN BLDV-MCNC: 8 MG/DL (ref 7–22)
CALCIUM SERPL-MCNC: 8.1 MG/DL (ref 8.5–10.5)
CALCIUM SERPL-MCNC: 8.3 MG/DL (ref 8.5–10.5)
CALCIUM SERPL-MCNC: 8.4 MG/DL (ref 8.5–10.5)
CALCIUM SERPL-MCNC: 8.5 MG/DL
CALCIUM SERPL-MCNC: 8.6 MG/DL
CALCIUM SERPL-MCNC: 8.6 MG/DL (ref 8.5–10.5)
CALCIUM SERPL-MCNC: 8.6 MG/DL (ref 8.5–10.5)
CALCIUM SERPL-MCNC: 8.7 MG/DL (ref 8.5–10.5)
CALCIUM SERPL-MCNC: 8.7 MG/DL (ref 8.5–10.5)
CALCIUM SERPL-MCNC: 8.8 MG/DL
CALCIUM SERPL-MCNC: 9.1 MG/DL (ref 8.5–10.5)
CASTS 2: ABNORMAL /LPF
CASTS 2: ABNORMAL /LPF
CASTS UA: ABNORMAL /LPF
CASTS UA: ABNORMAL /LPF
CASTS: ABNORMAL /LPF
CASTS: ABNORMAL /LPF
CHARACTER, URINE: ABNORMAL
CHARACTER, URINE: ABNORMAL
CHARACTER, URINE: CLEAR
CHLORIDE BLD-SCNC: 101 MEQ/L (ref 98–111)
CHLORIDE BLD-SCNC: 102 MEQ/L (ref 98–111)
CHLORIDE BLD-SCNC: 103 MEQ/L (ref 98–111)
CHLORIDE BLD-SCNC: 104 MMOL/L
CHLORIDE BLD-SCNC: 104 MMOL/L
CHLORIDE BLD-SCNC: 105 MEQ/L (ref 98–111)
CHLORIDE BLD-SCNC: 105 MEQ/L (ref 98–111)
CHLORIDE BLD-SCNC: 107 MEQ/L (ref 98–111)
CHLORIDE BLD-SCNC: 107 MEQ/L (ref 98–111)
CHLORIDE BLD-SCNC: 108 MMOL/L
CHLORIDE BLD-SCNC: 109 MEQ/L (ref 98–111)
CHLORIDE BLD-SCNC: 110 MEQ/L (ref 98–111)
CHLORIDE BLD-SCNC: 111 MEQ/L (ref 98–111)
CHLORIDE BLD-SCNC: 114 MEQ/L (ref 98–111)
CHOLESTEROL, TOTAL: 90 MG/DL (ref 100–199)
CLARITY: NORMAL
CLOSTRIDIUM DIFFICILE, PCR: NEGATIVE
CO2: 16 MEQ/L (ref 23–33)
CO2: 17 MEQ/L (ref 23–33)
CO2: 17 MEQ/L (ref 23–33)
CO2: 18 MEQ/L (ref 23–33)
CO2: 20 MEQ/L (ref 23–33)
CO2: 21 MEQ/L (ref 23–33)
CO2: 22 MEQ/L (ref 23–33)
CO2: 23 MMOL/L
CO2: 24 MEQ/L (ref 23–33)
CO2: 25 MMOL/L
CO2: 26 MMOL/L
COLLECTED BY:: ABNORMAL
COLOR: ABNORMAL
COLOR: ABNORMAL
COLOR: YELLOW
COLOR: YELLOW
CREAT SERPL-MCNC: 1 MG/DL (ref 0.4–1.2)
CREAT SERPL-MCNC: 1.1 MG/DL (ref 0.4–1.2)
CREAT SERPL-MCNC: 1.2 MG/DL (ref 0.4–1.2)
CREAT SERPL-MCNC: 1.2 MG/DL (ref 0.4–1.2)
CREAT SERPL-MCNC: 1.4 MG/DL
CREAT SERPL-MCNC: 1.5 MG/DL
CREAT SERPL-MCNC: 1.6 MG/DL (ref 0.4–1.2)
CREAT SERPL-MCNC: 1.7 MG/DL
CREAT SERPL-MCNC: 1.7 MG/DL (ref 0.4–1.2)
CREAT SERPL-MCNC: 1.8 MG/DL (ref 0.4–1.2)
CREAT SERPL-MCNC: 1.8 MG/DL (ref 0.4–1.2)
CREAT SERPL-MCNC: 2 MG/DL (ref 0.4–1.2)
CREAT SERPL-MCNC: 2.2 MG/DL (ref 0.4–1.2)
CREATININE URINE: 80.7 MG/DL
CRYSTALS, UA: ABNORMAL
CRYSTALS, UA: ABNORMAL
CRYSTALS: ABNORMAL
DEVICE: ABNORMAL
EKG ATRIAL RATE: 101 BPM
EKG ATRIAL RATE: 68 BPM
EKG ATRIAL RATE: 76 BPM
EKG P AXIS: 3 DEGREES
EKG P AXIS: 72 DEGREES
EKG P AXIS: 86 DEGREES
EKG P-R INTERVAL: 240 MS
EKG P-R INTERVAL: 256 MS
EKG P-R INTERVAL: 288 MS
EKG Q-T INTERVAL: 400 MS
EKG Q-T INTERVAL: 412 MS
EKG Q-T INTERVAL: 530 MS
EKG QRS DURATION: 114 MS
EKG QRS DURATION: 118 MS
EKG QRS DURATION: 196 MS
EKG QTC CALCULATION (BAZETT): 463 MS
EKG QTC CALCULATION (BAZETT): 518 MS
EKG QTC CALCULATION (BAZETT): 560 MS
EKG R AXIS: -88 DEGREES
EKG R AXIS: 26 DEGREES
EKG T AXIS: -113 DEGREES
EKG T AXIS: 64 DEGREES
EKG T AXIS: 80 DEGREES
EKG VENTRICULAR RATE: 101 BPM
EKG VENTRICULAR RATE: 67 BPM
EKG VENTRICULAR RATE: 76 BPM
EOSINOPHIL # BLD: 0 %
EOSINOPHIL # BLD: 6.2 %
EOSINOPHILS ABSOLUTE: 0 THOU/MM3 (ref 0–0.4)
EOSINOPHILS ABSOLUTE: 0.1 /ΜL
EOSINOPHILS ABSOLUTE: 0.5 THOU/MM3 (ref 0–0.4)
EOSINOPHILS RELATIVE PERCENT: 1.6 %
EPITHELIAL CELLS, UA: ABNORMAL /HPF
ERYTHROCYTE [DISTWIDTH] IN BLOOD BY AUTOMATED COUNT: 12.5 % (ref 11.5–14.5)
ERYTHROCYTE [DISTWIDTH] IN BLOOD BY AUTOMATED COUNT: 12.5 % (ref 11.5–14.5)
ERYTHROCYTE [DISTWIDTH] IN BLOOD BY AUTOMATED COUNT: 12.6 % (ref 11.5–14.5)
ERYTHROCYTE [DISTWIDTH] IN BLOOD BY AUTOMATED COUNT: 12.6 % (ref 11.5–14.5)
ERYTHROCYTE [DISTWIDTH] IN BLOOD BY AUTOMATED COUNT: 12.7 % (ref 11.5–14.5)
ERYTHROCYTE [DISTWIDTH] IN BLOOD BY AUTOMATED COUNT: 12.8 % (ref 11.5–14.5)
ERYTHROCYTE [DISTWIDTH] IN BLOOD BY AUTOMATED COUNT: 12.9 % (ref 11.5–14.5)
ERYTHROCYTE [DISTWIDTH] IN BLOOD BY AUTOMATED COUNT: 13 % (ref 11.5–14.5)
ERYTHROCYTE [DISTWIDTH] IN BLOOD BY AUTOMATED COUNT: 13.7 % (ref 11.5–14.5)
ERYTHROCYTE [DISTWIDTH] IN BLOOD BY AUTOMATED COUNT: 42 FL (ref 35–45)
ERYTHROCYTE [DISTWIDTH] IN BLOOD BY AUTOMATED COUNT: 42.5 FL (ref 35–45)
ERYTHROCYTE [DISTWIDTH] IN BLOOD BY AUTOMATED COUNT: 42.6 FL (ref 35–45)
ERYTHROCYTE [DISTWIDTH] IN BLOOD BY AUTOMATED COUNT: 42.9 FL (ref 35–45)
ERYTHROCYTE [DISTWIDTH] IN BLOOD BY AUTOMATED COUNT: 43 FL (ref 35–45)
ERYTHROCYTE [DISTWIDTH] IN BLOOD BY AUTOMATED COUNT: 43.8 FL (ref 35–45)
ERYTHROCYTE [DISTWIDTH] IN BLOOD BY AUTOMATED COUNT: 43.8 FL (ref 35–45)
ERYTHROCYTE [DISTWIDTH] IN BLOOD BY AUTOMATED COUNT: 44.2 FL (ref 35–45)
ERYTHROCYTE [DISTWIDTH] IN BLOOD BY AUTOMATED COUNT: 44.9 FL (ref 35–45)
ERYTHROCYTE [DISTWIDTH] IN BLOOD BY AUTOMATED COUNT: 45.7 FL (ref 35–45)
ERYTHROCYTE [DISTWIDTH] IN BLOOD BY AUTOMATED COUNT: 45.8 FL (ref 35–45)
ERYTHROCYTE [DISTWIDTH] IN BLOOD BY AUTOMATED COUNT: 46.5 FL (ref 35–45)
FERRITIN: 649 NG/ML (ref 22–322)
FLU A ANTIGEN: NEGATIVE
FLU B ANTIGEN: NEGATIVE
GFR CALCULATED: 39
GFR CALCULATED: 45
GFR CALCULATED: 48
GFR SERPL CREATININE-BSD FRML MDRD: 29 ML/MIN/1.73M2
GFR SERPL CREATININE-BSD FRML MDRD: 32 ML/MIN/1.73M2
GFR SERPL CREATININE-BSD FRML MDRD: 36 ML/MIN/1.73M2
GFR SERPL CREATININE-BSD FRML MDRD: 36 ML/MIN/1.73M2
GFR SERPL CREATININE-BSD FRML MDRD: 39 ML/MIN/1.73M2
GFR SERPL CREATININE-BSD FRML MDRD: 41 ML/MIN/1.73M2
GFR SERPL CREATININE-BSD FRML MDRD: 58 ML/MIN/1.73M2
GFR SERPL CREATININE-BSD FRML MDRD: 58 ML/MIN/1.73M2
GFR SERPL CREATININE-BSD FRML MDRD: 64 ML/MIN/1.73M2
GFR SERPL CREATININE-BSD FRML MDRD: 71 ML/MIN/1.73M2
GLUCOSE BLD-MCNC: 107 MG/DL (ref 70–108)
GLUCOSE BLD-MCNC: 114 MG/DL
GLUCOSE BLD-MCNC: 114 MG/DL (ref 70–108)
GLUCOSE BLD-MCNC: 116 MG/DL (ref 70–108)
GLUCOSE BLD-MCNC: 120 MG/DL (ref 70–108)
GLUCOSE BLD-MCNC: 123 MG/DL (ref 70–108)
GLUCOSE BLD-MCNC: 124 MG/DL (ref 70–108)
GLUCOSE BLD-MCNC: 128 MG/DL (ref 70–108)
GLUCOSE BLD-MCNC: 130 MG/DL (ref 70–108)
GLUCOSE BLD-MCNC: 131 MG/DL
GLUCOSE BLD-MCNC: 131 MG/DL (ref 70–108)
GLUCOSE BLD-MCNC: 134 MG/DL (ref 70–108)
GLUCOSE BLD-MCNC: 139 MG/DL (ref 70–108)
GLUCOSE BLD-MCNC: 144 MG/DL (ref 70–108)
GLUCOSE BLD-MCNC: 150 MG/DL (ref 70–108)
GLUCOSE BLD-MCNC: 150 MG/DL (ref 70–108)
GLUCOSE BLD-MCNC: 154 MG/DL (ref 70–108)
GLUCOSE BLD-MCNC: 154 MG/DL (ref 70–108)
GLUCOSE BLD-MCNC: 155 MG/DL (ref 70–108)
GLUCOSE BLD-MCNC: 157 MG/DL (ref 70–108)
GLUCOSE BLD-MCNC: 159 MG/DL (ref 70–108)
GLUCOSE BLD-MCNC: 161 MG/DL (ref 70–108)
GLUCOSE BLD-MCNC: 164 MG/DL (ref 70–108)
GLUCOSE BLD-MCNC: 169 MG/DL (ref 70–108)
GLUCOSE BLD-MCNC: 174 MG/DL (ref 70–108)
GLUCOSE BLD-MCNC: 176 MG/DL (ref 70–108)
GLUCOSE BLD-MCNC: 177 MG/DL
GLUCOSE BLD-MCNC: 182 MG/DL (ref 70–108)
GLUCOSE BLD-MCNC: 183 MG/DL (ref 70–108)
GLUCOSE BLD-MCNC: 184 MG/DL (ref 70–108)
GLUCOSE BLD-MCNC: 184 MG/DL (ref 70–108)
GLUCOSE BLD-MCNC: 186 MG/DL (ref 70–108)
GLUCOSE BLD-MCNC: 187 MG/DL (ref 70–108)
GLUCOSE BLD-MCNC: 189 MG/DL (ref 70–108)
GLUCOSE BLD-MCNC: 191 MG/DL (ref 70–108)
GLUCOSE BLD-MCNC: 191 MG/DL (ref 70–108)
GLUCOSE BLD-MCNC: 195 MG/DL (ref 70–108)
GLUCOSE BLD-MCNC: 195 MG/DL (ref 70–108)
GLUCOSE BLD-MCNC: 197 MG/DL (ref 70–108)
GLUCOSE BLD-MCNC: 197 MG/DL (ref 70–108)
GLUCOSE BLD-MCNC: 199 MG/DL (ref 70–108)
GLUCOSE BLD-MCNC: 203 MG/DL (ref 70–108)
GLUCOSE BLD-MCNC: 203 MG/DL (ref 70–108)
GLUCOSE BLD-MCNC: 205 MG/DL (ref 70–108)
GLUCOSE BLD-MCNC: 208 MG/DL (ref 70–108)
GLUCOSE BLD-MCNC: 210 MG/DL (ref 70–108)
GLUCOSE BLD-MCNC: 211 MG/DL (ref 70–108)
GLUCOSE BLD-MCNC: 216 MG/DL (ref 70–108)
GLUCOSE BLD-MCNC: 222 MG/DL (ref 70–108)
GLUCOSE BLD-MCNC: 225 MG/DL (ref 70–108)
GLUCOSE BLD-MCNC: 229 MG/DL (ref 70–108)
GLUCOSE BLD-MCNC: 240 MG/DL (ref 70–108)
GLUCOSE BLD-MCNC: 265 MG/DL (ref 70–108)
GLUCOSE BLD-MCNC: 268 MG/DL (ref 70–108)
GLUCOSE URINE: NEGATIVE
GLUCOSE URINE: NEGATIVE MG/DL
GLUCOSE URINE: NEGATIVE MG/DL
GLUCOSE, URINE: NEGATIVE MG/DL
HBA1C MFR BLD: 5.6 % (ref 4.4–6.4)
HCO3, MIXED: 28 MMOL/L (ref 23–28)
HCO3: 20 MMOL/L (ref 23–28)
HCO3: 21 MMOL/L (ref 23–28)
HCO3: 21 MMOL/L (ref 23–28)
HCT VFR BLD CALC: 22.4 % (ref 42–52)
HCT VFR BLD CALC: 22.7 % (ref 42–52)
HCT VFR BLD CALC: 23.9 % (ref 42–52)
HCT VFR BLD CALC: 24.2 % (ref 42–52)
HCT VFR BLD CALC: 24.4 % (ref 42–52)
HCT VFR BLD CALC: 24.9 % (ref 42–52)
HCT VFR BLD CALC: 25 % (ref 42–52)
HCT VFR BLD CALC: 25.3 % (ref 42–52)
HCT VFR BLD CALC: 26.7 % (ref 42–52)
HCT VFR BLD CALC: 27 % (ref 42–52)
HCT VFR BLD CALC: 30.7 % (ref 42–52)
HCT VFR BLD CALC: 31 % (ref 41–53)
HDLC SERPL-MCNC: 42 MG/DL
HEMOCCULT STL QL: NEGATIVE
HEMOCCULT STL QL: NEGATIVE
HEMOGLOBIN: 10.9 G/DL (ref 13.5–17.5)
HEMOGLOBIN: 7.2 GM/DL (ref 14–18)
HEMOGLOBIN: 7.4 GM/DL (ref 14–18)
HEMOGLOBIN: 7.6 GM/DL (ref 14–18)
HEMOGLOBIN: 7.9 GM/DL (ref 14–18)
HEMOGLOBIN: 7.9 GM/DL (ref 14–18)
HEMOGLOBIN: 8.1 GM/DL (ref 14–18)
HEMOGLOBIN: 8.2 GM/DL (ref 14–18)
HEMOGLOBIN: 8.3 GM/DL (ref 14–18)
HEMOGLOBIN: 8.5 GM/DL (ref 14–18)
HEMOGLOBIN: 9 GM/DL (ref 14–18)
HEMOGLOBIN: 9.9 GM/DL (ref 14–18)
ICTOTEST: NEGATIVE
ICTOTEST: NEGATIVE
IFIO2: 5
IFIO2: 50
IMMATURE GRANS (ABS): 0.01 THOU/MM3 (ref 0–0.07)
IMMATURE GRANS (ABS): 0.02 THOU/MM3 (ref 0–0.07)
IMMATURE GRANS (ABS): 0.05 THOU/MM3 (ref 0–0.07)
IMMATURE GRANS (ABS): 0.08 THOU/MM3 (ref 0–0.07)
IMMATURE GRANS (ABS): 0.15 THOU/MM3 (ref 0–0.07)
IMMATURE GRANS (ABS): 0.18 THOU/MM3 (ref 0–0.07)
IMMATURE GRANULOCYTES: 0.2 %
IMMATURE GRANULOCYTES: 0.3 %
IMMATURE GRANULOCYTES: 0.6 %
IMMATURE GRANULOCYTES: 1 %
IMMATURE GRANULOCYTES: 2.2 %
IMMATURE GRANULOCYTES: 2.3 %
INR BLD: 0.94 (ref 0.85–1.13)
INR BLD: 1.16 (ref 0.85–1.13)
INR BLD: 1.2 (ref 0.85–1.13)
IRON SATURATION: 14 % (ref 20–50)
IRON: 16 UG/DL (ref 65–195)
KETONES, URINE: NEGATIVE
LACTIC ACID, SEPSIS: 1.4 MMOL/L (ref 0.5–1.9)
LACTIC ACID, SEPSIS: 1.6 MMOL/L (ref 0.5–1.9)
LACTIC ACID, SEPSIS: 2.5 MMOL/L (ref 0.5–1.9)
LACTIC ACID: 0.8 MMOL/L (ref 0.5–2.2)
LACTIC ACID: 2.1 MMOL/L (ref 0.5–2.2)
LDL CHOLESTEROL CALCULATED: 29 MG/DL
LEGIONELLA URINARY AG: NEGATIVE
LEUKOCYTE ESTERASE, URINE: ABNORMAL
LEUKOCYTE ESTERASE, URINE: NORMAL
LIPASE: 11.5 U/L (ref 5.6–51.3)
LIPASE: 11.9 U/L (ref 5.6–51.3)
LIPASE: 14.9 U/L (ref 5.6–51.3)
LV EF: 48 %
LVEF MODALITY: NORMAL
LYMPHOCYTES # BLD: 10.8 %
LYMPHOCYTES # BLD: 10.9 %
LYMPHOCYTES # BLD: 11.2 %
LYMPHOCYTES # BLD: 11.3 %
LYMPHOCYTES # BLD: 18.8 %
LYMPHOCYTES # BLD: 19.8 %
LYMPHOCYTES # BLD: 22.5 %
LYMPHOCYTES # BLD: 3.9 %
LYMPHOCYTES ABSOLUTE: 0.2 THOU/MM3 (ref 1–4.8)
LYMPHOCYTES ABSOLUTE: 0.8 THOU/MM3 (ref 1–4.8)
LYMPHOCYTES ABSOLUTE: 0.8 THOU/MM3 (ref 1–4.8)
LYMPHOCYTES ABSOLUTE: 0.9 THOU/MM3 (ref 1–4.8)
LYMPHOCYTES ABSOLUTE: 0.9 THOU/MM3 (ref 1–4.8)
LYMPHOCYTES ABSOLUTE: 1.3 THOU/MM3 (ref 1–4.8)
LYMPHOCYTES ABSOLUTE: 1.4 THOU/MM3 (ref 1–4.8)
LYMPHOCYTES ABSOLUTE: 1.4 THOU/MM3 (ref 1–4.8)
LYMPHOCYTES ABSOLUTE: 2 /ΜL
LYMPHOCYTES RELATIVE PERCENT: 29.4 %
MAGNESIUM: 1.6 MG/DL (ref 1.6–2.4)
MAGNESIUM: 1.7 MG/DL (ref 1.6–2.4)
MAGNESIUM: 1.7 MG/DL (ref 1.6–2.4)
MAGNESIUM: 1.8 MG/DL (ref 1.6–2.4)
MAGNESIUM: 1.9 MG/DL (ref 1.6–2.4)
MAGNESIUM: 2.1 MG/DL (ref 1.6–2.4)
MAGNESIUM: 2.3 MG/DL (ref 1.6–2.4)
MCH RBC QN AUTO: 30.1 PG (ref 26–33)
MCH RBC QN AUTO: 30.2 PG (ref 26–33)
MCH RBC QN AUTO: 30.3 PG (ref 26–33)
MCH RBC QN AUTO: 30.4 PG (ref 26–33)
MCH RBC QN AUTO: 30.5 PG (ref 26–33)
MCH RBC QN AUTO: 30.6 PG (ref 26–33)
MCH RBC QN AUTO: 30.6 PG (ref 26–33)
MCH RBC QN AUTO: 30.8 PG (ref 26–33)
MCH RBC QN AUTO: 30.9 PG (ref 26–33)
MCH RBC QN AUTO: 31 PG (ref 26–33)
MCH RBC QN AUTO: 31.1 PG (ref 26–33)
MCH RBC QN AUTO: 31.2 PG (ref 26–33)
MCH RBC QN AUTO: 32.4 PG
MCHC RBC AUTO-ENTMCNC: 30.3 GM/DL (ref 32.2–35.5)
MCHC RBC AUTO-ENTMCNC: 31 GM/DL (ref 32.2–35.5)
MCHC RBC AUTO-ENTMCNC: 31.1 GM/DL (ref 32.2–35.5)
MCHC RBC AUTO-ENTMCNC: 31.8 GM/DL (ref 32.2–35.5)
MCHC RBC AUTO-ENTMCNC: 32.1 GM/DL (ref 32.2–35.5)
MCHC RBC AUTO-ENTMCNC: 32.2 GM/DL (ref 32.2–35.5)
MCHC RBC AUTO-ENTMCNC: 32.5 GM/DL (ref 32.2–35.5)
MCHC RBC AUTO-ENTMCNC: 32.6 GM/DL (ref 32.2–35.5)
MCHC RBC AUTO-ENTMCNC: 32.8 GM/DL (ref 32.2–35.5)
MCHC RBC AUTO-ENTMCNC: 33.1 GM/DL (ref 32.2–35.5)
MCHC RBC AUTO-ENTMCNC: 33.3 GM/DL (ref 32.2–35.5)
MCHC RBC AUTO-ENTMCNC: 33.6 GM/DL (ref 32.2–35.5)
MCHC RBC AUTO-ENTMCNC: 35.2 G/DL
MCV RBC AUTO: 91.9 FL (ref 80–94)
MCV RBC AUTO: 92.1 FL
MCV RBC AUTO: 92.5 FL (ref 80–94)
MCV RBC AUTO: 92.7 FL (ref 80–94)
MCV RBC AUTO: 93.4 FL (ref 80–94)
MCV RBC AUTO: 93.6 FL (ref 80–94)
MCV RBC AUTO: 94 FL (ref 80–94)
MCV RBC AUTO: 95.1 FL (ref 80–94)
MCV RBC AUTO: 96.1 FL (ref 80–94)
MCV RBC AUTO: 97.4 FL (ref 80–94)
MCV RBC AUTO: 97.6 FL (ref 80–94)
MCV RBC AUTO: 98.8 FL (ref 80–94)
MCV RBC AUTO: 99.2 FL (ref 80–94)
MISCELLANEOUS 2: ABNORMAL
MISCELLANEOUS 2: ABNORMAL
MISCELLANEOUS LAB TEST RESULT: ABNORMAL
MONOCYTES # BLD: 10.8 %
MONOCYTES # BLD: 4.3 %
MONOCYTES # BLD: 6.8 %
MONOCYTES # BLD: 6.9 %
MONOCYTES # BLD: 7.3 %
MONOCYTES # BLD: 7.9 %
MONOCYTES # BLD: 8.3 %
MONOCYTES # BLD: 9.7 %
MONOCYTES ABSOLUTE: 0.2 THOU/MM3 (ref 0.4–1.3)
MONOCYTES ABSOLUTE: 0.5 THOU/MM3 (ref 0.4–1.3)
MONOCYTES ABSOLUTE: 0.6 /ΜL
MONOCYTES ABSOLUTE: 0.6 THOU/MM3 (ref 0.4–1.3)
MONOCYTES ABSOLUTE: 0.6 THOU/MM3 (ref 0.4–1.3)
MONOCYTES ABSOLUTE: 0.7 THOU/MM3 (ref 0.4–1.3)
MONOCYTES ABSOLUTE: 0.8 THOU/MM3 (ref 0.4–1.3)
MONOCYTES RELATIVE PERCENT: 8.5 %
MRSA SCREEN RT-PCR: NEGATIVE
MRSA SCREEN: NORMAL
NEUTROPHILS ABSOLUTE: 4 /ΜL
NEUTROPHILS RELATIVE PERCENT: 60 %
NITRITE, URINE: NEGATIVE
NITRITE, URINE: POSITIVE
NUCLEATED RED BLOOD CELLS: 0 /100 WBC
O2 SAT, MIXED: 27 %
O2 SATURATION: 63 %
O2 SATURATION: 91 %
O2 SATURATION: 93 %
ORGANISM: ABNORMAL
ORGANISM: ABNORMAL
OSMOLALITY CALCULATION: 279.5 MOSMOL/KG (ref 275–300)
OSMOLALITY CALCULATION: 286.7 MOSMOL/KG (ref 275–300)
OSMOLALITY CALCULATION: 293.1 MOSMOL/KG (ref 275–300)
OSMOLALITY URINE: 497 MOSMOL/KG (ref 250–750)
PCO2, MIXED VENOUS: 50 MMHG (ref 41–51)
PCO2: 30 MMHG (ref 35–45)
PCO2: 31 MMHG (ref 35–45)
PCO2: 53 MMHG (ref 35–45)
PDW BLD-RTO: 13.1 %
PH BLOOD GAS: 7.21 (ref 7.35–7.45)
PH BLOOD GAS: 7.42 (ref 7.35–7.45)
PH BLOOD GAS: 7.44 (ref 7.35–7.45)
PH UA: 5 (ref 4.5–8)
PH UA: 5 (ref 5–9)
PH UA: 6 (ref 5–9)
PH UA: 7 (ref 5–9)
PH, MIXED: 7.35 (ref 7.31–7.41)
PHOSPHORUS: 2.8 MG/DL (ref 2.4–4.7)
PHOSPHORUS: 2.9 MG/DL (ref 2.4–4.7)
PLATELET # BLD: 126 THOU/MM3 (ref 130–400)
PLATELET # BLD: 139 THOU/MM3 (ref 130–400)
PLATELET # BLD: 148 THOU/MM3 (ref 130–400)
PLATELET # BLD: 156 THOU/MM3 (ref 130–400)
PLATELET # BLD: 165 K/ΜL
PLATELET # BLD: 166 THOU/MM3 (ref 130–400)
PLATELET # BLD: 184 THOU/MM3 (ref 130–400)
PLATELET # BLD: 193 THOU/MM3 (ref 130–400)
PLATELET # BLD: 200 THOU/MM3 (ref 130–400)
PLATELET # BLD: 210 THOU/MM3 (ref 130–400)
PLATELET # BLD: 237 THOU/MM3 (ref 130–400)
PLATELET # BLD: 247 THOU/MM3 (ref 130–400)
PLATELET # BLD: 251 THOU/MM3 (ref 130–400)
PLATELET ESTIMATE: ADEQUATE
PMV BLD AUTO: 7.7 FL
PMV BLD AUTO: 8.9 FL (ref 9.4–12.4)
PMV BLD AUTO: 8.9 FL (ref 9.4–12.4)
PMV BLD AUTO: 9 FL (ref 9.4–12.4)
PMV BLD AUTO: 9 FL (ref 9.4–12.4)
PMV BLD AUTO: 9.1 FL (ref 9.4–12.4)
PMV BLD AUTO: 9.1 FL (ref 9.4–12.4)
PMV BLD AUTO: 9.3 FL (ref 9.4–12.4)
PMV BLD AUTO: 9.3 FL (ref 9.4–12.4)
PMV BLD AUTO: 9.4 FL (ref 9.4–12.4)
PMV BLD AUTO: 9.4 FL (ref 9.4–12.4)
PMV BLD AUTO: 9.6 FL (ref 9.4–12.4)
PMV BLD AUTO: 9.6 FL (ref 9.4–12.4)
PO2 MIXED: 19 MMHG (ref 25–40)
PO2: 40 MMHG (ref 71–104)
PO2: 59 MMHG (ref 71–104)
PO2: 63 MMHG (ref 71–104)
POTASSIUM REFLEX MAGNESIUM: 4.5 MEQ/L (ref 3.5–5.2)
POTASSIUM REFLEX MAGNESIUM: 4.9 MEQ/L (ref 3.5–5.2)
POTASSIUM REFLEX MAGNESIUM: 5 MEQ/L (ref 3.5–5.2)
POTASSIUM SERPL-SCNC: 3.1 MEQ/L (ref 3.5–5.2)
POTASSIUM SERPL-SCNC: 3.4 MEQ/L (ref 3.5–5.2)
POTASSIUM SERPL-SCNC: 3.4 MEQ/L (ref 3.5–5.2)
POTASSIUM SERPL-SCNC: 3.5 MEQ/L (ref 3.5–5.2)
POTASSIUM SERPL-SCNC: 3.7 MEQ/L (ref 3.5–5.2)
POTASSIUM SERPL-SCNC: 3.7 MEQ/L (ref 3.5–5.2)
POTASSIUM SERPL-SCNC: 4.2 MEQ/L (ref 3.5–5.2)
POTASSIUM SERPL-SCNC: 4.2 MMOL/L
POTASSIUM SERPL-SCNC: 4.3 MEQ/L (ref 3.5–5.2)
POTASSIUM SERPL-SCNC: 4.3 MEQ/L (ref 3.5–5.2)
POTASSIUM SERPL-SCNC: 4.3 MMOL/L
POTASSIUM SERPL-SCNC: 4.4 MEQ/L (ref 3.5–5.2)
POTASSIUM SERPL-SCNC: 4.6 MMOL/L
POTASSIUM SERPL-SCNC: 4.8 MEQ/L (ref 3.5–5.2)
POTASSIUM SERPL-SCNC: 4.9 MEQ/L (ref 3.5–5.2)
POTASSIUM SERPL-SCNC: 5 MEQ/L (ref 3.5–5.2)
POTASSIUM SERPL-SCNC: 5 MEQ/L (ref 3.5–5.2)
PRO-BNP: 2066 PG/ML (ref 0–1800)
PRO-BNP: 3209 PG/ML (ref 0–1800)
PROCALCITONIN: 0.32 NG/ML (ref 0.01–0.09)
PROCALCITONIN: 2.01 NG/ML (ref 0.01–0.09)
PROTEIN UA: 100 MG/DL
PROTEIN UA: 30
PROTEIN UA: ABNORMAL
PROTEIN UA: NEGATIVE
RBC # BLD: 2.33 MILL/MM3 (ref 4.7–6.1)
RBC # BLD: 2.42 MILL/MM3 (ref 4.7–6.1)
RBC # BLD: 2.45 MILL/MM3 (ref 4.7–6.1)
RBC # BLD: 2.46 MILL/MM3 (ref 4.7–6.1)
RBC # BLD: 2.59 MILL/MM3 (ref 4.7–6.1)
RBC # BLD: 2.6 MILL/MM3 (ref 4.7–6.1)
RBC # BLD: 2.63 MILL/MM3 (ref 4.7–6.1)
RBC # BLD: 2.65 MILL/MM3 (ref 4.7–6.1)
RBC # BLD: 2.73 MILL/MM3 (ref 4.7–6.1)
RBC # BLD: 2.74 MILL/MM3 (ref 4.7–6.1)
RBC # BLD: 2.92 MILL/MM3 (ref 4.7–6.1)
RBC # BLD: 3.28 MILL/MM3 (ref 4.7–6.1)
RBC # BLD: 3.37 10^6/ΜL
RBC URINE: ABNORMAL /HPF
RENAL EPITHELIAL, UA: ABNORMAL
RH FACTOR: NORMAL
SCAN OF BLOOD SMEAR: NORMAL
SEDIMENTATION RATE, ERYTHROCYTE: 121 MM/HR (ref 0–10)
SEG NEUTROPHILS: 64 %
SEG NEUTROPHILS: 65.7 %
SEG NEUTROPHILS: 70.4 %
SEG NEUTROPHILS: 78.5 %
SEG NEUTROPHILS: 80.4 %
SEG NEUTROPHILS: 80.9 %
SEG NEUTROPHILS: 81.5 %
SEG NEUTROPHILS: 91.4 %
SEGMENTED NEUTROPHILS ABSOLUTE COUNT: 4 THOU/MM3 (ref 1.8–7.7)
SEGMENTED NEUTROPHILS ABSOLUTE COUNT: 4.7 THOU/MM3 (ref 1.8–7.7)
SEGMENTED NEUTROPHILS ABSOLUTE COUNT: 4.9 THOU/MM3 (ref 1.8–7.7)
SEGMENTED NEUTROPHILS ABSOLUTE COUNT: 5.1 THOU/MM3 (ref 1.8–7.7)
SEGMENTED NEUTROPHILS ABSOLUTE COUNT: 6 THOU/MM3 (ref 1.8–7.7)
SEGMENTED NEUTROPHILS ABSOLUTE COUNT: 6.1 THOU/MM3 (ref 1.8–7.7)
SEGMENTED NEUTROPHILS ABSOLUTE COUNT: 6.2 THOU/MM3 (ref 1.8–7.7)
SEGMENTED NEUTROPHILS ABSOLUTE COUNT: 6.4 THOU/MM3 (ref 1.8–7.7)
SODIUM BLD-SCNC: 129 MMOL/L
SODIUM BLD-SCNC: 133 MEQ/L (ref 135–145)
SODIUM BLD-SCNC: 133 MEQ/L (ref 135–145)
SODIUM BLD-SCNC: 135 MEQ/L (ref 135–145)
SODIUM BLD-SCNC: 135 MMOL/L
SODIUM BLD-SCNC: 136 MEQ/L (ref 135–145)
SODIUM BLD-SCNC: 137 MEQ/L (ref 135–145)
SODIUM BLD-SCNC: 137 MMOL/L
SODIUM BLD-SCNC: 139 MEQ/L (ref 135–145)
SODIUM BLD-SCNC: 139 MMOL/L
SODIUM BLD-SCNC: 140 MEQ/L (ref 135–145)
SODIUM BLD-SCNC: 141 MEQ/L (ref 135–145)
SODIUM BLD-SCNC: 142 MEQ/L (ref 135–145)
SODIUM BLD-SCNC: 143 MEQ/L (ref 135–145)
SODIUM BLD-SCNC: 144 MEQ/L (ref 135–145)
SODIUM URINE: 33 MEQ/L
SOURCE, BLOOD GAS: ABNORMAL
SPECIFIC GRAVITY UA: 1.01 (ref 1–1.03)
SPECIFIC GRAVITY UA: 1.02 (ref 1–1.03)
SPECIFIC GRAVITY, URINE: 1.02 (ref 1–1.03)
SPECIFIC GRAVITY, URINE: 1.02 (ref 1–1.03)
STREP PNEUMO AG, UR: NEGATIVE
TOTAL IRON BINDING CAPACITY: 114 UG/DL (ref 171–450)
TOTAL PROTEIN: 6.6 G/DL (ref 6.1–8)
TOTAL PROTEIN: 6.7 G/DL (ref 6.1–8)
TOTAL PROTEIN: 7.2 G/DL (ref 6.1–8)
TOTAL PROTEIN: 7.3 G/DL (ref 6.1–8)
TRIGL SERPL-MCNC: 94 MG/DL (ref 0–199)
TROPONIN T: 0.01 NG/ML
TROPONIN T: < 0.01 NG/ML
TSH SERPL DL<=0.05 MIU/L-ACNC: 0.99 UIU/ML (ref 0.4–4.2)
UREA NITROGEN, UR: 777 MG/DL
URINE CULTURE REFLEX: ABNORMAL
URINE CULTURE, ROUTINE: ABNORMAL
UROBILINOGEN, URINE: 0.2 EU/DL (ref 0–1)
UROBILINOGEN, URINE: NORMAL
VANCOMYCIN RANDOM: 10.4 UG/ML (ref 0.1–39.9)
VANCOMYCIN RESISTANT ENTEROCOCCUS: POSITIVE
VRE CULTURE: NORMAL
WBC # BLD: 11.6 THOU/MM3 (ref 4.8–10.8)
WBC # BLD: 5.6 THOU/MM3 (ref 4.8–10.8)
WBC # BLD: 6.1 THOU/MM3 (ref 4.8–10.8)
WBC # BLD: 6.6 10^3/ML
WBC # BLD: 6.7 THOU/MM3 (ref 4.8–10.8)
WBC # BLD: 7.5 THOU/MM3 (ref 4.8–10.8)
WBC # BLD: 7.7 THOU/MM3 (ref 4.8–10.8)
WBC # BLD: 7.7 THOU/MM3 (ref 4.8–10.8)
WBC # BLD: 7.8 THOU/MM3 (ref 4.8–10.8)
WBC # BLD: 7.9 THOU/MM3 (ref 4.8–10.8)
WBC # BLD: 8.2 THOU/MM3 (ref 4.8–10.8)
WBC # BLD: 8.3 THOU/MM3 (ref 4.8–10.8)
WBC # BLD: 9.6 THOU/MM3 (ref 4.8–10.8)
WBC UA: > 200 /HPF
WBC UA: ABNORMAL /HPF
WBC UA: ABNORMAL /HPF
YEAST: ABNORMAL

## 2019-01-01 PROCEDURE — 99232 SBSQ HOSP IP/OBS MODERATE 35: CPT | Performed by: FAMILY MEDICINE

## 2019-01-01 PROCEDURE — 6370000000 HC RX 637 (ALT 250 FOR IP): Performed by: INTERNAL MEDICINE

## 2019-01-01 PROCEDURE — 2580000003 HC RX 258: Performed by: FAMILY MEDICINE

## 2019-01-01 PROCEDURE — 80048 BASIC METABOLIC PNL TOTAL CA: CPT

## 2019-01-01 PROCEDURE — G8427 DOCREV CUR MEDS BY ELIG CLIN: HCPCS | Performed by: NURSE PRACTITIONER

## 2019-01-01 PROCEDURE — G8419 CALC BMI OUT NRM PARAM NOF/U: HCPCS | Performed by: NURSE PRACTITIONER

## 2019-01-01 PROCEDURE — 6360000002 HC RX W HCPCS: Performed by: INTERNAL MEDICINE

## 2019-01-01 PROCEDURE — C9113 INJ PANTOPRAZOLE SODIUM, VIA: HCPCS | Performed by: INTERNAL MEDICINE

## 2019-01-01 PROCEDURE — 1123F ACP DISCUSS/DSCN MKR DOCD: CPT | Performed by: INTERNAL MEDICINE

## 2019-01-01 PROCEDURE — 83735 ASSAY OF MAGNESIUM: CPT

## 2019-01-01 PROCEDURE — 6360000002 HC RX W HCPCS: Performed by: PHYSICIAN ASSISTANT

## 2019-01-01 PROCEDURE — 99239 HOSP IP/OBS DSCHRG MGMT >30: CPT | Performed by: INTERNAL MEDICINE

## 2019-01-01 PROCEDURE — 99232 SBSQ HOSP IP/OBS MODERATE 35: CPT | Performed by: INTERNAL MEDICINE

## 2019-01-01 PROCEDURE — G8419 CALC BMI OUT NRM PARAM NOF/U: HCPCS | Performed by: INTERNAL MEDICINE

## 2019-01-01 PROCEDURE — 85025 COMPLETE CBC W/AUTO DIFF WBC: CPT

## 2019-01-01 PROCEDURE — 87493 C DIFF AMPLIFIED PROBE: CPT

## 2019-01-01 PROCEDURE — 6360000002 HC RX W HCPCS: Performed by: FAMILY MEDICINE

## 2019-01-01 PROCEDURE — 82948 REAGENT STRIP/BLOOD GLUCOSE: CPT

## 2019-01-01 PROCEDURE — 2060000000 HC ICU INTERMEDIATE R&B

## 2019-01-01 PROCEDURE — 99213 OFFICE O/P EST LOW 20 MIN: CPT | Performed by: INTERNAL MEDICINE

## 2019-01-01 PROCEDURE — 2500000003 HC RX 250 WO HCPCS: Performed by: FAMILY MEDICINE

## 2019-01-01 PROCEDURE — 97530 THERAPEUTIC ACTIVITIES: CPT

## 2019-01-01 PROCEDURE — 2580000003 HC RX 258: Performed by: PHYSICIAN ASSISTANT

## 2019-01-01 PROCEDURE — 2580000003 HC RX 258: Performed by: INTERNAL MEDICINE

## 2019-01-01 PROCEDURE — 1036F TOBACCO NON-USER: CPT | Performed by: NURSE PRACTITIONER

## 2019-01-01 PROCEDURE — 83690 ASSAY OF LIPASE: CPT

## 2019-01-01 PROCEDURE — 3700000000 HC ANESTHESIA ATTENDED CARE: Performed by: INTERNAL MEDICINE

## 2019-01-01 PROCEDURE — 97110 THERAPEUTIC EXERCISES: CPT

## 2019-01-01 PROCEDURE — 83605 ASSAY OF LACTIC ACID: CPT

## 2019-01-01 PROCEDURE — 99285 EMERGENCY DEPT VISIT HI MDM: CPT

## 2019-01-01 PROCEDURE — 96365 THER/PROPH/DIAG IV INF INIT: CPT

## 2019-01-01 PROCEDURE — 4040F PNEUMOC VAC/ADMIN/RCVD: CPT | Performed by: NURSE PRACTITIONER

## 2019-01-01 PROCEDURE — 87641 MR-STAPH DNA AMP PROBE: CPT

## 2019-01-01 PROCEDURE — 6360000002 HC RX W HCPCS

## 2019-01-01 PROCEDURE — 3700000001 HC ADD 15 MINUTES (ANESTHESIA): Performed by: INTERNAL MEDICINE

## 2019-01-01 PROCEDURE — 2709999900 HC NON-CHARGEABLE SUPPLY

## 2019-01-01 PROCEDURE — 6370000000 HC RX 637 (ALT 250 FOR IP): Performed by: FAMILY MEDICINE

## 2019-01-01 PROCEDURE — 97162 PT EVAL MOD COMPLEX 30 MIN: CPT

## 2019-01-01 PROCEDURE — G8427 DOCREV CUR MEDS BY ELIG CLIN: HCPCS | Performed by: INTERNAL MEDICINE

## 2019-01-01 PROCEDURE — 96367 TX/PROPH/DG ADDL SEQ IV INF: CPT

## 2019-01-01 PROCEDURE — 96361 HYDRATE IV INFUSION ADD-ON: CPT

## 2019-01-01 PROCEDURE — 82803 BLOOD GASES ANY COMBINATION: CPT

## 2019-01-01 PROCEDURE — 3609017700 HC EGD DILATION GASTRIC/DUODENAL STRICTURE: Performed by: INTERNAL MEDICINE

## 2019-01-01 PROCEDURE — 86901 BLOOD TYPING SEROLOGIC RH(D): CPT

## 2019-01-01 PROCEDURE — 1101F PT FALLS ASSESS-DOCD LE1/YR: CPT | Performed by: NURSE PRACTITIONER

## 2019-01-01 PROCEDURE — 1200000000 HC SEMI PRIVATE

## 2019-01-01 PROCEDURE — 85027 COMPLETE CBC AUTOMATED: CPT

## 2019-01-01 PROCEDURE — 81001 URINALYSIS AUTO W/SCOPE: CPT

## 2019-01-01 PROCEDURE — 82272 OCCULT BLD FECES 1-3 TESTS: CPT

## 2019-01-01 PROCEDURE — 6360000002 HC RX W HCPCS: Performed by: UROLOGY

## 2019-01-01 PROCEDURE — 36415 COLL VENOUS BLD VENIPUNCTURE: CPT

## 2019-01-01 PROCEDURE — 2500000003 HC RX 250 WO HCPCS: Performed by: NURSE PRACTITIONER

## 2019-01-01 PROCEDURE — G0378 HOSPITAL OBSERVATION PER HR: HCPCS

## 2019-01-01 PROCEDURE — 92526 ORAL FUNCTION THERAPY: CPT

## 2019-01-01 PROCEDURE — 99219 PR INITIAL OBSERVATION CARE/DAY 50 MINUTES: CPT | Performed by: INTERNAL MEDICINE

## 2019-01-01 PROCEDURE — 2580000003 HC RX 258: Performed by: NURSE PRACTITIONER

## 2019-01-01 PROCEDURE — 84100 ASSAY OF PHOSPHORUS: CPT

## 2019-01-01 PROCEDURE — 2709999900 HC NON-CHARGEABLE SUPPLY: Performed by: UROLOGY

## 2019-01-01 PROCEDURE — 83036 HEMOGLOBIN GLYCOSYLATED A1C: CPT

## 2019-01-01 PROCEDURE — 97166 OT EVAL MOD COMPLEX 45 MIN: CPT

## 2019-01-01 PROCEDURE — G8484 FLU IMMUNIZE NO ADMIN: HCPCS | Performed by: INTERNAL MEDICINE

## 2019-01-01 PROCEDURE — 88305 TISSUE EXAM BY PATHOLOGIST: CPT

## 2019-01-01 PROCEDURE — 51040 INCISE & DRAIN BLADDER: CPT | Performed by: UROLOGY

## 2019-01-01 PROCEDURE — 85018 HEMOGLOBIN: CPT

## 2019-01-01 PROCEDURE — 51705 CHANGE OF BLADDER TUBE: CPT | Performed by: UROLOGY

## 2019-01-01 PROCEDURE — 99223 1ST HOSP IP/OBS HIGH 75: CPT | Performed by: PHYSICIAN ASSISTANT

## 2019-01-01 PROCEDURE — 99214 OFFICE O/P EST MOD 30 MIN: CPT | Performed by: INTERNAL MEDICINE

## 2019-01-01 PROCEDURE — 1123F ACP DISCUSS/DSCN MKR DOCD: CPT | Performed by: NURSE PRACTITIONER

## 2019-01-01 PROCEDURE — 84484 ASSAY OF TROPONIN QUANT: CPT

## 2019-01-01 PROCEDURE — 87086 URINE CULTURE/COLONY COUNT: CPT

## 2019-01-01 PROCEDURE — 93306 TTE W/DOPPLER COMPLETE: CPT

## 2019-01-01 PROCEDURE — G8484 FLU IMMUNIZE NO ADMIN: HCPCS | Performed by: NURSE PRACTITIONER

## 2019-01-01 PROCEDURE — 1101F PT FALLS ASSESS-DOCD LE1/YR: CPT | Performed by: INTERNAL MEDICINE

## 2019-01-01 PROCEDURE — C1894 INTRO/SHEATH, NON-LASER: HCPCS | Performed by: UROLOGY

## 2019-01-01 PROCEDURE — 84132 ASSAY OF SERUM POTASSIUM: CPT

## 2019-01-01 PROCEDURE — 80202 ASSAY OF VANCOMYCIN: CPT

## 2019-01-01 PROCEDURE — 83540 ASSAY OF IRON: CPT

## 2019-01-01 PROCEDURE — 87040 BLOOD CULTURE FOR BACTERIA: CPT

## 2019-01-01 PROCEDURE — 93010 ELECTROCARDIOGRAM REPORT: CPT | Performed by: NUCLEAR MEDICINE

## 2019-01-01 PROCEDURE — 80061 LIPID PANEL: CPT

## 2019-01-01 PROCEDURE — 99284 EMERGENCY DEPT VISIT MOD MDM: CPT

## 2019-01-01 PROCEDURE — 92610 EVALUATE SWALLOWING FUNCTION: CPT

## 2019-01-01 PROCEDURE — 74177 CT ABD & PELVIS W/CONTRAST: CPT

## 2019-01-01 PROCEDURE — 6370000000 HC RX 637 (ALT 250 FOR IP): Performed by: PHYSICIAN ASSISTANT

## 2019-01-01 PROCEDURE — 7100000001 HC PACU RECOVERY - ADDTL 15 MIN: Performed by: INTERNAL MEDICINE

## 2019-01-01 PROCEDURE — 82728 ASSAY OF FERRITIN: CPT

## 2019-01-01 PROCEDURE — 9990000010 HC NO CHARGE VISIT: Performed by: AUDIOLOGIST

## 2019-01-01 PROCEDURE — 87184 SC STD DISK METHOD PER PLATE: CPT

## 2019-01-01 PROCEDURE — 99233 SBSQ HOSP IP/OBS HIGH 50: CPT | Performed by: INTERNAL MEDICINE

## 2019-01-01 PROCEDURE — 2500000003 HC RX 250 WO HCPCS: Performed by: UROLOGY

## 2019-01-01 PROCEDURE — 82140 ASSAY OF AMMONIA: CPT

## 2019-01-01 PROCEDURE — 83880 ASSAY OF NATRIURETIC PEPTIDE: CPT

## 2019-01-01 PROCEDURE — 2700000000 HC OXYGEN THERAPY PER DAY

## 2019-01-01 PROCEDURE — 99999 PR OFFICE/OUTPT VISIT,PROCEDURE ONLY: CPT | Performed by: UROLOGY

## 2019-01-01 PROCEDURE — 82570 ASSAY OF URINE CREATININE: CPT

## 2019-01-01 PROCEDURE — 6360000004 HC RX CONTRAST MEDICATION: Performed by: NURSE PRACTITIONER

## 2019-01-01 PROCEDURE — 7100000000 HC PACU RECOVERY - FIRST 15 MIN: Performed by: INTERNAL MEDICINE

## 2019-01-01 PROCEDURE — 6360000002 HC RX W HCPCS: Performed by: ANESTHESIOLOGY

## 2019-01-01 PROCEDURE — 84300 ASSAY OF URINE SODIUM: CPT

## 2019-01-01 PROCEDURE — 4040F PNEUMOC VAC/ADMIN/RCVD: CPT | Performed by: INTERNAL MEDICINE

## 2019-01-01 PROCEDURE — 96366 THER/PROPH/DIAG IV INF ADDON: CPT

## 2019-01-01 PROCEDURE — 6360000002 HC RX W HCPCS: Performed by: NURSE PRACTITIONER

## 2019-01-01 PROCEDURE — G8598 ASA/ANTIPLAT THER USED: HCPCS | Performed by: INTERNAL MEDICINE

## 2019-01-01 PROCEDURE — 80053 COMPREHEN METABOLIC PANEL: CPT

## 2019-01-01 PROCEDURE — 36600 WITHDRAWAL OF ARTERIAL BLOOD: CPT

## 2019-01-01 PROCEDURE — 1111F DSCHRG MED/CURRENT MED MERGE: CPT | Performed by: INTERNAL MEDICINE

## 2019-01-01 PROCEDURE — 85610 PROTHROMBIN TIME: CPT

## 2019-01-01 PROCEDURE — 99222 1ST HOSP IP/OBS MODERATE 55: CPT | Performed by: PSYCHIATRY & NEUROLOGY

## 2019-01-01 PROCEDURE — 7100000011 HC PHASE II RECOVERY - ADDTL 15 MIN: Performed by: UROLOGY

## 2019-01-01 PROCEDURE — 74176 CT ABD & PELVIS W/O CONTRAST: CPT

## 2019-01-01 PROCEDURE — 2500000003 HC RX 250 WO HCPCS: Performed by: INTERNAL MEDICINE

## 2019-01-01 PROCEDURE — 7100000001 HC PACU RECOVERY - ADDTL 15 MIN: Performed by: UROLOGY

## 2019-01-01 PROCEDURE — 7100000010 HC PHASE II RECOVERY - FIRST 15 MIN: Performed by: UROLOGY

## 2019-01-01 PROCEDURE — 93005 ELECTROCARDIOGRAM TRACING: CPT | Performed by: FAMILY MEDICINE

## 2019-01-01 PROCEDURE — 1036F TOBACCO NON-USER: CPT | Performed by: INTERNAL MEDICINE

## 2019-01-01 PROCEDURE — 87081 CULTURE SCREEN ONLY: CPT

## 2019-01-01 PROCEDURE — 71250 CT THORAX DX C-: CPT

## 2019-01-01 PROCEDURE — 85730 THROMBOPLASTIN TIME PARTIAL: CPT

## 2019-01-01 PROCEDURE — 99152 MOD SED SAME PHYS/QHP 5/>YRS: CPT | Performed by: INTERNAL MEDICINE

## 2019-01-01 PROCEDURE — 74230 X-RAY XM SWLNG FUNCJ C+: CPT

## 2019-01-01 PROCEDURE — 84145 PROCALCITONIN (PCT): CPT

## 2019-01-01 PROCEDURE — 86850 RBC ANTIBODY SCREEN: CPT

## 2019-01-01 PROCEDURE — 83935 ASSAY OF URINE OSMOLALITY: CPT

## 2019-01-01 PROCEDURE — 85014 HEMATOCRIT: CPT

## 2019-01-01 PROCEDURE — 2709999900 HC NON-CHARGEABLE SUPPLY: Performed by: INTERNAL MEDICINE

## 2019-01-01 PROCEDURE — 6370000000 HC RX 637 (ALT 250 FOR IP): Performed by: ANESTHESIOLOGY

## 2019-01-01 PROCEDURE — 99222 1ST HOSP IP/OBS MODERATE 55: CPT | Performed by: INTERNAL MEDICINE

## 2019-01-01 PROCEDURE — 6360000002 HC RX W HCPCS: Performed by: SPECIALIST

## 2019-01-01 PROCEDURE — 92611 MOTION FLUOROSCOPY/SWALLOW: CPT

## 2019-01-01 PROCEDURE — 70450 CT HEAD/BRAIN W/O DYE: CPT

## 2019-01-01 PROCEDURE — 94640 AIRWAY INHALATION TREATMENT: CPT

## 2019-01-01 PROCEDURE — 2500000003 HC RX 250 WO HCPCS: Performed by: SPECIALIST

## 2019-01-01 PROCEDURE — 99225 PR SBSQ OBSERVATION CARE/DAY 25 MINUTES: CPT | Performed by: FAMILY MEDICINE

## 2019-01-01 PROCEDURE — 3600000013 HC SURGERY LEVEL 3 ADDTL 15MIN: Performed by: UROLOGY

## 2019-01-01 PROCEDURE — 3609012400 HC EGD TRANSORAL BIOPSY SINGLE/MULTIPLE: Performed by: INTERNAL MEDICINE

## 2019-01-01 PROCEDURE — 2500000003 HC RX 250 WO HCPCS: Performed by: ANESTHESIOLOGY

## 2019-01-01 PROCEDURE — 93280 PM DEVICE PROGR EVAL DUAL: CPT | Performed by: INTERNAL MEDICINE

## 2019-01-01 PROCEDURE — G8598 ASA/ANTIPLAT THER USED: HCPCS | Performed by: NURSE PRACTITIONER

## 2019-01-01 PROCEDURE — 87077 CULTURE AEROBIC IDENTIFY: CPT

## 2019-01-01 PROCEDURE — 83550 IRON BINDING TEST: CPT

## 2019-01-01 PROCEDURE — 71046 X-RAY EXAM CHEST 2 VIEWS: CPT

## 2019-01-01 PROCEDURE — 86900 BLOOD TYPING SEROLOGIC ABO: CPT

## 2019-01-01 PROCEDURE — 93005 ELECTROCARDIOGRAM TRACING: CPT | Performed by: NURSE PRACTITIONER

## 2019-01-01 PROCEDURE — 99213 OFFICE O/P EST LOW 20 MIN: CPT | Performed by: NURSE PRACTITIONER

## 2019-01-01 PROCEDURE — 82248 BILIRUBIN DIRECT: CPT

## 2019-01-01 PROCEDURE — 3600000003 HC SURGERY LEVEL 3 BASE: Performed by: UROLOGY

## 2019-01-01 PROCEDURE — 97535 SELF CARE MNGMENT TRAINING: CPT

## 2019-01-01 PROCEDURE — 99221 1ST HOSP IP/OBS SF/LOW 40: CPT | Performed by: INTERNAL MEDICINE

## 2019-01-01 PROCEDURE — 87899 AGENT NOS ASSAY W/OPTIC: CPT

## 2019-01-01 PROCEDURE — 3609012700 HC EGD DILATION SAVORY: Performed by: INTERNAL MEDICINE

## 2019-01-01 PROCEDURE — 3700000000 HC ANESTHESIA ATTENDED CARE: Performed by: UROLOGY

## 2019-01-01 PROCEDURE — 87500 VANOMYCIN DNA AMP PROBE: CPT

## 2019-01-01 PROCEDURE — 80076 HEPATIC FUNCTION PANEL: CPT

## 2019-01-01 PROCEDURE — 93010 ELECTROCARDIOGRAM REPORT: CPT | Performed by: INTERNAL MEDICINE

## 2019-01-01 PROCEDURE — 97116 GAIT TRAINING THERAPY: CPT

## 2019-01-01 PROCEDURE — 7100000000 HC PACU RECOVERY - FIRST 15 MIN: Performed by: UROLOGY

## 2019-01-01 PROCEDURE — 17250 CHEM CAUT OF GRANLTJ TISSUE: CPT | Performed by: UROLOGY

## 2019-01-01 PROCEDURE — 94761 N-INVAS EAR/PLS OXIMETRY MLT: CPT

## 2019-01-01 PROCEDURE — 87449 NOS EACH ORGANISM AG IA: CPT

## 2019-01-01 PROCEDURE — 87186 SC STD MICRODIL/AGAR DIL: CPT

## 2019-01-01 PROCEDURE — 76770 US EXAM ABDO BACK WALL COMP: CPT

## 2019-01-01 PROCEDURE — 2580000003 HC RX 258: Performed by: UROLOGY

## 2019-01-01 PROCEDURE — 84540 ASSAY OF URINE/UREA-N: CPT

## 2019-01-01 PROCEDURE — APPNB30 APP NON BILLABLE TIME 0-30 MINS: Performed by: NURSE PRACTITIONER

## 2019-01-01 PROCEDURE — 6370000000 HC RX 637 (ALT 250 FOR IP): Performed by: NURSE PRACTITIONER

## 2019-01-01 PROCEDURE — 74220 X-RAY XM ESOPHAGUS 1CNTRST: CPT

## 2019-01-01 PROCEDURE — 3700000001 HC ADD 15 MINUTES (ANESTHESIA): Performed by: UROLOGY

## 2019-01-01 PROCEDURE — 87804 INFLUENZA ASSAY W/OPTIC: CPT

## 2019-01-01 PROCEDURE — 71045 X-RAY EXAM CHEST 1 VIEW: CPT

## 2019-01-01 PROCEDURE — 0D758ZZ DILATION OF ESOPHAGUS, VIA NATURAL OR ARTIFICIAL OPENING ENDOSCOPIC: ICD-10-PCS | Performed by: INTERNAL MEDICINE

## 2019-01-01 RX ORDER — SODIUM CHLORIDE 9 MG/ML
INJECTION, SOLUTION INTRAVENOUS CONTINUOUS
Status: DISCONTINUED | OUTPATIENT
Start: 2019-01-01 | End: 2019-01-01 | Stop reason: HOSPADM

## 2019-01-01 RX ORDER — DEXTROSE AND SODIUM CHLORIDE 5; .45 G/100ML; G/100ML
INJECTION, SOLUTION INTRAVENOUS CONTINUOUS
Status: DISCONTINUED | OUTPATIENT
Start: 2019-01-01 | End: 2019-01-01

## 2019-01-01 RX ORDER — CLOPIDOGREL BISULFATE 75 MG/1
75 TABLET ORAL DAILY
Qty: 30 TABLET | Refills: 3
Start: 2019-01-01

## 2019-01-01 RX ORDER — POTASSIUM CHLORIDE 1500 MG/1
20 TABLET, FILM COATED, EXTENDED RELEASE ORAL DAILY
Qty: 60 TABLET | Refills: 3 | Status: SHIPPED | OUTPATIENT
Start: 2019-01-01

## 2019-01-01 RX ORDER — DEXTROSE MONOHYDRATE 50 MG/ML
100 INJECTION, SOLUTION INTRAVENOUS PRN
Status: DISCONTINUED | OUTPATIENT
Start: 2019-01-01 | End: 2019-01-01 | Stop reason: HOSPADM

## 2019-01-01 RX ORDER — LACTOBACILLUS RHAMNOSUS GG 10B CELL
1 CAPSULE ORAL DAILY
Status: DISCONTINUED | OUTPATIENT
Start: 2019-01-01 | End: 2019-01-01

## 2019-01-01 RX ORDER — AMOXICILLIN AND CLAVULANATE POTASSIUM 500; 125 MG/1; MG/1
1 TABLET, FILM COATED ORAL 3 TIMES DAILY
Qty: 30 TABLET | Refills: 0 | DISCHARGE
Start: 2019-01-01 | End: 2019-01-01

## 2019-01-01 RX ORDER — SODIUM CHLORIDE 9 MG/ML
INJECTION, SOLUTION INTRAVENOUS CONTINUOUS
Status: CANCELLED | OUTPATIENT
Start: 2019-01-01

## 2019-01-01 RX ORDER — LEVOFLOXACIN 5 MG/ML
750 INJECTION, SOLUTION INTRAVENOUS
Status: DISCONTINUED | OUTPATIENT
Start: 2019-01-01 | End: 2019-01-01

## 2019-01-01 RX ORDER — GRANULES FOR ORAL 3 G/1
3 POWDER ORAL ONCE
Status: DISCONTINUED | OUTPATIENT
Start: 2019-01-01 | End: 2019-01-01

## 2019-01-01 RX ORDER — FAMOTIDINE 20 MG/1
20 TABLET, FILM COATED ORAL DAILY
Status: DISCONTINUED | OUTPATIENT
Start: 2019-01-01 | End: 2019-01-01 | Stop reason: HOSPADM

## 2019-01-01 RX ORDER — SODIUM CHLORIDE 0.9 % (FLUSH) 0.9 %
10 SYRINGE (ML) INJECTION EVERY 12 HOURS SCHEDULED
Status: DISCONTINUED | OUTPATIENT
Start: 2019-01-01 | End: 2019-01-01 | Stop reason: HOSPADM

## 2019-01-01 RX ORDER — M-VIT,TX,IRON,MINS/CALC/FOLIC 27MG-0.4MG
1 TABLET ORAL DAILY
Status: DISCONTINUED | OUTPATIENT
Start: 2019-01-01 | End: 2019-01-01

## 2019-01-01 RX ORDER — LIDOCAINE 40 MG/G
CREAM TOPICAL DAILY
Status: DISCONTINUED | OUTPATIENT
Start: 2019-01-01 | End: 2019-01-01

## 2019-01-01 RX ORDER — LINEZOLID 2 MG/ML
600 INJECTION, SOLUTION INTRAVENOUS EVERY 12 HOURS
Status: DISCONTINUED | OUTPATIENT
Start: 2019-01-01 | End: 2019-01-01 | Stop reason: HOSPADM

## 2019-01-01 RX ORDER — POLYVINYL ALCOHOL 14 MG/ML
1 SOLUTION/ DROPS OPHTHALMIC EVERY 4 HOURS PRN
Status: DISCONTINUED | OUTPATIENT
Start: 2019-01-01 | End: 2019-01-01

## 2019-01-01 RX ORDER — GUAIFENESIN/DEXTROMETHORPHAN 100-10MG/5
10 SYRUP ORAL EVERY 6 HOURS PRN
Status: DISCONTINUED | OUTPATIENT
Start: 2019-01-01 | End: 2019-01-01

## 2019-01-01 RX ORDER — ONDANSETRON 2 MG/ML
4 INJECTION INTRAMUSCULAR; INTRAVENOUS EVERY 6 HOURS PRN
Status: DISCONTINUED | OUTPATIENT
Start: 2019-01-01 | End: 2019-01-01 | Stop reason: HOSPADM

## 2019-01-01 RX ORDER — MORPHINE SULFATE/0.9% NACL/PF 1 MG/ML
SYRINGE (ML) INJECTION CONTINUOUS
Status: DISCONTINUED | OUTPATIENT
Start: 2019-01-01 | End: 2019-01-01 | Stop reason: HOSPADM

## 2019-01-01 RX ORDER — TAMSULOSIN HYDROCHLORIDE 0.4 MG/1
0.4 CAPSULE ORAL DAILY
Status: DISCONTINUED | OUTPATIENT
Start: 2019-01-01 | End: 2019-01-01 | Stop reason: HOSPADM

## 2019-01-01 RX ORDER — POTASSIUM CHLORIDE 750 MG/1
10 TABLET, FILM COATED, EXTENDED RELEASE ORAL DAILY
Status: DISCONTINUED | OUTPATIENT
Start: 2019-01-01 | End: 2019-01-01

## 2019-01-01 RX ORDER — AMLODIPINE BESYLATE 10 MG/1
10 TABLET ORAL DAILY
Status: DISCONTINUED | OUTPATIENT
Start: 2019-01-01 | End: 2019-01-01 | Stop reason: HOSPADM

## 2019-01-01 RX ORDER — POTASSIUM CHLORIDE 20 MEQ/1
40 TABLET, EXTENDED RELEASE ORAL ONCE
Status: COMPLETED | OUTPATIENT
Start: 2019-01-01 | End: 2019-01-01

## 2019-01-01 RX ORDER — ISOSORBIDE DINITRATE 30 MG/1
30 TABLET ORAL DAILY
COMMUNITY

## 2019-01-01 RX ORDER — DOCUSATE SODIUM 100 MG/1
100 CAPSULE, LIQUID FILLED ORAL 2 TIMES DAILY
Status: DISCONTINUED | OUTPATIENT
Start: 2019-01-01 | End: 2019-01-01 | Stop reason: HOSPADM

## 2019-01-01 RX ORDER — ACETAMINOPHEN 500 MG
500 TABLET ORAL EVERY 8 HOURS PRN
Status: DISCONTINUED | OUTPATIENT
Start: 2019-01-01 | End: 2019-01-01 | Stop reason: HOSPADM

## 2019-01-01 RX ORDER — ISOSORBIDE MONONITRATE 30 MG/1
30 TABLET, EXTENDED RELEASE ORAL DAILY
Qty: 30 TABLET | Refills: 3 | Status: SHIPPED | OUTPATIENT
Start: 2019-01-01 | End: 2019-01-01 | Stop reason: CLARIF

## 2019-01-01 RX ORDER — LORAZEPAM 2 MG/ML
1 INJECTION INTRAMUSCULAR
Status: CANCELLED | OUTPATIENT
Start: 2019-01-01

## 2019-01-01 RX ORDER — POTASSIUM CHLORIDE 7.45 MG/ML
10 INJECTION INTRAVENOUS PRN
Status: DISCONTINUED | OUTPATIENT
Start: 2019-01-01 | End: 2019-01-01

## 2019-01-01 RX ORDER — NICOTINE POLACRILEX 4 MG
15 LOZENGE BUCCAL PRN
Status: DISCONTINUED | OUTPATIENT
Start: 2019-01-01 | End: 2019-01-01 | Stop reason: HOSPADM

## 2019-01-01 RX ORDER — TORSEMIDE 20 MG/1
10 TABLET ORAL DAILY
Status: DISCONTINUED | OUTPATIENT
Start: 2019-01-01 | End: 2019-01-01

## 2019-01-01 RX ORDER — ASCORBIC ACID 500 MG
500 TABLET ORAL NIGHTLY
Status: DISCONTINUED | OUTPATIENT
Start: 2019-01-01 | End: 2019-01-01 | Stop reason: HOSPADM

## 2019-01-01 RX ORDER — MECLIZINE HCL 12.5 MG/1
25 TABLET ORAL EVERY 12 HOURS PRN
Status: DISCONTINUED | OUTPATIENT
Start: 2019-01-01 | End: 2019-01-01 | Stop reason: HOSPADM

## 2019-01-01 RX ORDER — 0.9 % SODIUM CHLORIDE 0.9 %
1000 INTRAVENOUS SOLUTION INTRAVENOUS ONCE
Status: COMPLETED | OUTPATIENT
Start: 2019-01-01 | End: 2019-01-01

## 2019-01-01 RX ORDER — SODIUM CHLORIDE 0.9 % (FLUSH) 0.9 %
10 SYRINGE (ML) INJECTION PRN
Status: DISCONTINUED | OUTPATIENT
Start: 2019-01-01 | End: 2019-01-01 | Stop reason: HOSPADM

## 2019-01-01 RX ORDER — DOXEPIN HYDROCHLORIDE 3 MG/1
3 TABLET ORAL NIGHTLY
COMMUNITY

## 2019-01-01 RX ORDER — MIDAZOLAM HYDROCHLORIDE 1 MG/ML
INJECTION INTRAMUSCULAR; INTRAVENOUS PRN
Status: DISCONTINUED | OUTPATIENT
Start: 2019-01-01 | End: 2019-01-01 | Stop reason: ALTCHOICE

## 2019-01-01 RX ORDER — GLIPIZIDE 5 MG/1
5 TABLET ORAL DAILY
Status: DISCONTINUED | OUTPATIENT
Start: 2019-01-01 | End: 2019-01-01

## 2019-01-01 RX ORDER — METOPROLOL SUCCINATE 25 MG/1
25 TABLET, EXTENDED RELEASE ORAL DAILY
Status: DISCONTINUED | OUTPATIENT
Start: 2019-01-01 | End: 2019-01-01 | Stop reason: HOSPADM

## 2019-01-01 RX ORDER — GREEN TEA/HOODIA GORDONII 315-12.5MG
1 CAPSULE ORAL DAILY
Qty: 30 TABLET | Refills: 0 | DISCHARGE
Start: 2019-01-01 | End: 2019-01-01

## 2019-01-01 RX ORDER — 0.9 % SODIUM CHLORIDE 0.9 %
30 INTRAVENOUS SOLUTION INTRAVENOUS ONCE
Status: DISCONTINUED | OUTPATIENT
Start: 2019-01-01 | End: 2019-01-01

## 2019-01-01 RX ORDER — PANTOPRAZOLE SODIUM 40 MG/1
40 TABLET, DELAYED RELEASE ORAL 2 TIMES DAILY
Status: DISCONTINUED | OUTPATIENT
Start: 2019-01-01 | End: 2019-01-01 | Stop reason: HOSPADM

## 2019-01-01 RX ORDER — MORPHINE SULFATE 4 MG/ML
INJECTION, SOLUTION INTRAMUSCULAR; INTRAVENOUS
Status: DISCONTINUED
Start: 2019-01-01 | End: 2019-01-01 | Stop reason: HOSPADM

## 2019-01-01 RX ORDER — SODIUM CHLORIDE 0.9 % (FLUSH) 0.9 %
10 SYRINGE (ML) INJECTION PRN
Status: DISCONTINUED | OUTPATIENT
Start: 2019-01-01 | End: 2019-01-01

## 2019-01-01 RX ORDER — GLYCOPYRROLATE 1 MG/5 ML
0.2 SYRINGE (ML) INTRAVENOUS
Status: CANCELLED | OUTPATIENT
Start: 2019-01-01

## 2019-01-01 RX ORDER — FAMOTIDINE 20 MG/1
20 TABLET, FILM COATED ORAL DAILY
Status: DISCONTINUED | OUTPATIENT
Start: 2019-01-01 | End: 2019-01-01

## 2019-01-01 RX ORDER — DEXTROSE MONOHYDRATE 25 G/50ML
12.5 INJECTION, SOLUTION INTRAVENOUS PRN
Status: DISCONTINUED | OUTPATIENT
Start: 2019-01-01 | End: 2019-01-01 | Stop reason: HOSPADM

## 2019-01-01 RX ORDER — IPRATROPIUM BROMIDE AND ALBUTEROL SULFATE 2.5; .5 MG/3ML; MG/3ML
1 SOLUTION RESPIRATORY (INHALATION) EVERY 4 HOURS PRN
Status: DISCONTINUED | OUTPATIENT
Start: 2019-01-01 | End: 2019-01-01 | Stop reason: HOSPADM

## 2019-01-01 RX ORDER — ATORVASTATIN CALCIUM 20 MG/1
20 TABLET, FILM COATED ORAL DAILY
Status: DISCONTINUED | OUTPATIENT
Start: 2019-01-01 | End: 2019-01-01

## 2019-01-01 RX ORDER — PANTOPRAZOLE SODIUM 40 MG/1
40 TABLET, DELAYED RELEASE ORAL 2 TIMES DAILY
Status: DISCONTINUED | OUTPATIENT
Start: 2019-01-01 | End: 2019-01-01

## 2019-01-01 RX ORDER — POLYETHYLENE GLYCOL 3350 17 G/17G
17 POWDER, FOR SOLUTION ORAL DAILY PRN
Status: DISCONTINUED | OUTPATIENT
Start: 2019-01-01 | End: 2019-01-01

## 2019-01-01 RX ORDER — GABAPENTIN 100 MG/1
100 CAPSULE ORAL 3 TIMES DAILY
Status: DISCONTINUED | OUTPATIENT
Start: 2019-01-01 | End: 2019-01-01

## 2019-01-01 RX ORDER — POTASSIUM CHLORIDE 750 MG/1
20 TABLET, FILM COATED, EXTENDED RELEASE ORAL DAILY
Status: DISCONTINUED | OUTPATIENT
Start: 2019-01-01 | End: 2019-01-01 | Stop reason: HOSPADM

## 2019-01-01 RX ORDER — HYDRALAZINE HYDROCHLORIDE 20 MG/ML
5 INJECTION INTRAMUSCULAR; INTRAVENOUS EVERY 4 HOURS PRN
Status: DISCONTINUED | OUTPATIENT
Start: 2019-01-01 | End: 2019-01-01 | Stop reason: HOSPADM

## 2019-01-01 RX ORDER — FENTANYL CITRATE 50 UG/ML
INJECTION, SOLUTION INTRAMUSCULAR; INTRAVENOUS PRN
Status: DISCONTINUED | OUTPATIENT
Start: 2019-01-01 | End: 2019-01-01 | Stop reason: ALTCHOICE

## 2019-01-01 RX ORDER — POTASSIUM CHLORIDE 20 MEQ/1
40 TABLET, EXTENDED RELEASE ORAL PRN
Status: DISCONTINUED | OUTPATIENT
Start: 2019-01-01 | End: 2019-01-01

## 2019-01-01 RX ORDER — FUROSEMIDE 10 MG/ML
20 INJECTION INTRAMUSCULAR; INTRAVENOUS DAILY
Status: DISCONTINUED | OUTPATIENT
Start: 2019-01-01 | End: 2019-01-01

## 2019-01-01 RX ORDER — DOXEPIN HYDROCHLORIDE 3 MG/1
3 TABLET ORAL NIGHTLY
Status: DISCONTINUED | OUTPATIENT
Start: 2019-01-01 | End: 2019-01-01

## 2019-01-01 RX ORDER — RANOLAZINE 500 MG/1
1000 TABLET, EXTENDED RELEASE ORAL 2 TIMES DAILY
Status: DISCONTINUED | OUTPATIENT
Start: 2019-01-01 | End: 2019-01-01

## 2019-01-01 RX ORDER — ASPIRIN 81 MG/1
81 TABLET, CHEWABLE ORAL DAILY
Status: DISCONTINUED | OUTPATIENT
Start: 2019-01-01 | End: 2019-01-01 | Stop reason: HOSPADM

## 2019-01-01 RX ORDER — FUROSEMIDE 10 MG/ML
40 INJECTION INTRAMUSCULAR; INTRAVENOUS DAILY
Status: DISCONTINUED | OUTPATIENT
Start: 2019-01-01 | End: 2019-01-01

## 2019-01-01 RX ORDER — POTASSIUM CHLORIDE 20 MEQ/1
20 TABLET, EXTENDED RELEASE ORAL DAILY
Status: DISCONTINUED | OUTPATIENT
Start: 2019-01-01 | End: 2019-01-01 | Stop reason: HOSPADM

## 2019-01-01 RX ORDER — TRAMADOL HYDROCHLORIDE 50 MG/1
50 TABLET ORAL EVERY 4 HOURS PRN
Qty: 10 TABLET | Refills: 0 | Status: SHIPPED | OUTPATIENT
Start: 2019-01-01 | End: 2019-01-01

## 2019-01-01 RX ORDER — ASCORBIC ACID 500 MG
500 TABLET ORAL NIGHTLY
Status: DISCONTINUED | OUTPATIENT
Start: 2019-01-01 | End: 2019-01-01

## 2019-01-01 RX ORDER — RANOLAZINE 500 MG/1
1000 TABLET, EXTENDED RELEASE ORAL 2 TIMES DAILY
Status: DISCONTINUED | OUTPATIENT
Start: 2019-01-01 | End: 2019-01-01 | Stop reason: HOSPADM

## 2019-01-01 RX ORDER — METOPROLOL TARTRATE 5 MG/5ML
5 INJECTION INTRAVENOUS
Status: DISCONTINUED | OUTPATIENT
Start: 2019-01-01 | End: 2019-01-01 | Stop reason: HOSPADM

## 2019-01-01 RX ORDER — MAGNESIUM OXIDE 400 MG/1
400 TABLET ORAL 2 TIMES DAILY
Status: DISCONTINUED | OUTPATIENT
Start: 2019-01-01 | End: 2019-01-01

## 2019-01-01 RX ORDER — CLOPIDOGREL BISULFATE 75 MG/1
75 TABLET ORAL DAILY
Status: DISCONTINUED | OUTPATIENT
Start: 2019-01-01 | End: 2019-01-01 | Stop reason: HOSPADM

## 2019-01-01 RX ORDER — MORPHINE SULFATE/0.9% NACL/PF 1 MG/ML
SYRINGE (ML) INJECTION CONTINUOUS
Status: CANCELLED | OUTPATIENT
Start: 2019-01-01

## 2019-01-01 RX ORDER — TRAMADOL HYDROCHLORIDE 50 MG/1
50 TABLET ORAL EVERY 4 HOURS PRN
COMMUNITY

## 2019-01-01 RX ORDER — GLYCOPYRROLATE 1 MG/5 ML
0.2 SYRINGE (ML) INTRAVENOUS EVERY 4 HOURS PRN
Status: DISCONTINUED | OUTPATIENT
Start: 2019-01-01 | End: 2019-01-01 | Stop reason: HOSPADM

## 2019-01-01 RX ORDER — TRAMADOL HYDROCHLORIDE 50 MG/1
50 TABLET ORAL EVERY 4 HOURS PRN
Qty: 10 TABLET | Refills: 0 | Status: SHIPPED | OUTPATIENT
Start: 2019-01-01 | End: 2019-01-01 | Stop reason: SDUPTHER

## 2019-01-01 RX ORDER — FENTANYL CITRATE 50 UG/ML
INJECTION, SOLUTION INTRAMUSCULAR; INTRAVENOUS PRN
Status: DISCONTINUED | OUTPATIENT
Start: 2019-01-01 | End: 2019-01-01 | Stop reason: SDUPTHER

## 2019-01-01 RX ORDER — GUAIFENESIN/DEXTROMETHORPHAN 100-10MG/5
10 SYRUP ORAL EVERY 6 HOURS PRN
Status: DISCONTINUED | OUTPATIENT
Start: 2019-01-01 | End: 2019-01-01 | Stop reason: HOSPADM

## 2019-01-01 RX ORDER — IPRATROPIUM BROMIDE AND ALBUTEROL SULFATE 2.5; .5 MG/3ML; MG/3ML
3 SOLUTION RESPIRATORY (INHALATION) EVERY 4 HOURS PRN
Status: DISCONTINUED | OUTPATIENT
Start: 2019-01-01 | End: 2019-01-01

## 2019-01-01 RX ORDER — CLOTRIMAZOLE AND BETAMETHASONE DIPROPIONATE 10; .64 MG/G; MG/G
CREAM TOPICAL 2 TIMES DAILY
Status: DISCONTINUED | OUTPATIENT
Start: 2019-01-01 | End: 2019-01-01 | Stop reason: HOSPADM

## 2019-01-01 RX ORDER — METOPROLOL SUCCINATE 25 MG/1
25 TABLET, EXTENDED RELEASE ORAL DAILY
Qty: 30 TABLET | Refills: 3 | Status: ON HOLD | OUTPATIENT
Start: 2019-01-01 | End: 2019-01-01 | Stop reason: HOSPADM

## 2019-01-01 RX ORDER — AMLODIPINE BESYLATE 10 MG/1
10 TABLET ORAL DAILY
Status: DISCONTINUED | OUTPATIENT
Start: 2019-01-01 | End: 2019-01-01

## 2019-01-01 RX ORDER — PANTOPRAZOLE SODIUM 40 MG/10ML
40 INJECTION, POWDER, LYOPHILIZED, FOR SOLUTION INTRAVENOUS DAILY
Status: DISCONTINUED | OUTPATIENT
Start: 2019-01-01 | End: 2019-01-01 | Stop reason: HOSPADM

## 2019-01-01 RX ORDER — ATORVASTATIN CALCIUM 20 MG/1
20 TABLET, FILM COATED ORAL DAILY
Status: DISCONTINUED | OUTPATIENT
Start: 2019-01-01 | End: 2019-01-01 | Stop reason: HOSPADM

## 2019-01-01 RX ORDER — AMOXICILLIN AND CLAVULANATE POTASSIUM 875; 125 MG/1; MG/1
1 TABLET, FILM COATED ORAL 2 TIMES DAILY
COMMUNITY
Start: 2019-01-01 | End: 2019-01-01

## 2019-01-01 RX ORDER — TRAMADOL HYDROCHLORIDE 50 MG/1
50 TABLET ORAL EVERY 4 HOURS PRN
Status: DISCONTINUED | OUTPATIENT
Start: 2019-01-01 | End: 2019-01-01

## 2019-01-01 RX ORDER — TAMSULOSIN HYDROCHLORIDE 0.4 MG/1
0.4 CAPSULE ORAL DAILY
Status: DISCONTINUED | OUTPATIENT
Start: 2019-01-01 | End: 2019-01-01

## 2019-01-01 RX ORDER — TORSEMIDE 20 MG/1
10 TABLET ORAL 2 TIMES DAILY
Status: DISCONTINUED | OUTPATIENT
Start: 2019-01-01 | End: 2019-01-01

## 2019-01-01 RX ORDER — DOXYCYCLINE HYCLATE 100 MG
100 TABLET ORAL 2 TIMES DAILY
Qty: 14 TABLET | Refills: 0 | DISCHARGE
Start: 2019-01-01 | End: 2019-01-01

## 2019-01-01 RX ORDER — ISOSORBIDE DINITRATE 20 MG/1
20 TABLET ORAL DAILY
COMMUNITY
End: 2019-01-01 | Stop reason: DRUGHIGH

## 2019-01-01 RX ORDER — BENZONATATE 100 MG/1
100 CAPSULE ORAL 3 TIMES DAILY
Status: DISCONTINUED | OUTPATIENT
Start: 2019-01-01 | End: 2019-01-01

## 2019-01-01 RX ORDER — FERROUS SULFATE 325(65) MG
325 TABLET ORAL
COMMUNITY
End: 2019-01-01 | Stop reason: DRUGHIGH

## 2019-01-01 RX ORDER — PROPOFOL 10 MG/ML
INJECTION, EMULSION INTRAVENOUS PRN
Status: DISCONTINUED | OUTPATIENT
Start: 2019-01-01 | End: 2019-01-01 | Stop reason: SDUPTHER

## 2019-01-01 RX ORDER — ACETAMINOPHEN 325 MG/1
650 TABLET ORAL EVERY 4 HOURS PRN
Status: DISCONTINUED | OUTPATIENT
Start: 2019-01-01 | End: 2019-01-01 | Stop reason: HOSPADM

## 2019-01-01 RX ORDER — INSULIN GLARGINE 100 [IU]/ML
0.25 INJECTION, SOLUTION SUBCUTANEOUS NIGHTLY
Status: DISCONTINUED | OUTPATIENT
Start: 2019-01-01 | End: 2019-01-01 | Stop reason: HOSPADM

## 2019-01-01 RX ORDER — TRAMADOL HYDROCHLORIDE 50 MG/1
50 TABLET ORAL EVERY 4 HOURS PRN
Status: ON HOLD | COMMUNITY
End: 2019-01-01 | Stop reason: SDUPTHER

## 2019-01-01 RX ORDER — GLYCOPYRROLATE 1 MG/5 ML
0.1 SYRINGE (ML) INTRAVENOUS
Status: DISCONTINUED | OUTPATIENT
Start: 2019-01-01 | End: 2019-01-01 | Stop reason: ALTCHOICE

## 2019-01-01 RX ORDER — SODIUM CHLORIDE 9 MG/ML
INJECTION, SOLUTION INTRAVENOUS CONTINUOUS
Status: ACTIVE | OUTPATIENT
Start: 2019-01-01 | End: 2019-01-01

## 2019-01-01 RX ORDER — ONDANSETRON 2 MG/ML
INJECTION INTRAMUSCULAR; INTRAVENOUS PRN
Status: DISCONTINUED | OUTPATIENT
Start: 2019-01-01 | End: 2019-01-01 | Stop reason: SDUPTHER

## 2019-01-01 RX ORDER — ISOSORBIDE MONONITRATE 30 MG/1
30 TABLET, EXTENDED RELEASE ORAL DAILY
Status: DISCONTINUED | OUTPATIENT
Start: 2019-01-01 | End: 2019-01-01 | Stop reason: HOSPADM

## 2019-01-01 RX ORDER — FUROSEMIDE 10 MG/ML
INJECTION INTRAMUSCULAR; INTRAVENOUS
Status: COMPLETED
Start: 2019-01-01 | End: 2019-01-01

## 2019-01-01 RX ORDER — SODIUM CHLORIDE 450 MG/100ML
INJECTION, SOLUTION INTRAVENOUS CONTINUOUS
Status: DISCONTINUED | OUTPATIENT
Start: 2019-01-01 | End: 2019-01-01 | Stop reason: HOSPADM

## 2019-01-01 RX ORDER — IPRATROPIUM BROMIDE AND ALBUTEROL SULFATE 2.5; .5 MG/3ML; MG/3ML
3 SOLUTION RESPIRATORY (INHALATION) EVERY 4 HOURS PRN
Qty: 360 ML | Refills: 0 | Status: SHIPPED | OUTPATIENT
Start: 2019-01-01

## 2019-01-01 RX ORDER — ASCORBIC ACID 500 MG
500 TABLET ORAL NIGHTLY
COMMUNITY

## 2019-01-01 RX ORDER — CLOPIDOGREL BISULFATE 75 MG/1
75 TABLET ORAL DAILY
Status: DISCONTINUED | OUTPATIENT
Start: 2019-01-01 | End: 2019-01-01

## 2019-01-01 RX ORDER — TORSEMIDE 20 MG/1
20 TABLET ORAL DAILY
Status: DISCONTINUED | OUTPATIENT
Start: 2019-01-01 | End: 2019-01-01 | Stop reason: HOSPADM

## 2019-01-01 RX ORDER — TORSEMIDE 20 MG/1
20 TABLET ORAL DAILY
Qty: 30 TABLET | Refills: 3 | Status: SHIPPED | OUTPATIENT
Start: 2019-01-01

## 2019-01-01 RX ORDER — SODIUM CHLORIDE 0.9 % (FLUSH) 0.9 %
10 SYRINGE (ML) INJECTION EVERY 12 HOURS SCHEDULED
Status: DISCONTINUED | OUTPATIENT
Start: 2019-01-01 | End: 2019-01-01

## 2019-01-01 RX ORDER — ATORVASTATIN CALCIUM 20 MG/1
20 TABLET, FILM COATED ORAL NIGHTLY
Status: DISCONTINUED | OUTPATIENT
Start: 2019-01-01 | End: 2019-01-01 | Stop reason: HOSPADM

## 2019-01-01 RX ORDER — METOPROLOL SUCCINATE 25 MG/1
25 TABLET, EXTENDED RELEASE ORAL DAILY
Qty: 30 TABLET | Refills: 3 | Status: SHIPPED | OUTPATIENT
Start: 2019-01-01

## 2019-01-01 RX ORDER — BUPIVACAINE HYDROCHLORIDE 5 MG/ML
INJECTION, SOLUTION EPIDURAL; INTRACAUDAL PRN
Status: DISCONTINUED | OUTPATIENT
Start: 2019-01-01 | End: 2019-01-01 | Stop reason: ALTCHOICE

## 2019-01-01 RX ORDER — 0.9 % SODIUM CHLORIDE 0.9 %
30 INTRAVENOUS SOLUTION INTRAVENOUS ONCE
Status: COMPLETED | OUTPATIENT
Start: 2019-01-01 | End: 2019-01-01

## 2019-01-01 RX ORDER — GABAPENTIN 100 MG/1
100 CAPSULE ORAL 3 TIMES DAILY
Status: DISCONTINUED | OUTPATIENT
Start: 2019-01-01 | End: 2019-01-01 | Stop reason: HOSPADM

## 2019-01-01 RX ORDER — AMOXICILLIN AND CLAVULANATE POTASSIUM 875; 125 MG/1; MG/1
1 TABLET, FILM COATED ORAL EVERY 12 HOURS SCHEDULED
Status: DISCONTINUED | OUTPATIENT
Start: 2019-01-01 | End: 2019-01-01 | Stop reason: HOSPADM

## 2019-01-01 RX ORDER — CIPROFLOXACIN 2 MG/ML
400 INJECTION, SOLUTION INTRAVENOUS
Status: COMPLETED | OUTPATIENT
Start: 2019-01-01 | End: 2019-01-01

## 2019-01-01 RX ORDER — GLYCOPYRROLATE 1 MG/5 ML
SYRINGE (ML) INTRAVENOUS PRN
Status: DISCONTINUED | OUTPATIENT
Start: 2019-01-01 | End: 2019-01-01 | Stop reason: SDUPTHER

## 2019-01-01 RX ORDER — PANTOPRAZOLE SODIUM 40 MG/1
40 TABLET, DELAYED RELEASE ORAL 2 TIMES DAILY
COMMUNITY

## 2019-01-01 RX ORDER — LIDOCAINE HYDROCHLORIDE 20 MG/ML
INJECTION, SOLUTION INFILTRATION; PERINEURAL PRN
Status: DISCONTINUED | OUTPATIENT
Start: 2019-01-01 | End: 2019-01-01 | Stop reason: SDUPTHER

## 2019-01-01 RX ORDER — FERROUS SULFATE 325(65) MG
325 TABLET ORAL
Status: DISCONTINUED | OUTPATIENT
Start: 2019-01-01 | End: 2019-01-01 | Stop reason: HOSPADM

## 2019-01-01 RX ORDER — GRANULES FOR ORAL 3 G/1
3 POWDER ORAL ONCE
Status: COMPLETED | OUTPATIENT
Start: 2019-01-01 | End: 2019-01-01

## 2019-01-01 RX ORDER — METOPROLOL SUCCINATE 25 MG/1
25 TABLET, EXTENDED RELEASE ORAL DAILY
Status: DISCONTINUED | OUTPATIENT
Start: 2019-01-01 | End: 2019-01-01

## 2019-01-01 RX ORDER — GUAIFENESIN AND DEXTROMETHORPHAN HYDROBROMIDE 100; 10 MG/5ML; MG/5ML
10 SOLUTION ORAL EVERY 6 HOURS PRN
COMMUNITY

## 2019-01-01 RX ORDER — RANOLAZINE 500 MG/1
500 TABLET, EXTENDED RELEASE ORAL 2 TIMES DAILY
Status: DISCONTINUED | OUTPATIENT
Start: 2019-01-01 | End: 2019-01-01 | Stop reason: HOSPADM

## 2019-01-01 RX ORDER — POTASSIUM CHLORIDE 750 MG/1
20 TABLET, FILM COATED, EXTENDED RELEASE ORAL DAILY
Status: DISCONTINUED | OUTPATIENT
Start: 2019-01-01 | End: 2019-01-01

## 2019-01-01 RX ORDER — ONDANSETRON 2 MG/ML
4 INJECTION INTRAMUSCULAR; INTRAVENOUS EVERY 6 HOURS PRN
Status: DISCONTINUED | OUTPATIENT
Start: 2019-01-01 | End: 2019-01-01

## 2019-01-01 RX ORDER — ASPIRIN 81 MG/1
81 TABLET, CHEWABLE ORAL DAILY
Status: DISCONTINUED | OUTPATIENT
Start: 2019-01-01 | End: 2019-01-01

## 2019-01-01 RX ORDER — PANTOPRAZOLE SODIUM 40 MG/1
40 TABLET, DELAYED RELEASE ORAL
Status: DISCONTINUED | OUTPATIENT
Start: 2019-01-01 | End: 2019-01-01

## 2019-01-01 RX ORDER — FERROUS SULFATE 325(65) MG
325 TABLET ORAL
Status: DISCONTINUED | OUTPATIENT
Start: 2019-01-01 | End: 2019-01-01

## 2019-01-01 RX ORDER — DOXEPIN HYDROCHLORIDE 3 MG/1
3 TABLET ORAL NIGHTLY
Status: DISCONTINUED | OUTPATIENT
Start: 2019-01-01 | End: 2019-01-01 | Stop reason: HOSPADM

## 2019-01-01 RX ORDER — SODIUM BICARBONATE 650 MG/1
1300 TABLET ORAL 2 TIMES DAILY
Status: DISCONTINUED | OUTPATIENT
Start: 2019-01-01 | End: 2019-01-01

## 2019-01-01 RX ORDER — TRAMADOL HYDROCHLORIDE 50 MG/1
50 TABLET ORAL EVERY 6 HOURS PRN
Status: DISCONTINUED | OUTPATIENT
Start: 2019-01-01 | End: 2019-01-01 | Stop reason: HOSPADM

## 2019-01-01 RX ORDER — CIPROFLOXACIN 500 MG/1
TABLET, FILM COATED ORAL
Qty: 20 TABLET | Refills: 0 | Status: SHIPPED | OUTPATIENT
Start: 2019-01-01 | End: 2019-01-01

## 2019-01-01 RX ORDER — LEVOFLOXACIN 5 MG/ML
750 INJECTION, SOLUTION INTRAVENOUS ONCE
Status: COMPLETED | OUTPATIENT
Start: 2019-01-01 | End: 2019-01-01

## 2019-01-01 RX ORDER — ONDANSETRON 4 MG/1
4 TABLET, FILM COATED ORAL EVERY 8 HOURS PRN
Status: DISCONTINUED | OUTPATIENT
Start: 2019-01-01 | End: 2019-01-01 | Stop reason: HOSPADM

## 2019-01-01 RX ORDER — IPRATROPIUM BROMIDE AND ALBUTEROL SULFATE 2.5; .5 MG/3ML; MG/3ML
3 SOLUTION RESPIRATORY (INHALATION) EVERY 4 HOURS PRN
Status: DISCONTINUED | OUTPATIENT
Start: 2019-01-01 | End: 2019-01-01 | Stop reason: HOSPADM

## 2019-01-01 RX ORDER — LEVOFLOXACIN 5 MG/ML
500 INJECTION, SOLUTION INTRAVENOUS ONCE
Status: COMPLETED | OUTPATIENT
Start: 2019-01-01 | End: 2019-01-01

## 2019-01-01 RX ORDER — LORAZEPAM 2 MG/ML
1 INJECTION INTRAMUSCULAR EVERY 6 HOURS PRN
Status: DISCONTINUED | OUTPATIENT
Start: 2019-01-01 | End: 2019-01-01 | Stop reason: HOSPADM

## 2019-01-01 RX ORDER — MORPHINE SULFATE 4 MG/ML
4 INJECTION, SOLUTION INTRAMUSCULAR; INTRAVENOUS ONCE
Status: COMPLETED | OUTPATIENT
Start: 2019-01-01 | End: 2019-01-01

## 2019-01-01 RX ORDER — POTASSIUM CHLORIDE 7.45 MG/ML
10 INJECTION INTRAVENOUS PRN
Status: DISCONTINUED | OUTPATIENT
Start: 2019-01-01 | End: 2019-01-01 | Stop reason: HOSPADM

## 2019-01-01 RX ORDER — BUMETANIDE 1 MG/1
1 TABLET ORAL DAILY
COMMUNITY
Start: 2019-01-01 | End: 2019-01-01

## 2019-01-01 RX ORDER — FUROSEMIDE 10 MG/ML
40 INJECTION INTRAMUSCULAR; INTRAVENOUS 2 TIMES DAILY
Status: DISCONTINUED | OUTPATIENT
Start: 2019-01-01 | End: 2019-01-01 | Stop reason: HOSPADM

## 2019-01-01 RX ORDER — ACETAMINOPHEN 650 MG/1
650 SUPPOSITORY RECTAL EVERY 4 HOURS PRN
Status: DISCONTINUED | OUTPATIENT
Start: 2019-01-01 | End: 2019-01-01 | Stop reason: HOSPADM

## 2019-01-01 RX ORDER — NEBIVOLOL 5 MG/1
10 TABLET ORAL DAILY
Status: CANCELLED | OUTPATIENT
Start: 2019-01-01

## 2019-01-01 RX ADMIN — RANOLAZINE 1000 MG: 500 TABLET, FILM COATED, EXTENDED RELEASE ORAL at 22:39

## 2019-01-01 RX ADMIN — MAGNESIUM GLUCONATE 500 MG ORAL TABLET 400 MG: 500 TABLET ORAL at 21:15

## 2019-01-01 RX ADMIN — LORAZEPAM 1 MG: 2 INJECTION INTRAMUSCULAR; INTRAVENOUS at 14:31

## 2019-01-01 RX ADMIN — TAMSULOSIN HYDROCHLORIDE 0.4 MG: 0.4 CAPSULE ORAL at 10:21

## 2019-01-01 RX ADMIN — ENOXAPARIN SODIUM 40 MG: 40 INJECTION SUBCUTANEOUS at 07:59

## 2019-01-01 RX ADMIN — MAGNESIUM GLUCONATE 500 MG ORAL TABLET 400 MG: 500 TABLET ORAL at 21:08

## 2019-01-01 RX ADMIN — PANTOPRAZOLE SODIUM 40 MG: 40 TABLET, DELAYED RELEASE ORAL at 15:46

## 2019-01-01 RX ADMIN — ISOSORBIDE DINITRATE 30 MG: 20 TABLET ORAL at 09:38

## 2019-01-01 RX ADMIN — GLIPIZIDE 5 MG: 5 TABLET ORAL at 08:42

## 2019-01-01 RX ADMIN — POTASSIUM CHLORIDE 20 MEQ: 750 TABLET, EXTENDED RELEASE ORAL at 14:25

## 2019-01-01 RX ADMIN — GABAPENTIN 100 MG: 100 CAPSULE ORAL at 09:23

## 2019-01-01 RX ADMIN — SODIUM CHLORIDE: 9 INJECTION, SOLUTION INTRAVENOUS at 12:57

## 2019-01-01 RX ADMIN — RANOLAZINE 500 MG: 500 TABLET, FILM COATED, EXTENDED RELEASE ORAL at 21:16

## 2019-01-01 RX ADMIN — ISOSORBIDE MONONITRATE 30 MG: 30 TABLET ORAL at 10:22

## 2019-01-01 RX ADMIN — Medication 500 MG: at 21:54

## 2019-01-01 RX ADMIN — RANOLAZINE 1000 MG: 500 TABLET, FILM COATED, EXTENDED RELEASE ORAL at 21:08

## 2019-01-01 RX ADMIN — FERROUS SULFATE TAB 325 MG (65 MG ELEMENTAL FE) 325 MG: 325 (65 FE) TAB at 13:22

## 2019-01-01 RX ADMIN — FUROSEMIDE 40 MG: 10 INJECTION, SOLUTION INTRAMUSCULAR; INTRAVENOUS at 12:42

## 2019-01-01 RX ADMIN — PIPERACILLIN SODIUM,TAZOBACTAM SODIUM 3.38 G: 3; .375 INJECTION, POWDER, FOR SOLUTION INTRAVENOUS at 16:00

## 2019-01-01 RX ADMIN — FAMOTIDINE 20 MG: 20 TABLET ORAL at 09:37

## 2019-01-01 RX ADMIN — FAMOTIDINE 20 MG: 20 TABLET ORAL at 10:23

## 2019-01-01 RX ADMIN — Medication 10 ML: at 05:34

## 2019-01-01 RX ADMIN — ENOXAPARIN SODIUM 30 MG: 100 INJECTION SUBCUTANEOUS at 12:54

## 2019-01-01 RX ADMIN — Medication 10 ML: at 11:45

## 2019-01-01 RX ADMIN — Medication 1 CAPSULE: at 09:20

## 2019-01-01 RX ADMIN — GABAPENTIN 100 MG: 100 CAPSULE ORAL at 14:20

## 2019-01-01 RX ADMIN — LINEZOLID 600 MG: 600 INJECTION, SOLUTION INTRAVENOUS at 21:12

## 2019-01-01 RX ADMIN — PANTOPRAZOLE SODIUM 40 MG: 40 INJECTION, POWDER, FOR SOLUTION INTRAVENOUS at 05:17

## 2019-01-01 RX ADMIN — FAMOTIDINE 20 MG: 20 TABLET ORAL at 08:46

## 2019-01-01 RX ADMIN — PIPERACILLIN SODIUM,TAZOBACTAM SODIUM 3.38 G: 3; .375 INJECTION, POWDER, FOR SOLUTION INTRAVENOUS at 22:12

## 2019-01-01 RX ADMIN — PIPERACILLIN SODIUM,TAZOBACTAM SODIUM 3.38 G: 3; .375 INJECTION, POWDER, FOR SOLUTION INTRAVENOUS at 04:49

## 2019-01-01 RX ADMIN — PIPERACILLIN SODIUM,TAZOBACTAM SODIUM 3.38 G: 3; .375 INJECTION, POWDER, FOR SOLUTION INTRAVENOUS at 00:49

## 2019-01-01 RX ADMIN — RANOLAZINE 1000 MG: 500 TABLET, FILM COATED, EXTENDED RELEASE ORAL at 21:48

## 2019-01-01 RX ADMIN — PANTOPRAZOLE SODIUM 40 MG: 40 TABLET, DELAYED RELEASE ORAL at 21:47

## 2019-01-01 RX ADMIN — PIPERACILLIN SODIUM,TAZOBACTAM SODIUM 3.38 G: 3; .375 INJECTION, POWDER, FOR SOLUTION INTRAVENOUS at 15:50

## 2019-01-01 RX ADMIN — BARIUM SULFATE 140 ML: 980 POWDER, FOR SUSPENSION ORAL at 09:12

## 2019-01-01 RX ADMIN — IRON SUCROSE 200 MG: 20 INJECTION, SOLUTION INTRAVENOUS at 18:17

## 2019-01-01 RX ADMIN — Medication 1 CAPSULE: at 10:08

## 2019-01-01 RX ADMIN — GABAPENTIN 100 MG: 100 CAPSULE ORAL at 15:42

## 2019-01-01 RX ADMIN — FAMOTIDINE 20 MG: 20 TABLET ORAL at 08:00

## 2019-01-01 RX ADMIN — ATORVASTATIN CALCIUM 20 MG: 20 TABLET, FILM COATED ORAL at 20:53

## 2019-01-01 RX ADMIN — MAGNESIUM GLUCONATE 500 MG ORAL TABLET 400 MG: 500 TABLET ORAL at 09:19

## 2019-01-01 RX ADMIN — Medication 500 MG: at 20:53

## 2019-01-01 RX ADMIN — FERROUS SULFATE TAB 325 MG (65 MG ELEMENTAL FE) 325 MG: 325 (65 FE) TAB at 15:42

## 2019-01-01 RX ADMIN — PIPERACILLIN SODIUM,TAZOBACTAM SODIUM 3.38 G: 3; .375 INJECTION, POWDER, FOR SOLUTION INTRAVENOUS at 21:09

## 2019-01-01 RX ADMIN — ATORVASTATIN CALCIUM 20 MG: 20 TABLET, FILM COATED ORAL at 20:23

## 2019-01-01 RX ADMIN — DEXTROSE AND SODIUM CHLORIDE: 5; 450 INJECTION, SOLUTION INTRAVENOUS at 12:51

## 2019-01-01 RX ADMIN — SODIUM CHLORIDE: 4.5 INJECTION, SOLUTION INTRAVENOUS at 08:32

## 2019-01-01 RX ADMIN — GABAPENTIN 100 MG: 100 CAPSULE ORAL at 17:02

## 2019-01-01 RX ADMIN — GLIPIZIDE 5 MG: 5 TABLET ORAL at 09:39

## 2019-01-01 RX ADMIN — LIDOCAINE 4%: 4 CREAM TOPICAL at 08:42

## 2019-01-01 RX ADMIN — POTASSIUM CHLORIDE 10 MEQ: 750 TABLET, EXTENDED RELEASE ORAL at 10:23

## 2019-01-01 RX ADMIN — PANTOPRAZOLE SODIUM 40 MG: 40 TABLET, DELAYED RELEASE ORAL at 23:11

## 2019-01-01 RX ADMIN — INSULIN LISPRO 1 UNITS: 100 INJECTION, SOLUTION INTRAVENOUS; SUBCUTANEOUS at 13:26

## 2019-01-01 RX ADMIN — POTASSIUM CHLORIDE 40 MEQ: 20 TABLET, EXTENDED RELEASE ORAL at 13:26

## 2019-01-01 RX ADMIN — TAMSULOSIN HYDROCHLORIDE 0.4 MG: 0.4 CAPSULE ORAL at 09:39

## 2019-01-01 RX ADMIN — ISOSORBIDE MONONITRATE 30 MG: 30 TABLET ORAL at 08:01

## 2019-01-01 RX ADMIN — BARIUM SULFATE 20 ML: 400 PASTE ORAL at 11:57

## 2019-01-01 RX ADMIN — FOSFOMYCIN TROMETHAMINE 1 PACKET: 3 POWDER ORAL at 09:47

## 2019-01-01 RX ADMIN — CLOPIDOGREL BISULFATE 75 MG: 75 TABLET ORAL at 09:47

## 2019-01-01 RX ADMIN — FUROSEMIDE 20 MG: 10 INJECTION, SOLUTION INTRAMUSCULAR; INTRAVENOUS at 10:23

## 2019-01-01 RX ADMIN — ATORVASTATIN CALCIUM 20 MG: 20 TABLET, FILM COATED ORAL at 10:07

## 2019-01-01 RX ADMIN — METOPROLOL SUCCINATE 25 MG: 25 TABLET, FILM COATED, EXTENDED RELEASE ORAL at 09:23

## 2019-01-01 RX ADMIN — METOPROLOL SUCCINATE 25 MG: 25 TABLET, FILM COATED, EXTENDED RELEASE ORAL at 10:23

## 2019-01-01 RX ADMIN — ASPIRIN 81 MG 81 MG: 81 TABLET ORAL at 10:23

## 2019-01-01 RX ADMIN — TAMSULOSIN HYDROCHLORIDE 0.4 MG: 0.4 CAPSULE ORAL at 09:18

## 2019-01-01 RX ADMIN — ISOSORBIDE DINITRATE 30 MG: 20 TABLET ORAL at 09:18

## 2019-01-01 RX ADMIN — Medication 500 MG: at 20:23

## 2019-01-01 RX ADMIN — Medication 10 ML: at 21:17

## 2019-01-01 RX ADMIN — SODIUM CHLORIDE: 9 INJECTION, SOLUTION INTRAVENOUS at 22:36

## 2019-01-01 RX ADMIN — Medication 10 ML: at 08:15

## 2019-01-01 RX ADMIN — PIPERACILLIN SODIUM,TAZOBACTAM SODIUM 3.38 G: 3; .375 INJECTION, POWDER, FOR SOLUTION INTRAVENOUS at 04:35

## 2019-01-01 RX ADMIN — FERROUS SULFATE TAB 325 MG (65 MG ELEMENTAL FE) 325 MG: 325 (65 FE) TAB at 10:21

## 2019-01-01 RX ADMIN — SODIUM CHLORIDE 1000 ML: 9 INJECTION, SOLUTION INTRAVENOUS at 01:32

## 2019-01-01 RX ADMIN — AMLODIPINE BESYLATE 10 MG: 10 TABLET ORAL at 10:22

## 2019-01-01 RX ADMIN — MULTIPLE VITAMINS W/ MINERALS TAB 1 TABLET: TAB at 08:42

## 2019-01-01 RX ADMIN — INSULIN LISPRO 2 UNITS: 100 INJECTION, SOLUTION INTRAVENOUS; SUBCUTANEOUS at 12:37

## 2019-01-01 RX ADMIN — GABAPENTIN 100 MG: 100 CAPSULE ORAL at 08:01

## 2019-01-01 RX ADMIN — POTASSIUM CHLORIDE 20 MEQ: 750 TABLET, EXTENDED RELEASE ORAL at 09:37

## 2019-01-01 RX ADMIN — INSULIN LISPRO 1 UNITS: 100 INJECTION, SOLUTION INTRAVENOUS; SUBCUTANEOUS at 16:50

## 2019-01-01 RX ADMIN — ATORVASTATIN CALCIUM 20 MG: 20 TABLET, FILM COATED ORAL at 21:15

## 2019-01-01 RX ADMIN — PANTOPRAZOLE SODIUM 40 MG: 40 TABLET, DELAYED RELEASE ORAL at 17:02

## 2019-01-01 RX ADMIN — GABAPENTIN 100 MG: 100 CAPSULE ORAL at 23:11

## 2019-01-01 RX ADMIN — Medication 10 ML: at 16:52

## 2019-01-01 RX ADMIN — POTASSIUM CHLORIDE 20 MEQ: 750 TABLET, EXTENDED RELEASE ORAL at 08:45

## 2019-01-01 RX ADMIN — RANOLAZINE 1000 MG: 500 TABLET, FILM COATED, EXTENDED RELEASE ORAL at 09:19

## 2019-01-01 RX ADMIN — CLOPIDOGREL BISULFATE 75 MG: 75 TABLET ORAL at 08:52

## 2019-01-01 RX ADMIN — Medication 10 ML: at 08:07

## 2019-01-01 RX ADMIN — PANTOPRAZOLE SODIUM 40 MG: 40 TABLET, DELAYED RELEASE ORAL at 21:08

## 2019-01-01 RX ADMIN — ISOSORBIDE MONONITRATE 30 MG: 30 TABLET ORAL at 09:47

## 2019-01-01 RX ADMIN — MEROPENEM 1 G: 1 INJECTION, POWDER, FOR SOLUTION INTRAVENOUS at 13:38

## 2019-01-01 RX ADMIN — GABAPENTIN 100 MG: 100 CAPSULE ORAL at 09:37

## 2019-01-01 RX ADMIN — Medication 500 MG: at 23:18

## 2019-01-01 RX ADMIN — MAGNESIUM GLUCONATE 500 MG ORAL TABLET 400 MG: 500 TABLET ORAL at 10:06

## 2019-01-01 RX ADMIN — AMLODIPINE BESYLATE 10 MG: 10 TABLET ORAL at 08:01

## 2019-01-01 RX ADMIN — PIPERACILLIN SODIUM,TAZOBACTAM SODIUM 3.38 G: 3; .375 INJECTION, POWDER, FOR SOLUTION INTRAVENOUS at 10:12

## 2019-01-01 RX ADMIN — AMLODIPINE BESYLATE 10 MG: 10 TABLET ORAL at 08:42

## 2019-01-01 RX ADMIN — SODIUM BICARBONATE 1300 MG: 650 TABLET ORAL at 09:47

## 2019-01-01 RX ADMIN — POTASSIUM CHLORIDE 20 MEQ: 20 TABLET, EXTENDED RELEASE ORAL at 11:24

## 2019-01-01 RX ADMIN — GABAPENTIN 100 MG: 100 CAPSULE ORAL at 10:22

## 2019-01-01 RX ADMIN — MAGNESIUM GLUCONATE 500 MG ORAL TABLET 400 MG: 500 TABLET ORAL at 09:23

## 2019-01-01 RX ADMIN — CLOPIDOGREL BISULFATE 75 MG: 75 TABLET ORAL at 10:22

## 2019-01-01 RX ADMIN — Medication 10 ML: at 13:18

## 2019-01-01 RX ADMIN — GABAPENTIN 100 MG: 100 CAPSULE ORAL at 10:07

## 2019-01-01 RX ADMIN — LIDOCAINE 4%: 4 CREAM TOPICAL at 17:50

## 2019-01-01 RX ADMIN — GABAPENTIN 100 MG: 100 CAPSULE ORAL at 09:19

## 2019-01-01 RX ADMIN — PANTOPRAZOLE SODIUM 40 MG: 40 TABLET, DELAYED RELEASE ORAL at 08:46

## 2019-01-01 RX ADMIN — RANOLAZINE 1000 MG: 500 TABLET, FILM COATED, EXTENDED RELEASE ORAL at 20:53

## 2019-01-01 RX ADMIN — ONDANSETRON 4 MG: 2 INJECTION INTRAMUSCULAR; INTRAVENOUS at 14:44

## 2019-01-01 RX ADMIN — TORSEMIDE 20 MG: 20 TABLET ORAL at 12:54

## 2019-01-01 RX ADMIN — VANCOMYCIN HYDROCHLORIDE 1500 MG: 1 INJECTION, POWDER, LYOPHILIZED, FOR SOLUTION INTRAVENOUS at 04:41

## 2019-01-01 RX ADMIN — GABAPENTIN 100 MG: 100 CAPSULE ORAL at 13:26

## 2019-01-01 RX ADMIN — FUROSEMIDE 40 MG: 10 INJECTION, SOLUTION INTRAMUSCULAR; INTRAVENOUS at 11:24

## 2019-01-01 RX ADMIN — ONDANSETRON HYDROCHLORIDE 4 MG: 4 INJECTION, SOLUTION INTRAMUSCULAR; INTRAVENOUS at 14:28

## 2019-01-01 RX ADMIN — ENOXAPARIN SODIUM 40 MG: 40 INJECTION SUBCUTANEOUS at 09:57

## 2019-01-01 RX ADMIN — METOPROLOL SUCCINATE 25 MG: 25 TABLET, FILM COATED, EXTENDED RELEASE ORAL at 09:47

## 2019-01-01 RX ADMIN — LINAGLIPTIN 5 MG: 5 TABLET, FILM COATED ORAL at 10:07

## 2019-01-01 RX ADMIN — IPRATROPIUM BROMIDE AND ALBUTEROL SULFATE 3 ML: .5; 3 SOLUTION RESPIRATORY (INHALATION) at 23:55

## 2019-01-01 RX ADMIN — DEXTROSE AND SODIUM CHLORIDE: 5; 450 INJECTION, SOLUTION INTRAVENOUS at 21:02

## 2019-01-01 RX ADMIN — BARIUM SULFATE 60 ML: 0.81 POWDER, FOR SUSPENSION ORAL at 09:47

## 2019-01-01 RX ADMIN — PIPERACILLIN SODIUM,TAZOBACTAM SODIUM 3.38 G: 3; .375 INJECTION, POWDER, FOR SOLUTION INTRAVENOUS at 08:14

## 2019-01-01 RX ADMIN — RANOLAZINE 1000 MG: 500 TABLET, FILM COATED, EXTENDED RELEASE ORAL at 20:23

## 2019-01-01 RX ADMIN — LINEZOLID 600 MG: 600 INJECTION, SOLUTION INTRAVENOUS at 09:38

## 2019-01-01 RX ADMIN — Medication 4 UNITS: at 15:33

## 2019-01-01 RX ADMIN — ISOSORBIDE MONONITRATE 30 MG: 30 TABLET ORAL at 09:19

## 2019-01-01 RX ADMIN — Medication 1 CAPSULE: at 09:39

## 2019-01-01 RX ADMIN — CLOPIDOGREL BISULFATE 75 MG: 75 TABLET ORAL at 09:37

## 2019-01-01 RX ADMIN — Medication 1 CAPSULE: at 08:42

## 2019-01-01 RX ADMIN — MEROPENEM 1 G: 1 INJECTION, POWDER, FOR SOLUTION INTRAVENOUS at 00:40

## 2019-01-01 RX ADMIN — FERROUS SULFATE TAB 325 MG (65 MG ELEMENTAL FE) 325 MG: 325 (65 FE) TAB at 13:26

## 2019-01-01 RX ADMIN — MAGNESIUM GLUCONATE 500 MG ORAL TABLET 400 MG: 500 TABLET ORAL at 23:10

## 2019-01-01 RX ADMIN — GABAPENTIN 100 MG: 100 CAPSULE ORAL at 09:47

## 2019-01-01 RX ADMIN — ENOXAPARIN SODIUM 40 MG: 40 INJECTION SUBCUTANEOUS at 08:45

## 2019-01-01 RX ADMIN — CLOPIDOGREL BISULFATE 75 MG: 75 TABLET ORAL at 09:18

## 2019-01-01 RX ADMIN — RANOLAZINE 1000 MG: 500 TABLET, FILM COATED, EXTENDED RELEASE ORAL at 07:59

## 2019-01-01 RX ADMIN — SODIUM CHLORIDE: 9 INJECTION, SOLUTION INTRAVENOUS at 00:49

## 2019-01-01 RX ADMIN — AMLODIPINE BESYLATE 10 MG: 10 TABLET ORAL at 10:07

## 2019-01-01 RX ADMIN — MULTIPLE VITAMINS W/ MINERALS TAB 1 TABLET: TAB at 09:38

## 2019-01-01 RX ADMIN — RANOLAZINE 500 MG: 500 TABLET, FILM COATED, EXTENDED RELEASE ORAL at 11:30

## 2019-01-01 RX ADMIN — POTASSIUM CHLORIDE 20 MEQ: 20 TABLET, EXTENDED RELEASE ORAL at 09:16

## 2019-01-01 RX ADMIN — LEVOFLOXACIN 500 MG: 5 INJECTION, SOLUTION INTRAVENOUS at 21:52

## 2019-01-01 RX ADMIN — RANOLAZINE 1000 MG: 500 TABLET, FILM COATED, EXTENDED RELEASE ORAL at 23:11

## 2019-01-01 RX ADMIN — GABAPENTIN 100 MG: 100 CAPSULE ORAL at 21:09

## 2019-01-01 RX ADMIN — CLOTRIMAZOLE AND BETAMETHASONE DIPROPIONATE: 10; .5 CREAM TOPICAL at 21:15

## 2019-01-01 RX ADMIN — PIPERACILLIN SODIUM,TAZOBACTAM SODIUM 3.38 G: 3; .375 INJECTION, POWDER, FOR SOLUTION INTRAVENOUS at 12:52

## 2019-01-01 RX ADMIN — TAMSULOSIN HYDROCHLORIDE 0.4 MG: 0.4 CAPSULE ORAL at 09:54

## 2019-01-01 RX ADMIN — PANTOPRAZOLE SODIUM 40 MG: 40 TABLET, DELAYED RELEASE ORAL at 06:26

## 2019-01-01 RX ADMIN — Medication 10 ML: at 09:24

## 2019-01-01 RX ADMIN — Medication 500 MG: at 21:31

## 2019-01-01 RX ADMIN — POTASSIUM CHLORIDE 40 MEQ: 20 TABLET, EXTENDED RELEASE ORAL at 08:17

## 2019-01-01 RX ADMIN — FAMOTIDINE 20 MG: 20 TABLET ORAL at 08:52

## 2019-01-01 RX ADMIN — SODIUM CHLORIDE: 9 INJECTION, SOLUTION INTRAVENOUS at 00:41

## 2019-01-01 RX ADMIN — FERROUS SULFATE TAB 325 MG (65 MG ELEMENTAL FE) 325 MG: 325 (65 FE) TAB at 11:31

## 2019-01-01 RX ADMIN — ASPIRIN 81 MG 81 MG: 81 TABLET ORAL at 11:24

## 2019-01-01 RX ADMIN — RANOLAZINE 1000 MG: 500 TABLET, FILM COATED, EXTENDED RELEASE ORAL at 14:24

## 2019-01-01 RX ADMIN — FENTANYL CITRATE 50 MCG: 50 INJECTION INTRAMUSCULAR; INTRAVENOUS at 14:52

## 2019-01-01 RX ADMIN — MORPHINE SULFATE 4 MG: 4 INJECTION INTRAVENOUS at 13:02

## 2019-01-01 RX ADMIN — ISOSORBIDE MONONITRATE 30 MG: 30 TABLET ORAL at 08:51

## 2019-01-01 RX ADMIN — DOCUSATE SODIUM 100 MG: 100 CAPSULE, LIQUID FILLED ORAL at 10:23

## 2019-01-01 RX ADMIN — PANTOPRAZOLE SODIUM 40 MG: 40 TABLET, DELAYED RELEASE ORAL at 06:35

## 2019-01-01 RX ADMIN — TORSEMIDE 20 MG: 20 TABLET ORAL at 08:51

## 2019-01-01 RX ADMIN — AMOXICILLIN AND CLAVULANATE POTASSIUM 1 TABLET: 875; 125 TABLET, FILM COATED ORAL at 08:51

## 2019-01-01 RX ADMIN — GABAPENTIN 100 MG: 100 CAPSULE ORAL at 20:23

## 2019-01-01 RX ADMIN — CLOTRIMAZOLE AND BETAMETHASONE DIPROPIONATE: 10; .5 CREAM TOPICAL at 09:16

## 2019-01-01 RX ADMIN — ATORVASTATIN CALCIUM 20 MG: 20 TABLET, FILM COATED ORAL at 09:20

## 2019-01-01 RX ADMIN — PANTOPRAZOLE SODIUM 40 MG: 40 TABLET, DELAYED RELEASE ORAL at 09:37

## 2019-01-01 RX ADMIN — TRAMADOL HYDROCHLORIDE 50 MG: 50 TABLET, FILM COATED ORAL at 21:53

## 2019-01-01 RX ADMIN — GLIPIZIDE 5 MG: 5 TABLET ORAL at 10:07

## 2019-01-01 RX ADMIN — SODIUM CHLORIDE: 9 INJECTION, SOLUTION INTRAVENOUS at 12:44

## 2019-01-01 RX ADMIN — PANTOPRAZOLE SODIUM 40 MG: 40 TABLET, DELAYED RELEASE ORAL at 09:18

## 2019-01-01 RX ADMIN — ACETAMINOPHEN 650 MG: 650 SUPPOSITORY RECTAL at 02:25

## 2019-01-01 RX ADMIN — CLOTRIMAZOLE AND BETAMETHASONE DIPROPIONATE: 10; .5 CREAM TOPICAL at 11:32

## 2019-01-01 RX ADMIN — MULTIPLE VITAMINS W/ MINERALS TAB 1 TABLET: TAB at 10:07

## 2019-01-01 RX ADMIN — TAMSULOSIN HYDROCHLORIDE 0.4 MG: 0.4 CAPSULE ORAL at 09:23

## 2019-01-01 RX ADMIN — ENOXAPARIN SODIUM 30 MG: 100 INJECTION SUBCUTANEOUS at 10:23

## 2019-01-01 RX ADMIN — INSULIN LISPRO 1 UNITS: 100 INJECTION, SOLUTION INTRAVENOUS; SUBCUTANEOUS at 17:18

## 2019-01-01 RX ADMIN — SODIUM CHLORIDE: 9 INJECTION, SOLUTION INTRAVENOUS at 10:32

## 2019-01-01 RX ADMIN — MORPHINE SULFATE 30 MG: 1 INJECTION INTRAVENOUS at 14:32

## 2019-01-01 RX ADMIN — PIPERACILLIN SODIUM,TAZOBACTAM SODIUM 3.38 G: 3; .375 INJECTION, POWDER, FOR SOLUTION INTRAVENOUS at 12:54

## 2019-01-01 RX ADMIN — PIPERACILLIN SODIUM,TAZOBACTAM SODIUM 3.38 G: 3; .375 INJECTION, POWDER, FOR SOLUTION INTRAVENOUS at 13:19

## 2019-01-01 RX ADMIN — ASPIRIN 81 MG 81 MG: 81 TABLET ORAL at 08:51

## 2019-01-01 RX ADMIN — FENTANYL CITRATE 25 MCG: 50 INJECTION INTRAMUSCULAR; INTRAVENOUS at 14:20

## 2019-01-01 RX ADMIN — PANTOPRAZOLE SODIUM 40 MG: 40 TABLET, DELAYED RELEASE ORAL at 10:08

## 2019-01-01 RX ADMIN — Medication 0.1 MG: at 09:02

## 2019-01-01 RX ADMIN — IRON SUCROSE 200 MG: 20 INJECTION, SOLUTION INTRAVENOUS at 17:47

## 2019-01-01 RX ADMIN — Medication 500 MG: at 21:08

## 2019-01-01 RX ADMIN — RANOLAZINE 1000 MG: 500 TABLET, FILM COATED, EXTENDED RELEASE ORAL at 10:21

## 2019-01-01 RX ADMIN — FAMOTIDINE 20 MG: 20 TABLET ORAL at 09:47

## 2019-01-01 RX ADMIN — LIDOCAINE HYDROCHLORIDE 80 MG: 20 INJECTION, SOLUTION INFILTRATION; PERINEURAL at 14:15

## 2019-01-01 RX ADMIN — LIDOCAINE HYDROCHLORIDE 100 MG: 20 INJECTION, SOLUTION INFILTRATION; PERINEURAL at 09:08

## 2019-01-01 RX ADMIN — METOPROLOL SUCCINATE 25 MG: 25 TABLET, FILM COATED, EXTENDED RELEASE ORAL at 10:06

## 2019-01-01 RX ADMIN — FUROSEMIDE 40 MG: 10 INJECTION, SOLUTION INTRAMUSCULAR; INTRAVENOUS at 21:26

## 2019-01-01 RX ADMIN — METOPROLOL SUCCINATE 25 MG: 25 TABLET, FILM COATED, EXTENDED RELEASE ORAL at 09:39

## 2019-01-01 RX ADMIN — CLOPIDOGREL BISULFATE 75 MG: 75 TABLET ORAL at 11:24

## 2019-01-01 RX ADMIN — MULTIPLE VITAMINS W/ MINERALS TAB 1 TABLET: TAB at 09:19

## 2019-01-01 RX ADMIN — LIDOCAINE 4%: 4 CREAM TOPICAL at 09:20

## 2019-01-01 RX ADMIN — METOPROLOL SUCCINATE 25 MG: 25 TABLET, FILM COATED, EXTENDED RELEASE ORAL at 11:30

## 2019-01-01 RX ADMIN — FAMOTIDINE 20 MG: 20 TABLET ORAL at 11:24

## 2019-01-01 RX ADMIN — IPRATROPIUM BROMIDE AND ALBUTEROL SULFATE 3 ML: .5; 3 SOLUTION RESPIRATORY (INHALATION) at 09:08

## 2019-01-01 RX ADMIN — PIPERACILLIN SODIUM,TAZOBACTAM SODIUM 3.38 G: 3; .375 INJECTION, POWDER, FOR SOLUTION INTRAVENOUS at 13:26

## 2019-01-01 RX ADMIN — AMLODIPINE BESYLATE 10 MG: 10 TABLET ORAL at 11:31

## 2019-01-01 RX ADMIN — AMLODIPINE BESYLATE 10 MG: 10 TABLET ORAL at 09:38

## 2019-01-01 RX ADMIN — LINAGLIPTIN 5 MG: 5 TABLET, FILM COATED ORAL at 09:39

## 2019-01-01 RX ADMIN — TAMSULOSIN HYDROCHLORIDE 0.4 MG: 0.4 CAPSULE ORAL at 10:06

## 2019-01-01 RX ADMIN — SODIUM CHLORIDE 2478 ML: 9 INJECTION, SOLUTION INTRAVENOUS at 20:20

## 2019-01-01 RX ADMIN — PIPERACILLIN SODIUM,TAZOBACTAM SODIUM 3.38 G: 3; .375 INJECTION, POWDER, FOR SOLUTION INTRAVENOUS at 09:38

## 2019-01-01 RX ADMIN — VANCOMYCIN HYDROCHLORIDE 1500 MG: 1 INJECTION, POWDER, LYOPHILIZED, FOR SOLUTION INTRAVENOUS at 23:06

## 2019-01-01 RX ADMIN — Medication 10 ML: at 12:56

## 2019-01-01 RX ADMIN — PROPOFOL 60 MG: 10 INJECTION, EMULSION INTRAVENOUS at 09:08

## 2019-01-01 RX ADMIN — AMLODIPINE BESYLATE 10 MG: 10 TABLET ORAL at 09:19

## 2019-01-01 RX ADMIN — SODIUM CHLORIDE: 9 INJECTION, SOLUTION INTRAVENOUS at 18:26

## 2019-01-01 RX ADMIN — ACETAMINOPHEN 500 MG: 500 TABLET, FILM COATED ORAL at 16:02

## 2019-01-01 RX ADMIN — SODIUM CHLORIDE: 9 INJECTION, SOLUTION INTRAVENOUS at 02:27

## 2019-01-01 RX ADMIN — ASPIRIN 81 MG 81 MG: 81 TABLET ORAL at 09:37

## 2019-01-01 RX ADMIN — ACETAMINOPHEN 650 MG: 650 SUPPOSITORY RECTAL at 21:00

## 2019-01-01 RX ADMIN — MAGNESIUM GLUCONATE 500 MG ORAL TABLET 400 MG: 500 TABLET ORAL at 08:42

## 2019-01-01 RX ADMIN — METOPROLOL SUCCINATE 25 MG: 25 TABLET, FILM COATED, EXTENDED RELEASE ORAL at 09:19

## 2019-01-01 RX ADMIN — RANOLAZINE 1000 MG: 500 TABLET, FILM COATED, EXTENDED RELEASE ORAL at 09:38

## 2019-01-01 RX ADMIN — GABAPENTIN 100 MG: 100 CAPSULE ORAL at 12:55

## 2019-01-01 RX ADMIN — IOPAMIDOL 80 ML: 755 INJECTION, SOLUTION INTRAVENOUS at 21:42

## 2019-01-01 RX ADMIN — PIPERACILLIN SODIUM,TAZOBACTAM SODIUM 3.38 G: 3; .375 INJECTION, POWDER, FOR SOLUTION INTRAVENOUS at 01:36

## 2019-01-01 RX ADMIN — ENOXAPARIN SODIUM 30 MG: 30 INJECTION SUBCUTANEOUS at 09:16

## 2019-01-01 RX ADMIN — FUROSEMIDE 40 MG: 10 INJECTION, SOLUTION INTRAMUSCULAR; INTRAVENOUS at 09:16

## 2019-01-01 RX ADMIN — TRAMADOL HYDROCHLORIDE 50 MG: 50 TABLET, FILM COATED ORAL at 21:08

## 2019-01-01 RX ADMIN — AMLODIPINE BESYLATE 10 MG: 10 TABLET ORAL at 09:47

## 2019-01-01 RX ADMIN — CLOPIDOGREL BISULFATE 75 MG: 75 TABLET ORAL at 08:00

## 2019-01-01 RX ADMIN — ENOXAPARIN SODIUM 30 MG: 30 INJECTION SUBCUTANEOUS at 11:25

## 2019-01-01 RX ADMIN — ATORVASTATIN CALCIUM 20 MG: 20 TABLET, FILM COATED ORAL at 08:42

## 2019-01-01 RX ADMIN — ASPIRIN 81 MG 81 MG: 81 TABLET ORAL at 09:47

## 2019-01-01 RX ADMIN — RANOLAZINE 500 MG: 500 TABLET, FILM COATED, EXTENDED RELEASE ORAL at 09:23

## 2019-01-01 RX ADMIN — INSULIN LISPRO 1 UNITS: 100 INJECTION, SOLUTION INTRAVENOUS; SUBCUTANEOUS at 09:58

## 2019-01-01 RX ADMIN — TAMSULOSIN HYDROCHLORIDE 0.4 MG: 0.4 CAPSULE ORAL at 08:42

## 2019-01-01 RX ADMIN — AMOXICILLIN AND CLAVULANATE POTASSIUM 1 TABLET: 875; 125 TABLET, FILM COATED ORAL at 20:23

## 2019-01-01 RX ADMIN — BARIUM SULFATE 60 ML: 0.81 POWDER, FOR SUSPENSION ORAL at 11:56

## 2019-01-01 RX ADMIN — CEFTRIAXONE SODIUM 1 G: 1 INJECTION, POWDER, FOR SOLUTION INTRAMUSCULAR; INTRAVENOUS at 01:33

## 2019-01-01 RX ADMIN — IPRATROPIUM BROMIDE AND ALBUTEROL SULFATE 3 ML: .5; 3 SOLUTION RESPIRATORY (INHALATION) at 23:47

## 2019-01-01 RX ADMIN — FERROUS SULFATE TAB 325 MG (65 MG ELEMENTAL FE) 325 MG: 325 (65 FE) TAB at 11:41

## 2019-01-01 RX ADMIN — ENOXAPARIN SODIUM 40 MG: 40 INJECTION SUBCUTANEOUS at 13:13

## 2019-01-01 RX ADMIN — GABAPENTIN 100 MG: 100 CAPSULE ORAL at 08:51

## 2019-01-01 RX ADMIN — Medication 10 ML: at 20:24

## 2019-01-01 RX ADMIN — LEVOFLOXACIN 750 MG: 5 INJECTION, SOLUTION INTRAVENOUS at 01:33

## 2019-01-01 RX ADMIN — FENTANYL CITRATE 25 MCG: 50 INJECTION INTRAMUSCULAR; INTRAVENOUS at 14:19

## 2019-01-01 RX ADMIN — INSULIN LISPRO 3 UNITS: 100 INJECTION, SOLUTION INTRAVENOUS; SUBCUTANEOUS at 11:38

## 2019-01-01 RX ADMIN — BARIUM SULFATE 20 ML: 400 PASTE ORAL at 09:49

## 2019-01-01 RX ADMIN — MAGNESIUM GLUCONATE 500 MG ORAL TABLET 400 MG: 500 TABLET ORAL at 11:30

## 2019-01-01 RX ADMIN — POTASSIUM CHLORIDE 20 MEQ: 750 TABLET, FILM COATED, EXTENDED RELEASE ORAL at 08:55

## 2019-01-01 RX ADMIN — SODIUM BICARBONATE 1300 MG: 650 TABLET ORAL at 20:53

## 2019-01-01 RX ADMIN — GABAPENTIN 100 MG: 100 CAPSULE ORAL at 11:31

## 2019-01-01 RX ADMIN — CEFTRIAXONE SODIUM 1 G: 1 INJECTION, POWDER, FOR SOLUTION INTRAMUSCULAR; INTRAVENOUS at 23:10

## 2019-01-01 RX ADMIN — GABAPENTIN 100 MG: 100 CAPSULE ORAL at 21:15

## 2019-01-01 RX ADMIN — Medication 0.1 MG: at 13:19

## 2019-01-01 RX ADMIN — GABAPENTIN 100 MG: 100 CAPSULE ORAL at 21:47

## 2019-01-01 RX ADMIN — ASPIRIN 81 MG 81 MG: 81 TABLET ORAL at 09:16

## 2019-01-01 RX ADMIN — Medication 10 ML: at 23:11

## 2019-01-01 RX ADMIN — METOPROLOL SUCCINATE 25 MG: 25 TABLET, FILM COATED, EXTENDED RELEASE ORAL at 08:51

## 2019-01-01 RX ADMIN — GABAPENTIN 100 MG: 100 CAPSULE ORAL at 13:22

## 2019-01-01 RX ADMIN — GABAPENTIN 100 MG: 100 CAPSULE ORAL at 08:42

## 2019-01-01 RX ADMIN — LINAGLIPTIN 5 MG: 5 TABLET, FILM COATED ORAL at 08:42

## 2019-01-01 RX ADMIN — PIPERACILLIN SODIUM,TAZOBACTAM SODIUM 3.38 G: 3; .375 INJECTION, POWDER, FOR SOLUTION INTRAVENOUS at 05:29

## 2019-01-01 RX ADMIN — POTASSIUM CHLORIDE 20 MEQ: 750 TABLET, FILM COATED, EXTENDED RELEASE ORAL at 08:01

## 2019-01-01 RX ADMIN — SODIUM BICARBONATE 1300 MG: 650 TABLET ORAL at 08:08

## 2019-01-01 RX ADMIN — SODIUM CHLORIDE 1000 ML: 9 INJECTION, SOLUTION INTRAVENOUS at 19:32

## 2019-01-01 RX ADMIN — RANOLAZINE 1000 MG: 500 TABLET, FILM COATED, EXTENDED RELEASE ORAL at 08:51

## 2019-01-01 RX ADMIN — ISOSORBIDE DINITRATE 30 MG: 20 TABLET ORAL at 14:19

## 2019-01-01 RX ADMIN — ATORVASTATIN CALCIUM 20 MG: 20 TABLET, FILM COATED ORAL at 09:39

## 2019-01-01 RX ADMIN — GABAPENTIN 100 MG: 100 CAPSULE ORAL at 13:04

## 2019-01-01 RX ADMIN — ASPIRIN 81 MG 81 MG: 81 TABLET ORAL at 09:18

## 2019-01-01 RX ADMIN — METOPROLOL SUCCINATE 25 MG: 25 TABLET, FILM COATED, EXTENDED RELEASE ORAL at 08:42

## 2019-01-01 RX ADMIN — Medication 10 ML: at 10:30

## 2019-01-01 RX ADMIN — METOPROLOL SUCCINATE 25 MG: 25 TABLET, FILM COATED, EXTENDED RELEASE ORAL at 08:00

## 2019-01-01 RX ADMIN — Medication 10 ML: at 13:25

## 2019-01-01 RX ADMIN — AMLODIPINE BESYLATE 10 MG: 10 TABLET ORAL at 08:51

## 2019-01-01 RX ADMIN — INSULIN LISPRO 1 UNITS: 100 INJECTION, SOLUTION INTRAVENOUS; SUBCUTANEOUS at 08:08

## 2019-01-01 RX ADMIN — ASPIRIN 81 MG 81 MG: 81 TABLET ORAL at 08:01

## 2019-01-01 RX ADMIN — CLOPIDOGREL BISULFATE 75 MG: 75 TABLET ORAL at 09:16

## 2019-01-01 RX ADMIN — FAMOTIDINE 20 MG: 20 TABLET ORAL at 09:16

## 2019-01-01 RX ADMIN — PANTOPRAZOLE SODIUM 40 MG: 40 INJECTION, POWDER, FOR SOLUTION INTRAVENOUS at 11:19

## 2019-01-01 RX ADMIN — PIPERACILLIN SODIUM,TAZOBACTAM SODIUM 3.38 G: 3; .375 INJECTION, POWDER, FOR SOLUTION INTRAVENOUS at 14:44

## 2019-01-01 RX ADMIN — PANTOPRAZOLE SODIUM 40 MG: 40 TABLET, DELAYED RELEASE ORAL at 11:24

## 2019-01-01 RX ADMIN — FAMOTIDINE 20 MG: 20 TABLET ORAL at 09:18

## 2019-01-01 RX ADMIN — ENOXAPARIN SODIUM 30 MG: 100 INJECTION SUBCUTANEOUS at 08:14

## 2019-01-01 RX ADMIN — ACETAMINOPHEN 650 MG: 650 SUPPOSITORY RECTAL at 20:18

## 2019-01-01 RX ADMIN — PIPERACILLIN SODIUM,TAZOBACTAM SODIUM 3.38 G: 3; .375 INJECTION, POWDER, FOR SOLUTION INTRAVENOUS at 21:02

## 2019-01-01 RX ADMIN — PROPOFOL 12 MG: 10 INJECTION, EMULSION INTRAVENOUS at 14:15

## 2019-01-01 RX ADMIN — LINEZOLID 600 MG: 600 INJECTION, SOLUTION INTRAVENOUS at 11:31

## 2019-01-01 RX ADMIN — PIPERACILLIN SODIUM,TAZOBACTAM SODIUM 3.38 G: 3; .375 INJECTION, POWDER, FOR SOLUTION INTRAVENOUS at 00:02

## 2019-01-01 RX ADMIN — INSULIN LISPRO 1 UNITS: 100 INJECTION, SOLUTION INTRAVENOUS; SUBCUTANEOUS at 16:39

## 2019-01-01 RX ADMIN — ACETAMINOPHEN 500 MG: 500 TABLET, FILM COATED ORAL at 21:46

## 2019-01-01 RX ADMIN — FAMOTIDINE 20 MG: 20 TABLET ORAL at 10:07

## 2019-01-01 RX ADMIN — TAMSULOSIN HYDROCHLORIDE 0.4 MG: 0.4 CAPSULE ORAL at 08:51

## 2019-01-01 RX ADMIN — FERROUS SULFATE TAB 325 MG (65 MG ELEMENTAL FE) 325 MG: 325 (65 FE) TAB at 13:15

## 2019-01-01 RX ADMIN — ISOSORBIDE DINITRATE 30 MG: 20 TABLET ORAL at 08:41

## 2019-01-01 RX ADMIN — GABAPENTIN 100 MG: 100 CAPSULE ORAL at 20:53

## 2019-01-01 RX ADMIN — RANOLAZINE 1000 MG: 500 TABLET, FILM COATED, EXTENDED RELEASE ORAL at 08:42

## 2019-01-01 RX ADMIN — PANTOPRAZOLE SODIUM 40 MG: 40 INJECTION, POWDER, FOR SOLUTION INTRAVENOUS at 05:34

## 2019-01-01 RX ADMIN — Medication 10 ML: at 09:20

## 2019-01-01 RX ADMIN — POTASSIUM CHLORIDE 20 MEQ: 750 TABLET, EXTENDED RELEASE ORAL at 09:18

## 2019-01-01 RX ADMIN — DEXTROSE AND SODIUM CHLORIDE: 5; 450 INJECTION, SOLUTION INTRAVENOUS at 16:42

## 2019-01-01 RX ADMIN — MAGNESIUM GLUCONATE 500 MG ORAL TABLET 400 MG: 500 TABLET ORAL at 09:37

## 2019-01-01 RX ADMIN — PIPERACILLIN SODIUM,TAZOBACTAM SODIUM 3.38 G: 3; .375 INJECTION, POWDER, FOR SOLUTION INTRAVENOUS at 23:28

## 2019-01-01 RX ADMIN — MECLIZINE HYDROCHLORIDE 25 MG: 12.5 TABLET, FILM COATED ORAL at 10:22

## 2019-01-01 RX ADMIN — INSULIN LISPRO 1 UNITS: 100 INJECTION, SOLUTION INTRAVENOUS; SUBCUTANEOUS at 13:15

## 2019-01-01 RX ADMIN — DOXEPIN HYDROCHLORIDE 3 MG: 3 TABLET ORAL at 21:10

## 2019-01-01 RX ADMIN — CIPROFLOXACIN 400 MG: 2 INJECTION, SOLUTION INTRAVENOUS at 14:12

## 2019-01-01 RX ADMIN — MAGNESIUM GLUCONATE 500 MG ORAL TABLET 400 MG: 500 TABLET ORAL at 21:47

## 2019-01-01 RX ADMIN — TAMSULOSIN HYDROCHLORIDE 0.4 MG: 0.4 CAPSULE ORAL at 07:59

## 2019-01-01 RX ADMIN — POTASSIUM CHLORIDE 20 MEQ: 750 TABLET, FILM COATED, EXTENDED RELEASE ORAL at 09:47

## 2019-01-01 RX ADMIN — INSULIN LISPRO 1 UNITS: 100 INJECTION, SOLUTION INTRAVENOUS; SUBCUTANEOUS at 21:26

## 2019-01-01 RX ADMIN — FERROUS SULFATE TAB 325 MG (65 MG ELEMENTAL FE) 325 MG: 325 (65 FE) TAB at 13:04

## 2019-01-01 RX ADMIN — GLIPIZIDE 5 MG: 5 TABLET ORAL at 09:19

## 2019-01-01 RX ADMIN — BARIUM SULFATE 20 ML: 400 SUSPENSION ORAL at 09:47

## 2019-01-01 RX ADMIN — LINAGLIPTIN 5 MG: 5 TABLET, FILM COATED ORAL at 09:18

## 2019-01-01 RX ADMIN — INSULIN LISPRO 1 UNITS: 100 INJECTION, SOLUTION INTRAVENOUS; SUBCUTANEOUS at 20:30

## 2019-01-01 RX ADMIN — PANTOPRAZOLE SODIUM 40 MG: 40 INJECTION, POWDER, FOR SOLUTION INTRAVENOUS at 08:00

## 2019-01-01 RX ADMIN — GABAPENTIN 100 MG: 100 CAPSULE ORAL at 13:15

## 2019-01-01 RX ADMIN — ISOSORBIDE MONONITRATE 30 MG: 30 TABLET ORAL at 11:31

## 2019-01-01 RX ADMIN — PANTOPRAZOLE SODIUM 40 MG: 40 INJECTION, POWDER, FOR SOLUTION INTRAVENOUS at 06:25

## 2019-01-01 RX ADMIN — RANOLAZINE 1000 MG: 500 TABLET, FILM COATED, EXTENDED RELEASE ORAL at 09:47

## 2019-01-01 RX ADMIN — PANTOPRAZOLE SODIUM 40 MG: 40 INJECTION, POWDER, FOR SOLUTION INTRAVENOUS at 06:21

## 2019-01-01 ASSESSMENT — ENCOUNTER SYMPTOMS
FACIAL SWELLING: 0
DIARRHEA: 0
BACK PAIN: 0
NAUSEA: 0
SHORTNESS OF BREATH: 0
EYE REDNESS: 0
BLOOD IN STOOL: 1
ABDOMINAL PAIN: 0
EYE PAIN: 0
NAUSEA: 0
COLOR CHANGE: 0
RHINORRHEA: 0
BACK PAIN: 0
WHEEZING: 0
COUGH: 1
NAUSEA: 0
SHORTNESS OF BREATH: 0
ABDOMINAL DISTENTION: 0
WHEEZING: 0
CHEST TIGHTNESS: 0
COLOR CHANGE: 0
ABDOMINAL PAIN: 0
ABDOMINAL PAIN: 0
SORE THROAT: 0
COUGH: 0
CHEST TIGHTNESS: 0
EYE DISCHARGE: 0
VOMITING: 0
APNEA: 0
EYE REDNESS: 0
SHORTNESS OF BREATH: 0

## 2019-01-01 ASSESSMENT — PULMONARY FUNCTION TESTS
PIF_VALUE: 1
PIF_VALUE: 4
PIF_VALUE: 13
PIF_VALUE: 2
PIF_VALUE: 4
PIF_VALUE: 2
PIF_VALUE: 3
PIF_VALUE: 17
PIF_VALUE: 4
PIF_VALUE: 2
PIF_VALUE: 27
PIF_VALUE: 4
PIF_VALUE: 3
PIF_VALUE: 3
PIF_VALUE: 4
PIF_VALUE: 2
PIF_VALUE: 1
PIF_VALUE: 4
PIF_VALUE: 7
PIF_VALUE: 4
PIF_VALUE: 2
PIF_VALUE: 13
PIF_VALUE: 2
PIF_VALUE: 3
PIF_VALUE: 3
PIF_VALUE: 2
PIF_VALUE: 3
PIF_VALUE: 1
PIF_VALUE: 4
PIF_VALUE: 2
PIF_VALUE: 14
PIF_VALUE: 4
PIF_VALUE: 1
PIF_VALUE: 1

## 2019-01-01 ASSESSMENT — PAIN SCALES - GENERAL
PAINLEVEL_OUTOF10: 0
PAINLEVEL_OUTOF10: 2
PAINLEVEL_OUTOF10: 0
PAINLEVEL_OUTOF10: 4
PAINLEVEL_OUTOF10: 0
PAINLEVEL_OUTOF10: 0
PAINLEVEL_OUTOF10: 5
PAINLEVEL_OUTOF10: 0
PAINLEVEL_OUTOF10: 4
PAINLEVEL_OUTOF10: 0
PAINLEVEL_OUTOF10: 9
PAINLEVEL_OUTOF10: 0
PAINLEVEL_OUTOF10: 3
PAINLEVEL_OUTOF10: 0
PAINLEVEL_OUTOF10: 4
PAINLEVEL_OUTOF10: 0
PAINLEVEL_OUTOF10: 5
PAINLEVEL_OUTOF10: 0
PAINLEVEL_OUTOF10: 5
PAINLEVEL_OUTOF10: 0

## 2019-01-01 ASSESSMENT — PAIN DESCRIPTION - DESCRIPTORS
DESCRIPTORS: SORE
DESCRIPTORS: DISCOMFORT
DESCRIPTORS: DISCOMFORT

## 2019-01-01 ASSESSMENT — PAIN DESCRIPTION - LOCATION
LOCATION: THROAT

## 2019-01-01 ASSESSMENT — PAIN DESCRIPTION - PAIN TYPE
TYPE: ACUTE PAIN

## 2019-01-01 ASSESSMENT — PAIN DESCRIPTION - FREQUENCY: FREQUENCY: CONTINUOUS

## 2019-01-01 ASSESSMENT — PAIN DESCRIPTION - PROGRESSION: CLINICAL_PROGRESSION: NOT CHANGED

## 2019-01-01 ASSESSMENT — PAIN - FUNCTIONAL ASSESSMENT: PAIN_FUNCTIONAL_ASSESSMENT: 0-10

## 2019-03-27 NOTE — LETTER
Beneficiary Notification Letter     This [place] Provider is Participating in an Innovative Payment and [address] [place] [address] [place] from Ella Gutierrez:   [facility] is participating in a Medicare initiative called the [facility] for [facility]. You are receiving this letter because your health care provider has identified you as a patient who is receiving care through this initiative. Health care providers participating in the [facility] [address], including [facility], will work with Medicare to improve care for patients. Your Medicare rights have not been changed. You still have all the same Medicare rights and protections, including the right to choose which hospital, doctor, or other health care provider you see. However, because [facility] chose to participate in the [address] [place] [address], all Medicare beneficiaries who meet the eligibility criteria of this initiative will receive care under the initiative. If you do not wish to receive care under the Bundled Payments [facility] [address] [facility], you must choose a health care provider that does not participate in this initiative for your care. Regardless of which health care provider you see, Medicare will continue to cover all of your medically necessary services. Bundled Payments for Care Improvement Advanced aims to help improve your care     The Bundled Payments [facility] [address] [facility] is an innovative Medicare initiative that encourages your doctors, hospitals, and other health care providers to work more closely together so you get better care during and following certain hospital stays.  In this initiative, doctors and hospitals may work closely with certain health care providers and suppliers that help patients recover after discharge from the hospital, including skilled nursing facilities, home health agencies, inpatient rehabilitation facilities, and long term care hospitals. [address] [place] [address] is working closely with the doctors and other health care providers that care for you during and following your hospital stay and for a period of time after you leave the hospital. By working together, the health care providers are trying to more efficiently provide well-managed, high quality, patient-centered care as you undergo treatment. Hospitals, doctors, and other health care providers that care for you following a hospital stay may receive an additional payment for providing better, more coordinated health care. Medicare will monitor your care to make sure you and others get high quality care. Your feedback is important     Medicare may also ask you to answer a survey about the services and care you received from [address] [place] [address]. The survey will be mailed to you. Your feedback will improve care for all people with Medicare who receive care from [address] [place] [address]. Completion of this survey is optional.     Get more information     For more information about the Bundled Payments for [address] [place] [address], you can:    · Visit the CMS BPCI Advanced Website at http://hsu-fritz.net/ initiatives/bpci-advanced   · Call the [facility] BPCI-A team at (960) 726-2717. · Call 1-800-MEDICARE (5-485.403.2897). TTY users can call 2-706.549.6336     If you have concerns or complaints about your care, talk to your health care provider, or contact your Beneficiary and Family Centered Quality Improvement Organization [facility]). To get your CC-QIO's phone number, visit Medicare.gov/contacts or call 1-800-MEDICARE. · To find a different hospital, visit www. [email or web address].gov or call

## 2019-03-27 NOTE — ED TRIAGE NOTES
Patient brought into ED due to shortness of breath and decreased o2 at nursing home. Nursing home states patient on 2L o2 with oxygen saturation of 89%. EMS brings patient on nonrebreathing and saturations 99%.

## 2019-03-27 NOTE — ED PROVIDER NOTES
(01/12/2018); Cardiac surgery; vascular surgery; Cardiac catheterization (02/19/2018); pr dilation of salivary duct (N/A, 11/23/2018); Upper gastrointestinal endoscopy (Left, 2/14/2019); Upper gastrointestinal endoscopy (Left, 2/14/2019); and suprapubic catheter placement (N/A, 2/28/2019). CURRENT MEDICATIONS       Previous Medications    ACETAMINOPHEN (TYLENOL) 500 MG TABLET    Take by mouth every 8 hours as needed for Pain 2 tab    AMLODIPINE (NORVASC) 10 MG TABLET    Take 1 tablet by mouth daily    ASPIRIN 81 MG TABLET    Take 81 mg by mouth daily    ATORVASTATIN (LIPITOR) 20 MG TABLET    Take 20 mg by mouth daily    BYSTOLIC 10 MG TABLET    TAKE 1 TABLET BY MOUTH DAILY    CLOPIDOGREL (PLAVIX) 75 MG TABLET    Take 75 mg by mouth daily    CLOTRIMAZOLE-BETAMETHASONE (LOTRISONE) 1-0.05 % CREAM    Apply topically Daily to foreskin and every 8 hours as needed    DEXTROMETHORPHAN-GUAIFENESIN  MG/5ML SYRP    Take 10 mLs by mouth every 6 hours as needed for Cough    DOCUSATE SODIUM (COLACE) 100 MG CAPSULE    Take 100 mg by mouth 2 times daily     DOXEPIN (SILENOR) 3 MG TABS TABLET    Take 3 mg by mouth nightly    DOXEPIN HCL PO    Take 3 mg by mouth nightly    FAMOTIDINE (PEPCID) 20 MG TABLET    Take 20 mg by mouth daily    FERROUS SULFATE 325 (65 FE) MG TABLET    Take 1 tablet by mouth Daily with lunch    GABAPENTIN (NEURONTIN) 100 MG CAPSULE    Take 100 mg by mouth 3 times daily. Hershell Broach     GLIPIZIDE (GLUCOTROL) 5 MG TABLET    Take 5 mg by mouth daily    ISOSORBIDE MONONITRATE (ISMO;MONOKET) 10 MG TABLET    Take 1 tablet by mouth daily    LACTOBACILLUS PO    Take by mouth daily    LINAGLIPTIN (TRADJENTA) 5 MG TABLET    Take 5 mg by mouth daily    MAGNESIUM HYDROXIDE (EQL MILK OF MAGNESIA) 400 MG/5ML SUSPENSION    Take by mouth daily as needed for Constipation    MAGNESIUM OXIDE (MAG-OX) 400 MG TABLET    Take 400 mg by mouth 2 times daily     MECLIZINE (ANTIVERT) 25 MG TABLET    Take 25 mg by mouth every 12 hours as needed     METOPROLOL SUCCINATE (TOPROL XL) 25 MG EXTENDED RELEASE TABLET    Take 1 tablet by mouth daily    MULTIPLE VITAMINS-MINERALS (MULTIVITAMIN ADULTS 50+ PO)    Take 1 tablet by mouth daily     ONDANSETRON (ZOFRAN) 4 MG TABLET    Take 4 mg by mouth every 8 hours as needed for Nausea or Vomiting    PANTOPRAZOLE (PROTONIX) 40 MG TABLET    Take 40 mg by mouth 2 times daily    POLYETHYLENE GLYCOL (GLYCOLAX) POWDER    Take 17 g by mouth daily as needed     POLYVINYL ALCOHOL (LIQUIFILM TEARS) 1.4 % OPHTHALMIC SOLUTION    Place 1 drop into both eyes every 4 hours as needed     POTASSIUM CHLORIDE (KLOR-CON M) 20 MEQ EXTENDED RELEASE TABLET    Take 0.5 tablets by mouth daily    RANEXA 1000 MG EXTENDED RELEASE TABLET    TAKE 1 TABLET BY MOUTH TWICE DAILY    TAMSULOSIN (FLOMAX) 0.4 MG CAPSULE    Take 1 capsule by mouth daily    TORSEMIDE (DEMADEX) 10 MG TABLET    Take 1 tablet by mouth 2 times daily    TROLAMINE SALICYLATE (ASPERCREME EX)    Apply topically daily in AM to bilateral knees    VITAMIN C (ASCORBIC ACID) 500 MG TABLET    Take 500 mg by mouth nightly        ALLERGIES     has No Known Allergies. FAMILY HISTORY     indicated that his mother is . He indicated that his father is . family history is not on file. SOCIAL HISTORY      reports that he has never smoked. He has never used smokeless tobacco. He reports that he does not drink alcohol or use drugs. PHYSICAL EXAM     INITIAL VITALS:  height is 5' 7\" (1.702 m) and weight is 182 lb 1.6 oz (82.6 kg). His rectal temperature is 100.9 °F (38.3 °C). His blood pressure is 120/59 (abnormal) and his pulse is 88. His respiration is 27 and oxygen saturation is 96%. Physical Exam   Constitutional: He appears ill. Nasal cannula in place. HENT:   Head: Normocephalic and atraumatic. Right Ear: External ear normal.   Left Ear: External ear normal.   Eyes: Conjunctivae are normal.   Neck: Normal range of motion. Neck supple.  No tracheal deviation present. Cardiovascular: Normal rate, regular rhythm, normal heart sounds and intact distal pulses. Pulmonary/Chest: Tachypnea noted. He has no wheezes. He has rales in the left upper field, the left middle field and the left lower field. SPO2 at 88% on RA   Abdominal: Bowel sounds are normal. There is generalized tenderness (diffuse). Genitourinary:   Genitourinary Comments: Indwelling urinary catheter   Musculoskeletal: He exhibits edema. Right lower leg: He exhibits edema (1+ up to knees). Left lower leg: He exhibits edema (1+ up to  knees). Neurological: He is disoriented. He displays no tremor. He displays no seizure activity. GCS eye subscore is 4. GCS verbal subscore is 4. GCS motor subscore is 6. Skin:   Skin tear to right lower forearm  Skin hot to touch     DIFFERENTIAL DIAGNOSIS:   Sepsis, UTI, CVA, Influenza, Dehydration, ACI, Viral illness, Pneumonia    DIAGNOSTIC RESULTS     EKG: All EKG's are interpreted by the Emergency Department Physician who either signs or Co-signs this chart in the absence of a cardiologist.    RADIOLOGY: non-plainfilm images(s) such as CT, Ultrasound and MRI are read by the radiologist.    [address] [place] [address]   Final Result   Stable atrophy with chronic small vessel disease. **This report has been created using voice recognition software. It may contain minor errors which are inherent in voice recognition technology. **      Final report electronically signed by Dr. Sherman Bhardwaj on 3/27/2019 9:59 PM      CT ABDOMEN PELVIS W IV CONTRAST Additional Contrast? Radiologist Recommendation   Final Result   1. Bilateral infiltrates compatible with pneumonitis increasing compared to the prior study. 2. Cholelithiasis. 3. Stable mild renal atrophy         **This report has been created using voice recognition software. It may contain minor errors which are inherent in voice recognition technology. **      Final report electronically signed by Dr. Peri Crawford on 3/27/2019 9:57 PM      XR CHEST PORTABLE   Final Result      Interstitial infiltrates in the mid and lower lungs bilaterally either representing pulmonary edema or an atypical viral-type pneumonia. **This report has been created using voice recognition software. It may contain minor errors which are inherent in voice recognition technology. **      Final report electronically signed by Dr. Sundeep Al on 3/27/2019 9:05 PM          LABS:     Labs Reviewed   CBC WITH AUTO DIFFERENTIAL - Abnormal; Notable for the following components:       Result Value    RBC 3.28 (*)     Hemoglobin 9.9 (*)     Hematocrit 30.7 (*)     MPV 8.9 (*)     Lymphocytes # 0.2 (*)     Monocytes # 0.2 (*)     All other components within normal limits   BASIC METABOLIC PANEL - Abnormal; Notable for the following components:    CO2 18 (*)     Glucose 229 (*)     BUN 36 (*)     CREATININE 1.8 (*)     Calcium 8.4 (*)     All other components within normal limits   HEPATIC FUNCTION PANEL - Abnormal; Notable for the following components:    Alb 3.1 (*)     ALT 6 (*)     All other components within normal limits   TROPONIN - Abnormal; Notable for the following components:    Troponin T 0.015 (*)     All other components within normal limits   BRAIN NATRIURETIC PEPTIDE - Abnormal; Notable for the following components:    Pro-BNP 3209.0 (*)     All other components within normal limits   PROTIME-INR - Abnormal; Notable for the following components:    INR 1.16 (*)     All other components within normal limits   PROCALCITONIN - Abnormal; Notable for the following components:    Procalcitonin 2.01 (*)     All other components within normal limits   BLOOD GAS, ARTERIAL - Abnormal; Notable for the following components:    PCO2 31 (*)     PO2 63 (*)     HCO3 20 (*)     Base Excess (Calculated) -3.7 (*)     All other components within normal limits   URINE WITH REFLEXED MICRO - Abnormal; Notable for the following components: Protein, UA 30 (*)     Leukocyte Esterase, Urine MODERATE (*)     Color, UA DK YELLOW (*)     Character, Urine CLOUDY (*)     All other components within normal limits   GLOMERULAR FILTRATION RATE, ESTIMATED - Abnormal; Notable for the following components:    Est, Glom Filt Rate 36 (*)     All other components within normal limits   RAPID INFLUENZA A/B ANTIGENS   CULTURE BLOOD #1   CULTURE BLOOD #2   URINE CULTURE, REFLEXED    Narrative:     Source: urine, clean catch       Site:           Current Antibiotics: not stated   STREP PNEUMONIAE ANTIGEN   LEGIONELLA ANTIGEN, URINE   LIPASE   MAGNESIUM   APTT   LACTIC ACID, PLASMA   ANION GAP   OSMOLALITY   SCAN OF BLOOD SMEAR       EMERGENCY DEPARTMENT COURSE:   Vitals:    Vitals:    03/27/19 2210 03/27/19 2309 03/27/19 2353 03/28/19 0039   BP: (!) 114/54 116/60 (!) 126/52 (!) 120/59   Pulse: 95 93 92 88   Resp: (!) 41 24 26 27   Temp:       TempSrc:       SpO2: 99% 95% 99% 96%   Weight:       Height:   5' 7\" (1.702 m)        8:01 PM: The patient was seen and evaluated. MDM:  The patient presents to the ED with complaints of altered mental status. When the patient arrived, he was visibly ill. His rectal temperature was 104.9. He was oriented to person only - disoriented to place, time, and situation. The patient's physical exam showed tachypnea, rales throughout the right lung fields, an indwelling catheter, and diffuse abdominal tenderness to palpation. He came into the ED on a nonrebreather, but was on RA during initial evaluation. His O2 sats dropped to 88% on RA and supplemental O2 was applied. Within the department, the patient was treated with IV fluids, tylenol, levaquin, and zosyn. Patient's status remained stable during the duration of their stay. Labs and imaging were ordered, and reviewed with the patient's present family. I explained my proposed course of treatment with the patient's family, and they were amenable to my decision.  The patient was

## 2019-03-28 PROBLEM — A41.9 SEPSIS (HCC): Status: ACTIVE | Noted: 2018-01-01

## 2019-03-28 PROBLEM — J18.9 PNEUMONITIS: Status: ACTIVE | Noted: 2019-01-01

## 2019-03-28 NOTE — ED NOTES
Patient does not know the year of his date of birth or the current year. Jessie Paul NP notified of patients confusion, temp, respiratory rate, oxygen saturations with and without o2. Patient placed back on o2. Jessie Paul NP at bedside.      Woodrow Garzon RN  03/27/19 2013

## 2019-03-28 NOTE — CARE COORDINATION
3/28/19, 1:58 PM    DISCHARGE BARRIERS    Patient is from [facility] [place] and plans to return. Matilde Velazquez at [facility] stated that he is a long term resident and a medicaid bed hold. Left a message for CEDRIC Florence to confirm return to [facility].

## 2019-03-28 NOTE — CONSULTS
Consults                                  CONSULTATION NOTE :ID       Patient - Chon [place],  Age - 80 y.o.    - 1935      Room Number - 4B-11/011-A   N -  [id number]   [facility]  Date of Admission -  3/27/2019  7:13 PM  Patient's PCP: Leonor Benito MD     Requesting Physician: Jameel Ca MD    REASON FOR CONSULTATION   sepsis    HISTORY OF PRESENT ILLNESS       This is a very pleasant 80 y.o. male who was admitted to the hospital with a chief complaints of change in mental state. He is not able to give much hx, he was noted to have a change in his mental state from his base line. He had low oxygenation and despite treatment he was not getting better for which reason he was sent here. He has trouble swallowing, he has cough his CT scan shows bilateral infiltrate likely related to aspiration. He has supra pubic catheter. Had hx of UTI. The catheter was placed last month.  He is growing gram negative bacilli    PAST MEDICAL  HISTORY       Past Medical History:   Diagnosis Date    Acute kidney failure (HCC)     Anemia     ASHD (arteriosclerotic heart disease)     Blood circulation, collateral     CAD (coronary artery disease)     Cardiomegaly     Chronic diastolic (congestive) heart failure (HCC)     Chronic kidney disease     DM2 (diabetes mellitus, type 2) (HCC)     Dyslipidemia     Falls     GERD (gastroesophageal reflux disease)     Heart failure with preserved ejection fraction (HCC)     Hyperlipidemia     Hypertension     Iron deficiency anemia     Left atrial dilation     severe    Lymphoma (HCC)     non hodgkin    Malaise     Nonintractable headache     Osteoarthritis of both knees     Pacemaker 2018    [employer] DUAL PACEMAKER INSERTED ON D (peripheral vascular disease) ([facility] [address]        PAST SURGICAL HISTORY     Past Surgical History:   Procedure Laterality Date    CARDIAC CATHETERIZATION  2018    CARDIAC SURGERY      CORONARY smokeless tobacco.     ETOH:   reports that he does not drink alcohol. Patient currently lives in a nursing home      FAMILY HISTORY:     History reviewed. No pertinent family history. REVIEW OF SYSTEMS:   Unable to obtain due to his mental state    PHYSICAL EXAM:     /61   Pulse 87   Temp 99 °F (37.2 °C) (Oral)   Resp 22   Ht 5' 9\" (1.753 m)   Wt 188 lb 1.6 oz (85.3 kg)   SpO2 97%   BMI 27.78 kg/m²   General:  Sick looking, confused  HEENT: slighlty pale conjunctiva, unicteric sclera, dry oral mucosa. Chest:   Bilateral air entry, rales at both lower lung fields  Cardiovascular:  RRR ,S1S2, no murmur or gallop. Abdomen:  Soft, non tender to palpation. has suprapubic catheter  Extremities:  No rash  Skin:  Warm and dry. CNS: lethargic, no neck stiffness        LABS:     CBC:   Recent Labs     03/27/19 2039   WBC 5.6   HGB 9.9*        BMP:    Recent Labs     03/27/19 2039 03/28/19 0326    133*   K 4.9 4.9    102   CO2 18* 17*   BUN 36* 33*   CREATININE 1.8* 1.8*   GLUCOSE 229* 240*     Calcium:  Recent Labs     03/28/19 0326   CALCIUM 8.1*     Ionized Calcium:No results for input(s): IONCA in the last 72 hours. Magnesium:  Recent Labs     03/27/19 2039   MG 1.6     Phosphorus:No results for input(s): PHOS in the last 72 hours. BNP:No results for input(s): BNP in the last 72 hours.   Glucose:  Recent Labs     03/28/19  0701 03/28/19  1211   POCGLU 203* 210*     HgbA1C:   Recent Labs     03/28/19  0337   LABA1C 5.6     INR:   Recent Labs     03/27/19 2039   INR 1.16*     Hepatic:   Recent Labs     03/27/19 2039   ALKPHOS 72   ALT 6*   AST 12   PROT 6.6   BILITOT 0.8   BILIDIR 0.3   LABALBU 3.1*     Amylase and Lipase:  Recent Labs     03/27/19 2039   LACTA 2.1     Lactic Acid:   Recent Labs     03/27/19 2039   LACTA 2.1    UA:   Recent Labs     03/27/19  1950   PHUR 6.0   COLORU DK YELLOW*   PROTEINU 30*   BLOODU NEGATIVE   RBCUA 3-5   WBCUA 15-25   BACTERIA JIHAN Zarate NEGATIVE   GLUCOSEU NEGATIVE   BILIRUBINUR NEGATIVE   UROBILINOGEN 0.2   KETUA NEGATIVE         IMAGING:    Micro:   Lab Results   Component Value Date    BC No growth-preliminary 03/27/2019       Problem list of patient      Patient Active Problem List   Diagnosis Code    Postural dizziness with near syncope R42, R55    DM2 (diabetes mellitus, type 2) ([facility] E11.9    Hypertension I10    Pacemaker Z95.0    Peripheral vascular disease ([facility] I73.9    Lactic acidosis E87.2    Iron deficiency anemia D50.9    CAD (coronary artery disease) I25.10    Heart failure with preserved ejection fraction (HCC) I50.30    GERD (gastroesophageal reflux disease) K21.9    Dyslipidemia E78.5    Lymphoma ([facility]) C85.90    Osteoarthritis of both knees M17.0    Left atrial dilation I51.7    GLORIA (acute kidney injury) ([facility] N17.9    Frequent falls R29.6    Physical deconditioning R53.81    Altered awareness, transient R40.4    Fever R50.9    Sepsis ([facility]) A41.9    Pancytopenia ([facility]) D61.818    Altered mental status R41.82    Hepatomegaly R16.0    Urinary retention R33.9    Hematuria R31.9    Acute kidney injury superimposed on chronic kidney disease (HCC) N17.9, N18.9    Hyperkalemia E87.5    Hypercalcemia E83.52    Calculus of gallbladder without cholecystitis without obstruction K80.20    Syncope and collapse R55    Non-intractable vomiting with nausea R11.2    Gallbladder dilatation K82.8    Nonintractable headache R51    Osteoarthritis of multiple joints M15.9    Chronic diastolic (congestive) heart failure (HCC) I50.32    S/P placement of cardiac pacemaker Z95.0    Chronic kidney disease, stage 3 (HCC) N18.3    Severe malnutrition ([facility]) E43    Dysphagia R13.10    Urinary retention R33.9    Hyperkalemia E87.5    Hyponatremia E87.1    Lactic acidosis E87.2    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

## 2019-03-28 NOTE — ED NOTES
Pt has fluids running and his second antibiotic on Vancomycin is running, once that is finished we need to start Zosyn. I put pt in a 90 degree angle to help pt swallow better. I also provided warm blankets for pt.       Jaswinder Díaz RN  03/28/19 56

## 2019-03-28 NOTE — PROGRESS NOTES
Hospitalist Progress Note    Patient:  Ruby Mccracken      Unit/Bed:4B-11/011-A    YOB: 1935    MRN: [id number]       Acct: [de-identified]     PCP: Kike Herrera MD    Date of Admission: 3/27/2019    Chief Complaint: [address] Course: 80 y.o. male with PMH of GLORIA, anemia, CHF, PAD (R SFA/popliteal stent), PAH, neurogenic bladder s/p suprapubic catheter, DMII, and HTN who presented [facility] by EMS from [address] [place] [address]. Unable to obtain thorough hx due to pt's altered state. Per ED notes, secondary from nursing home report, pt was sent to the ED for SOB, couch, and decreased O2 saturation. Pt was on 2L NC at the nursing home and was satting 89%, was placed on non-rebreather upon arrival and was satting 99%. Cousin was at bedside who states the patient is not at his baseline state. Pt has known esophageal dysphagia s/p EGD dilation on 2/2019 by Dr. Eugenie Mills. Pt also recently had a suprapubic catheter placed on 2/2019 for neurogenic bladder. Pt is normally very alert and oriented. In the ED, pt confused with 3/4 SIRS (febrile, tachycardic, and tachypenic). Pt placed on non-re breather, started on IV Vanc, Zosyn and Levaquin and admitted. Subjective: pt more alert today, complains of being \"cold\". Continues to cough.        Medications:  Reviewed    Infusion Medications    dextrose       Scheduled Medications    amLODIPine  10 mg Oral Daily    aspirin  81 mg Oral Daily    atorvastatin  20 mg Oral Nightly    clopidogrel  75 mg Oral Daily    docusate sodium  100 mg Oral BID    doxepin  3 mg Oral Nightly    famotidine  20 mg Oral Daily    ferrous sulfate  325 mg Oral Lunch    gabapentin  100 mg Oral TID    isosorbide mononitrate  30 mg Oral Daily    metoprolol succinate  25 mg Oral Daily    potassium chloride  10 mEq Oral Daily    pantoprazole  40 mg Oral BID AC    ranolazine  1,000 mg Oral BID    tamsulosin  0.4 mg Oral Daily    03/28/19  0326    133*   K 4.9 4.9    102   CO2 18* 17*   BUN 36* 33*   CREATININE 1.8* 1.8*   CALCIUM 8.4* 8.1*     Recent Labs     03/27/19 2039   AST 12   ALT 6*   BILIDIR 0.3   BILITOT 0.8   ALKPHOS 72     Recent Labs     03/27/19 2039   INR 1.16*     No results for input(s): Lucinda Dark in the last 72 hours. Urinalysis:      Lab Results   Component Value Date    NITRU NEGATIVE 03/27/2019    WBCUA 15-25 03/27/2019    BACTERIA MANY 03/27/2019    RBCUA 3-5 03/27/2019    BLOODU NEGATIVE 03/27/2019    SPECGRAV 1.012 02/23/2019    GLUCOSEU NEGATIVE 03/27/2019       Radiology:  CT HEAD WO CONTRAST   Final Result   Stable atrophy with chronic small vessel disease. **This report has been created using voice recognition software. It may contain minor errors which are inherent in voice recognition technology. **      Final report electronically signed by Dr. Deven Mckeon on 3/27/2019 9:59 PM      CT ABDOMEN PELVIS W IV CONTRAST Additional Contrast? Radiologist Recommendation   Final Result   1. Bilateral infiltrates compatible with pneumonitis increasing compared to the prior study. 2. Cholelithiasis. 3. Stable mild renal atrophy         **This report has been created using voice recognition software. It may contain minor errors which are inherent in voice recognition technology. **      Final report electronically signed by Dr. Deven Mckeon on 3/27/2019 9:57 PM      XR CHEST PORTABLE   Final Result      Interstitial infiltrates in the mid and lower lungs bilaterally either representing pulmonary edema or an atypical viral-type pneumonia. **This report has been created using voice recognition software. It may contain minor errors which are inherent in voice recognition technology. **      Final report electronically signed by Dr. Ariadna Ratliff on 3/27/2019 9:05 PM          Diet: DIET CARB CONTROL;    DVT prophylaxis: [x] Lovenox                                 [] SCDs [] SQ Heparin                                 [] Encourage ambulation           [] Already on Anticoagulation     Disposition:    [] Home       [] TCU       [] Rehab       [] Psych       [x] SNF       [] [place]       [] Other-    Code Status: Full Code    PT/OT Eval Status: pending     Assessment/Plan:    Anticipated Discharge in : TBD     Active Hospital Problems    Diagnosis Date Noted    Pneumonitis [J18.9] 03/28/2019    Altered mental status [R41.82] 09/02/2018    Sepsis ([facility] [address] [A41.9] 08/16/2018    Heart failure with preserved ejection fraction ([facility] [I50.30]        Assessment/Plan:    1. Acute Metabolic Encephalopathy- likely secondary to infectious etiology given his presentation with sepsis. Source can be PNA and/or . Cont IV Vanc, Zosyn and Levaquin. CT showing bilateral pneumonitis and UA grossly abnormal. F/U Bx and Ux. Consult ID. 2. Acute Hypoxic Resp. Failure- secondary to PNA with some component of heart failure exacerbation. Cont IV ABx and diuresis. Cont to wean supplemental O2 as tolerated. 3. Sepsis- 3/4 SIRS (febrile, tachycardic, and tachypenic). Source can be PNA and/or . Cont IV Vanc, Zosyn and Levaquin. CT showing bilateral pneumonitis and UA grossly abnormal. F/U Bx and Ux. Consult ID.     4. PNA/Pnueomonitis- history of esophageal dysphagia s/p dilation on 2/2019. CT showing bilateral pneumonitis. Aspiration PNA? Cont IV Vanc and Zosyn. SLP eval. Consult ID.    5. Acute on Chronic Diastolic CHF- LES, elevated BNP, CXR findings of some edema. 2D Echo on 12/17 EF 55%. Gentle IV diuresis lasix 20 BID. Pending 2D Echo. 6. Esophageal Dysphagia-  history of esophageal dysphagia s/p dilation on 2/2019. SLP eval.     7. Neurogenic bladder- s/p suprapubic catheter placement on 2/2019.     8. Troponin Elevation- likely demand mismatch from sepsis. No chest pain or ECG changes. Monitor on telemetry. 9. CABG x 3 in 1975    10.  PAD s/p R SFA/popliteal stent with ISR; L BTK disease          Electronically signed by Christopher Miller MD on 3/28/2019 at 8:25 AM

## 2019-03-28 NOTE — PROGRESS NOTES
both knees     Pacemaker 1/25/2018    [employer] D[employer] PACEM[employer]ER INSERTED ON 01/  PVD (peripheral vascular disease) [facility])      Past Surgical History:   Procedure Laterality Date    CARDIAC CATHETERIZATION  02/19/2018    CARDIAC SURGERY      CORONARY ARTERY BYPASS GRAFT      PACEMAKER PLACEMENT  01/12/2018    [facility]    OR DILATION OF SALIVARY DUCT N/A 11/23/2018    EGD DILATION SAVORY performed by Kassidy Kiser MD at [address] [facility] N/A 2/28/2019    SUPRAPUBIC CATHETER PLACEMENT performed by Dinah Doyle MD at [address] [facility] Left 2/14/2019    EGD DILATION SAVORY performed by Kassidy Kiser MD at [facility] ENDOSCOPY Left 2/14/2019    EGD BIOPSY performed by Kassidy Kiser MD at [facility]    VASCULAR SURGERY             Subjective  Chart Reviewed: Yes(orders, progress notes)  Patient assessed for rehabilitation services?: Yes  Family / Caregiver Present: No    Subjective: drowsy, cooperative    General:       Vision: Within Functional Limits(has reading glasses)    Hearing: Exceptions to [facility]  Hearing Exceptions: Hard of hearing/hearing concerns         Pain:  Pain Assessment  Patient Currently in Pain: Yes(throat)  Pain Level: 9       Social/Functional History:  Type of Home: Facility([facility])  Home Layout: One level  Home Equipment: Rolling walker, Wheelchair-manual     Bathroom Toilet: Standard  Bathroom Equipment: Grab bars around toilet       ADL Assistance: Independent     Homemaking Responsibilities: No    Ambulation Assistance: Independent  Transfer Assistance: Independent          Additional Comments: Pt reports ambulating with RW at [facility] with A.; A for all mobility & ADLs.      Objective        Overall Cognitive Status: WFL         Sensation  Overall Sensation Status: WFL               LUE AROM (degrees)  LUE AROM : WFL          RUE AROM (degrees)  RUE AROM : WFL       LUE Training, Safety Education & Training    Goals:  Patient goals : feel better    Short term goals  Time Frame for Short term goals: 2 weeks  Short term goal 1: Tolerate sitting EOB 8-10 min with ADL task with S for balance  Short term goal 2: Complete BUE light strengthening exercises to increase endurance to A with BADL  Short term goal 3: Tolerate further assessment of t/fs & functional mobility by OTR  Long term goals  Time Frame for Long term goals : No LTG set d/t short ELOS    Evaluation Complexity: Based on the findings of patient history, examination, clinical presentation, and decision making during this evaluation, this patient is of medium complexity.

## 2019-03-28 NOTE — H&P
History & Physical        Patient:  Didi Glover  YOB: 1935    MRN: [id number]     Acct: [de-identified]    PCP: Cleve Jones MD    Date of Admission: 3/27/2019    Date of Service: Pt seen/examined on 03/28/19  and Admitted to Inpatient with expected LOS greater than two midnights due to medical therapy. Chief Complaint:  Altered Mental Status       History Of Present Illness:      80 y.o. male with PMH of GLORIA, anemia, CHF, DMII, and HTN who presented to [address] [place] [address] by EMS from [address] [place] [address]. Unable to obtain thorough hx due to pt's altered state. Per ED notes, secondary from nursing home report, pt was sent to the ED for SOB, couch, and decreased O2 saturation. Pt was on 2L NC at the nursing home and was satting 89%, was placed on non-rebreather upon arrival and was satting 99%. Cousin was at bedside who states the patient is not at his baseline state. When asked about place, pt states \"in the doctors office\". He is unable to state the time, saying \"I dont know\", he is oriented to person. Unable to obtain ROS given pt's altered mental status.      Past Medical History:          Diagnosis Date    Acute kidney failure (HCC)     Anemia     ASHD (arteriosclerotic heart disease)     Blood circulation, collateral     CAD (coronary artery disease)     Cardiomegaly     Chronic diastolic (congestive) heart failure (HCC)     Chronic kidney disease     DM2 (diabetes mellitus, type 2) (HCC)     Dyslipidemia     Falls     GERD (gastroesophageal reflux disease)     Heart failure with preserved ejection fraction (HCC)     Hyperlipidemia     Hypertension     Iron deficiency anemia     Left atrial dilation     severe    Lymphoma (HCC)     non hodgkin    Malaise     Nonintractable headache     Osteoarthritis of both knees     Pacemaker 1/25/2018    [employer] D[employer] PAC[employer]ER INSERTED ON 01/  PVD (peripheral vascular disease) (HCC)        Past Surgical History:          Procedure Laterality Date    CARDIAC CATHETERIZATION  02/19/2018    CARDIAC SURGERY      CORONARY ARTERY BYPASS GRAFT      PACEMAKER PLACEMENT  01/12/2018    [facility]    OK DILATION OF SALIVARY DUCT N/A 11/23/2018    EGD DILATION SAVORY performed by Natalia Dior MD at [address] [facility] N/A 2/28/2019    SUPRAPUBIC CATHETER PLACEMENT performed by Nazario Duque MD at [address] [facility] Left 2/14/2019    EGD DILATION SAVORY performed by Natalia Dior MD at [facility] Left 2/14/2019    EGD BIOPSY performed by [facility] MD at [facility]    VASCULAR SURGERY         Medications Prior to Admission:      Prior to Admission medications    Medication Sig Start Date End Date Taking?  Authorizing Provider   metoprolol succinate (TOPROL XL) 25 MG extended release tablet Take 1 tablet by mouth daily 3/20/19   Tereza Oneill MD   doxepin (SILENOR) 3 MG TABS tablet Take 3 mg by mouth nightly    Historical Provider, MD   ferrous sulfate 325 (65 Fe) MG tablet Take 1 tablet by mouth Daily with lunch 2/25/19   GARRETT Edwards - CNP   DOXEPIN HCL PO Take 3 mg by mouth nightly    Historical Provider, MD   Dextromethorphan-guaiFENesin  MG/5ML SYRP Take 10 mLs by mouth every 6 hours as needed for Cough    Historical Provider, MD   pantoprazole (PROTONIX) 40 MG tablet Take 40 mg by mouth 2 times daily    Historical Provider, MD   vitamin C (ASCORBIC ACID) 500 MG tablet Take 500 mg by mouth nightly     Historical Provider, MD   clotrimazole-betamethasone (LOTRISONE) 1-0.05 % cream Apply topically Daily to foreskin and every 8 hours as needed    Historical Provider, MD   polyvinyl alcohol (LIQUIFILM TEARS) 1.4 % ophthalmic solution Place 1 drop into both eyes every 4 hours as needed     Historical Provider, MD   LACTOBACILLUS PO Take by mouth daily    Historical Provider, MD   magnesium hydroxide (EQL MILK OF MAGNESIA) 400 MG/5ML suspension Take by mouth daily as needed for Constipation    Historical Provider, MD   docusate sodium (COLACE) 100 MG capsule Take 100 mg by mouth 2 times daily     Historical Provider, MD   famotidine (PEPCID) 20 MG tablet Take 20 mg by mouth daily    Historical Provider, MD   linagliptin (TRADJENTA) 5 MG tablet Take 5 mg by mouth daily    Historical Provider, MD   ondansetron (ZOFRAN) 4 MG tablet Take 4 mg by mouth every 8 hours as needed for Nausea or Vomiting    Historical Provider, MD   torsemide (DEMADEX) 10 MG tablet Take 1 tablet by mouth 2 times daily 11/26/18   Anupam Alcala MD   tamsulosin [facility]) 0.4 MG capsule Take 1 capsule by mouth daily 9/7/18   Maria Alejandra Shafer MD   Trolamine Salicylate (ASPERCREME EX) Apply topically daily in AM to bilateral knees    Historical Provider, MD   gabapentin (NEURONTIN) 100 MG capsule Take 100 mg by mouth 3 times daily. Harpal Evans     Historical Provider, MD   acetaminophen (TYLENOL) 500 MG tablet Take by mouth every 8 hours as needed for Pain 2 tab    Historical Provider, MD   polyethylene glycol (GLYCOLAX) powder Take 17 g by mouth daily as needed     Historical Provider, MD   meclizine (ANTIVERT) 25 MG tablet Take 25 mg by mouth every 12 hours as needed     Historical Provider, MD   Multiple Vitamins-Minerals (MULTIVITAMIN ADULTS 50+ PO) Take 1 tablet by mouth daily     Historical Provider, MD   RANEXA 1000 MG extended release tablet TAKE 1 TABLET BY MOUTH TWICE DAILY 6/25/18   Irwin Cast MD   BYSTOLIC 10 MG tablet TAKE 1 TABLET BY MOUTH DAILY 6/25/18   Irwin Cast MD   amLODIPine (NORVASC) 10 MG tablet Take 1 tablet by mouth daily 4/17/18   Irwin Cast MD   potassium chloride (KLOR-CON M) 20 MEQ extended release tablet Take 0.5 tablets by mouth daily 4/16/18   Irwin Cast MD   aspirin 81 MG tablet Take 81 mg by mouth daily    Historical Provider, MD   atorvastatin (LIPITOR) 20 MG tablet Take 20 mg by mouth Skin: Skin color, texture, turgor normal.  No rashes or lesions. Neurologic:  Neurovascularly intact without any focal sensory/motor deficits. Cranial nerves: II-XII intact, grossly non-focal.  Psychiatric:  Disoriented, thought content appropriate,   Capillary Refill: Brisk,< 3 seconds   Peripheral Pulses: +2 palpable, equal bilaterally       Labs:     Recent Labs     03/27/19 2039   WBC 5.6   HGB 9.9*   HCT 30.7*        Recent Labs     03/27/19 2039      K 4.9      CO2 18*   BUN 36*   CREATININE 1.8*   CALCIUM 8.4*     Recent Labs     03/27/19 2039   AST 12   ALT 6*   BILIDIR 0.3   BILITOT 0.8   ALKPHOS 72     Recent Labs     03/27/19 2039   INR 1.16*     No results for input(s): Lucinda Dark in the last 72 hours. Urinalysis:      Lab Results   Component Value Date    NITRU NEGATIVE 03/27/2019    WBCUA 15-25 03/27/2019    BACTERIA MANY 03/27/2019    RBCUA 3-5 03/27/2019    BLOODU NEGATIVE 03/27/2019    SPECGRAV 1.012 02/23/2019    GLUCOSEU NEGATIVE 03/27/2019       Intake & Output:  No intake/output data recorded. No intake/output data recorded. Radiology:     CXR: I have reviewed the CXR with the following interpretation:     \"Interstitial infiltrates in the mid and lower lungs bilaterally either representing pulmonary edema or an atypical viral-type pneumonia. \"    EKG:  I have reviewed the EKG with the following interpretation:     'Sinus tachycardia with 1st degree A-V block  ST & T wave abnormality, consider inferior ischemia  ST & T wave abnormality, consider anterolateral ischemia  Abnormal ECG  When compared with ECG of 19-NOV-2018 14:27,  Significant changes have occurred\"    CT HEAD WO CONTRAST   Final Result   Stable atrophy with chronic small vessel disease. **This report has been created using voice recognition software. It may contain minor errors which are inherent in voice recognition technology. **      Final report electronically signed by Dr. Fnay Doyle Jimbo on 3/27/2019 9:59 PM      CT ABDOMEN PELVIS W IV CONTRAST Additional Contrast? Radiologist Recommendation   Final Result   1. Bilateral infiltrates compatible with pneumonitis increasing compared to the prior study. 2. Cholelithiasis. 3. Stable mild renal atrophy         **This report has been created using voice recognition software. It may contain minor errors which are inherent in voice recognition technology. **      Final report electronically signed by Dr. Melani Morin on 3/27/2019 9:57 PM      XR CHEST PORTABLE   Final Result      Interstitial infiltrates in the mid and lower lungs bilaterally either representing pulmonary edema or an atypical viral-type pneumonia. **This report has been created using voice recognition software. It may contain minor errors which are inherent in voice recognition technology. **      Final report electronically signed by Dr. Wisam Kee on 3/27/2019 9:05 PM           DVT prophylaxis: {dvt prophylaxis:46264    Code Status: Full Code      PT/OT Eval Status: on case    Disposition:[facility]    C/Reece Leung 1106 Problems    Diagnosis Date Noted    Pneumonitis [J18.9] 03/28/2019    Altered mental status [R41.82] 09/02/2018    Sepsis ([facility] [A41.9] 08/16/2018    Heart failure with preserved ejection fraction ([facility] [I50.30]        PLAN:    1. Altered Mental Status with Sepsis  - Broad spectrum Abx coverage including Levofloxacin, Vanc and Zosyn  - procal of 2.1, lactic acid of 2.1, temp of 104.9 on arrival --> 102.1  - ABG: pH 7.42, PCO2 31, PO2 63, HCO3 20  - electrolytes WNL, CT of head negative, blood cultures pending, Flu A/B neg  - continue to monitor clinical course, symptomatic control    2. Chronic diastolic CHF  - BNP of 7211 with presence of +1 pitting edema  - rales present in left lung throughout, pt given Lasix    3.  Pneumonitis   - CXR depicts bilateral infiltrates   - Legionella antigen pending, Strep antigen pending  - WBC of 5.6         Continue home medications    Thank you Leonor Benito MD for the opportunity to be involved in this patient's care.     Electronically signed by Christopher Harris PA-C on 3/28/2019 at 3:11 AM

## 2019-03-28 NOTE — PROGRESS NOTES
Pharmacy Note  Vancomycin Consult    Reshma Meyers is a 80 y.o. male started on Vancomycin for sepsis; consult received from [facility] to manage therapy. Also receiving the following antibiotics: Levaquin and Zosyn. Patient Active Problem List   Diagnosis    Postural dizziness with near syncope    DM2 (diabetes mellitus, type 2) (HCC)    Hypertension    Pacemaker    Peripheral vascular disease (HCC)    Lactic acidosis    Iron deficiency anemia    CAD (coronary artery disease)    Heart failure with preserved ejection fraction (HCC)    GERD (gastroesophageal reflux disease)    Dyslipidemia    Lymphoma (HCC)    Osteoarthritis of both knees    Left atrial dilation    GLORIA (acute kidney injury) ([address]    Frequent falls    Physical deconditioning    Altered awareness, transient    Fever    Sepsis ([address]    Pancytopenia (HCC)    Altered mental status    Hepatomegaly    Urinary retention    Hematuria    Acute kidney injury superimposed on chronic kidney disease (HCC)    Hyperkalemia    Hypercalcemia    Calculus of gallbladder without cholecystitis without obstruction    Syncope and collapse    Non-intractable vomiting with nausea    Gallbladder dilatation    Nonintractable headache    Osteoarthritis of multiple joints    Chronic diastolic (congestive) heart failure (HCC)    S/P placement of cardiac pacemaker    Chronic kidney disease, stage 3 (HCC)    Severe malnutrition (HCC)    Dysphagia    Urinary retention    Hyperkalemia    Hyponatremia    Lactic acidosis    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 ([address]    Urinary tract infection associated with indwelling urethral catheter ([address]    Other acute kidney failure (HCC)    Suprapubic catheter ([address]    Acute postoperative pain    BPH with obstruction/lower urinary tract symptoms    Neurogenic bladder    Pneumonitis       Allergies:  Patient has no known allergies.      Temp max: 104.9    Recent Labs     03/27/19 2039   BUN 36* Recent Labs     03/27/19 2039   CREATININE 1.8*       Recent Labs     03/27/19 2039   WBC 5.6       No intake or output data in the 24 hours ending 03/28/19 0408    Culture Date      Source                       Results  3/28/19                Legionella                  Sent  3/28/19                Strep pneumo            Sent  3/27/19                Rapid A/B flu              A/B negative  3/27/19                BC 2/2                        Sent  3/27/19                Urine                           No result    Ht Readings from Last 1 Encounters:   03/27/19 5' 7\" (1.702 m)        Wt Readings from Last 1 Encounters:   03/27/19 182 lb 1.6 oz (82.6 kg)         Body mass index is 28.52 kg/m². Estimated Creatinine Clearance: 32 mL/min (A) (based on SCr of 1.8 mg/dL (H)). Goal Trough Level: 15-20 mcg/mL    Assessment/Plan:  1500 mg x 1 given in the ED on 3/27/19 @2306. Will check a 24 hour random level to assess further dosing. Thank you for the consult. Will continue to follow.    Bonnie Chaparro, PharmD

## 2019-03-28 NOTE — PROGRESS NOTES
[facility] THERAPY  [facility] CVICU 4B  Bedside Swallowing Evaluation      SLP Individual Minutes  Time In: 1550  Time Out: 9644  Minutes: 10  Timed Code Treatment Minutes: 0 Minutes       Date: 3/28/2019  Patient Name: Savita Segura      CSN: [id number]   : 1935  (80 y.o.)  Gender: male   Referring Physician: Dr. Brandi Naqvi   Diagnosis: Altered mental status   Secondary Diagnosis: Dysphagia   History of Present Illness/Injury: Pt admit with above altered mental status secondary to high running fever with chest xray indicate of pneumonia with bilateral infiltrates per nursing report and chart review. Rocky VALADEZ reports pt with hx of multiple EGD dilations. No GI follow up at this time. Consult for BSE to further assess and determine need for further dietary changes/instrumental assessment.    Past Medical History:   Diagnosis Date    Acute kidney failure (HCC)     Anemia     ASHD (arteriosclerotic heart disease)     Blood circulation, collateral     CAD (coronary artery disease)     Cardiomegaly     Chronic diastolic (congestive) heart failure (HCC)     Chronic kidney disease     DM2 (diabetes mellitus, type 2) (HCC)     Dyslipidemia     Falls     GERD (gastroesophageal reflux disease)     Heart failure with preserved ejection fraction (HCC)     Hyperlipidemia     Hypertension     Iron deficiency anemia     Left atrial dilation     severe    Lymphoma (HCC)     non hodgkin    Malaise     Nonintractable headache     Osteoarthritis of both knees     Pacemaker 2018    [employer] DUAL PACEMAKER INSERTED ON D (peripheral vascular disease) (HCC)        Pain:  None reported     Current Diet: Regular diet and thin liquids     Respiratory Status: [] Independent [x] Nasal Cannula [] Oxygen Mask      [] Tracheostomy [] Other:     [] Ventilator/Settings:    Behavioral Observation: [x] Alert [] Oriented [x] Confused [x] Lethargic      [] Dysarthric [] Limited Direction Following [] Agitated      [] Other:    ORAL MECHANISM EVALUATION:         Comments:  Facial / Labial [x]WFL [] Impaired []DNT    Lingual []WFL [x] Impaired []DNT Lingual tremor at rest, decreased ROM, strength and coordination   Dentition [x]WFL [] Impaired []DNT    Velum [x]WFL [] Impaired []DNT    Vocal Quality []WFL [x] Impaired []DNT Severe hoarseness    Sensation []WFL [] Impaired [x]DNT    Cough [x]WFL [] Impaired []DNT    Other: []WFL [] Impaired []DNT    Other: []WFL [] Impaired []DNT        PATIENT WAS EVALUATED USING:  Puree, hard solid, thin liquids by cup     ORAL PHASE: [x] WFL  [] Impaired   [x] Slow but functional mastication     PHARYNGEAL PHASE: [] WFL: Pharyngeal phase appears WFLs, but cannot completely rule out pharyngeal phase deficits from a bedside swallow evaluation alone. [x] Impaired   [x] Decreased Hyolaryngeal Elevation as trials progressed     SIGNS AND SYMPTOMS OF LARYNGEAL PENETRATION / ASPIRATION:  [x] Belching, coughing     IMPRESSIONS: Pt presents with essentially functional oral swallow function evidenced by slow but effective mastication with use of liquid wash to maximize oral clearance. No overt oral stasis to follow. Pt at least mild pharyngeal dysphagia. Complaints of globus sensation near upper pharynx; however, none reported during this assessment. Pt with what appeared to be a timely pharyngeal swallow trigger and suspected good oral bolus control. Laryngeal elevation appeared to decline with multiple trials given completion of manual palpation. Pt with hx of EGD dilations in the past. No ongoing GI follow up at this time per pt/nursing report. Pt with intermittent belching following by delayed cough with trials of thin liquids by cup. No overt s/s of aspiration noted with trials of puree and hard solids. Baseline coughing present without PO intake. Recommendations for MBS to further assess pharyngeal swallow function and r/o dysfunction.  Recommend GI consult

## 2019-03-28 NOTE — PROGRESS NOTES
[address] [address]  INPATIENT PHYSICAL THERAPY  EVALUATION  [facility]    Time In: 6812  Time Out: 1305  Timed Code Treatment Minutes: 8 Minutes  Minutes: 27          Date: 3/28/2019  Patient Name: Estefany Morocho,  Gender:  male        MRN: [id number]  : 1935  (80 y.o.)      Referring Practitioner: Shelly Waters PA-C  Diagnosis: altered mental status  Additional Pertinent Hx: Per EMR: \"Per ER note on 3/27/19: 80 y.o. male who presents to the Emergency Department by EMS from [address]. The patient has a history of CAD, diabetes, hypertension, hyperlipidemia, GERD, PVD, CKD, and anemia. Cousin of patient is at bedside. According to [facility] report, patient was sent here for shortness of breath, cough, and decreased O2. Patient was on 2 liters of NC at the [facility] home with a saturation of 89%. Upon arrival to our department, squad has patient on a non-rebreather with sats at 99%. Patient is alert to person and place, but states the year is \"\". Cousin states this is abnormal for the patient. Patient complains of abdominal pain upon palpation. He presents here with a rectal temperature of 104. 9. \"     Past Medical History:   Diagnosis Date    Acute kidney failure ([address]     Anemia     ASHD (arteriosclerotic heart disease)     Blood circulation, collateral     CAD (coronary artery disease)     Cardiomegaly     Chronic diastolic (congestive) heart failure (HCC)     Chronic kidney disease     DM2 (diabetes mellitus, type 2) (HCC)     Dyslipidemia     Falls     GERD (gastroesophageal reflux disease)     Heart failure with preserved ejection fraction (HCC)     Hyperlipidemia     Hypertension     Iron deficiency anemia     Left atrial dilation     severe    Lymphoma (HCC)     non hodgkin    Malaise     Nonintractable headache     Osteoarthritis of both knees     Pacemaker 2018    [employer] DUAL PACEMAKER INSERTED ON  (peripheral vascular disease) [facility])      Past Surgical History:   Procedure Laterality Date    CARDIAC CATHETERIZATION  02/19/2018    CARDIAC SURGERY      CORONARY ARTERY BYPASS GRAFT      PACEMAKER PLACEMENT  01/12/2018    [facility] Summers    [place] DILATION OF SALIVARY DUCT N/A 11/23/2018    EGD DILATION SAVORY performed by Maria Elena Dallas MD at [facility] Pop N/A 2/28/2019    SUPRAPUBIC CATHETER PLACEMENT performed by Vidhya Wilson MD at [address] [address] Left 2/14/2019    EGD DILATION SAVORY performed by Maria Elena Dallas MD at [facility]   [facility] Left 2/14/2019    EGD BIOPSY performed by Maria Elena Dallas MD at [facility] [place]    VASCULAR SURGERY         Restrictions/Precautions:  Fall Risk      Other position/activity restrictions: monitor BP during session       Subjective:  Chart Reviewed: Yes  Patient assessed for rehabilitation services?: Yes  Family / Caregiver Present: No  Subjective: Pt recieved in supine and was agreeable to PT interventions. Post session, pt in bedside chair with all needs in reach. General:  Overall Orientation Status: Impaired  Orientation Level: Oriented to person, Disoriented to situation, Disoriented to time, Disoriented to place  Follows Commands: Impaired    Vision: Impaired  Vision Exceptions: Wears glasses for reading    Hearing: Exceptions to [facility]  Hearing Exceptions: Hard of hearing/hearing concerns       Pain:  Denies.           Social/Functional History:    Lives With: Spouse  Type of Home: Facility([facility])  Home Layout: One level  Home Equipment: Rolling walker, Wheelchair-manual     Bathroom Toilet: Standard  Bathroom Equipment: Grab bars around toilet       ADL Assistance: Independent     Homemaking Responsibilities: Yes  Ambulation Assistance: Independent  Transfer Assistance: Independent          Additional Comments: Pt reports ambulating with RW at [facility] with A.; A for all high-level IADLs  Assessment: Pt demonstrates a decrease in baseline by way of bed mobility, ambulation, and transfers secondary to decreased activity tolerance, fatigue, strength, and balance deficits. Pt will benefit from continued therapy services during admission and beyond hospital discharge for improvements in functional independence and safety with mobility. Prognosis: Good    Clinical Presentation: Moderate - Evolving with Changing Characteristics:   Pt demonstrates a decrease in baseline by way of bed mobility, ambulation, and transfers secondary to decreased activity tolerance, fatigue, strength, and balance deficits. Pt will benefit from continued therapy services during admission and beyond hospital discharge for improvements in functional independence and safety with mobility. Decision Making: Moderate Complexitybased on patient assessment and decision making process of determining plan of care and establishing reasonable expectations for measurable functional outcomes    REQUIRES PT FOLLOW UP: Yes    Discharge Recommendations:  Discharge Recommendations: Continue to assess pending progress, Subacute/Skilled Nursing Facility, Patient would benefit from continued therapy after discharge    Patient Education:  Patient Education: Role of PT, plan of care    Equipment Recommendations:  Equipment Needed: No    Safety:  Type of devices:  All fall risk precautions in place, Gait belt, Call light within reach, Chair alarm in place, Left in chair, Nurse notified, Patient at risk for falls    Plan:  Times per week: 3-5xGM  Times per day: Daily  Specific instructions for Next Treatment: advance as tolerated   Current Treatment Recommendations: Strengthening, Transfer Training, Endurance Training, ROM, Neuromuscular Re-education, Patient/Caregiver Education & Training, Balance Training, Functional Mobility Training, Stair training, Safety Education & Training, IADL Training, Gait Training, Home Exercise Program    Goals:  Patient goals : return to PLOF  Short term goals  Time Frame for Short term goals: 2 weeks  Short term goal 1: bed mobility with contact guard assist for ease of getting in/out of bed  Short term goal 2: sit <>stand with contact guard assist for ease of transfers  Short term goal 3: ambulate 15ft with use of least restrictive device and contact guard assist for ease of ADLs and ambulating to the bathroom  Short term goal 4: tolerate standing dynamic task for 2 minutes, with CGA, no LOB for ease of ADLs  Long term goals  Time Frame for Long term goals : N/A secondary to short ELOS    Evaluation Complexity: Based on the findings of patient history, examination, clinical presentation, and decision making during this evaluation, the evaluation of Alexandra Vela  is of medium complexity.             AM-PAC Inpatient Mobility without Stair Climbing Raw Score : 12  AM-PAC Inpatient without Stair Climbing T-Scale Score : 37.26  Mobility Inpatient CMS 0-100% Score: 63.03  Mobility Inpatient without Stair CMS G-Code Modifier : CL

## 2019-03-28 NOTE — CARE COORDINATION
3/28/19, 8:34 AM      Rhesa Carrel       Admitted from: ED 3/27/2019/ [facility] day: 0   Location: -11/011-A Reason for admit: Altered mental status [R41.82] Status: IP  Admit order signed?: yes  PMH:  has a past medical history of Acute kidney failure ([address] Anemia, ASHD (arteriosclerotic heart disease), Blood circulation, collateral, CAD (coronary artery disease), Cardiomegaly, Chronic diastolic (congestive) heart failure ([address] Chronic kidney disease, DM2 (diabetes mellitus, type 2) ([address] Dyslipidemia, Falls, GERD (gastroesophageal reflux disease), Heart failure with preserved ejection fraction ([address] Hyperlipidemia, Hypertension, Iron deficiency anemia, Left atrial dilation, Lymphoma ([address] Malaise, Nonintractable headache, Osteoarthritis of both knees, Pacemaker, and PVD (peripheral vascular disease) ([address]   Procedure: none  Pertinent abnormal Imaging:  3/27 CT Head: No acute findings  3/27 CT Abd/pelvis: Bilateral infiltrates compatible with pneumonitis increasing compared to the prior study; cholelithiasis; stable mild renal atrophy  3/27 CXR: Interstitial infiltrates in the mid and lower lungs bilaterally either representing pulmonary edema or an atypical viral-type pneumonia  Medications:  Scheduled Meds:   amLODIPine  10 mg Oral Daily    aspirin  81 mg Oral Daily    atorvastatin  20 mg Oral Nightly    clopidogrel  75 mg Oral Daily    docusate sodium  100 mg Oral BID    doxepin  3 mg Oral Nightly    famotidine  20 mg Oral Daily    ferrous sulfate  325 mg Oral Lunch    gabapentin  100 mg Oral TID    isosorbide mononitrate  30 mg Oral Daily    metoprolol succinate  25 mg Oral Daily    potassium chloride  10 mEq Oral Daily    pantoprazole  40 mg Oral BID AC    ranolazine  1,000 mg Oral BID    tamsulosin  0.4 mg Oral Daily    vitamin C  500 mg Oral Nightly    sodium chloride flush  10 mL Intravenous 2 times per day    enoxaparin  30 mg Subcutaneous Daily    piperacillin-tazobactam  3.375 g Intravenous Q8H    levofloxacin  750 mg Intravenous Q48H    insulin lispro  0-6 Units Subcutaneous TID WC    insulin lispro  0-3 Units Subcutaneous Nightly    vancomycin (VANCOCIN) intermittent dosing (placeholder)   Other RX Placeholder    furosemide  20 mg Intravenous Daily     Continuous Infusions:   dextrose        Pertinent Info/Orders/Treatment Plan: Presented from F with AMS, SOB, cough, and decreased O2 sats. At [facility], was on 2L O2 and sats 89%. Placed on NRB and on arrival sats were 99%. ID consulted. SLP/PT/OT. MBS ordered. Urine +enteric gram negative bacilli. Tmax 104.9. Sats 97% on 5L O2. Oriented to person and place. Follows commands. Suprapubic catheter. Telemetry, I&O, daily weight, trujillo care, up with assist. Norvasc, asa, lipitor, plavix, doxepin, lovenox, pepcid, IV lasix 20 mg daily, neurontin, SSI ACHS, imdur, IV levaquin, prn antivert, toprol xl, protonix, IV zosyn, K+, ranexa, flomax, vit C. Received 30 ml/kg fld bolus and IV vancomycin x1. Na+ 133, CO2 17, BUN 33, Creat 1.8, LA 2.5, procal 2.01, pro-bnp 3209, trop T 0.015, alb 3.1, hgb 9.9. Blood cultures sent. Rapid flu was neg. Diet: DIET CARB CONTROL;   Smoking status:  reports that he has never smoked. He has never used smokeless tobacco.   PCP: Julito Gregory MD  Readmission: no  Readmission Risk Score: 44%    Discharge Planning  Current Residence:  Nursing Home  Living Arrangements:  Other (Comment)   Support Systems:  Family Members  Current Services PTA:     Potential Assistance Needed:  N/A  Potential Assistance Purchasing Medications:  No  Does patient want to participate in local refill/ meds to beds program?  No  Type of Home Care Services:  None  Patient expects to be discharged to:  [facility]  Expected Discharge date:  03/30/19  Follow Up Appointment: Best Day/ Time: Monday AM    Discharge Plan: From [facility]. SW on case.

## 2019-03-29 NOTE — PLAN OF CARE
Care plan reviewed with patient and cousin. Patient and cousin verbalize understanding of the plan of care and contribute to goal setting. Problem: Falls - Risk of:  Goal: Will remain free from falls  Description  Will remain free from falls  3/29/2019 1053 by Pam Deshpande RN  Outcome: Ongoing  Note:   No falls noted this shift. Continue falling star program. Bed alarm on, bed in low position. Call light and personal belongings in reach. Patient uses call light appropriately. 3/29/2019 1051 by Pam Deshpande RN  Outcome: Ongoing  Note:   No falls noted this shift. Continue falling star program. Bed alarm on, bed in low position. Call light and personal belongings in reach. Patient uses call light appropriately. Goal: Absence of physical injury  Description  Absence of physical injury  3/29/2019 1053 by Pam Deshpande RN  Outcome: Ongoing  Note:   No falls noted this shift. Continue falling star program. Bed alarm on, bed in low position. Call light and personal belongings in reach. Patient uses call light appropriately. 3/29/2019 1051 by Pam Deshpande RN  Outcome: Ongoing  Note:   No falls noted this shift. Continue falling star program. Bed alarm on, bed in low position. Call light and personal belongings in reach. Patient uses call light appropriately. Problem: Risk for Impaired Skin Integrity  Goal: Tissue integrity - skin and mucous membranes  Description  Structural intactness and normal physiological function of skin and  mucous membranes.   3/29/2019 1053 by Pam Deshpande RN  Outcome: Ongoing  Note:   No new signs or symptoms of skin breakdown noted this shift, encouraging patient to turn and reposition self in bed q2h    3/29/2019 1051 by Pam Deshpande RN  Outcome: Ongoing  Note:   No new signs or symptoms of skin breakdown noted this shift, encouraging patient to turn and reposition self in bed q2h     Problem: Pain:  Description  Pain management should include both nonpharmacologic and pharmacologic interventions. Goal: Pain level will decrease  Description  Pain level will decrease  3/29/2019 1053 by Silas Peters RN  Outcome: Ongoing  Note:   Patient denies pain so far this shift, will continue to monitor. 3/29/2019 1051 by Silas Peters RN  Outcome: Ongoing  Note:   Patient denies pain so far this shift, will continue to monitor.      Goal: Control of acute pain  Description  Control of acute pain  3/29/2019 1053 by Silas Peters RN  Outcome: Ongoing  3/29/2019 1051 by Silsa Peters RN  Outcome: Ongoing  3/29/2019 0049 by Leni Zaragoza RN  Outcome: Ongoing  Goal: Control of chronic pain  Description  Control of chronic pain  3/29/2019 1053 by Silas Peters RN  Outcome: Ongoing  3/29/2019 1051 by Silas Peters RN  Outcome: Ongoing     Problem: DISCHARGE BARRIERS  Goal: Patient's continuum of care needs are met  3/29/2019 1053 by Silas Peters RN  Outcome: Ongoing  Note:   Purposefully hourly rounding     3/29/2019 1051 by Silas Peters RN  Outcome: Ongoing  Note:   Purposefully hourly rounding        Problem: Nutrition  Intervention: Swallowing evaluation  3/29/2019 1053 by Silas Peters RN  Note:   Patient failed barium swallow today, completely NPO at this time

## 2019-03-29 NOTE — PROGRESS NOTES
Hospitalist Progress Note    Patient:  Jacoby Perkins      Unit/Bed:4B-11/011-A    YOB: 1935    MRN: [id number]       Acct: [de-identified]     PCP: Linda Lovett MD    Date of Admission: 3/27/2019    Chief Complaint: [address] Course: 80 y. o. male with PMH of CKD, anemia, CHF, PAD (R SFA/popliteal stent), PAH, neurogenic bladder s/p suprapubic catheter, DMII, and HTN who presented to [facility] by EMS from [address] [place] [address]. Unable to obtain thorough hx due to pt's altered state. Per ED notes, secondary from nursing home report, pt was sent to the ED for SOB, couch, and decreased O2 saturation. Pt was on 2L NC at the nursing home and was satting 89%, was placed on non-rebreather upon arrival and was satting 99%. Cousin was at bedside who states the patient is not at his baseline state.      Pt has known esophageal dysphagia s/p EGD dilation on 2/2019 by Dr. Bharti Powell. Pt also recently had a suprapubic catheter placed on 2/2019 for neurogenic bladder. Pt is normally very alert and oriented.      In the ED, pt confused with 3/4 SIRS (febrile, tachycardic, and tachypenic). Pt placed on non-re breather, started on IV Vanc, Zosyn and Levaquin and admitted. Subjective: no acute events overnight. Having bad cough. No fevers or chills. Much more alert today.        Medications:  Reviewed    Infusion Medications    dextrose       Scheduled Medications    amLODIPine  10 mg Oral Daily    aspirin  81 mg Oral Daily    atorvastatin  20 mg Oral Nightly    clopidogrel  75 mg Oral Daily    docusate sodium  100 mg Oral BID    doxepin  3 mg Oral Nightly    famotidine  20 mg Oral Daily    ferrous sulfate  325 mg Oral Lunch    gabapentin  100 mg Oral TID    isosorbide mononitrate  30 mg Oral Daily    metoprolol succinate  25 mg Oral Daily    potassium chloride  10 mEq Oral Daily    pantoprazole  40 mg Oral BID AC    ranolazine  1,000 mg Oral BID    tamsulosin  0.4 mg Oral Daily    vitamin C  500 mg Oral Nightly    sodium chloride flush  10 mL Intravenous 2 times per day    enoxaparin  30 mg Subcutaneous Daily    piperacillin-tazobactam  3.375 g Intravenous Q8H    insulin lispro  0-6 Units Subcutaneous TID     insulin lispro  0-3 Units Subcutaneous Nightly    vancomycin (VANCOCIN) intermittent dosing (placeholder)   Other RX Placeholder     PRN Meds: meclizine, sodium chloride flush, glucose, dextrose, glucagon (rDNA), dextrose, ipratropium-albuterol, acetaminophen      Intake/Output Summary (Last 24 hours) at 3/29/2019 0753  Last data filed at 3/29/2019 0324  Gross per 24 hour   Intake 360.18 ml   Output 1000 ml   Net -639.82 ml       Diet:  DIET CARB CONTROL; Exam:  BP (!) 114/57   Pulse 74   Temp 98 °F (36.7 °C) (Oral)   Resp 28   Ht 5' 9\" (1.753 m)   Wt 186 lb 6.4 oz (84.6 kg)   SpO2 97%   BMI 27.53 kg/m²     General appearance: No apparent distress, appears stated age and cooperative. HEENT: Pupils equal, round, and reactive to light. Conjunctivae/corneas clear. Neck: Supple, with full range of motion. No jugular venous distention. Trachea midline. Respiratory:  Normal respiratory effort. Rales and crackles today   Cardiovascular: Regular rate and rhythm with normal S1/S2 without murmurs, rubs or gallops. Abdomen: Soft, non-tender, non-distended with normal bowel sounds. Musculoskeletal: passive and active ROM x 4 extremities. Skin: Skin color, texture, turgor normal.  No rashes or lesions. Neurologic:  Neurovascularly intact without any focal sensory/motor deficits.  Cranial nerves: II-XII intact, grossly non-focal.  Psychiatric: Alert and oriented, thought content appropriate, normal insight  Capillary Refill: Brisk,< 3 seconds   Peripheral Pulses: +2 palpable, equal bilaterally       Labs:   Recent Labs     03/27/19 2039 03/29/19 0337   WBC 5.6 7.5   HGB 9.9* 7.4*   HCT 30.7* 23.9*    126*     Recent Labs     03/27/19 2039 03/28/19 0326 03/29/19  0337    133* 137   K 4.9 4.9 4.3    102 103   CO2 18* 17* 17*   BUN 36* 33* 45*   CREATININE 1.8* 1.8* 2.0*   CALCIUM 8.4* 8.1* 8.3*   PHOS  --   --  2.8     Recent Labs     03/27/19 2039   AST 12   ALT 6*   BILIDIR 0.3   BILITOT 0.8   ALKPHOS 72     Recent Labs     03/27/19 2039   INR 1.16*     No results for input(s): Brook Petersen in the last 72 hours. Urinalysis:      Lab Results   Component Value Date    NITRU NEGATIVE 03/27/2019    WBCUA 15-25 03/27/2019    BACTERIA MANY 03/27/2019    RBCUA 3-5 03/27/2019    BLOODU NEGATIVE 03/27/2019    SPECGRAV 1.012 02/23/2019    GLUCOSEU NEGATIVE 03/27/2019       Radiology:  CT HEAD WO CONTRAST   Final Result   Stable atrophy with chronic small vessel disease. **This report has been created using voice recognition software. It may contain minor errors which are inherent in voice recognition technology. **      Final report electronically signed by Dr. Trena Tello on 3/27/2019 9:59 PM      CT ABDOMEN PELVIS W IV CONTRAST Additional Contrast? Radiologist Recommendation   Final Result   1. Bilateral infiltrates compatible with pneumonitis increasing compared to the prior study. 2. Cholelithiasis. 3. Stable mild renal atrophy         **This report has been created using voice recognition software. It may contain minor errors which are inherent in voice recognition technology. **      Final report electronically signed by Dr. Trena Tello on 3/27/2019 9:57 PM      XR CHEST PORTABLE   Final Result      Interstitial infiltrates in the mid and lower lungs bilaterally either representing pulmonary edema or an atypical viral-type pneumonia. **This report has been created using voice recognition software. It may contain minor errors which are inherent in voice recognition technology. **      Final report electronically signed by Dr. Nelia Cardenas on 3/27/2019 9:05 PM      FL MODIFIED BARIUM SWALLOW W VIDEO    (Results Pending) CT CHEST WO CONTRAST    (Results Pending)   US RENAL COMPLETE    (Results Pending)       Diet: DIET CARB CONTROL;    DVT prophylaxis: [x] Lovenox                                 [] SCDs                                 [] SQ Heparin                                 [] Encourage ambulation           [] Already on Anticoagulation     Disposition:    [] Home       [] TCU       [] Rehab       [] Psych       [x] SNF       [] [place]       [] Other-    Code Status: Full Code    PT/OT Eval Status: ECF     Assessment/Plan:    Anticipated Discharge in : TBD     Active Hospital Problems    Diagnosis Date Noted    Pneumonitis [J18.9] 03/28/2019    Altered mental status [R41.82] 09/02/2018    Sepsis ([facility] [A41.9] 08/16/2018    Heart failure with preserved ejection fraction (HCC) [I50.30]           Assessment/Plan:     1. Acute Metabolic Encephalopathy- likely secondary to infectious etiology given his presentation with sepsis. Source can be PNA and/or . Cont IV Vanc, Zosyn (Day 3). CT showing bilateral pneumonitis and UA grossly abnormal. F/U Bx and Ux. Consult ID.      2. Acute Hypoxic Resp. Failure- secondary to PNA with some component of heart failure exacerbation. Cont IV Vanc and Zosyn. Cont to wean supplemental O2 as tolerated. IV diuresis d/c today given rise in creatinine.      3. Sepsis- 3/4 SIRS (febrile, tachycardic, and tachypenic). Source can be PNA and/or . Cont IV Vanc, Zosyn per ID. CT showing bilateral pneumonitis and UA grossly abnormal. F/U Bx and Ux. Consult ID.      4. PNA/Pnueomonitis- history of esophageal dysphagia s/p dilation on 2/2019. CT showing bilateral pneumonitis. Aspiration PNA? Cont IV Vanc and Zosyn. SLP rec NPO. Barium swallow today. Consult ID.     5. Acute on Chronic Diastolic CHF- LES, elevated BNP, CXR findings of some edema. 2D Echo on 12/17 EF 55%. Gentle IV diuresis lasix 20 BID. Pending 2D Echo. IV diuresis d/c today given rise in creatinine.  Consult

## 2019-03-29 NOTE — CONSULTS
1.8.  His beta  natriuretic peptide was 3209. He was noted to be febrile with a  temperature of 104.9 degrees. He was started on IV fluids along with IV  antibiotics. He was then admitted to the hospitalist services. Since  admission, the patient has been seen by Dr. Tamiko Guerra for gram-negative  urinary tract infection. He was started on IV Zosyn and vancomycin was  stopped. Again, the patient also had IV contrast study done on  admission. Nephrology has been asked to see him in consultation due to  rising serum creatinine. His serum creatinine which was previously 1.7  was 1.8 on arrival and now has gone up to 2. The patient has received  IV Lasix on an intermittent basis. His bicarb is 17 at this time. There is also some concern of aspiration. He just came back from barium  swallow which apparently told he did not pass. At the time of my  evaluation, the patient was resting comfortably. He is awake, but he is  not able to provide or engage in any clinically meaningful conversation. No family members are in the room. All my information was obtained by  reviewing his medical chart and speaking with the nurse at the bedside. Currently, the patient is on 4 liters of nasal cannula. He has a  suprapubic catheter in place. PAST MEDICAL HISTORY:  Again significant for chronic kidney disease,  history of chronic diastolic congestive heart failure, diabetes,  hypertension, dyslipidemia, gastroesophageal reflux disease, history of  non-Hodgkin's lymphoma, coronary artery disease, BPH, pacemaker,  peripheral arterial disease. PAST SURGICAL HISTORY:  Significant for previous coronary artery bypass  graft, history of pacemaker placement, suprapubic catheter placement,  EGD. FAMILY HISTORY:  Not available at this time. SOCIAL HISTORY:  The patient is a nursing home resident. No alcohol,  smoking, or active drug use.     NURSING HOME MEDICATIONS:  Include Ultram, ferrous sulfate, doxepin,  Protonix, growing E.  coli. His flu antigen is negative for flu A or flu B. Blood cultures  are preserved and they are negative so far. CAT scan of the head done in the emergency room did not reveal any acute  changes. The patient had a CAT scan of the abdomen and pelvis with IV  contrast which revealed bilateral infiltrates compatible with  pneumonitis. Changes and distribution was concerning for possible  aspiration. He had small pleural effusion. Bilateral renal atrophy  without any obstruction was noted. This morning, the patient had a CAT  scan of the chest without contrast which revealed bilateral pleural  effusions, patchy ground-glass opacities with mild lymphadenopathy. The  patient also noted to have bibasilar consolidative process. Mild  mediastinal lymphadenopathy. Also noted to have some distal esophageal  wall thickening. Urinalysis done on admission was negative for any blood, positive for  trace blood without any nitrites. Urine sodium is 33, urine osmolality  is 497. The patient's old echo from 12/2017 showed ejection fraction of 55% to  60%. The patient's old serum creatinine were reviewed. His serum creatinine  usually stays around 1.4 to 1.5 and it was 1.7 back in 01/2019. In summary, 80-year-old male with history of chronic kidney disease  stage 3 with baseline creatinine of around 1.5 to 1.7, history of  chronic diastolic heart dysfunction, history of diabetes mellitus,  coronary artery disease, history of BPH, suprapubic catheter placement,  non-Hodgkin's lymphoma, who was admitted to the hospital with complaints  of altered mental status associated with respiratory distress involving  hypoxia associated with some cough. There was some concern for  aspiration pneumonia. He was noted to have bilateral infiltrates. He  was febrile on admission. He was admitted with a diagnosis of pneumonia  as well as urinary tract infection.   He was also noted to have some  pleural effusion and elevated BNP and required IV Lasix. Nephrology has  been asked to see him in consultation due to rising serum creatinine. ASSESSMENT AND PLAN/PROBLEM LIST:  1.  Mild acute kidney injury superimposed on chronic kidney disease  stage 3. His acute kidney injury is multifactorial in the setting of  pneumonia, some prerenal failure, use of diuretics. His urine sodium is  33. The patient has oral mucous membranes which appeared dry. There  are several considerations for acute kidney injury. Nonetheless, his  acute kidney injury is very mild. His serum creatinine has been  anywhere between 1.5 to 1.7 in the past and now it is up to 2. The  patient does have some bilateral crackles likely on the basis of  pneumonia, but pulmonary vascular congestion cannot be ruled out. He  has elevated beta natriuretic peptide. At this time, we will avoid IV  fluids. Can give him diuretics on an as-needed basis. We will simply  monitor his renal function for now. CAT scan did not show any  obstructive uropathy. He does have bilateral renal atrophy on CAT scan. We will just monitor his renal function for now. Avoid nephrotoxins. Do not give any Fleet phos enemas. Do not give any ACE or ARBs as of  now. 2.  Low serum bicarbonate. His ABG done two days ago showed a pH of  7.42. Looking at his ABG, he has a pCO2 of 31. His bicarb was 18 at  that time. It appears that his low bicarb is mostly in compensation  secondary to respiratory alkalosis. We will simply monitor for now. We  will repeat pH, but we will obtain a venous pH in the morning. 3.  Probable aspiration pneumonia. Currently being followed by  Infectious Disease. He just completed a barium swallow. Follow  results. Advise aspiration precautions. Currently, he is on IV Zosyn. 4.  Gram-negative urinary tract infection. Continue with IV Zosyn. Vancomycin has been stopped by Infectious Disease. 5.  History of suprapubic catheter placement.   6. History of coronary artery disease, status post coronary artery  bypass graft in the past.  7.  Altered mental status, likely secondary to underlying infections,  namely UTI and pneumonia. We will continue to monitor. 8.  Chronic diastolic congestive heart failure. It is okay from renal  standpoint to give diuretics as needed. Plan of care was reviewed with the patient's nurse. Nephrology will  follow. We will monitor his urine output. We will avoid nephrotoxins  and monitor his blood pressure. Thank you for allowing me to  participate in the care of this patient. Please do not hesitate to call  me if you have any questions.         Hector Posada M.D.    D: 03/29/2019 12:33:51       T: 03/29/2019 12:37:57     [email or web address]  Job#: [id number]     Doc#: [id number]    CC:

## 2019-03-29 NOTE — PROGRESS NOTES
Results   Component Value Date    BC No growth-preliminary 03/27/2019            Problem list of patient:     Patient Active Problem List   Diagnosis Code    Postural dizziness with near syncope R42, R55    DM2 (diabetes mellitus, type 2) ([facility] E11.9    Hypertension I10    Pacemaker Z95.0    [facility] I73.9    Lactic acidosis E87.2    Iron deficiency anemia D50.9    CAD (coronary artery disease) I25.10    Heart failure with preserved ejection fraction (HCC) I50.30    GERD (gastroesophageal reflux disease) K21.9    Dyslipidemia E78.5    Lymphoma ([facility]) C85.90    Osteoarthritis of both knees M17.0    Left atrial dilation I51.7    GLORIA (acute kidney injury) [facility] N17.9    Frequent falls R29.6    Physical deconditioning R53.81    Altered awareness, transient R40.4    Fever R50.9    Sepsis ([facility]) A41.9    Pancytopenia ([facility]) D61.818    Altered mental status R41.82    Hepatomegaly R16.0    Urinary retention R33.9    Hematuria R31.9    Acute kidney injury superimposed on chronic kidney disease (HCC) N17.9, N18.9    Hyperkalemia E87.5    Hypercalcemia E83.52    Calculus of gallbladder without cholecystitis without obstruction K80.20    Syncope and collapse R55    Non-intractable vomiting with nausea R11.2    Gallbladder dilatation K82.8    Nonintractable headache R51    Osteoarthritis of multiple joints M15.9    Chronic diastolic (congestive) heart failure (HCC) I50.32    S/P placement of cardiac pacemaker Z95.0    Chronic kidney disease, stage 3 (HCC) N18.3    Severe malnutrition (HCC) E43    Dysphagia R13.10    Urinary retention R33.9    Hyperkalemia E87.5    Hyponatremia E87.1    Lactic acidosis E87.2    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) T83.89XA, N36.8    Urinary tract infection associated with indwelling urethral catheter (HCC) T83.511A, N39.0    Other acute kidney failure (HCC) N17.8    Suprapubic catheter ([facility] Z93.59  Acute postoperative pain G89.18    BPH with obstruction/lower urinary tract symptoms N40.1, N13.8    Neurogenic bladder N31.9    Pneumonitis J18.9    Low bicarbonate E87.8         ASSESSMENT/PLAN   Sepsis  Suspected aspiration pneumonia  GLORIA  CHF  Continue current treatment      Dee Camacho MD, FACP 3/29/2019 5:28 PM

## 2019-03-29 NOTE — CARE COORDINATION
3/29/19, 3:24 PM      [facility] day: 1  Location: -11/011-A Reason for admit: Altered mental status [R41.82]   Procedure:   3/27 CT Head: No acute findings  3/27 CT Abd/pelvis: Bilateral infiltrates compatible with pneumonitis increasing compared to the prior study; cholelithiasis; stable mild renal atrophy  3/27 CXR: Interstitial infiltrates in the mid and lower lungs bilaterally either representing pulmonary edema or an atypical viral-type pneumonia  3/29 MBS: Aspiration with nectar thick and thin consistencies  3/29 Renal US: The right kidney is slightly asymmetrically smaller than the left  3/29 CT Chest:   1. Small bilateral pleural effusions. 2. Bibasilar consolidative right greater than left atelectasis or pneumonia. Recommend follow-up to document resolution. 3. Patchy ground glass opacities in a perihilar distribution is demonstrated within the lungs bilaterally. This may represent pulmonary edema versus sequela from an inflammatory or infectious pneumonitis. 4. Mild mediastinal lymphadenopathy. This is indeterminate and may be reactive in nature. However, recommend follow-up to document resolution. 5. Mild distal esophageal wall thickening of indeterminate etiology. This may be related to sequela from reflux esophagitis     Treatment Plan of Care: Echo done - not read yet. Failed MBS. NPO. GI consulted for drop in HGB and no obvious source of bleeding. Lasix stopped d/t increase in creat. Nephrology consulted for GLORIA. Hospitalist and ID following. Urine +Ecoli. Tmax 100.9. Sats 93% on 3L O2. Ox4. SLP/PT/OT. Suprapubic catheter. Telemetry, I&O, daily weight, trujillo care, up with assist. Norvasc, asa, lipitor, plavix, doxepin, lovenox, pepcid, neurontin, SSI ACHS, imdur, prn antivert, toprol xl, protonix, IV zosyn, K+, ranexa, flomax, vit C. Received IV vancomycin x1. BUN 45, Creat 2, AG 17, Hgb 7.4.    PCP: Daina Taylor MD  Readmission Risk Score: 50%  Discharge Plan: [facility] to [facility]. No precert required. SW on case.

## 2019-03-29 NOTE — DISCHARGE INSTR - COC
GASTROINTESTINAL ENDOSCOPY Left 2/14/2019    EGD BIOPSY performed by Ronald Tam MD at [facility]    VASCULAR SURGERY         Immunization History: There is no immunization history for the selected administration types on file for this patient.     Active Problems:  Patient Active Problem List   Diagnosis Code    Postural dizziness with near syncope R42, R55    DM2 (diabetes mellitus, type 2) ([facility] E11.9    Hypertension I10    Pacemaker Z95.0    Peripheral vascular disease ([facility] I73.9    Lactic acidosis E87.2    Iron deficiency anemia D50.9    CAD (coronary artery disease) I25.10    Heart failure with preserved ejection fraction (HCC) I50.30    GERD (gastroesophageal reflux disease) K21.9    Dyslipidemia E78.5    Lymphoma ([facility]) C85.90    Osteoarthritis of both knees M17.0    Left atrial dilation I51.7    GLORIA (acute kidney injury) ([facility] [facility] N17.9    Frequent falls R29.6    Physical deconditioning R53.81    Altered awareness, transient R40.4    Fever R50.9    Sepsis ([facility]) A41.9    Pancytopenia ([facility]) D61.818    Altered mental status R41.82    Hepatomegaly R16.0    Urinary retention R33.9    Hematuria R31.9    Acute kidney injury superimposed on chronic kidney disease (HCC) N17.9, N18.9    Hyperkalemia E87.5    Hypercalcemia E83.52    Calculus of gallbladder without cholecystitis without obstruction K80.20    Syncope and collapse R55    Non-intractable vomiting with nausea R11.2    Gallbladder dilatation K82.8    Nonintractable headache R51    Osteoarthritis of multiple joints M15.9    Chronic diastolic (congestive) heart failure (HCC) I50.32    S/P placement of cardiac pacemaker Z95.0    Chronic kidney disease, stage 3 (HCC) N18.3    Severe malnutrition ([facility]) E43    Dysphagia R13.10    Urinary retention R33.9    Hyperkalemia E87.5    Hyponatremia E87.1    Lactic acidosis E87.2    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 ([facility]) T83.89XA, N36.8    Urinary tract infection associated with indwelling urethral catheter (HCC) T83.511A, N39.0    Other acute kidney failure ([address] N17.8    Suprapubic catheter ([facility] Z93.59    Acute postoperative pain G89.18    BPH with obstruction/lower urinary tract symptoms N40.1, N13.8    Neurogenic bladder N31.9    Pneumonitis J18.9    Low bicarbonate E87.8       Isolation/Infection:   Isolation          No Isolation            Nurse Assessment:  Last Vital Signs: /60   Pulse 80   Temp 98.8 °F (37.1 °C) (Oral)   Resp 20   Ht 5' 9\" (1.753 m)   Wt 186 lb 6.4 oz (84.6 kg)   SpO2 93%   BMI 27.53 kg/m²     Last documented pain score (0-10 scale): Pain Level: 0  Last Weight:   Wt Readings from Last 1 Encounters:   03/29/19 186 lb 6.4 oz (84.6 kg)     Mental Status:  alert and logical    IV Access:  - None    Nursing Mobility/ADLs:  Walking   Assisted  Transfer  Assisted  Bathing  Assisted  Dressing  Assisted  Toileting  Assisted  Feeding  Assisted  Med Admin  Dependent  Med Delivery   whole    Wound Care Documentation and Therapy:  Wound 11/20/18 Other (Comment) 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 (Active)   Number of days: 129       Wound 03/28/19 Buttocks DTI vs Chronic Discoloration (Active)   Wound Deep tissue/Injury 3/28/2019  9:06 PM   Dressing/Treatment Protective barrier 3/29/2019 11:14 AM   Wound Assessment Purple 3/29/2019 11:14 AM   Drainage Amount None 3/29/2019 11:14 AM   Odor None 3/28/2019  5:05 PM   Abi-wound Assessment Blanchable erythema;Pink 3/29/2019 11:14 AM   Number of days: 1       Wound 03/28/19 Coccyx DTI vs Chronic Discoloration (Active)   Wound Deep tissue/Injury 3/28/2019  9:06 PM   Dressing/Treatment Foam 3/29/2019  3:13 AM   Wound Assessment Purple 3/28/2019  5:05 PM   Drainage Amount None 3/29/2019 11:14 AM   Odor None 3/28/2019  5:05 PM   Abi-wound Assessment Blanchable erythema;Pink 3/29/2019 11:14 AM   Number of days: 1        Elimination:  Continence: · Bowel: Yes  · Bladder: suprapubic catheter  Urinary Catheter: Insertion Date: prior to admission   Colostomy/Ileostomy/Ileal Conduit: No       Date of Last BM: 4/3/19    Intake/Output Summary (Last 24 hours) at 3/29/2019 1508  Last data filed at 3/29/2019 1350  Gross per 24 hour   Intake 215.18 ml   Output 1150 ml   Net -934.82 ml     I/O last 3 completed shifts: In: 215.2 [P.O.:100; I.V.:115.2]  Out: 1150 [Urine:1150]    Safety Concerns: At Risk for Falls    Impairments/Disabilities:      None    Nutrition Therapy:  Current Nutrition Therapy:   - dysphagia II diet, no straws    Routes of Feeding: Oral  Liquids: Thin Liquids  Daily Fluid Restriction: no  Last Modified Barium Swallow with Video (Video Swallowing Test): not done    Treatments at the Time of Hospital Discharge:   Respiratory Treatments: PRN Duonebs  Oxygen Therapy:  is not on home oxygen therapy.   Ventilator:    - No ventilator support    Rehab Therapies: same as PTA  Weight Bearing Status/Restrictions: No weight bearing restirctions  Other Medical Equipment (for information only, NOT a DME order):  walker  Other Treatments: n/a    Patient's personal belongings (please select all that are sent with patient):  Hearing Aides bilateral    RN SIGNATURE:  Electronically signed by Amy Luis RN on 4/3/19 at 6:03 PM    CASE MANAGEMENT/SOCIAL WORK SECTION    Inpatient Status Date: 03/28/2019    Readmission Risk Assessment Score:  Readmission Risk              Risk of Unplanned Readmission:        50           Discharging to Facility/ Agency   · Name: [facility]  · Address: [address] [place] [address]  · Phone: [phone]  · Fax: [phone]    Dialysis Facility (if applicable)   · Name:  · Address:  · Dialysis Schedule:  · Phone:  · Fax:    / signature: Electronically signed by KIARA Leo LSW on 3/29/19 at 3:08 PM    PHYSICIAN SECTION    Prognosis: Good    Condition at Discharge: Stable    Rehab Potential (if transferring to Rehab): Good    Recommended Labs or Other Treatments After Discharge: CBC in 1 week, BMP in 1 week, CT chest w/o contrast in 4 weeks. Continued speech therapy. Recommend referral to Dr. Trav Casas, if aspiration continues for consideration of PEG tube placement. Physician Certification: I certify the above information and transfer of John Nowak  is necessary for the continuing treatment of the diagnosis listed and that he requires [facility] for less 30 days.      Update Admission H&P: No change in H&P    PHYSICIAN SIGNATURE:  Electronically signed by Robin Camargo MD on 4/3/19 at 4:28 PM

## 2019-03-29 NOTE — PLAN OF CARE
Problem: Falls - Risk of:  Goal: Will remain free from falls  Description  Will remain free from falls  Outcome: Ongoing  Note:   Pt bed bound. Turn q2hr. Bed in lowest position with alarm on. Call light within reach. Problem: Risk for Impaired Skin Integrity  Goal: Tissue integrity - skin and mucous membranes  Description  Structural intactness and normal physiological function of skin and  mucous membranes. Outcome: Ongoing  Note:   Pt turn q2hr. Redness on buttocks. Barrier cream applied. Protective patch on buttocks. Arms and heels elevated off of bed. No new signs of skin breakdown present. Will continue to monitor. Problem: Pain:  Goal: Pain level will decrease  Description  Pain level will decrease  Outcome: Ongoing  Note:   No c/o pain this shift. Will continue to monitor. Problem: DISCHARGE BARRIERS  Goal: Patient's continuum of care needs are met  3/29/2019 0049 by Beronica Caal RN  Outcome: Ongoing  Note:   Plans to return to [facility] at discharge. 3/28/2019 1359 by LAURIE Hope  Outcome: Ongoing     Problem: Nutrition  Intervention: Swallowing evaluation  Note:   Pt scheduled for barium swallow eval tomorrow. Care plan reviewed with patient. Patient verbalizes understanding of the plan of care and contributes to goal setting.

## 2019-03-29 NOTE — PROGRESS NOTES
[facility]  SPEECH THERAPY  [facility] CVICU 4B  Modified Barium Swallow    SLP Individual Minutes  Time In: 930  Time Out: 0945  Minutes: 15  Timed Code Treatment Minutes: 0 Minutes       [] [address]   [x] Acute Care [] Transitional Care Unit [] IP Rehab    Date: 3/29/2019  Patient Name: Aylin Heard      CSN: [id number]   : 1935  (80 y.o.)  Gender: male   Referring Physician: Rocky Argueta MD  Diagnosis: Altered mental status  Secondary Diagnosis: Dysphagia  Date of Last MBS:   Diet: NPO pending instrumental evaluation (MBS)  History of Present Illness/Injury: Patient admitted to [facility] with above diagnosis. See physician H&P for full report. Per chart review, \"Patient presented to [facility] by EMS from [address] [place] [address]. Unable to obtain thorough hx due to pt's altered state. Per ED notes, secondary from nursing home report, pt was sent to the ED for SOB, couch, and decreased O2 saturation. Pt was on 2L NC at the nursing home and was satting 89%, was placed on non-rebreather upon arrival and was satting 99%. \" Patient with history of dysphagia with most recent MBS completed 18 with recommendations of dysphagia III with thin with restriction to straws, however patient upgraded to regular and thin with no restrictions prior to discharge. ST completed BSE 3/28 with recommendations of NPO pending instrumental assessment (MBS). ST consulted to complete MBS to determine pharyngeal integrity and determine least restrictive diet.      \"Patient has a past medical history of Acute kidney failure ([address] Anemia, ASHD (arteriosclerotic heart disease), Blood circulation, collateral, CAD (coronary artery disease), Cardiomegaly, Chronic diastolic (congestive) heart failure ([address] Chronic kidney disease, DM2 (diabetes mellitus, type 2) ([address] Dyslipidemia, Falls, GERD (gastroesophageal reflux disease), Heart failure with preserved ejection fraction ([facility] Hyperlipidemia, Hypertension, Iron deficiency anemia, Left atrial dilation, Lymphoma (HCC), Malaise, Nonintractable headache, Osteoarthritis of both knees, Pacemaker, and PVD (peripheral vascular disease) ([facility] \",\"Please see medical history questionnaire for information related to prior medical history, allergies and medications. \"    Pain: Patient reported pain when swallowing. Current Diet: NPO pending MBS    Respiratory Status: [] Independent [x] Nasal Cannula [] Oxygen Mask      [] Tracheostomy [] Other:     [] Ventilator/Settings:    Behavioral Observation: [] Alert [] Oriented   [x] Confused [x] Lethargic      [] Dysarthric [] Limited Direction Following  [] Agitated [x] Other: [place]    PATIENT WAS EVALUATED USING: Thin via spoon, cup and straw, nectar via cup, puree, soft solid (peaches), hard/coarse solid (sandip cracker)       ORAL PREPARATION PHASE: [] WFL  [x] Impaired   [x] Slow mastication [x] Uncoordinated mastication [] Decreased bolus control   [] Decreased bolus formation [] Loss of bolus from lips / Anterior spillage   [] OTHER:    ORAL PHASE: [] WFL  [x] Impaired   [] Residue in the anterior sulcus [] Residue in the lateral sulcus - right   [] Residue in the lateral sulcus - left [] Uncoordinated AP movement   [] Slow AP movement   [] Decreased lingual elevation   [] Decreased tongue base retraction [x] Uncontrolled bolus / diffuse fall over tongue base   [] Piecemeal deglutition    [] OTHER:     ORAL PHASE UGO SCORE: (Dysphagia outcome and severity scale)  [x] 5 = Mild dysphagia; May have one diet consistency restricted; Mild oral residue -clears.     PHARYNGEAL PHASE: [] WFL  [x] Impaired   [] Absent Swallow  [x] Delayed Swallow [x] Decreased airway protection   [] Decreased epiglottic inversion [x] Decreased hyolaryngeal elevation   [x] Residue in the valleculae: moderate-severe [x] Residue in the pyriform sinus: moderate-severe    [] Cricopharyngeal dysfunction  [] Residue along posterior pharyngeal wall   [] Residue along the ariepiglottic folds. [] OTHER:    PHARYNGEAL PHASE UGO SCORE: (Dysphagia outcome and severity scale)  [x] 1 = Severe dysphagia:  NPO; Unable to tolerate any po safely; Severe residue unable to clear; Silent aspiration with two or more consistencies, non-functional cough;  OR Unable to achieve a swallow. SIGNS AND SYMPTOMS OF LARYNGEAL PENETRATION / ASPIRATION:  [x] Laryngeal penetration evident with: thin via cup and straw, nectar-thick via cup  [x] Audible aspiration evident with: thin via cup and straw, nectar-thick via cup    PENETRATION-ASPIRATION SCALE (PAS):  [x] 7 = Material enters the airway, passes below the vocal folds, and is not ejected from the trachea despite effort    ESOPHAGEAL PHASE:   [x] See Radiology Report for details    ATTEMPTED TECHNIQUES:      Comments:  [x]Small bolus size [] Effective [x] Not Effective    [x]Straw [] Effective [x] Not Effective    [x]Cup [] Effective [x] Not Effective    []Chin tuck [] Effective [] Not Effective    []Head Turn [] Effective [] Not Effective    []Spoon presentations [] Effective [] Not Effective    [x] Volitional cough [] Effective [x] Not Effective Unable to eject material   [x] Spontaneous cough [] Effective [x] Not Effective Unable to eject material   []OTHER: [] Effective [] Not Effective      DIAGNOSTIC IMPRESSIONS: Patient presents with mild oral dysphagia and severe pharyngeal dysphagia as evidenced by instrumental evaluation results. Prior to initiation of evaluation, patient with report of \"It hurts to swallow\" and severe hoarse vocal quality. Patient with increasing fatigue as evaluation progressed with increased respiratory effort demonstrated with limited PO trials. Patient completed limited PO trials of thin via spoon, cup and straw, nectar via cup, puree, soft solid (peaches), and hard/coarse solid (sandip cracker).  Patient oral phase with mild deficits characterized by slow, uncontrolled mastication with PO trials of soft and hard/coarse solids and diffuse fall over tongue base to the level of the valleculae and pyriforms with all consistencies. Patient pharyngeal phase severely impaired as evidenced by delayed swallow initiation and decreased airway protection secondary to decreased hyolaryngeal elevation; moderate to severe residue noted in valleculae and pyriforms with all consistencies completed during evaluation. Furthermore, patient with history of esophageal dysphagia with completion of multiple dilations; UES opening appeared to be decreased with regurgitation of material into pyriform sinuses following encouragement to complete multiple swallows to clear residue. ST directed patient to implement liquid wash with fair success clearing residue. Initial PO trials of thin via spoon and cup with good success and no episodes of penetration or aspiration. Following limited PO trials of solid consistencies and increasing residue in the valleculae and pyriforms, patient presented with increasing fatigue and difficulty swallowing as evidenced by penetration and aspiration of thin via cup and straw. PO trials of thin via straw resulted in patient with choking episode due to aspiration of material prior to swallow initiation. Patient with eventual unsuccessful cough to clear material with increasing respiratory effort noted. Following choking episode, all residue in valleculae and pyriforms cleared; suspect aspiration of material. PO trials of nectar-thick liquid via cup also resulted in penetration and aspiration with unsuccessful effort to clear. Following above evaluation results, recommend NPO with alternate means of nutrition to be provided to maintain adequate nutrition and hydration at this time. ST services medically necessary to decrease risk of medical decline and address pharyngeal integrity via implementation of POC. RN notified of evaluation results with receptiveness noted.

## 2019-03-30 NOTE — PROGRESS NOTES
Transport to take patient to [facility]. Patient went back on 3 L supplemental oxygen via nasal cannula.

## 2019-03-30 NOTE — OP NOTE
[address] [place] [address]                                OPERATIVE REPORT    PATIENT NAME: Jonnathan Love              :        1935  MED REC NO:   [id number]                           ROOM:       0011  ACCOUNT NO:   [de-identified]                           ADMIT DATE: 2019  PROVIDER:     Ronald Tam M.D.    DATE OF PROCEDURE:  2019    INDICATIONS:  The patient with dysphagia, recurrent aspiration,  presented with pneumonia as well as anemia with significant drop in H  and H.  Plan today for EGD to evaluate for GI bleed and also possible  dilation. SURGEON:  Ronald Tam M.D. ASA CLASSIFICATION:  III. DESCRIPTION OF PROCEDURE:  The patient was brought to the GI lab. Consent was obtained. The risks involved with the procedure were  explained to the patient. Informed consent was obtained. The patient  was monitored during the procedure with pulse oximetry, blood pressure  monitoring, and oxygen by nasal cannula. Sedation by incremental doses  of IV Versed, total 2 mg of Versed and 75 mcg of fentanyl given in  incremental dosage during the procedure to achieve continuous conscious  sedation. PROCEDURE PERFORMED:  EGD with dilation until size 54-Welsh. Standard video 190 Olympus upper scope was advanced under direct vision  from the oral cavity up to the duodenum. The esophagus appears normal.   No erosions or ulcerations seen. No well-defined stricture. I elected  to dilate the patient as the patient benefitted from upper endoscopy  with dilation done in the past.  Scope was advanced into the stomach. Retroflex examination of the cardia revealed normal cardia with no  evidence of hiatus hernia. Very minimal non-clinically significant  patchy erythema seen in the distal part of the body and the antrum. Duodenum appears normal.  Scope was withdrawn through the antrum.    Guidewire was introduced. Scope was withdrawn. Then, American dilator  starting from size 48 until size 54-Iranian introduced over the  guidewire, led to dilation of the esophagus. Dilator was withdrawn. Scope was advanced again, inspected the site of the dilation. No bleed. No perforation. The procedure was terminated with no immediate  complications. IMPRESSION:  1. No active GI bleed seen. 2.  The patient with aspiration and pneumonia , dysphagia, benefitted from upper  endoscopy with dilation in the past.  Dilation done at this time until  size 54-Iranian. PLAN:  1. Speech Therapy to evaluate the dysphagia. If the patient failed and  kept on having aspiration, PEG tube is recommended. 2.  Check H and H tomorrow, evaluate. If drop in H and H, then  colonoscopy is recommended.         Olga Lyons M.D.    D: 03/30/2019 10:14:43       T: 03/30/2019 10:43:16     [email or web address]  Job#: [id number]     Doc#: [id number]    CC:  Tiffany Ford M.D.

## 2019-03-30 NOTE — FLOWSHEET NOTE
03/30/19 1214   Provider Notification   Reason for Communication Review case   Provider Name Dr Asael Lyn   Provider Notification Physician   Method of Communication Secure Message   Response Waiting for response   Notification Time    12:13 pm  [phone] From: rosey rganados [facility] 4B CVICU RE: Jaiden Sacks back from EGD they did dilate his esophagus. Dr Haydee Ayala ordered a speech eval today, but speech deferred until 4/1. Can I hand dextrose?   Read 12:13 pm   12:14 pm  I will place order   Read 12:15 pm   12:16 pm per this RN  He is NPO so I am unable to give him his medication PO  Read 12:16 pm   12:17 pm  Thats fine   12:17 pm  Can hold for now   12:17 pm  He has prn BP meds   12:17 pm  IV  12:19 pm per this RN  thank you

## 2019-03-30 NOTE — PROGRESS NOTES
Renal Progress Note    Assessment and Plan: 1. Acute kidney injury improving with serum creatinine near baseline   2. UTI  3. Aspiration pneumonia   4. Hy[pertension  5. Deconditioing   6. Metabolic acidosis from acute kidney injury   7. Normocytic anemia   PLAN:  Reviewed labs   Reviewed medications   Chloraseptic oral spray as needed  Sodium bicarbonate 1300 mg po bid   AM labs   Will follow     Patient Active Problem List:     Postural dizziness with near syncope     DM2 (diabetes mellitus, type 2) (HCC)     Hypertension     Pacemaker     Peripheral vascular disease (HCC)     Lactic acidosis     Iron deficiency anemia     CAD (coronary artery disease)     Heart failure with preserved ejection fraction (HCC)     GERD (gastroesophageal reflux disease)     Dyslipidemia     Lymphoma (HCC)     Osteoarthritis of both knees     Left atrial dilation     GLORIA (acute kidney injury) ([address]     Frequent falls     Physical deconditioning     Altered awareness, transient     Fever     Sepsis ([address]     Pancytopenia (HCC)     Altered mental status     Hepatomegaly     Urinary retention     Hematuria     Acute kidney injury superimposed on chronic kidney disease (HCC)     Hyperkalemia     Hypercalcemia     Calculus of gallbladder without cholecystitis without obstruction     Syncope and collapse     Non-intractable vomiting with nausea     Gallbladder dilatation     Nonintractable headache     Osteoarthritis of multiple joints     Chronic diastolic (congestive) heart failure (HCC)     S/P placement of cardiac pacemaker     Chronic kidney disease, stage 3 (HCC)     Severe malnutrition (HCC)     Dysphagia     Urinary retention     Hyperkalemia     Hyponatremia     Lactic acidosis     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 ([address]     Urinary tract infection associated with indwelling urethral catheter ([address]     Other acute kidney failure (HCC)     Suprapubic catheter ([address]     Acute postoperative pain     BPH with obstruction/lower urinary tract symptoms     Neurogenic bladder     Pneumonitis     Low bicarbonate      Subjective:   Admit Date: 3/27/2019    Interval History:  Seeing for acute kidney injury   Awake and alert  Complains of sore throat  Updated by the staff  Stable blood pressure   Good urine output       Medications:   Scheduled Meds:   pantoprazole  40 mg Intravenous Daily    amLODIPine  10 mg Oral Daily    aspirin  81 mg Oral Daily    atorvastatin  20 mg Oral Nightly    clopidogrel  75 mg Oral Daily    docusate sodium  100 mg Oral BID    doxepin  3 mg Oral Nightly    famotidine  20 mg Oral Daily    ferrous sulfate  325 mg Oral Lunch    gabapentin  100 mg Oral TID    isosorbide mononitrate  30 mg Oral Daily    metoprolol succinate  25 mg Oral Daily    potassium chloride  10 mEq Oral Daily    ranolazine  1,000 mg Oral BID    tamsulosin  0.4 mg Oral Daily    vitamin C  500 mg Oral Nightly    sodium chloride flush  10 mL Intravenous 2 times per day    enoxaparin  30 mg Subcutaneous Daily    piperacillin-tazobactam  3.375 g Intravenous Q8H    insulin lispro  0-6 Units Subcutaneous TID WC    insulin lispro  0-3 Units Subcutaneous Nightly     Continuous Infusions:   dextrose         CBC:   Recent Labs     03/27/19 2039 03/29/19 0337 03/29/19 0816 03/30/19 0326   WBC 5.6 7.5  --  7.9   HGB 9.9* 7.4* 7.4* 8.2*    126*  --  139     CMP:    Recent Labs     03/28/19 0326 03/29/19 0337 03/30/19  0326   * 137 141   K 4.9 4.3 3.7    103 109   CO2 17* 17* 18*   BUN 33* 45* 40*   CREATININE 1.8* 2.0* 1.7*   GLUCOSE 240* 107 116*   CALCIUM 8.1* 8.3* 8.3*   LABGLOM 36* 32* 39*     Troponin: No results for input(s): TROPONINI in the last 72 hours. BNP: No results for input(s): BNP in the last 72 hours.   INR:   Recent Labs     03/27/19 2039   INR 1.16*     Lipids:   Recent Labs     03/27/19 2039   LIPASE 14.9     Liver:   Recent Labs     03/27/19 2039   AST 12   ALT 6*   ALKPHOS 72 PROT 6.6   LABALBU 3.1*   BILITOT 0.8     Iron:  No results for input(s): IRONS, FERRITIN in the last 72 hours. Invalid input(s): LABIRONS    Objective:   Vitals: BP (!) 159/67   Pulse 85   Temp 99.4 °F (37.4 °C) (Oral)   Resp 20   Ht 5' 9\" (1.753 m)   Wt 186 lb 9.6 oz (84.6 kg)   SpO2 95%   BMI 27.56 kg/m²    Wt Readings from Last 3 Encounters:   03/30/19 186 lb 9.6 oz (84.6 kg)   03/20/19 182 lb (82.6 kg)   03/18/19 184 lb 4.8 oz (83.6 kg)      24HR INTAKE/OUTPUT:      Intake/Output Summary (Last 24 hours) at 3/30/2019 0631  Last data filed at 3/30/2019 0353  Gross per 24 hour   Intake 759.37 ml   Output 1200 ml   Net -440.63 ml       Constitutional:  Alert, awake, no apparent distress   Skin:normal   HEENT:Pupils are reactive . Throat is clear   Neck:supple with no thyromegaly  Cardiovascular:  S1, S2 without murmur  Respiratory:  Clear  Abdomen: +bs, soft, non tender  :Suprapubic catheter noted   Ext: No LE edema  Musculoskeletal:Intact  Neuro:Alert,awake and oriented with no deficit      Electronically signed by Jackie Dumont MD on 3/30/2019 at 6:31 AM

## 2019-03-30 NOTE — PROGRESS NOTES
Oral BID    tamsulosin  0.4 mg Oral Daily    vitamin C  500 mg Oral Nightly    sodium chloride flush  10 mL Intravenous 2 times per day    enoxaparin  30 mg Subcutaneous Daily    piperacillin-tazobactam  3.375 g Intravenous Q8H    insulin lispro  0-6 Units Subcutaneous TID WC    insulin lispro  0-3 Units Subcutaneous Nightly      dextrose 5 % and 0.45 % NaCl 75 mL/hr at 03/30/19 1251    dextrose       phenol, metoprolol, hydrALAZINE, meclizine, sodium chloride flush, glucose, dextrose, glucagon (rDNA), dextrose, ipratropium-albuterol, acetaminophen      LABS:     CBC:   Recent Labs     03/27/19 2039 03/29/19  0337 03/29/19  0816 03/30/19  0326   WBC 5.6 7.5  --  7.9   HGB 9.9* 7.4* 7.4* 8.2*    126*  --  139     BMP:    Recent Labs     03/28/19 0326 03/29/19 0337 03/30/19  0326   * 137 141   K 4.9 4.3 3.7    103 109   CO2 17* 17* 18*   BUN 33* 45* 40*   CREATININE 1.8* 2.0* 1.7*   GLUCOSE 240* 107 116*     Calcium:  Recent Labs     03/30/19 0326   CALCIUM 8.3*     Ionized Calcium:No results for input(s): IONCA in the last 72 hours. Magnesium:  Recent Labs     03/30/19  0326   MG 1.9     Phosphorus:  Recent Labs     03/30/19  0326   PHOS 2.9     BNP:No results for input(s): BNP in the last 72 hours. Glucose:  Recent Labs     03/29/19  1642 03/29/19 2004 03/30/19  1237   POCGLU 123* 120* 114*     HgbA1C:   Recent Labs     03/28/19 0337   LABA1C 5.6     INR:   Recent Labs     03/27/19 2039   INR 1.16*     Hepatic:   Recent Labs     03/27/19 2039   ALKPHOS 72   ALT 6*   AST 12   PROT 6.6   BILITOT 0.8   BILIDIR 0.3   LABALBU 3.1*     Amylase and Lipase:  Recent Labs     03/27/19 2039   LACTA 2.1     Lactic Acid:   Recent Labs     03/27/19 2039   LACTA 2.1     Troponin: No results for input(s): CKTOTAL, CKMB, TROPONINI in the last 72 hours. BNP: No results for input(s): BNP in the last 72 hours.     CULTURES:   UA:   Recent Labs     03/27/19 1950   PHUR 6.0   COLORU DK YELLOW* PROTEINU 30*   BLOODU NEGATIVE   RBCUA 3-5   WBCUA 15-25   BACTERIA MANY   NITRU NEGATIVE   GLUCOSEU NEGATIVE   BILIRUBINUR NEGATIVE   UROBILINOGEN 0.2   KETUA NEGATIVE     Micro:   Lab Results   Component Value Date    BC No growth-preliminary 03/27/2019            Problem list of patient:     Patient Active Problem List   Diagnosis Code    Postural dizziness with near syncope R42, R55    DM2 (diabetes mellitus, type 2) ([facility] E11.9    Hypertension I10    Pacemaker Z95.0    Peripheral vascular disease ([facility] I73.9    Lactic acidosis E87.2    Iron deficiency anemia D50.9    CAD (coronary artery disease) I25.10    Heart failure with preserved ejection fraction (HCC) I50.30    GERD (gastroesophageal reflux disease) K21.9    Dyslipidemia E78.5    Lymphoma ([facility]) C85.90    Osteoarthritis of both knees M17.0    Left atrial dilation I51.7    GLORIA (acute kidney injury) ([facility] N17.9    Frequent falls R29.6    Physical deconditioning R53.81    Altered awareness, transient R40.4    Fever R50.9    Sepsis (HCC) A41.9    Pancytopenia ([facility]) D61.818    Altered mental status R41.82    Hepatomegaly R16.0    Urinary retention R33.9    Hematuria R31.9    Acute kidney injury superimposed on chronic kidney disease (HCC) N17.9, N18.9    Hyperkalemia E87.5    Hypercalcemia E83.52    Calculus of gallbladder without cholecystitis without obstruction K80.20    Syncope and collapse R55    Non-intractable vomiting with nausea R11.2    Gallbladder dilatation K82.8    Nonintractable headache R51    Osteoarthritis of multiple joints M15.9    Acute on chronic diastolic (congestive) heart failure (HCC) I50.33    S/P placement of cardiac pacemaker Z95.0    Chronic kidney disease, stage 3 (HCC) N18.3    Severe malnutrition (HCC) E43    Dysphagia R13.10    Urinary retention R33.9    Hyperkalemia E87.5    Hyponatremia E87.1    Lactic acidosis E87.2    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 ([address] T83.89XA, N36.8    Urinary tract infection associated with indwelling urethral catheter ([address] T83.511A, N39.0    Other acute kidney failure ([address] N17.8    Suprapubic catheter ([address] Z93.59    Acute postoperative pain G89.18    BPH with obstruction/lower urinary tract symptoms N40.1, N13.8    Neurogenic bladder N31.9    Pneumonitis J18.9    Low bicarbonate E87.8    Acute metabolic encephalopathy Q39.95         ASSESSMENT/PLAN   Sepsis   aspiration pneumonia: he has esophageal stricture that required dilatation  GLORIA  CHF  Continue current treatment.   Continue iv antibiotic      Sigrid Reyes MD, [facility] 3/30/2019 3:09 PM

## 2019-03-30 NOTE — PLAN OF CARE
Problem: Falls - Risk of:  Goal: Will remain free from falls  Description  Will remain free from falls  Outcome: Ongoing  Note:   Patient is free from falls this shift. Goal: Absence of physical injury  Description  Absence of physical injury  Outcome: Ongoing     Problem: DISCHARGE BARRIERS  Goal: Patient's continuum of care needs are met  Outcome: Ongoing  Note:   Patient from NH and plans on going back at discharge   . Joseph Marte Care plan reviewed with patient. Patient verbalize understanding of the plan of care and contribute to goal setting.

## 2019-03-30 NOTE — PLAN OF CARE
Problem: Falls - Risk of:  Goal: Will remain free from falls  Description  Will remain free from falls  3/30/2019 0042 by Jake Aguilar RN  Outcome: Ongoing  Note:   No falls this shift. Call light within reach, bed alarm on, frequent rounding. Problem: Risk for Impaired Skin Integrity  Goal: Tissue integrity - skin and mucous membranes  Description  Structural intactness and normal physiological function of skin and  mucous membranes. 3/30/2019 0042 by Jake Aguilar RN  Outcome: Ongoing  Note:   Turned every two hours. DTI on coccyx. ECP cream applied. Problem: Pain:  Goal: Pain level will decrease  Description  Pain level will decrease  3/30/2019 0042 by Jake Aguilar RN  Outcome: Ongoing  Note:   Pain Assessment: 0-10  Pain Level: 0   Pain goal:  0   Is pain goal met at this time? Yes     Additional interventions to be implemented: rest         Problem: DISCHARGE BARRIERS  Goal: Patient's continuum of care needs are met  3/30/2019 0042 by Jake Aguilar RN  Outcome: Ongoing  Note:   Plan to discharge back to [facility] once stable. Care plan reviewed with patient. Patient verbalize understanding of the plan of care and contribute to goal setting.

## 2019-03-30 NOTE — PROGRESS NOTES
[address] [place] [address]  SPEECH THERAPY MISSED TREATMENT NOTE  [facility] CVICU 4B      Date: 3/30/2019  Patient Name: Oxana Mahoney        MRN: [id number]    : 1935  (80 y.o.)    REASON FOR MISSED TREATMENT:    New order for speech to reevaluate swallowing. Pt with EGD and dilation this morning. WIll hold swallow eval and plan to complete .     Matthew Mendoza M.S. [id number]

## 2019-03-30 NOTE — PRE SEDATION
[facility]  Sedation/Analgesia History & Physical    Patient: Norman Pastrana : 1935  Med Rec#: [id number] Acc#: [id number]   Provider Performing Procedure: Kassidy Kiser  Primary Care Physician: Mary Khoury MD    PRE-PROCEDURE   Full CODE [x]Yes  DNR-CCA/DNR-CC []Yes   Brief History/Pre-Procedure Diagnosis: dysphagia, aspiration and low H/H , R/o GI bleeding           MEDICAL HISTORY  []CAD/Valve  []Liver Disease  []Lung Disease []Diabetes  []Hypertension []Renal Disease  []Additional information:       has a past medical history of Acute kidney failure ([facility] Anemia, ASHD (arteriosclerotic heart disease), Blood circulation, collateral, CAD (coronary artery disease), Cardiomegaly, Chronic diastolic (congestive) heart failure ([facility] [address] Chronic kidney disease, DM2 (diabetes mellitus, type 2) ([facility] [address] Dyslipidemia, Falls, GERD (gastroesophageal reflux disease), Heart failure with preserved ejection fraction ([facility] [address] Hyperlipidemia, Hypertension, Iron deficiency anemia, Left atrial dilation, Lymphoma ([facility] [address] Malaise, Nonintractable headache, Osteoarthritis of both knees, Pacemaker, and PVD (peripheral vascular disease) ([facility] [address] SURGICAL HISTORY   has a past surgical history that includes Coronary artery bypass graft; pacemaker placement (2018); Cardiac surgery; vascular surgery; Cardiac catheterization (2018); pr dilation of salivary duct (N/A, 2018); Upper gastrointestinal endoscopy (Left, 2019); Upper gastrointestinal endoscopy (Left, 2019); and suprapubic catheter placement (N/A, 2019).   Additional information:       ALLERGIES   Allergies as of 2019    (No Known Allergies)     Additional information:       MEDICATIONS   Coumadin Use Last 7 Days [x]No []Yes  Antiplatelet drug therapy use last 7 days  [x]No []Yes  Other anticoagulant use last 7 days  [x]No []Yes    Current Facility-Administered Medications:     phenol 1.4 % mouth spray 1 ranolazine (RANEXA) extended release tablet 1,000 mg, 1,000 mg, Oral, BID, Silvia De Oliveira PA-C, Stopped at 03/29/19 1020    tamsulosin (FLOMAX) capsule 0.4 mg, 0.4 mg, Oral, Daily, Heriberto Harmon PA-C, Stopped at 03/29/19 1021    vitamin C (ASCORBIC ACID) tablet 500 mg, 500 mg, Oral, Nightly, Silvia De Oliveira PA-C    sodium chloride flush 0.9 % injection 10 mL, 10 mL, Intravenous, 2 times per day, Heriberto Harmon PA-C, 10 mL at 03/29/19 0815    sodium chloride flush 0.9 % injection 10 mL, 10 mL, Intravenous, PRN, Faviola De Oliveira PA-C    enoxaparin (LOVENOX) injection 30 mg, 30 mg, Subcutaneous, Daily, Silvia De Oliveira PA-C, 30 mg at 03/29/19 0814    piperacillin-tazobactam (ZOSYN) 3.375 g in dextrose 5 % 50 mL IVPB extended infusion (mini-bag), 3.375 g, Intravenous, Q8H, Silvia De Oliveira PA-C, Stopped at 03/30/19 0402    glucose (GLUTOSE) 40 % oral gel 15 g, 15 g, Oral, PRN, Silvia De Oliveira PA-C    dextrose 50 % solution 12.5 g, 12.5 g, Intravenous, PRN, Faviola De Oliveira PA-C    glucagon (rDNA) injection 1 mg, 1 mg, Intramuscular, PRN, Silvia De Oliveira PA-C    dextrose 5 % solution, 100 mL/hr, Intravenous, PRN, Faviola De Oliveira PA-C    insulin lispro (HUMALOG) injection vial 0-6 Units, 0-6 Units, Subcutaneous, TID WC, Silvia De Oliveira PA-C    insulin lispro (HUMALOG) injection vial 0-3 Units, 0-3 Units, Subcutaneous, Nightly, Silvia De Oliveira PA-C    ipratropium-albuterol (DUONEB) nebulizer solution 1 ampule, 1 ampule, Inhalation, Q4H PRN, Faviola De Oliveira PA-C    acetaminophen (TYLENOL) suppository 650 mg, 650 mg, Rectal, Q4H PRN, Silvia De Oliveira PA-C, 650 mg at 03/28/19 2100  Prior to Admission medications    Medication Sig Start Date End Date Taking? Authorizing Provider   traMADol (ULTRAM) 50 MG tablet Take 50 mg by mouth every 4 hours as needed for Pain.    Yes Historical Provider, MD   ferrous sulfate 325 (65 Fe) MG tablet Take 1 tablet by mouth Daily with lunch 2/25/19  Yes Farzaneh Altamirano, APRN - CNP DOXEPIN HCL PO Take 3 mg by mouth nightly   Yes Historical Provider, MD   Dextromethorphan-guaiFENesin  MG/5ML SYRP Take 10 mLs by mouth every 6 hours as needed for Cough   Yes Historical Provider, MD   pantoprazole (PROTONIX) 40 MG tablet Take 40 mg by mouth 2 times daily   Yes Historical Provider, MD   vitamin C (ASCORBIC ACID) 500 MG tablet Take 500 mg by mouth nightly    Yes Historical Provider, MD   clotrimazole-betamethasone (LOTRISONE) 1-0.05 % cream Apply topically Daily to foreskin and every 8 hours as needed   Yes Historical Provider, MD   LACTOBACILLUS PO Take by mouth daily   Yes Historical Provider, MD   docusate sodium (COLACE) 100 MG capsule Take 100 mg by mouth 2 times daily    Yes Historical Provider, MD   famotidine (PEPCID) 20 MG tablet Take 20 mg by mouth daily   Yes Historical Provider, MD   linagliptin (TRADJENTA) 5 MG tablet Take 5 mg by mouth daily   Yes Historical Provider, MD   ondansetron (ZOFRAN) 4 MG tablet Take 4 mg by mouth every 8 hours as needed for Nausea or Vomiting   Yes Historical Provider, MD   torsemide (DEMADEX) 10 MG tablet Take 1 tablet by mouth 2 times daily 11/26/18  Yes Laine Escoto MD   tamsulosin (FLOMAX) 0.4 MG capsule Take 1 capsule by mouth daily 9/7/18  Yes Ailyn Quinn MD   gabapentin (NEURONTIN) 100 MG capsule Take 100 mg by mouth 3 times daily. .   Yes Historical Provider, MD   acetaminophen (TYLENOL) 500 MG tablet Take by mouth every 8 hours as needed for Pain 2 tab   Yes Historical Provider, MD   meclizine (ANTIVERT) 25 MG tablet Take 25 mg by mouth every 12 hours as needed    Yes Historical Provider, MD   Multiple Vitamins-Minerals (MULTIVITAMIN ADULTS 50+ PO) Take 1 tablet by mouth daily    Yes Historical Provider, MD   RANEXA 1000 MG extended release tablet TAKE 1 TABLET BY MOUTH TWICE DAILY 6/25/18  Yes Ian Petersen MD   BYSTOLIC 10 MG tablet TAKE 1 TABLET BY MOUTH DAILY 6/25/18  Yes Ian Petersen MD   amLODIPine (NORVASC) 10 MG tablet Take 1 tablet by mouth daily 4/17/18  Yes Wallace Oliveros MD   potassium chloride (KLOR-CON M) 20 MEQ extended release tablet Take 0.5 tablets by mouth daily 4/16/18  Yes Wallace Oliveros MD   aspirin 81 MG tablet Take 81 mg by mouth daily   Yes Historical Provider, MD   atorvastatin (LIPITOR) 20 MG tablet Take 20 mg by mouth daily   Yes Historical Provider, MD   clopidogrel (PLAVIX) 75 MG tablet Take 75 mg by mouth daily   Yes Historical Provider, MD   glipiZIDE (GLUCOTROL) 5 MG tablet Take 5 mg by mouth daily   Yes Historical Provider, MD   magnesium oxide (MAG-OX) 400 MG tablet Take 400 mg by mouth 2 times daily    Yes Historical Provider, MD   isosorbide mononitrate (ISMO;MONOKET) 10 MG tablet Take 1 tablet by mouth daily 12/15/17  Yes Wallace Oliveros MD   metoprolol succinate (TOPROL XL) 25 MG extended release tablet Take 1 tablet by mouth daily 3/20/19   Wallace Oliveros MD   doxepin (SILENOR) 3 MG TABS tablet Take 3 mg by mouth nightly    Historical Provider, MD   polyvinyl alcohol (LIQUIFILM TEARS) 1.4 % ophthalmic solution Place 1 drop into both eyes every 4 hours as needed     Historical Provider, MD   magnesium hydroxide (EQL MILK OF MAGNESIA) 400 MG/5ML suspension Take by mouth daily as needed for Constipation    Historical Provider, MD   Trolamine Salicylate (ASPERCREME EX) Apply topically daily in AM to bilateral knees    Historical Provider, MD   polyethylene glycol (GLYCOLAX) powder Take 17 g by mouth daily as needed     Historical Provider, MD     Additional information:       PHYSICAL:   Heart:  [x]Regular rate and rhythm  []Other:    Lungs:  [x]Clear    []Other:    Abdomen: [x]Soft    []Other:    Mental Status: [x]Alert & Oriented  []Other:      VITAL SIGNS   Patient Vitals for the past 24 hrs:   BP Temp Temp src Pulse Resp SpO2 Weight   03/30/19 0822 (!) 146/70 -- -- 80 16 96 % --   03/30/19 0347 (!) 159/67 99.4 °F (37.4 °C) Oral 85 20 95 % 186 lb 9.6 oz (84.6 kg)   03/29/19 2358 (!) 137/56 98.6 °F (37 °C) Oral 92 20 -- --   03/29/19 1950 128/60 98.7 °F (37.1 °C) Oral 74 18 91 % --   03/29/19 1611 -- -- -- -- -- 92 % --   03/29/19 1557 -- -- -- -- -- 94 % --   03/29/19 1539 (!) 128/58 99.4 °F (37.4 °C) Oral 80 20 94 % --   03/29/19 1122 -- -- -- -- -- 93 % --   03/29/19 1114 121/60 98.8 °F (37.1 °C) Oral 80 20 95 % --       PLANNED PROCEDURE   [x]EGD  []Colonoscopy []Flex Sigmoid  []ERCP []EUS   []Cystoscopy  [] CATH [] BRONCH   Consent: I have discussed with the patient and/or the patient representative the indication, alternatives, and the possible risks and/or complications of the planned procedure and the anesthesia methods. The patient and/or patient representative appear to understand and agree to proceed. SEDATION  Planned agent:[x]Midazolam []Meperidine [x]Sublimaze []Morphine  []Diazepam  []Other:     ASA Classification: Class 3 - A patient with severe systemic disease that limits activity but is not incapacitating    Airway Assessment: Mallampati Class II - (soft palate, fauces & uvula are visible)    Monitoring and Safety: The patient will be placed on a cardiac monitor and vital signs, pulse oximetry and level of consciousness will be continuously evaluated throughout the procedure. The patient will be closely monitored until recovery from the medications is complete and the patient has returned to baseline status. Respiratory therapy will be on standby during the procedure. [x]Pre-procedure diagnostic studies complete and results available. Comment:    [x]Previous sedation/anesthesia experiences assessed. Comment:    [x]The patient is an appropriate candidate to undergo the planned procedure sedation and anesthesia. (Refer to nursing sedation/analgesia documentation record)  [x]Formulation and discussion of sedation/procedure plan, risks, and expectations with patient and/or responsible adult completed. [x]Patient examined immediately prior to the procedure.  (Refer to

## 2019-03-30 NOTE — PROGRESS NOTES
chloride  10 mEq Oral Daily    ranolazine  1,000 mg Oral BID    tamsulosin  0.4 mg Oral Daily    vitamin C  500 mg Oral Nightly    sodium chloride flush  10 mL Intravenous 2 times per day    enoxaparin  30 mg Subcutaneous Daily    piperacillin-tazobactam  3.375 g Intravenous Q8H    insulin lispro  0-6 Units Subcutaneous TID WC    insulin lispro  0-3 Units Subcutaneous Nightly     PRN Meds: phenol, metoprolol, hydrALAZINE, meclizine, sodium chloride flush, glucose, dextrose, glucagon (rDNA), dextrose, ipratropium-albuterol, acetaminophen      Intake/Output Summary (Last 24 hours) at 3/30/2019 0809  Last data filed at 3/30/2019 0353  Gross per 24 hour   Intake 759.37 ml   Output 1200 ml   Net -440.63 ml       Diet:  Diet NPO Effective Now    Exam:  BP (!) 159/67   Pulse 85   Temp 99.4 °F (37.4 °C) (Oral)   Resp 20   Ht 5' 9\" (1.753 m)   Wt 186 lb 9.6 oz (84.6 kg)   SpO2 95%   BMI 27.56 kg/m²     General appearance: No apparent distress, appears stated age and cooperative. HEENT: Pupils equal, round, and reactive to light. Conjunctivae/corneas clear. Neck: Supple, with full range of motion. No jugular venous distention. Trachea midline. Respiratory:  Normal respiratory effort. Clear to auscultation, bilaterally without Rales/Wheezes/Rhonchi. Cardiovascular: Regular rate and rhythm with normal S1/S2 without murmurs, rubs or gallops. Abdomen: Soft, non-tender, non-distended with normal bowel sounds. Musculoskeletal: passive and active ROM x 4 extremities. Skin: Skin color, texture, turgor normal.  No rashes or lesions. Neurologic:  Neurovascularly intact without any focal sensory/motor deficits.  Cranial nerves: II-XII intact, grossly non-focal.  Psychiatric: Alert and oriented, thought content appropriate, normal insight  Capillary Refill: Brisk,< 3 seconds   Peripheral Pulses: +2 palpable, equal bilaterally       Labs:   Recent Labs     03/27/19 2039 03/29/19  6855 03/29/19  0816 03/30/19  4166 an inflammatory or infectious pneumonitis. 4. Mild mediastinal lymphadenopathy. This is indeterminate and may be reactive in nature. However, recommend follow-up to document resolution. 5. Mild distal esophageal wall thickening of indeterminate etiology. This may be related to sequela from reflux esophagitis. **This report has been created using voice recognition software. It may contain minor errors which are inherent in voice recognition technology. **      Final report electronically signed by Dr. Sis Zuniga on 3/29/2019 9:55 AM      CT HEAD WO CONTRAST   Final Result   Stable atrophy with chronic small vessel disease. **This report has been created using voice recognition software. It may contain minor errors which are inherent in voice recognition technology. **      Final report electronically signed by Dr. Eileen Lewis on 3/27/2019 9:59 PM      CT ABDOMEN PELVIS W IV CONTRAST Additional Contrast? Radiologist Recommendation   Final Result   1. Bilateral infiltrates compatible with pneumonitis increasing compared to the prior study. 2. Cholelithiasis. 3. Stable mild renal atrophy         **This report has been created using voice recognition software. It may contain minor errors which are inherent in voice recognition technology. **      Final report electronically signed by Dr. Eileen Lewis on 3/27/2019 9:57 PM      XR CHEST PORTABLE   Final Result      Interstitial infiltrates in the mid and lower lungs bilaterally either representing pulmonary edema or an atypical viral-type pneumonia. **This report has been created using voice recognition software. It may contain minor errors which are inherent in voice recognition technology. **      Final report electronically signed by Dr. Maria Fernanda Dickerson on 3/27/2019 9:05 PM          Diet: Diet NPO Effective Now    DVT prophylaxis: [x] Lovenox                                 [] SCDs                                 [] SQ Heparin [] Encourage ambulation           [] Already on Anticoagulation     Disposition:    [] Home       [] TCU       [] Rehab       [] Psych       [x] SNF       [] [place]       [] Other-    Code Status: Full Code    PT/OT Eval Status: pending     Assessment/Plan:    Anticipated Discharge in : TBD     Active Hospital Problems    Diagnosis Date Noted    Low bicarbonate [E87.8]     Pneumonitis [J18.9] 03/28/2019    Altered mental status [R41.82] 09/02/2018    Sepsis ([address] [A41.9] 08/16/2018    Heart failure with preserved ejection fraction (HCC) [I50.30]               Assessment/Plan:     1. Acute Metabolic Encephalopathy- likely secondary to infectious etiology given his presentation with sepsis. Source can be PNA and/or . Cont IV Zosyn (Day 4). CT showing bilateral pneumonitis and UA grossly abnormal. Bx NGTD. Ux growing E. Coli. Consult ID.      2. Acute Hypoxic Resp. Failure- secondary to PNA with some component of heart failure exacerbation. Pt was started on IV Diuretics and stopped given worsening kidney function. Cont IV Zosyn. Cont to wean supplemental O2 as tolerated.      3. Sepsis- 3/4 SIRS (febrile, tachycardic, and tachypenic). Source can be PNA and/or . Cont IV Zosyn per ID. CT showing bilateral pneumonitis and UA grossly abnormal. Bx NGTD. Ux growing E. Coli. Treat with Fosfomycin x 1.      4. PNA/Pnueomonitis- history of esophageal dysphagia s/p dilation on 2/2019. CT showing bilateral pneumonitis. Aspiration PNA? Cont IV and Zosyn. SLP rec NPO. Barium swallow showing aspiration. Plan for EGD and dilation on 3/30.     5. Acute on Chronic Diastolic CHF- LES, elevated BNP, CXR findings of some edema. 2D Echo on 12/17 EF 55%. Gentle IV diuresis lasix 20 BID. Pending 2D Echo. Stopped IV diuresis on 3/29 given worsening kidney function. Consult Nephrology.      6. Esophageal Dysphagia-  history of esophageal dysphagia s/p dilation on 2/2019. SLP rec NPO.  Barium swallow showing aspirations. Plan for EGD and dilation 3/30, if still aspirating will need to discuss Peg Tube.      7. Neurogenic bladder- s/p suprapubic catheter placement on 2/2019.      8. Troponin Elevation- likely demand mismatch from sepsis. No chest pain or ECG changes. Monitor on telemetry.      9. CABG x 3 in 1975     10. PAD s/p R SFA/popliteal stent with ISR; L BTK disease     11. CKD on GLORIA- creatinine increased from his baseline. IV Lasix 20 discontinued on 3/29. Nephrology on board managing.      12. Hemoglobin drop from 9.9 to 7.2. No melena or hematochezia. Pt is on Plavix.  Plan for EGD and dilation today.                   Electronically signed by Robbin Brasher MD on 3/30/2019 at 8:09 AM

## 2019-03-30 NOTE — PROGRESS NOTES
Patient arrived to PACU. Vital signs stable on 2 liters supplemental oxygen via nasal cannula. Patient alert to verbal stimuli.

## 2019-03-30 NOTE — CONSULTS
[address] [place] [address]                                  CONSULTATION    PATIENT NAME: Ronda Weinstein              :        1935  MED REC NO:   [id number]                           ROOM:       [id number]  ACCOUNT NO:   [de-identified]                           ADMIT DATE: 2019  PROVIDER:     MARY Mendoza Bras:  2019    HISTORY OF PRESENT ILLNESS:  The patient is known to the practice;  admitted at this time with shortness of breath; altered mental status,  improved. Seen in GI consult for evaluation due to possible aspiration  pneumonia, change in voice, cough on eating as well as significant drop  in H and H. The patient said he is having nausea. He vomited one time. The vomit  contained coffee-ground material, brownish. He has significant  difficulty swallowing. He is choking when he is swallowing. He had an  upper endoscopy with dilation done recently, improved but not resolved  completely. He has epigastric discomfort  Not radiating . His bowel  movement is dark in color, but it is not due to melena. It is hard, not  very liquid. He has no mucus discharge with the stool. No new  medications. He has no exposure to any patient with acute  gastroenteritis. He is short of breath with exertion. He states he does not move around  at this time. He is having no fever or chills. No diaphoresis or palpitations. He  has cough, nonproductive at this time. He coughs with eating. He had a recent upper endoscopy with dilation done through my office due  to dysphagia. He was dilated to size 54-Israeli. Gastritis and  duodenitis were seen. He has a history of iron-deficiency anemia duodenal   Biopsy obtain in last endoscopy .     PAST MEDICAL HISTORY:  Significant for acute kidney injury, anemia with  chronic kidney disease as well as chronic GI reflux, coronary artery  disease, hypertension, cardiomegaly, chronic diastolic congestive heart  failure, chronic kidney disease, diabetes, dyslipidemia, gastric reflux,  dysphagia, iron-deficiency anemia, left atrial dilation, osteoarthritis,  cardiac cath, open heart surgery in the past, angioplasty. SOCIAL HISTORY:  No smoking. No drug abuse. Do drug abuse. His cousin  takes care of him. FAMILY HISTORY:  No GI malignancy. MEDICATIONS:  Ranexa, Neurontin, Imdur, Toprol-XL, Norvasc, Pepcid,  Protonix at this time, Flomax, aspirin 81 mg daily, Plavix, iron  supplement, multi-inhalers, hydralazine. PHYSICAL EXAMINATION:  GENERAL:  Pleasant, comfortable, having difficulty to choke, coughing,  slightly short of breath. VITAL SIGNS:  Afebrile. Temperature 98.7, T-max is 100.9, his blood  pressure 126/62. HEENT:   Head is atraumatic. Sclerae anicteric. Oral cavity, dry  mucosa. NECK:  Supple. CHEST:  Poor air entry with bilateral crepitation and rales. CARDIOVASCULAR:  S1, S2.  ABDOMEN:  Soft, nontender. There is no rebound or guarding. No  organomegaly. EXTREMITIES:  No clubbing. No cyanosis. LABORATORY:  His H and H are 7.2 and 22.7, on admission were 9.9 and  30.7. MCV is normal.  His white blood cell is normal.  BUN and  creatinine are normal. BUN is 45, creatinine is 2. Sodium and potassium  are normal.    IMPRESSION:  1. Aspiration pneumonia. 2.  GI bleed; likely peptic ulcer disease, erosive gastritis, and  dysphagia. 3.  Suprapubic Shabazz catheter. PLAN:  1. Upper endoscopy to be done tomorrow for evaluation of GI bleed. 2.  Continue PPI. 3.  We will wait for the speech therapy evaluation. If not improved,  might need a PEG tube placement. Thank you for allowing me to participate in this patient's care.         Latrice Fernando M.D.    D: 03/29/2019 20:07:26       T: 03/29/2019 23:08:26     [email or web address]  Job#: [id number]     Doc#: [id number]    CC:  Sergei Reardon M.D.

## 2019-03-31 NOTE — PROGRESS NOTES
Hospitalist Progress Note    Patient:  Mona Ponce      Unit/Bed:4B-11/011-A    YOB: 1935    MRN: [id number]       Acct: [de-identified]     PCP: Ebony Sinclair MD    Date of Admission: 3/27/2019    Chief Complaint: [address] Course: 80 y. o. male with PMH of CKD, anemia, CHF, PAD (R SFA/popliteal stent), PAH, neurogenic bladder s/p suprapubic catheter, DMII, and HTN who presented to [facility] by EMS from [address] [place] [address]. Unable to obtain thorough hx due to pt's altered state. Per ED notes, secondary from nursing home report, pt was sent to the ED for SOB, couch, and decreased O2 saturation. Pt was on 2L NC at the nursing home and was satting 89%, was placed on non-rebreather upon arrival and was satting 99%. Cousin was at bedside who states the patient is not at his baseline state.      Pt has known esophageal dysphagia s/p EGD dilation on 2/2019 by Dr. Connie Garrett. Pt also recently had a suprapubic catheter placed on 2/2019 for neurogenic bladder. Pt is normally very alert and oriented.      In the ED, pt confused with 3/4 SIRS (febrile, tachycardic, and tachypenic). Pt placed on non-re breather, started on IV Vanc, Zosyn and Levaquin and admitted.     Subjective: no acute events overnight. Mental status back to baseline. No fevers or chills. Pt talking more, looks much better today.        Medications:  Reviewed    Infusion Medications    dextrose 5 % and 0.45 % NaCl 75 mL/hr at 03/30/19 1251    dextrose       Scheduled Medications    enoxaparin  40 mg Subcutaneous Daily    potassium chloride  20 mEq Oral Daily    sodium bicarbonate  1,300 mg Oral BID    pantoprazole  40 mg Intravenous Daily    amLODIPine  10 mg Oral Daily    aspirin  81 mg Oral Daily    atorvastatin  20 mg Oral Nightly    clopidogrel  75 mg Oral Daily    docusate sodium  100 mg Oral BID    doxepin  3 mg Oral Nightly    famotidine  20 mg Oral Daily    ferrous sulfate  325 mg Oral Lunch    gabapentin  100 mg Oral TID    isosorbide mononitrate  30 mg Oral Daily    metoprolol succinate  25 mg Oral Daily    ranolazine  1,000 mg Oral BID    tamsulosin  0.4 mg Oral Daily    vitamin C  500 mg Oral Nightly    sodium chloride flush  10 mL Intravenous 2 times per day    piperacillin-tazobactam  3.375 g Intravenous Q8H    insulin lispro  0-6 Units Subcutaneous TID WC    insulin lispro  0-3 Units Subcutaneous Nightly     PRN Meds: phenol, metoprolol, hydrALAZINE, meclizine, sodium chloride flush, glucose, dextrose, glucagon (rDNA), dextrose, ipratropium-albuterol, acetaminophen      Intake/Output Summary (Last 24 hours) at 3/31/2019 0816  Last data filed at 3/31/2019 0356  Gross per 24 hour   Intake 1449.82 ml   Output 1675 ml   Net -225.18 ml       Diet:  Diet NPO Effective Now    Exam:  BP (!) 164/70   Pulse 85   Temp 98.7 °F (37.1 °C) (Oral)   Resp 18   Ht 5' 9\" (1.753 m)   Wt 188 lb (85.3 kg)   SpO2 94%   BMI 27.76 kg/m²     General appearance: No apparent distress, appears stated age and cooperative. HEENT: Pupils equal, round, and reactive to light. Conjunctivae/corneas clear. Neck: Supple, with full range of motion. No jugular venous distention. Trachea midline. Respiratory:  Normal respiratory effort. Clear to auscultation, bilaterally without Rales/Wheezes/Rhonchi. Cardiovascular: Regular rate and rhythm with normal S1/S2 without murmurs, rubs or gallops. Abdomen: Soft, non-tender, non-distended with normal bowel sounds. Musculoskeletal: passive and active ROM x 4 extremities. Skin: Skin color, texture, turgor normal.  No rashes or lesions. Neurologic:  Neurovascularly intact without any focal sensory/motor deficits.  Cranial nerves: II-XII intact, grossly non-focal.  Psychiatric: Alert and oriented, thought content appropriate, normal insight  Capillary Refill: Brisk,< 3 seconds   Peripheral Pulses: +2 palpable, equal bilaterally       Labs:   Recent Labs 03/29/19  0591 03/29/19  0816 03/30/19  0326 03/31/19  0430   WBC 7.5  --  7.9 7.7   HGB 7.4* 7.4* 8.2* 8.3*   HCT 23.9* 22.7* 25.0* 26.7*   *  --  139 148     Recent Labs     03/29/19  0337 03/30/19  0326 03/31/19  0430    141 141   K 4.3 3.7 3.4*    109 110   CO2 17* 18* 18*   BUN 45* 40* 25*   CREATININE 2.0* 1.7* 1.2   CALCIUM 8.3* 8.3* 8.3*   PHOS 2.8 2.9  --      No results for input(s): AST, ALT, BILIDIR, BILITOT, ALKPHOS in the last 72 hours. No results for input(s): INR in the last 72 hours. No results for input(s): Erik Specking in the last 72 hours. Urinalysis:      Lab Results   Component Value Date    NITRU NEGATIVE 03/27/2019    WBCUA 15-25 03/27/2019    BACTERIA MANY 03/27/2019    RBCUA 3-5 03/27/2019    BLOODU NEGATIVE 03/27/2019    SPECGRAV 1.012 02/23/2019    GLUCOSEU NEGATIVE 03/27/2019       Radiology:  US RENAL COMPLETE   Final Result   The right kidney is slightly asymmetrically smaller than the left. **This report has been created using voice recognition software. It may contain minor errors which are inherent in voice recognition technology. **      Final report electronically signed by Dr Mila Thomas on 3/29/2019 2:05 PM      FL MODIFIED BARIUM SWALLOW W VIDEO   Final Result   Aspiration with nectar thick and thin consistencies. For recommendations and additional comments please see the speech therapy report            **This report has been created using voice recognition software. It may contain minor errors which are inherent in voice recognition technology. **      Final report electronically signed by Dr. Zach Beltre on 3/29/2019 2:38 PM      CT CHEST [address]   Final Result   1. Small bilateral pleural effusions. 2. Bibasilar consolidative right greater than left atelectasis or pneumonia. Recommend follow-up to document resolution. 3. Patchy ground glass opacities in a perihilar distribution is demonstrated within the lungs bilaterally. This may represent pulmonary edema versus sequela from an inflammatory or infectious pneumonitis. 4. Mild mediastinal lymphadenopathy. This is indeterminate and may be reactive in nature. However, recommend follow-up to document resolution. 5. Mild distal esophageal wall thickening of indeterminate etiology. This may be related to sequela from reflux esophagitis. **This report has been created using voice recognition software. It may contain minor errors which are inherent in voice recognition technology. **      Final report electronically signed by Dr. Saniya Jeronimo on 3/29/2019 9:55 AM      CT HEAD WO CONTRAST   Final Result   Stable atrophy with chronic small vessel disease. **This report has been created using voice recognition software. It may contain minor errors which are inherent in voice recognition technology. **      Final report electronically signed by Dr. Geraldine Canas on 3/27/2019 9:59 PM      CT ABDOMEN PELVIS W IV CONTRAST Additional Contrast? Radiologist Recommendation   Final Result   1. Bilateral infiltrates compatible with pneumonitis increasing compared to the prior study. 2. Cholelithiasis. 3. Stable mild renal atrophy         **This report has been created using voice recognition software. It may contain minor errors which are inherent in voice recognition technology. **      Final report electronically signed by Dr. Geraldine Canas on 3/27/2019 9:57 PM      XR CHEST PORTABLE   Final Result      Interstitial infiltrates in the mid and lower lungs bilaterally either representing pulmonary edema or an atypical viral-type pneumonia. **This report has been created using voice recognition software. It may contain minor errors which are inherent in voice recognition technology. **      Final report electronically signed by Dr. Marshall Greene on 3/27/2019 9:05 PM          Diet: Diet NPO Effective Now    DVT prophylaxis: [x] Lovenox                                 [] 3/29 given worsening kidney function. Consult Nephrology.      6. Esophageal Dysphagia-  history of esophageal dysphagia s/p dilation on 2/2019. SLP rec NPO. Barium swallow showing aspirations. S/P EGD and dilation on 3/30 with Dr. Gmoes Ip. If still aspirating will need to discuss Peg Tube. Plan for SLP eval tomorrow.      7. Neurogenic bladder- s/p suprapubic catheter placement on 2/2019.      8. Troponin Elevation- likely demand mismatch from sepsis. No chest pain or ECG changes. Monitor on telemetry.      9. CABG x 3 in 1975     10. PAD s/p R SFA/popliteal stent with ISR; L BTK disease     11. CKD on GLORIA- creatinine increased from his baseline. IV Lasix 20 discontinued on 3/29. Nephrology on board managing.      12. Hemoglobin drop from 9.9 to 7.2. No melena or hematochezia. Pt is on Plavix. EGD on 3/30 showed no bleeding. Cont to monitor H&H.         Dispo: plan for SLP eval tomorrow. If still aspirating will need to discuss PEG tube.            Electronically signed by Julianne Christian MD on 3/31/2019 at 8:16 AM

## 2019-03-31 NOTE — PROGRESS NOTES
Renal Adjustment Follow-up    Recent Labs     03/30/19  0326 03/31/19  0430   BUN 40* 25*       Recent Labs     03/30/19  0326 03/31/19  0430   CREATININE 1.7* 1.2       Estimated Creatinine Clearance: 50 mL/min (based on SCr of 1.2 mg/dL).       Plan: Change lovenox to 40 mg daily

## 2019-03-31 NOTE — PROGRESS NOTES
kidney failure (HCC)     Suprapubic catheter (HCC)     Acute postoperative pain     BPH with obstruction/lower urinary tract symptoms     Neurogenic bladder     Pneumonitis     Low bicarbonate      Subjective:   Admit Date: 3/27/2019    Interval History:  Seeing for acute kidney injury   Very sleepy this morning  Updated by the staff  No new issues  Stable blood pressure   Good urine output       Medications:   Scheduled Meds:   enoxaparin  40 mg Subcutaneous Daily    potassium chloride  20 mEq Oral Daily    sodium bicarbonate  1,300 mg Oral BID    fosfomycin tromethamine  3 g Oral Once    pantoprazole  40 mg Intravenous Daily    amLODIPine  10 mg Oral Daily    aspirin  81 mg Oral Daily    atorvastatin  20 mg Oral Nightly    clopidogrel  75 mg Oral Daily    docusate sodium  100 mg Oral BID    doxepin  3 mg Oral Nightly    famotidine  20 mg Oral Daily    ferrous sulfate  325 mg Oral Lunch    gabapentin  100 mg Oral TID    isosorbide mononitrate  30 mg Oral Daily    metoprolol succinate  25 mg Oral Daily    ranolazine  1,000 mg Oral BID    tamsulosin  0.4 mg Oral Daily    vitamin C  500 mg Oral Nightly    sodium chloride flush  10 mL Intravenous 2 times per day    piperacillin-tazobactam  3.375 g Intravenous Q8H    insulin lispro  0-6 Units Subcutaneous TID WC    insulin lispro  0-3 Units Subcutaneous Nightly     Continuous Infusions:   dextrose 5 % and 0.45 % NaCl 75 mL/hr at 03/30/19 1251    dextrose         CBC:   Recent Labs     03/29/19  0337 03/29/19  0816 03/30/19  0326 03/31/19  0430   WBC 7.5  --  7.9 7.7   HGB 7.4* 7.4* 8.2* 8.3*   *  --  139 148     CMP:    Recent Labs     03/29/19  0337 03/30/19  0326 03/31/19  0430    141 141   K 4.3 3.7 3.4*    109 110   CO2 17* 18* 18*   BUN 45* 40* 25*   CREATININE 2.0* 1.7* 1.2   GLUCOSE 107 116* 182*   CALCIUM 8.3* 8.3* 8.3*   LABGLOM 32* 39* 58*     Troponin: No results for input(s): TROPONINI in the last 72 hours.   BNP: No results for input(s): BNP in the last 72 hours. INR:   No results for input(s): INR in the last 72 hours. Lipids:   No results for input(s): CHOL, LDLDIRECT, TRIG, HDL, AMYLASE, LIPASE in the last 72 hours. Liver:   No results for input(s): AST, ALT, ALKPHOS, PROT, LABALBU, BILITOT in the last 72 hours. Invalid input(s): BILDIR  Iron:  No results for input(s): IRONS, FERRITIN in the last 72 hours. Invalid input(s): LABIRONS    Objective:   Vitals: BP (!) 164/70   Pulse 85   Temp 98.7 °F (37.1 °C) (Oral)   Resp 18   Ht 5' 9\" (1.753 m)   Wt 188 lb (85.3 kg)   SpO2 94%   BMI 27.76 kg/m²    Wt Readings from Last 3 Encounters:   03/31/19 188 lb (85.3 kg)   03/20/19 182 lb (82.6 kg)   03/18/19 184 lb 4.8 oz (83.6 kg)      24HR INTAKE/OUTPUT:      Intake/Output Summary (Last 24 hours) at 3/31/2019 0735  Last data filed at 3/31/2019 0356  Gross per 24 hour   Intake 1449.82 ml   Output 1675 ml   Net -225.18 ml       Constitutional:  Comfortably asleep this morning  Skin:normal   HEENT:Pupils are reactive . Throat is clear   Neck:supple with no thyromegaly  Cardiovascular:  S1, S2 without murmur  Respiratory:  Clear  Abdomen: +bs, soft,   :Suprapubic catheter noted and intact  Ext: No LE edema  Musculoskeletal:Intact  Neuro: Deferred      Electronically signed by Rachana Joy MD on 3/31/2019 at 7:35 AM

## 2019-03-31 NOTE — PROGRESS NOTES
Gastroenterology  Progress Note    3/31/2019 4:11 PM  Subjective:   Admit Date: 3/27/2019    Interval History: no nausea, no vomiting, recurrent aspiration , s/o EGD with dilation, waiting for speech therapy evaluation . Diet: Diet NPO Effective Now    Medications:   Scheduled Meds:   enoxaparin  40 mg Subcutaneous Daily    potassium chloride  20 mEq Oral Daily    sodium bicarbonate  1,300 mg Oral BID    pantoprazole  40 mg Intravenous Daily    amLODIPine  10 mg Oral Daily    aspirin  81 mg Oral Daily    atorvastatin  20 mg Oral Nightly    clopidogrel  75 mg Oral Daily    docusate sodium  100 mg Oral BID    doxepin  3 mg Oral Nightly    famotidine  20 mg Oral Daily    ferrous sulfate  325 mg Oral Lunch    gabapentin  100 mg Oral TID    isosorbide mononitrate  30 mg Oral Daily    metoprolol succinate  25 mg Oral Daily    ranolazine  1,000 mg Oral BID    tamsulosin  0.4 mg Oral Daily    vitamin C  500 mg Oral Nightly    sodium chloride flush  10 mL Intravenous 2 times per day    piperacillin-tazobactam  3.375 g Intravenous Q8H    insulin lispro  0-6 Units Subcutaneous TID WC    insulin lispro  0-3 Units Subcutaneous Nightly     Continuous Infusions:   dextrose 5 % and 0.45 % NaCl 50 mL/hr at 19 0846    dextrose       CBC:   Recent Labs     19  0337 19  0816 19  0326 19  0430   WBC 7.5  --  7.9 7.7   HGB 7.4* 7.4* 8.2* 8.3*   *  --  139 148     BMP:    Recent Labs     19  0337 19  0326 19  0430    141 141   K 4.3 3.7 3.4*    109 110   CO2 17* 18* 18*   BUN 45* 40* 25*   CREATININE 2.0* 1.7* 1.2   GLUCOSE 107 116* 182*     Hepatic: No results for input(s): AST, ALT, ALB, BILITOT, ALKPHOS in the last 72 hours. INR: No results for input(s): INR in the last 72 hours.   Xray:   Endoscopy Finding:      PATIENT NAME: Srini Rodríguez              :        1935  MED REC NO:   [id number]                           ROOM: 0011  ACCOUNT NO:   [de-identified]                           ADMIT DATE: 03/27/2019  PROVIDER:     Ronald Tam M.D.     DATE OF PROCEDURE:  03/30/2019     INDICATIONS:  The patient with dysphagia, recurrent aspiration,  presented with pneumonia as well as anemia with significant drop in H  and H.  Plan today for EGD to evaluate for GI bleed and also possible  dilation.     SURGEON:  Ronald Tam M.D.     ASA CLASSIFICATION:  III.     DESCRIPTION OF PROCEDURE:  The patient was brought to the GI lab. Consent was obtained. The risks involved with the procedure were  explained to the patient. Informed consent was obtained. The patient  was monitored during the procedure with pulse oximetry, blood pressure  monitoring, and oxygen by nasal cannula. Sedation by incremental doses  of IV Versed, total 2 mg of Versed and 75 mcg of fentanyl given in  incremental dosage during the procedure to achieve continuous conscious  sedation.     PROCEDURE PERFORMED:  EGD with dilation until size 54-Luxembourger.     Standard video 190 Olympus upper scope was advanced under direct vision  from the oral cavity up to the duodenum. The esophagus appears normal.   No erosions or ulcerations seen. No well-defined stricture. I elected  to dilate the patient as the patient benefitted from upper endoscopy  with dilation done in the past.  Scope was advanced into the stomach. Retroflex examination of the cardia revealed normal cardia with no  evidence of hiatus hernia. Very minimal non-clinically significant  patchy erythema seen in the distal part of the body and the antrum. Duodenum appears normal.  Scope was withdrawn through the antrum. Guidewire was introduced. Scope was withdrawn. Then, American dilator  starting from size 48 until size 54-Luxembourger introduced over the  guidewire, led to dilation of the esophagus. Dilator was withdrawn. Scope was advanced again, inspected the site of the dilation. No bleed. No perforation. reflux disease)     Dyslipidemia     Lymphoma (HCC)     Osteoarthritis of both knees     Left atrial dilation     GLORIA (acute kidney injury) ([address]     Frequent falls     Physical deconditioning     Altered awareness, transient     Fever     Sepsis (HCC)     Pancytopenia (HCC)     Altered mental status     Hepatomegaly     Urinary retention     Hematuria     Acute kidney injury superimposed on chronic kidney disease (HCC)     Hyperkalemia     Hypercalcemia     Calculus of gallbladder without cholecystitis without obstruction     Syncope and collapse     Non-intractable vomiting with nausea     Gallbladder dilatation     Nonintractable headache     Osteoarthritis of multiple joints     Acute on chronic diastolic (congestive) heart failure (HCC)     S/P placement of cardiac pacemaker     Chronic kidney disease, stage 3 (HCC)     Severe malnutrition (HCC)     Dysphagia     Urinary retention     Hyperkalemia     Hyponatremia     Lactic acidosis     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

## 2019-04-01 NOTE — CARE COORDINATION
4/1/19, 11:10 AM      Jeannine Oswald day: 4  Location: -11/011-A Reason for admit: Altered mental status [R41.82]   Procedure: 3/30 EGD-no bleeding  Treatment Plan of Care: Hospitalist and ID following. K+ 3.1 with replacement ordered. IVF. IV protonix. IV zosyn. Na+ bicarb bid. 96% RA. SLP following. Orders to transfer to med surg. PCP: Cleve Jones MD  Readmission Risk Score: 44%  Discharge Plan: Plans to return to [address] [place] [address] following. Potential discharge tomorrow with plans to transition to PO atb.

## 2019-04-01 NOTE — PROGRESS NOTES
Physical Therapy   [address]-11[address]011-[address]    Time In: 915  Time Out: 2512  Timed Code Treatment Minutes: 23 Minutes  Minutes: 23          Date: 2019  Patient Name: Sydni Gilbert,  Gender:  male        MRN: [id number]  : 1935  (80 y.o.)     Referring Practitioner: Brad Yanez PA-C  Diagnosis: altered mental status  Additional Pertinent Hx: Per EMR: \"Per ER note on 3/27/19: 80 y.o. male who presents to the Emergency Department by EMS from [address]. The patient has a history of CAD, diabetes, hypertension, hyperlipidemia, GERD, PVD, CKD, and anemia. Cousin of patient is at bedside. According to [facility] report, patient was sent here for shortness of breath, cough, and decreased O2. Patient was on 2 liters of NC at the [facility] home with a saturation of 89%. Upon arrival to our department, squad has patient on a non-rebreather with sats at 99%. Patient is alert to person and place, but states the year is \"\". Cousin states this is abnormal for the patient. Patient complains of abdominal pain upon palpation. He presents here with a rectal temperature of 104. 9. \"     Past Medical History:   Diagnosis Date    Acute kidney failure ([address]     Anemia     ASHD (arteriosclerotic heart disease)     Blood circulation, collateral     CAD (coronary artery disease)     Cardiomegaly     Chronic diastolic (congestive) heart failure (HCC)     Chronic kidney disease     DM2 (diabetes mellitus, type 2) (HCC)     Dyslipidemia     Falls     GERD (gastroesophageal reflux disease)     Heart failure with preserved ejection fraction (HCC)     Hyperlipidemia     Hypertension     Iron deficiency anemia     Left atrial dilation     severe    Lymphoma (HCC)     non hodgkin    Malaise     Nonintractable headache     Osteoarthritis of both knees     Pacemaker 2018    [employer] DUAL PACEMAKER INSERTED ON level tile  Device: Rolling Walker  Assistance: Minimal assistance  Quality of Gait: Slow rose. Decreased B step lengths. Forward flexed posture. Min instability but no LOB  Distance: 12 feet x1          Balance  Comments: Static sit at EOB while RN took vitals at Stand by Assist for safety         Exercises:  Exercises  Comments: Seated in bedside chair pt completed BLE ankle pumps, LAQ, marches, hip abd/add, glut set all x12 reps to increase strength for improved functional mobility. Would benefit from continued therapy at discharge. Activity Tolerance:  Activity Tolerance: Patient limited by fatigue;Patient limited by endurance    Assessment: Body structures, Functions, Activity limitations: Decreased functional mobility , Decreased safe awareness, Decreased balance, Decreased ADL status, Decreased strength, Decreased endurance, Decreased high-level IADLs  Assessment: Pt tolerated session fairly well. Limited by decreased decreased strength, decreased edurance, decreased balance. Fatigues quickly. Pt would benefit from continued therapy at discharge. Prognosis: Good     REQUIRES PT FOLLOW UP: Yes    Discharge Recommendations:  Discharge Recommendations: Continue to assess pending progress, Subacute/Skilled Nursing Facility, Patient would benefit from continued therapy after discharge    Patient Education:  Patient Education: Bed mobility. Transfers. POC. Equipment Recommendations:  Equipment Needed: No    Safety:  Type of devices:  All fall risk precautions in place, Gait belt, Call light within reach, Chair alarm in place, Left in chair, Nurse notified, Patient at risk for falls    Plan:  Times per week: 3-5xGM  Times per day: Daily  Specific instructions for Next Treatment: advance as tolerated   Current Treatment Recommendations: Strengthening, Transfer Training, Endurance Training, ROM, Neuromuscular Re-education, Patient/Caregiver Education & Training, Balance Training, Functional Mobility Training, Stair training, Safety Education & Training, IADL Training, Gait Training, Home Exercise Program    Goals:  Patient goals : return to [facility]    Short term goals  Time Frame for Short term goals: 2 weeks  Short term goal 1: bed mobility with contact guard assist for ease of getting in/out of bed  Short term goal 2: sit <>stand with contact guard assist for ease of transfers  Short term goal 3: ambulate 15ft with use of least restrictive device and contact guard assist for ease of ADLs and ambulating to the bathroom  Short term goal 4: tolerate standing dynamic task for 2 minutes, with CGA, no LOB for ease of ADLs    Long term goals  Time Frame for Long term goals : N/A secondary to short ELOS  If patient is discharged prior to progress note completion, this note is to serve as the discharge note with all goals being unmet unless indicated otherwise.             AM-PAC Inpatient Mobility without Stair Climbing Raw Score : 15  AM-PAC Inpatient without Stair Climbing T-Scale Score : 43.03  Mobility Inpatient CMS 0-100% Score: 47.43  Mobility Inpatient without Stair CMS G-Code Modifier : CK

## 2019-04-01 NOTE — PROGRESS NOTES
TID    isosorbide mononitrate  30 mg Oral Daily    metoprolol succinate  25 mg Oral Daily    ranolazine  1,000 mg Oral BID    tamsulosin  0.4 mg Oral Daily    vitamin C  500 mg Oral Nightly    sodium chloride flush  10 mL Intravenous 2 times per day    piperacillin-tazobactam  3.375 g Intravenous Q8H    insulin lispro  0-6 Units Subcutaneous TID WC    insulin lispro  0-3 Units Subcutaneous Nightly      dextrose 5 % and 0.45 % NaCl 50 mL/hr at 03/31/19 2102    dextrose       phenol, metoprolol, hydrALAZINE, meclizine, sodium chloride flush, glucose, dextrose, glucagon (rDNA), dextrose, ipratropium-albuterol, acetaminophen      LABS:     CBC:   Recent Labs     03/30/19  0326 03/31/19  0430   WBC 7.9 7.7   HGB 8.2* 8.3*    148     BMP:    Recent Labs     03/30/19  0326 03/31/19  0430 04/01/19  0417    141 143   K 3.7 3.4* 3.1*    110 114*   CO2 18* 18* 16*   BUN 40* 25* 14   CREATININE 1.7* 1.2 1.0   GLUCOSE 116* 182* 191*     Calcium:  Recent Labs     04/01/19  0417   CALCIUM 8.4*     Ionized Calcium:No results for input(s): IONCA in the last 72 hours. Magnesium:  Recent Labs     04/01/19  0417   MG 1.9     Phosphorus:  Recent Labs     03/30/19  0326   PHOS 2.9     BNP:No results for input(s): BNP in the last 72 hours.   Glucose:  Recent Labs     03/31/19  1933 03/31/19  2154 04/01/19  0851   POCGLU 197* 176* 191*         Problem list of patient:     Patient Active Problem List   Diagnosis Code    Postural dizziness with near syncope R42, R55    DM2 (diabetes mellitus, type 2) ([facility] E11.9    Hypertension I10    Pacemaker Z95.0    Peripheral vascular disease [facility] I73.9    Lactic acidosis E87.2    Iron deficiency anemia D50.9    CAD (coronary artery disease) I25.10    Heart failure with preserved ejection fraction (HCC) I50.30    GERD (gastroesophageal reflux disease) K21.9    Dyslipidemia E78.5    Lymphoma ([facility]) C85.90    Osteoarthritis of both knees M17.0    Left atrial dilation I51.7    GLORIA (acute kidney injury) ([facility] N17.9    Frequent falls R29.6    Physical deconditioning R53.81    Altered awareness, transient R40.4    Fever R50.9    Sepsis (HCC) A41.9    Pancytopenia (HCC) D61.818    Altered mental status R41.82    Hepatomegaly R16.0    Urinary retention R33.9    Hematuria R31.9    Acute kidney injury superimposed on chronic kidney disease (HCC) N17.9, N18.9    Hyperkalemia E87.5    Hypercalcemia E83.52    Calculus of gallbladder without cholecystitis without obstruction K80.20    Syncope and collapse R55    Non-intractable vomiting with nausea R11.2    Gallbladder dilatation K82.8    Nonintractable headache R51    Osteoarthritis of multiple joints M15.9    Acute on chronic diastolic (congestive) heart failure (HCC) I50.33    S/P placement of cardiac pacemaker Z95.0    Chronic kidney disease, stage 3 ([facility]) N18.3    Severe malnutrition ([facility]) E43    Dysphagia R13.10    Urinary retention R33.9    Hyperkalemia E87.5    Hyponatremia E87.1    Lactic acidosis E87.2    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 ([facility]) T83.89XA, N36.8    Urinary tract infection associated with indwelling urethral catheter ([facility]) T83.511A, N39.0    Other acute kidney failure (HCC) N17.8    Suprapubic catheter ([facility]) Z93.59    Acute postoperative pain G89.18    BPH with obstruction/lower urinary tract symptoms N40.1, N13.8    Neurogenic bladder N31.9    Pneumonitis J18.9    Low bicarbonate E87.8    Acute metabolic encephalopathy Q98.00         ASSESSMENT/PLAN   · Sepsis. ·  aspiration pneumonia: he has esophageal stricture that required dilatation  · GLORIA  · CHF  · Continue current treatment. Continue iv antibiotic.  Will transition to oral at am and possible discharge to [facility] Rao Houston MD, [address] 4/1/2019 9:06 AM

## 2019-04-01 NOTE — PROGRESS NOTES
Hospitalist Progress Note    Patient:  Azam Sandoval      Unit/Bed:4B-11/011-A    YOB: 1935    MRN: [id number]       Acct: [de-identified]     PCP: Lisette Miranda MD    Date of Admission: 3/27/2019    Chief Complaint: [facility]: 80 y. o. male with PMH of CKD, anemia, CHF, PAD (R SFA/popliteal stent), PAH, neurogenic bladder s/p suprapubic catheter, DMII, and HTN who presented to [address] [place] [address] by EMS from [address] [place] [address]. Unable to obtain thorough hx due to pt's altered state. Per ED notes, secondary from nursing home report, pt was sent to the ED for SOB, couch, and decreased O2 saturation. Pt was on 2L NC at the nursing home and was satting 89%, was placed on non-rebreather upon arrival and was satting 99%. Cousin was at bedside who states the patient is not at his baseline state.      Pt has known esophageal dysphagia s/p EGD dilation on 2/2019 by Dr. Frankey Geralds. Pt also recently had a suprapubic catheter placed on 2/2019 for neurogenic bladder. Pt is normally very alert and oriented.      In the ED, pt confused with 3/4 SIRS (febrile, tachycardic, and tachypenic). Pt placed on non-re breather, started on IV Vanc, Zosyn and Levaquin and admitted.     Subjective: no acute events overnight. Pt looks much better. Reports feeling good. No fevers or chills. Off of NC on RA. No coughing, fevers or chills.  Mental status at baseline       Medications:  Reviewed    Infusion Medications    dextrose 5 % and 0.45 % NaCl 50 mL/hr at 03/31/19 2101    dextrose       Scheduled Medications    potassium replacement protocol   Other RX Placeholder    enoxaparin  40 mg Subcutaneous Daily    potassium chloride  20 mEq Oral Daily    sodium bicarbonate  1,300 mg Oral BID    pantoprazole  40 mg Intravenous Daily    amLODIPine  10 mg Oral Daily    aspirin  81 mg Oral Daily    atorvastatin  20 mg Oral Nightly    clopidogrel  75 mg Oral Daily    docusate sodium  100 mg Oral BID    doxepin  3 mg Oral Nightly    famotidine  20 mg Oral Daily    ferrous sulfate  325 mg Oral Lunch    gabapentin  100 mg Oral TID    isosorbide mononitrate  30 mg Oral Daily    metoprolol succinate  25 mg Oral Daily    ranolazine  1,000 mg Oral BID    tamsulosin  0.4 mg Oral Daily    vitamin C  500 mg Oral Nightly    sodium chloride flush  10 mL Intravenous 2 times per day    piperacillin-tazobactam  3.375 g Intravenous Q8H    insulin lispro  0-6 Units Subcutaneous TID WC    insulin lispro  0-3 Units Subcutaneous Nightly     PRN Meds: phenol, metoprolol, hydrALAZINE, meclizine, sodium chloride flush, glucose, dextrose, glucagon (rDNA), dextrose, ipratropium-albuterol, acetaminophen      Intake/Output Summary (Last 24 hours) at 4/1/2019 0851  Last data filed at 3/31/2019 1645  Gross per 24 hour   Intake 1100 ml   Output 550 ml   Net 550 ml       Diet:  DIET DYSPHAGIA I PUREED; No Drinking Straw    Exam:  BP (!) 162/74   Pulse 84   Temp 98.2 °F (36.8 °C) (Oral)   Resp 20   Ht 5' 9\" (1.753 m)   Wt 188 lb (85.3 kg)   SpO2 96%   BMI 27.76 kg/m²     General appearance: No apparent distress, appears stated age and cooperative. HEENT: Pupils equal, round, and reactive to light. Conjunctivae/corneas clear. Neck: Supple, with full range of motion. No jugular venous distention. Trachea midline. Respiratory:  Normal respiratory effort. Clear to auscultation, bilaterally without Rales/Wheezes/Rhonchi. Cardiovascular: Regular rate and rhythm with normal S1/S2 without murmurs, rubs or gallops. Abdomen: Soft, non-tender, non-distended with normal bowel sounds. Musculoskeletal: passive and active ROM x 4 extremities. Skin: Skin color, texture, turgor normal.  No rashes or lesions. Neurologic:  Neurovascularly intact without any focal sensory/motor deficits.  Cranial nerves: II-XII intact, grossly non-focal.  Psychiatric: Alert and oriented, thought content appropriate, normal insight  Capillary Refill: Brisk,< 3 seconds   Peripheral Pulses: +2 palpable, equal bilaterally       Labs:   Recent Labs     03/30/19  0326 03/31/19  0430   WBC 7.9 7.7   HGB 8.2* 8.3*   HCT 25.0* 26.7*    148     Recent Labs     03/30/19  0326 03/31/19  0430 04/01/19  0417    141 143   K 3.7 3.4* 3.1*    110 114*   CO2 18* 18* 16*   BUN 40* 25* 14   CREATININE 1.7* 1.2 1.0   CALCIUM 8.3* 8.3* 8.4*   PHOS 2.9  --   --      No results for input(s): AST, ALT, BILIDIR, BILITOT, ALKPHOS in the last 72 hours. No results for input(s): INR in the last 72 hours. No results for input(s): Erik Specking in the last 72 hours. Urinalysis:      Lab Results   Component Value Date    NITRU NEGATIVE 03/27/2019    WBCUA 15-25 03/27/2019    BACTERIA MANY 03/27/2019    RBCUA 3-5 03/27/2019    BLOODU NEGATIVE 03/27/2019    SPECGRAV 1.012 02/23/2019    GLUCOSEU NEGATIVE 03/27/2019       Radiology:  US RENAL COMPLETE   Final Result   The right kidney is slightly asymmetrically smaller than the left. **This report has been created using voice recognition software. It may contain minor errors which are inherent in voice recognition technology. **      Final report electronically signed by Dr Mila Thomas on 3/29/2019 2:05 PM      FL MODIFIED BARIUM SWALLOW W VIDEO   Final Result   Aspiration with nectar thick and thin consistencies. For recommendations and additional comments please see the speech therapy report            **This report has been created using voice recognition software. It may contain minor errors which are inherent in voice recognition technology. **      Final report electronically signed by Dr. Zach Beltre on 3/29/2019 2:38 PM      CT CHEST [address]   Final Result   1. Small bilateral pleural effusions. 2. Bibasilar consolidative right greater than left atelectasis or pneumonia. Recommend follow-up to document resolution.    3. Patchy ground glass opacities in a perihilar distribution is prophylaxis: [x] Lovenox                                 [] SCDs                                 [] SQ Heparin                                 [] Encourage ambulation           [] Already on Anticoagulation     Disposition:    [] Home       [] TCU       [] Rehab       [] Psych       [x] SNF       [] [place]       [] Other-    Code Status: Full Code    PT/OT Eval Status: ECF     Assessment/Plan:    Anticipated Discharge in : tomorrow     C/Reece Leung 1106 Problems    Diagnosis Date Noted    Acute metabolic encephalopathy [E92.55]     Low bicarbonate [E87.8]     Pneumonitis [J18.9] 03/28/2019    Acute on chronic diastolic (congestive) heart failure (HCC) [I50.33]     Altered mental status [R41.82] 09/02/2018    Sepsis ([address] [A41.9] 08/16/2018    Heart failure with preserved ejection fraction (HCC) [I50.30]           Assessment/Plan:     1. Acute Metabolic Encephalopathy- likely secondary to infectious etiology given his presentation with sepsis. Source can be PNA and/or . Cont IV Zosyn (Day 5).  Transition to PO Augmentin. CT showing bilateral pneumonitis and UA grossly abnormal. Bx NGTD. Ux growing E. Coli. Consult ID.      2. Acute Hypoxic Resp. Failure- secondary to PNA with some component of heart failure exacerbation. Pt was started on IV Diuretics and stopped given worsening kidney function. Cont IV Zosyn. Transition to PO Augmentin. Currently on RA.      3. Sepsis- 3/4 SIRS (febrile, tachycardic, and tachypenic). Source can be PNA and/or . Cont IV Zosyn per ID. CT showing bilateral pneumonitis and UA grossly abnormal. Bx NGTD. Ux growing E. Coli. Treat with Fosfomycin x 1.      4. PNA/Pnueomonitis- history of esophageal dysphagia s/p dilation on 2/2019. CT showing bilateral pneumonitis. Aspiration PNA? Cont IV and Zosyn. SLP rec NPO. Barium swallow showing aspiration. S/P EGD and dilation on 3/30.      5. Acute on Chronic Diastolic CHF- LES, elevated BNP, CXR findings of some edema.  2D Echo on 12/17 EF 55%. Gentle IV diuresis lasix 20 BID. Stopped IV diuresis on 3/29 given worsening kidney function. Consult Nephrology.      6. Esophageal Dysphagia-  history of esophageal dysphagia s/p dilation on 2/2019. SLP rec NPO. Barium swallow showing aspirations. S/P EGD and dilation on 3/30 with Dr. Mukesh Harrison. If still aspirating will need to discuss Peg Tube. Passed speech. Dysphagia 1 diet.      7. Neurogenic bladder- s/p suprapubic catheter placement on 2/2019.      8. Troponin Elevation- likely demand mismatch from sepsis. No chest pain or ECG changes. Monitor on telemetry.      9. CABG x 3 in 1975     10. PAD s/p R SFA/popliteal stent with ISR; L BTK disease     11. CKD on GLORIA- creatinine increased from his baseline. IV Lasix 20 discontinued on 3/29. Nephrology on board managing. Resovled.      12. Hemoglobin drop from 9.9 to 7.2. No melena or hematochezia. Pt is on Plavix. EGD on 3/30 showed no bleeding. Cont to monitor H&H.           Dispo: discharge to nursing home tomorrow. Speech to eval tomorrow morning.        Electronically signed by Clair Garrido MD on 4/1/2019 at 8:51 AM

## 2019-04-01 NOTE — CARE COORDINATION
4/1/19, 11:46 AM    DISCHARGE BARRIERS    Nurse states possible return to [facility] tomorrow. SW called Tia Catalan, Admissions for ecf and left detailed message.

## 2019-04-01 NOTE — PROGRESS NOTES
Gastroenterology  Progress Note    2019 6:22 PM  Subjective:   Admit Date: 3/27/2019    Interval History: no nausea, no vomiting, recurrent aspiration , s/o EGD with dilation, waiting for speech therapy evaluation , started on clear liquid diet. Diet: DIET DYSPHAGIA I PUREED; No Drinking Straw    Medications:   Scheduled Meds:   potassium replacement protocol   Other RX Placeholder    enoxaparin  40 mg Subcutaneous Daily    potassium chloride  20 mEq Oral Daily    sodium bicarbonate  1,300 mg Oral BID    pantoprazole  40 mg Intravenous Daily    amLODIPine  10 mg Oral Daily    aspirin  81 mg Oral Daily    atorvastatin  20 mg Oral Nightly    clopidogrel  75 mg Oral Daily    docusate sodium  100 mg Oral BID    doxepin  3 mg Oral Nightly    famotidine  20 mg Oral Daily    ferrous sulfate  325 mg Oral Lunch    gabapentin  100 mg Oral TID    isosorbide mononitrate  30 mg Oral Daily    metoprolol succinate  25 mg Oral Daily    ranolazine  1,000 mg Oral BID    tamsulosin  0.4 mg Oral Daily    vitamin C  500 mg Oral Nightly    sodium chloride flush  10 mL Intravenous 2 times per day    piperacillin-tazobactam  3.375 g Intravenous Q8H    insulin lispro  0-6 Units Subcutaneous TID WC    insulin lispro  0-3 Units Subcutaneous Nightly     Continuous Infusions:   dextrose 5 % and 0.45 % NaCl 20 mL/hr at 19 1642    dextrose       CBC:   Recent Labs     19  0326 19  0430   WBC 7.9 7.7   HGB 8.2* 8.3*    148     BMP:    Recent Labs     19  0326 19  0430 19  0417    141 143   K 3.7 3.4* 3.1*    110 114*   CO2 18* 18* 16*   BUN 40* 25* 14   CREATININE 1.7* 1.2 1.0   GLUCOSE 116* 182* 191*     Hepatic: No results for input(s): AST, ALT, ALB, BILITOT, ALKPHOS in the last 72 hours. INR: No results for input(s): INR in the last 72 hours.   Xray:   Endoscopy Finding:      PATIENT NAME: Warrick Duverney              :        1935  MED REC NO:   [id number]                           ROOM:       0011  ACCOUNT NO:   [de-identified]                           ADMIT DATE: 03/27/2019  PROVIDER:     Heron Tolentino M.D.     DATE OF PROCEDURE:  03/30/2019     INDICATIONS:  The patient with dysphagia, recurrent aspiration,  presented with pneumonia as well as anemia with significant drop in H  and H.  Plan today for EGD to evaluate for GI bleed and also possible  dilation.     SURGEON:  Heron Tolentino M.D.     ASA CLASSIFICATION:  III.     DESCRIPTION OF PROCEDURE:  The patient was brought to the GI lab. Consent was obtained. The risks involved with the procedure were  explained to the patient. Informed consent was obtained. The patient  was monitored during the procedure with pulse oximetry, blood pressure  monitoring, and oxygen by nasal cannula. Sedation by incremental doses  of IV Versed, total 2 mg of Versed and 75 mcg of fentanyl given in  incremental dosage during the procedure to achieve continuous conscious  sedation.     PROCEDURE PERFORMED:  EGD with dilation until size 54-Panamanian.     Standard video 190 Olympus upper scope was advanced under direct vision  from the oral cavity up to the duodenum. The esophagus appears normal.   No erosions or ulcerations seen. No well-defined stricture. I elected  to dilate the patient as the patient benefitted from upper endoscopy  with dilation done in the past.  Scope was advanced into the stomach. Retroflex examination of the cardia revealed normal cardia with no  evidence of hiatus hernia. Very minimal non-clinically significant  patchy erythema seen in the distal part of the body and the antrum. Duodenum appears normal.  Scope was withdrawn through the antrum. Guidewire was introduced. Scope was withdrawn. Then, American dilator  starting from size 48 until size 54-Panamanian introduced over the  guidewire, led to dilation of the esophagus. Dilator was withdrawn.    Scope was advanced again, inspected the site of the dilation. No bleed. No perforation. The procedure was terminated with no immediate  complications.     IMPRESSION:  1. No active GI bleed seen. 2.  The patient with aspiration and pneumonia , dysphagia, benefitted from upper  endoscopy with dilation in the past.  Dilation done at this time until  size 54-Serbian.     PLAN:  1. Speech Therapy to evaluate the dysphagia. If the patient failed and  kept on having aspiration, PEG tube is recommended. 2.  Check H and H tomorrow, evaluate. If drop in H and H, then  colonoscopy is recommended.  Kiki Orozco M.D.     D: 03/30/2019 10:14:43       T: 03/30/2019 10:43:16     [email or web address]  Job#: [id number]     Doc#: [id number]     CC:  Lela Merida M.D.               Last signed by: Percy Huston MD at 3/31/2019 12:33 AM        Objective:   Vitals: BP (!) 141/77   Pulse 92   Temp 98 °F (36.7 °C) (Oral)   Resp 20   Ht 5' 9\" (1.753 m)   Wt 188 lb (85.3 kg)   SpO2 96%   BMI 27.76 kg/m²     Intake/Output Summary (Last 24 hours) at 4/1/2019 1822  Last data filed at 4/1/2019 1443  Gross per 24 hour   Intake 1142 ml   Output 1125 ml   Net 17 ml     General appearance: alert and cooperative with exam  Lungs: clear to auscultation bilaterally  Heart: regular rate and rhythm, S1, S2 normal, no murmur, click, rub or gallop  Abdomen: soft, non-tender; bowel sounds normal; no masses,  no organomegaly  Extremities: extremities normal, atraumatic, no cyanosis or edema    Assessment and Plan:   1. Dysphagia and aspiration, speech therapy to see, may needs PEG innthe future. 2. Treat bthe pneumonia.        Follow up in GI Clinic after discharge in 2 week(s)    Patient Active Problem List:     Postural dizziness with near syncope     DM2 (diabetes mellitus, type 2) (HCC)     Hypertension     Pacemaker     Peripheral vascular disease (HCC)     Lactic acidosis     Iron deficiency anemia     CAD (coronary artery disease)     Heart failure with preserved ejection fraction (HCC)     GERD (gastroesophageal reflux disease)     Dyslipidemia     Lymphoma (HCC)     Osteoarthritis of both knees     Left atrial dilation     GLORIA (acute kidney injury) ([address]     Frequent falls     Physical deconditioning     Altered awareness, transient     Fever     Sepsis (HCC)     Pancytopenia (HCC)     Altered mental status     Hepatomegaly     Urinary retention     Hematuria     Acute kidney injury superimposed on chronic kidney disease (HCC)     Hyperkalemia     Hypercalcemia     Calculus of gallbladder without cholecystitis without obstruction     Syncope and collapse     Non-intractable vomiting with nausea     Gallbladder dilatation     Nonintractable headache     Osteoarthritis of multiple joints     Acute on chronic diastolic (congestive) heart failure (HCC)     S/P placement of cardiac pacemaker     Chronic kidney disease, stage 3 (HCC)     Severe malnutrition (HCC)     Dysphagia     Urinary retention     Hyperkalemia     Hyponatremia     Lactic acidosis     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)     Urinary tract infection associated with indwelling urethral catheter ([address]     Other acute kidney failure (HCC)     Suprapubic catheter (HCC)     Acute postoperative pain     BPH with obstruction/lower urinary tract symptoms     Neurogenic bladder     Pneumonitis     Low bicarbonate     Acute metabolic encephalopathy      Rory Jordan MD

## 2019-04-01 NOTE — PROGRESS NOTES
[facility] THERAPY  [facility] CVICU 4B  Bedside Swallowing Evaluation      SLP Individual Minutes  Time In: 820  Time Out: 2232  Minutes: 15  Timed Code Treatment Minutes: 0 Minutes       Date: 2019  Patient Name: Gerardo Perdomo      CSN: [id number]   : 1935  (80 y.o.)  Gender: male   Referring Physician: Dr. Paola Snider   Diagnosis: Altered mental status   Secondary Diagnosis: Dysphagia   History of Present Illness/Injury: Pt admit with above altered mental status secondary to high running fever with chest xray indicate of pneumonia with bilateral infiltrates per nursing report and chart review. Ptt with hx of multiple EGD dilations. MBS completed 3/29 with gross audible aspiration of all consistencies. Pt made NPO. EGD with dilations completed 3/30 with request for repeat swallowing assessment to determine safety for resuming oral diet.     Past Medical History:   Diagnosis Date    Acute kidney failure (HCC)     Anemia     ASHD (arteriosclerotic heart disease)     Blood circulation, collateral     CAD (coronary artery disease)     Cardiomegaly     Chronic diastolic (congestive) heart failure (HCC)     Chronic kidney disease     DM2 (diabetes mellitus, type 2) (HCC)     Dyslipidemia     Falls     GERD (gastroesophageal reflux disease)     Heart failure with preserved ejection fraction (HCC)     Hyperlipidemia     Hypertension     Iron deficiency anemia     Left atrial dilation     severe    Lymphoma (HCC)     non hodgkin    Malaise     Nonintractable headache     Osteoarthritis of both knees     Pacemaker 2018    [employer] DUAL PACEMAKER INSERTED ON D (peripheral vascular disease) (HCC)        Pain:  None reported     Current Diet: NPO pending speech eval    Respiratory Status: [x] Independent, significantly improve respiratory status    Behavioral Observation: [x] Alert  and cooperative    ORAL MECHANISM EVALUATION:         Comments:  Facial / Labial [x]WFL [] Impaired []DNT    Lingual [x]WFL [] Impaired []DNT    Dentition [x]WFL [] Impaired []DNT    Velum [x]WFL [] Impaired []DNT    Vocal Quality [x]WFL [] Impaired []DNT     Sensation []WFL [] Impaired [x]DNT    Cough [x]WFL [] Impaired [x]DNT    Other: []WFL [] Impaired []DNT    Other: []WFL [] Impaired []DNT        PATIENT WAS EVALUATED USING:  Puree and thin liquids by cup     ORAL PHASE: [x] WFL      PHARYNGEAL PHASE: [x] WFL: Pharyngeal phase appears WFLs, but cannot completely rule out pharyngeal phase deficits from a bedside swallow evaluation alone. SIGNS AND SYMPTOMS OF LARYNGEAL PENETRATION / ASPIRATION:  [x] No overt s/s of aspiration noted with tested consistencies. 20+ trials of pudding and 10+ trials of thin liquids by cup     IMPRESSIONS: Pt presents with essentially functional oral swallow function evidenced by slow but effective mastication of ice chips and functional bolus formation and control for puree and thin liquids by cup. . No overt oral stasis to follow. Pt with what appeared to be a timely pharyngeal swallow trigger and suspected good oral bolus control. Improved laryngeal elevation and endurance noted with no overt s/s of aspiration following extensive trials of puree and thin liquids. Significantly improved swallow function following dilation. Based on this assessment, pt appears safe to resume oral diet with conservative textures consisting of PUREE and THIN  liquids with avoidance of straws. Recommend CLOSE pulmonary monitoring with repeat instrumental assessment should pulmonary status decline. RECOMMENDATIONS:     Modified Barium Swallow: [] Is indicated to further assess    [x] Is NOT indicated at this time; Will recommend as appropriate.     DIET RECOMMENDATIONS:  Puree and thin, no straws    STRATEGIES: [x] Upright positioning, small bolus, alternate solids and liquids, close pulmonary monitoring     EDUCATION:   Learner: [x]Patient [] Significant other [] Son/Daughter [] Parent     [] Other:   Education: [x] Reviewed results and recommendations of this evaluation     [x] Reviewed diet and strategies     [] Reviewed signs, symptoms and risk of aspiration     [] Demonstrated how to thick liquid appropriately. [x] Reviewed goals and Plan of Care     [] OTHER:   Method: [x] Discussion [] Demonstration [] Hand-out     [] OTHER:   Evaluation of Education:     [x] Verbalizes understanding  [] Demonstrates with assistance     [] Demonstrates without assistance [x]Needs further instruction     [] No evidence of learning  [x] Family not present    PATIENT GOALS: [] Pt did not state. Will further assess during treatment. [x] Return to the least restricted diet possible     [] Return to previous level of function     [] OTHER:    PLAN / TREATMENT RECOMMENDATIONS:  [x] Skilled SLP intervention on acute care 3-5 x per week or until goals met and/or pt plateaus in function. Specific interventions for next session may include: MBS     SHORT TERM GOALS:  Short-term Goals  Timeframe for Short-term Goals: 2 weeks   Goal 1: Pt will tolerate puree and thin diet (with avoidance of straws) without s/s of aspiration to ensure safe and adequate nutrition and hydration  Goal 2: Pt will tolerate trials of advanced solids without s/s of aspiration 10/10 times to advance pt to previous diet level. Goal 3: Monitor pulmonary status closely and repeat instrumental assessment as needed.         LONG TERM GOALS:   No established LTG's given short Sarah Dean M.S. [id number]

## 2019-04-02 NOTE — PROGRESS NOTES
Gastroenterology  Progress Note    4/2/2019 7:34 PM  Subjective:   Admit Date: 3/27/2019    Interval History: no nausea, no vomiting, recurrent aspiration , s/o EGD with dilation, speech therapy evaluation , started diet. , revaluated, stable for discharge , will discuss PEG tub placement if he has recurrent aspiration. Diet: DIET DYSPHAGIA I PUREED; No Drinking Straw    Medications:   Scheduled Meds:   torsemide  20 mg Oral Daily    amoxicillin-clavulanate  1 tablet Oral 2 times per day    potassium replacement protocol   Other RX Placeholder    enoxaparin  40 mg Subcutaneous Daily    potassium chloride  20 mEq Oral Daily    pantoprazole  40 mg Intravenous Daily    amLODIPine  10 mg Oral Daily    aspirin  81 mg Oral Daily    atorvastatin  20 mg Oral Nightly    clopidogrel  75 mg Oral Daily    docusate sodium  100 mg Oral BID    doxepin  3 mg Oral Nightly    famotidine  20 mg Oral Daily    ferrous sulfate  325 mg Oral Lunch    gabapentin  100 mg Oral TID    isosorbide mononitrate  30 mg Oral Daily    metoprolol succinate  25 mg Oral Daily    ranolazine  1,000 mg Oral BID    tamsulosin  0.4 mg Oral Daily    vitamin C  500 mg Oral Nightly    sodium chloride flush  10 mL Intravenous 2 times per day    insulin lispro  0-6 Units Subcutaneous TID WC    insulin lispro  0-3 Units Subcutaneous Nightly     Continuous Infusions:   dextrose       CBC:   Recent Labs     03/31/19  0430 04/02/19  0537   WBC 7.7 7.7   HGB 8.3* 7.9*    156     BMP:    Recent Labs     03/31/19  0430 04/01/19  0417 04/01/19  1830 04/02/19  0537    143  --  141   K 3.4* 3.1* 3.5 3.4*    114*  --  110   CO2 18* 16*  --  18*   BUN 25* 14  --  10   CREATININE 1.2 1.0  --  1.1   GLUCOSE 182* 191*  --  154*     Hepatic: No results for input(s): AST, ALT, ALB, BILITOT, ALKPHOS in the last 72 hours. INR: No results for input(s): INR in the last 72 hours.   Xray:   Endoscopy Finding:      PATIENT NAME: Sergio Res              :        1935  MED REC NO:   [id number]                           ROOM:       0011  ACCOUNT NO:   [de-identified]                           ADMIT DATE: 2019  PROVIDER:     Marin Raymundo M.D.     DATE OF PROCEDURE:  2019     INDICATIONS:  The patient with dysphagia, recurrent aspiration,  presented with pneumonia as well as anemia with significant drop in H  and H.  Plan today for EGD to evaluate for GI bleed and also possible  dilation.     SURGEON:  Marin Raymundo M.D.     ASA CLASSIFICATION:  III.     DESCRIPTION OF PROCEDURE:  The patient was brought to the GI lab. Consent was obtained. The risks involved with the procedure were  explained to the patient. Informed consent was obtained. The patient  was monitored during the procedure with pulse oximetry, blood pressure  monitoring, and oxygen by nasal cannula. Sedation by incremental doses  of IV Versed, total 2 mg of Versed and 75 mcg of fentanyl given in  incremental dosage during the procedure to achieve continuous conscious  sedation.     PROCEDURE PERFORMED:  EGD with dilation until size 54-Arabic.     Standard video 190 Olympus upper scope was advanced under direct vision  from the oral cavity up to the duodenum. The esophagus appears normal.   No erosions or ulcerations seen. No well-defined stricture. I elected  to dilate the patient as the patient benefitted from upper endoscopy  with dilation done in the past.  Scope was advanced into the stomach. Retroflex examination of the cardia revealed normal cardia with no  evidence of hiatus hernia. Very minimal non-clinically significant  patchy erythema seen in the distal part of the body and the antrum. Duodenum appears normal.  Scope was withdrawn through the antrum. Guidewire was introduced. Scope was withdrawn. Then, American dilator  starting from size 48 until size 54-Arabic introduced over the  guidewire, led to dilation of the esophagus. Dilator was withdrawn. Scope was advanced again, inspected the site of the dilation. No bleed. No perforation. The procedure was terminated with no immediate  complications.     IMPRESSION:  1. No active GI bleed seen. 2.  The patient with aspiration and pneumonia , dysphagia, benefitted from upper  endoscopy with dilation in the past.  Dilation done at this time until  size 54-[place].     PLAN:  1. Speech Therapy to evaluate the dysphagia. If the patient failed and  kept on having aspiration, PEG tube is recommended. 2.  Check H and H tomorrow, evaluate. If drop in H and H, then  colonoscopy is recommended.  Gordon Dhillon M.D.     D: 03/30/2019 10:14:43       T: 03/30/2019 10:43:16     [email or web address]  Job#: [id number]     Doc#: [id number]     CC:  Pearson Buerger, M.D.               Last signed by: Mariely Scruggs MD at 3/31/2019 12:33 AM        Objective:   Vitals: /68   Pulse 88   Temp 97.9 °F (36.6 °C) (Oral)   Resp 20   Ht 5' 9\" (1.753 m)   Wt 188 lb (85.3 kg)   SpO2 96%   BMI 27.76 kg/m²     Intake/Output Summary (Last 24 hours) at 4/2/2019 1934  Last data filed at 4/2/2019 1400  Gross per 24 hour   Intake 1647.03 ml   Output 575 ml   Net 1072.03 ml     General appearance: alert and cooperative with exam  Lungs: clear to auscultation bilaterally  Heart: regular rate and rhythm, S1, S2 normal, no murmur, click, rub or gallop  Abdomen: soft, non-tender; bowel sounds normal; no masses,  no organomegaly  Extremities: extremities normal, atraumatic, no cyanosis or edema    Assessment and Plan:   1. Dysphagia and aspiration, speech therapy advanced diet , follow recommendation. 2. Treat the pneumonia. 3. PEG tub to discuss if he had recurrent aspiration.         Follow up in GI Clinic after discharge in 2 week(s)    Patient Active Problem List:     Postural dizziness with near syncope     DM2 (diabetes mellitus, type 2) (HCC)     Hypertension     Pacemaker     Peripheral vascular disease ([address]     Lactic acidosis     Iron deficiency anemia     CAD (coronary artery disease)     Heart failure with preserved ejection fraction (HCC)     GERD (gastroesophageal reflux disease)     Dyslipidemia     Lymphoma (HCC)     Osteoarthritis of both knees     Left atrial dilation     GLORIA (acute kidney injury) ([address]     Frequent falls     Physical deconditioning     Altered awareness, transient     Fever     Sepsis ([address]     Pancytopenia (HCC)     Altered mental status     Hepatomegaly     Urinary retention     Hematuria     Acute kidney injury superimposed on chronic kidney disease (HCC)     Hyperkalemia     Hypercalcemia     Calculus of gallbladder without cholecystitis without obstruction     Syncope and collapse     Non-intractable vomiting with nausea     Gallbladder dilatation     Nonintractable headache     Osteoarthritis of multiple joints     Acute on chronic diastolic (congestive) heart failure (HCC)     S/P placement of cardiac pacemaker     Chronic kidney disease, stage 3 (HCC)     Severe malnutrition (HCC)     Dysphagia     Urinary retention     Hyperkalemia     Hyponatremia     Lactic acidosis     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

## 2019-04-02 NOTE — PROGRESS NOTES
[facility]   Nephrology progress note  4/2/2019, 11:07 AM      Pt Name:    Alexandra Vela  MRN:     [id number]     YOB: 1935  Admit Date:    3/27/2019  7:13 PM  Primary Care Physician:  Mary Russell MD   Room number  6E-56/056-A    Chief Complaint: Nephrology following for GLORIA/CKD    Subjective:  Patient seen and examined  Doing better  Staff reports patient had several bouts of diarrhea  Awake, alert at this time    Objective:  24HR INTAKE/OUTPUT:      Intake/Output Summary (Last 24 hours) at 4/2/2019 1107  Last data filed at 4/2/2019 0556  Gross per 24 hour   Intake 2140.03 ml   Output 1500 ml   Net 640.03 ml     I/O last 3 completed shifts: In: 2140 [P.O.:600; I.V.:1540]  Out: 1500 [Urine:1500]  No intake/output data recorded. Admission weight: 182 lb 1.6 oz (82.6 kg)  Wt Readings from Last 3 Encounters:   03/31/19 188 lb (85.3 kg)   03/20/19 182 lb (82.6 kg)   03/18/19 184 lb 4.8 oz (83.6 kg)     Body mass index is 27.76 kg/m².     Physical examination  VITALS:     Vitals:    04/02/19 0759   BP: (!) 148/61   Pulse: 80   Resp: 20   Temp: 98.2 °F (36.8 °C)   SpO2: 96%     General Appearance: alert and cooperative with exam, appears comfortable, no distress  Mouth/Throat: Oral mucosa moist  Neck: No JVD  Lungs: Air entry B/L, no rales, no use of accessory muscles  Heart:  S1, S2 heard  GI: soft, non-tender, no guarding  Extremities: no LE edema      Lab Data  CBC:   Recent Labs     03/31/19  0430 04/02/19  0537   WBC 7.7 7.7   HGB 8.3* 7.9*   HCT 26.7* 23.9*    156     BMP:  Recent Labs     03/31/19  0430 04/01/19  0417 04/01/19  1830 04/02/19  0537    143  --  141   K 3.4* 3.1* 3.5 3.4*    114*  --  110   CO2 18* 16*  --  18*   BUN 25* 14  --  10   CREATININE 1.2 1.0  --  1.1   GLUCOSE 182* 191*  --  154*   CALCIUM 8.3* 8.4*  --  8.3*   MG 1.9 1.9  --  1.7     Hepatic: No results for input(s): LABALBU, AST, ALT, ALB, BILITOT, ALKPHOS in the last 72 hours.      Meds:  Infusion:    dextrose 5 % and 0.45 % NaCl 20 mL/hr at 04/01/19 1642    dextrose       Meds:    potassium replacement protocol   Other RX Placeholder    enoxaparin  40 mg Subcutaneous Daily    potassium chloride  20 mEq Oral Daily    sodium bicarbonate  1,300 mg Oral BID    pantoprazole  40 mg Intravenous Daily    amLODIPine  10 mg Oral Daily    aspirin  81 mg Oral Daily    atorvastatin  20 mg Oral Nightly    clopidogrel  75 mg Oral Daily    docusate sodium  100 mg Oral BID    doxepin  3 mg Oral Nightly    famotidine  20 mg Oral Daily    ferrous sulfate  325 mg Oral Lunch    gabapentin  100 mg Oral TID    isosorbide mononitrate  30 mg Oral Daily    metoprolol succinate  25 mg Oral Daily    ranolazine  1,000 mg Oral BID    tamsulosin  0.4 mg Oral Daily    vitamin C  500 mg Oral Nightly    sodium chloride flush  10 mL Intravenous 2 times per day    piperacillin-tazobactam  3.375 g Intravenous Q8H    insulin lispro  0-6 Units Subcutaneous TID WC    insulin lispro  0-3 Units Subcutaneous Nightly     Meds prn: phenol, metoprolol, hydrALAZINE, meclizine, sodium chloride flush, glucose, dextrose, glucagon (rDNA), dextrose, ipratropium-albuterol, acetaminophen       Impression and Plan:  1. GLORIA: resolved. Okay to stop IVF for now. Overall stable renal function  2. Hypokalemia: will replace with K-dur 40 meq po one dose  3. Low serum bicarbonate: ABG revealed mostly respiratory alkalosis  4. Pneumonia: hospitalist managing, on antibiotics  5. Hx SP catheter drainage  6. AMS: improved  7. Chronic diastolic CHF with mild systolic dysfunction: stop IVF, on outpatient diuretics, will resume loop diuretics. Continue with K-dur. 8. Gram Negative UTI  9. Aspiration pneumonia  10.  S/p Eso dilation    D/W patient and RN  D/W hospitalist    iY Doyle MD  [facility]

## 2019-04-02 NOTE — PROGRESS NOTES
Hospitalist Progress Note    Patient:  Ce Howell      Unit/Bed:6E-56/056-A    YOB: 1935    MRN: [id number]       Acct: [de-identified]     PCP: Thelma Blount MD    Date of Admission: 3/27/2019    Chief Complaint: [facility] Course:  80 y. o. male with PMH of CKD, anemia, CHF, PAD (R SFA/popliteal stent), PAH, neurogenic bladder s/p suprapubic catheter, DMII, and HTN who [facility] EMS from [address] [place] [address]. Unable to obtain thorough hx due to pt's altered state. Per ED notes, secondary from nursing home report, pt was sent to the ED for SOB, couch, and decreased O2 saturation. Pt was on 2L NC at the nursing home and was satting 89%, was placed on non-rebreather upon arrival and was satting 99%. Cousin was at bedside who states the patient is not at his baseline state.      Pt has known esophageal dysphagia s/p EGD dilation on 2/2019 by Dr. Mike Do. Pt also recently had a suprapubic catheter placed on 2/2019 for neurogenic bladder. Pt is normally very alert and oriented.      In the ED, pt confused with 3/4 SIRS (febrile, tachycardic, and tachypenic). Pt placed on non-re breather, started on IV Vanc, Zosyn and Levaquin and admitted.     Subjective: having over 6 episodes of watery diarrhea. No abdominal pain. No fevers or chills. Mental status back to baseline.        Medications:  Reviewed    Infusion Medications    dextrose 5 % and 0.45 % NaCl 20 mL/hr at 04/01/19 1642    dextrose       Scheduled Medications    potassium replacement protocol   Other RX Placeholder    enoxaparin  40 mg Subcutaneous Daily    potassium chloride  20 mEq Oral Daily    sodium bicarbonate  1,300 mg Oral BID    pantoprazole  40 mg Intravenous Daily    amLODIPine  10 mg Oral Daily    aspirin  81 mg Oral Daily    atorvastatin  20 mg Oral Nightly    clopidogrel  75 mg Oral Daily    docusate sodium  100 mg Oral BID    doxepin  3 mg Oral Nightly    famotidine palpable, equal bilaterally       Labs:   Recent Labs     03/31/19  0430 04/02/19  0537   WBC 7.7 7.7   HGB 8.3* 7.9*   HCT 26.7* 23.9*    156     Recent Labs     03/31/19  0430 04/01/19  0417 04/01/19  1830 04/02/19  0537    143  --  141   K 3.4* 3.1* 3.5 3.4*    114*  --  110   CO2 18* 16*  --  18*   BUN 25* 14  --  10   CREATININE 1.2 1.0  --  1.1   CALCIUM 8.3* 8.4*  --  8.3*     No results for input(s): AST, ALT, BILIDIR, BILITOT, ALKPHOS in the last 72 hours. No results for input(s): INR in the last 72 hours. No results for input(s): Richie Milch in the last 72 hours. Urinalysis:      Lab Results   Component Value Date    NITRU NEGATIVE 03/27/2019    WBCUA 15-25 03/27/2019    BACTERIA MANY 03/27/2019    RBCUA 3-5 03/27/2019    BLOODU NEGATIVE 03/27/2019    SPECGRAV 1.012 02/23/2019    GLUCOSEU NEGATIVE 03/27/2019       Radiology:  US RENAL COMPLETE   Final Result   The right kidney is slightly asymmetrically smaller than the left. **This report has been created using voice recognition software. It may contain minor errors which are inherent in voice recognition technology. **      Final report electronically signed by Dr Jenn Oliveira on 3/29/2019 2:05 PM      FL MODIFIED BARIUM SWALLOW W VIDEO   Final Result   Aspiration with nectar thick and thin consistencies. For recommendations and additional comments please see the speech therapy report            **This report has been created using voice recognition software. It may contain minor errors which are inherent in voice recognition technology. **      Final report electronically signed by Dr. Víctor Padilla on 3/29/2019 2:38 PM      CT CHEST [address]   Final Result   1. Small bilateral pleural effusions. 2. Bibasilar consolidative right greater than left atelectasis or pneumonia. Recommend follow-up to document resolution.    3. Patchy ground glass opacities in a perihilar distribution is demonstrated within the lungs bilaterally. This may represent pulmonary edema versus sequela from an inflammatory or infectious pneumonitis. 4. Mild mediastinal lymphadenopathy. This is indeterminate and may be reactive in nature. However, recommend follow-up to document resolution. 5. Mild distal esophageal wall thickening of indeterminate etiology. This may be related to sequela from reflux esophagitis. **This report has been created using voice recognition software. It may contain minor errors which are inherent in voice recognition technology. **      Final report electronically signed by Dr. Mei Joseph on 3/29/2019 9:55 AM      CT HEAD WO CONTRAST   Final Result   Stable atrophy with chronic small vessel disease. **This report has been created using voice recognition software. It may contain minor errors which are inherent in voice recognition technology. **      Final report electronically signed by Dr. Selvin Koehler on 3/27/2019 9:59 PM      CT ABDOMEN PELVIS W IV CONTRAST Additional Contrast? Radiologist Recommendation   Final Result   1. Bilateral infiltrates compatible with pneumonitis increasing compared to the prior study. 2. Cholelithiasis. 3. Stable mild renal atrophy         **This report has been created using voice recognition software. It may contain minor errors which are inherent in voice recognition technology. **      Final report electronically signed by Dr. Selvin Koehler on 3/27/2019 9:57 PM      XR CHEST PORTABLE   Final Result      Interstitial infiltrates in the mid and lower lungs bilaterally either representing pulmonary edema or an atypical viral-type pneumonia. **This report has been created using voice recognition software. It may contain minor errors which are inherent in voice recognition technology. **      Final report electronically signed by Dr. Yaz Morley on 3/27/2019 9:05 PM          Diet: DIET DYSPHAGIA I PUREED; No Drinking Straw    DVT prophylaxis: [x] Lovenox [] SCDs                                 [] SQ Heparin                                 [] Encourage ambulation           [] Already on Anticoagulation     Disposition:    [] Home       [] TCU       [] Rehab       [] Psych       [x] SNF       [] [place]       [] Other-    Code Status: Full Code    PT/OT Eval Status: ECF     Assessment/Plan:    Anticipated Discharge in : tomorrow     DARLIN/Reece Leung 1106 Problems    Diagnosis Date Noted    Hypokalemia [E87.6]     Acute metabolic encephalopathy [O76.95]     Low bicarbonate [E87.8]     Pneumonitis [J18.9] 03/28/2019    Acute on chronic diastolic (congestive) heart failure (HCC) [I50.33]     Altered mental status [R41.82] 09/02/2018    Sepsis ([address] [A41.9] 08/16/2018    Heart failure with preserved ejection fraction (HCC) [I50.30]              Assessment/Plan:     1. Acute Metabolic Encephalopathy- likely secondary to infectious etiology given his presentation with sepsis. Source can be PNA and/or . Cont IV Zosyn (Day 5).  Transition to PO Augmentin at discharge per ID. CT showing bilateral pneumonitis and UA grossly abnormal. Bx NGTD. Ux growing E. Coli. Consult ID.      2. Acute Hypoxic Resp. Failure- secondary to PNA with some component of heart failure exacerbation. Pt was started on IV Diuretics and stopped given worsening kidney function. Cont IV Zosyn. Transition to PO Augmentin at discharge per ID. Currently on RA.      3. Sepsis- 3/4 SIRS (febrile, tachycardic, and tachypenic). Source can be PNA and/or . Cont IV Zosyn per ID. CT showing bilateral pneumonitis and UA grossly abnormal. Bx NGTD. Ux growing E. Coli. Treat with Fosfomycin x 1.      4. PNA/Pnueomonitis- history of esophageal dysphagia s/p dilation on 2/2019. CT showing bilateral pneumonitis. Aspiration PNA? Cont IV and Zosyn. SLP rec NPO. Barium swallow showing aspiration.  S/P EGD and dilation on 3/30.      5.  Acute on Chronic Diastolic CHF- LES, elevated BNP, CXR findings of some edema. 2D Echo on 12/17 EF 55%. Gentle IV diuresis lasix 20 BID. Stopped IV diuresis on 3/29 given worsening kidney function. Consult Nephrology.      6. Esophageal Dysphagia-  history of esophageal dysphagia s/p dilation on 2/2019. SLP rec NPO. Barium swallow showing aspirations.  S/P EGD and dilation on 3/30 with Dr. Colt Sen. If still aspirating will need to discuss Peg Tube. Passed speech. Dysphagia 1 diet.      7. Neurogenic bladder- s/p suprapubic catheter placement on 2/2019.      8. Troponin Elevation- likely demand mismatch from sepsis. No chest pain or ECG changes. Monitor on telemetry.      9. CABG x 3 in 1975     10. PAD s/p R SFA/popliteal stent with ISR; L BTK disease     11. CKD on GLORIA- creatinine increased from his baseline. IV Lasix 20 discontinued on 3/29. Nephrology on board managing. Resovled.      12. Hemoglobin drop from 9.9 to 7.2. No melena or hematochezia. Pt is on Plavix. EGD on 3/30 showed no bleeding. Cont to monitor H&H.      13. Diarrhea- on 4/2 developed over 6 episodes of watery diarrhea. Pt has been on Abx. Will need to rule out C. Diff. If C. Diff is negative imodium prn. Dispo: if C. Diff is negative and diarrhea improves, can discharge back to nursing home with speech therapy to continue to follow-up while he is there.          Electronically signed by Adalgisa Garrett MD on 4/2/2019 at 8:30 AM

## 2019-04-02 NOTE — PROGRESS NOTES
liquids, upright position and close pulmonary monitoring. If decline presents recommend possible repeat MBS as approrapite. RN updated of ST recommendations    SHORT TERM GOAL #2:  Goal 2: Pt will tolerate trials of advanced solids without s/s of aspiration 10/10 times to advance pt to previous diet level. INTERVENTIONS: DNT secondary to focus on STG1. SHORT TERM GOAL #3:  Goal 3: Monitor pulmonary status closely and repeat instrumental assessment as needed. INTERVENTIONS: RN reports continued \"wheezing and rhonchi . \"  Patient with recent MBS completed 3/31 with gross aspiration; will monitor appropriateness to potentially repeat MBS in 1-2 weeks as needed    ASSESSMENT:  Assessment: [x]Progressing towards goals          []Not Progressing towards goals       Patient Tolerance of Treatment:   []Tolerated well [x]Tolerated fair [x]Required rest breaks []Fatigued  Plan for Next Session: PO trials; pulmonary monitoring   Education:  Learner:  [x]Patient          []Significant Other          []Other  Education provided regarding:  [x]Goals and POC   [x]Diet and swallowing precautions    []Home Exercise Program  [x]Progress and/or discharge information  Method of Education:  [x]Discussion          [x]Demonstration          []Handout          []Other  Evaluation of Education:   [x]Verbalized understanding   []Demonstrates without assistance  [x]Demonstrates with assistance  [x]Needs further instruction     []No evidence of learning                  [x]No family present      Plan: [x]Continue with current plan of care    []Modify current plan of care as follows:    []Discharge patient    Discharge Location:    Services/Supervision Recommended:     [x]Patient continues to require treatment by a licensed therapist to address functional deficits as outlined in the established plan of care.     Tim Estrada M.S. [facility]

## 2019-04-02 NOTE — PROGRESS NOTES
Progress note: Infectious diseases    Patient - Oxana Mahoney,  Age - 80 y.o.    - 1935      Room Number - 6E-56/056-A   MRN -  [id number]   Acct # - [de-identified]  Date of Admission -  3/27/2019  7:13 PM    SUBJECTIVE:   No new issues. OBJECTIVE   VITALS    height is 5' 9\" (1.753 m) and weight is 188 lb (85.3 kg). His oral temperature is 98.1 °F (36.7 °C). His blood pressure is 124/68 and his pulse is 84. His respiration is 20 and oxygen saturation is 97%. Wt Readings from Last 3 Encounters:   19 188 lb (85.3 kg)   19 182 lb (82.6 kg)   19 184 lb 4.8 oz (83.6 kg)       I/O (24 Hours)    Intake/Output Summary (Last 24 hours) at 2019 1430  Last data filed at 2019 1400  Gross per 24 hour   Intake 2789.03 ml   Output 600 ml   Net 2189.03 ml       General Appearance  Awake, alert, oriented,  Sick looking. HEENT - normocephalic, atraumatic, slighlty pale conjunctiva,  anicteric sclera  Neck - Supple, no mass  Lungs -  Bilateral  air entry, +rhonchi,    Cardiovascular - Heart sounds are normal.    Abdomen - soft, not distended, nontender,   Neurologic -oriented.   Skin - No bruising or bleeding  Extremities - No edema, no cyanosis, clubbing     MEDICATIONS:      torsemide  20 mg Oral Daily    potassium replacement protocol   Other RX Placeholder    enoxaparin  40 mg Subcutaneous Daily    potassium chloride  20 mEq Oral Daily    pantoprazole  40 mg Intravenous Daily    amLODIPine  10 mg Oral Daily    aspirin  81 mg Oral Daily    atorvastatin  20 mg Oral Nightly    clopidogrel  75 mg Oral Daily    docusate sodium  100 mg Oral BID    doxepin  3 mg Oral Nightly    famotidine  20 mg Oral Daily    ferrous sulfate  325 mg Oral Lunch    gabapentin  100 mg Oral TID    isosorbide mononitrate  30 mg Oral Daily    metoprolol succinate  25 mg Oral Daily    ranolazine  1,000 mg Oral BID    tamsulosin  0.4 mg Oral Daily    vitamin C  500 mg Oral Nightly    sodium chloride flush  10 mL Intravenous 2 times per day    piperacillin-tazobactam  3.375 g Intravenous Q8H    insulin lispro  0-6 Units Subcutaneous TID WC    insulin lispro  0-3 Units Subcutaneous Nightly      dextrose       phenol, metoprolol, hydrALAZINE, meclizine, sodium chloride flush, glucose, dextrose, glucagon (rDNA), dextrose, ipratropium-albuterol, acetaminophen      LABS:     CBC:   Recent Labs     03/31/19  0430 04/02/19  0537   WBC 7.7 7.7   HGB 8.3* 7.9*    156     BMP:    Recent Labs     03/31/19  0430 04/01/19  0417 04/01/19  1830 04/02/19  0537    143  --  141   K 3.4* 3.1* 3.5 3.4*    114*  --  110   CO2 18* 16*  --  18*   BUN 25* 14  --  10   CREATININE 1.2 1.0  --  1.1   GLUCOSE 182* 191*  --  154*     Calcium:  Recent Labs     04/02/19  0537   CALCIUM 8.3*     Ionized Calcium:No results for input(s): IONCA in the last 72 hours. Magnesium:  Recent Labs     04/02/19  0537   MG 1.7     Phosphorus:  No results for input(s): PHOS in the last 72 hours. BNP:No results for input(s): BNP in the last 72 hours.   Glucose:  Recent Labs     04/01/19  2050 04/02/19  0757 04/02/19  1137   POCGLU 211* 161* 265*         Problem list of patient:     Patient Active Problem List   Diagnosis Code    Postural dizziness with near syncope R42, R55    DM2 (diabetes mellitus, type 2) ([facility] E11.9    Hypertension I10    Pacemaker Z95.0    Peripheral vascular disease ([facility] I73.9    Lactic acidosis E87.2    Iron deficiency anemia D50.9    CAD (coronary artery disease) I25.10    Heart failure with preserved ejection fraction (HCC) I50.30    GERD (gastroesophageal reflux disease) K21.9    Dyslipidemia E78.5    Lymphoma (HCC) C85.90    Osteoarthritis of both knees M17.0    Left atrial dilation I51.7    GLORIA (acute kidney injury) ([facility] N17.9    Frequent falls R29.6    Physical deconditioning R53.81    Altered awareness, transient R40.4    Fever R50.9    Sepsis ([address] A41.9    Pancytopenia ([facility] D61.818    Altered mental status R41.82    Hepatomegaly R16.0    Urinary retention R33.9    Hematuria R31.9    Acute kidney injury superimposed on chronic kidney disease (HCC) N17.9, N18.9    Hyperkalemia E87.5    Hypercalcemia E83.52    Calculus of gallbladder without cholecystitis without obstruction K80.20    Syncope and collapse R55    Non-intractable vomiting with nausea R11.2    Gallbladder dilatation K82.8    Nonintractable headache R51    Osteoarthritis of multiple joints M15.9    Acute on chronic diastolic (congestive) heart failure (HCC) I50.33    S/P placement of cardiac pacemaker Z95.0    Chronic kidney disease, stage 3 (HCC) N18.3    Severe malnutrition (HCC) E43    Dysphagia R13.10    Urinary retention R33.9    Hyperkalemia E87.5    Hyponatremia E87.1    Lactic acidosis E87.2    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) T83.89XA, N36.8    Urinary tract infection associated with indwelling urethral catheter ([facility]) T83.511A, N39.0    Other acute kidney failure (HCC) N17.8    Suprapubic catheter ([facility]) Z93.59    Acute postoperative pain G89.18    BPH with obstruction/lower urinary tract symptoms N40.1, N13.8    Neurogenic bladder N31.9    Pneumonitis J18.9    Low bicarbonate E87.8    Acute metabolic encephalopathy S98.56    Hypokalemia E87.6         ASSESSMENT/PLAN   · Sepsis.   ·  aspiration pneumonia:   · LGORIA  · CHF  · Change to oral augmentin     Deisi Solis MD, FACP 4/2/2019 2:30 PM

## 2019-04-02 NOTE — PROGRESS NOTES
Tried to get patient up to chair several times and patient is refusing today. States he will get up tomorrow. Pt is turning every 2 hours and pillows are placed under him.   Pt turns well in bed

## 2019-04-02 NOTE — CARE COORDINATION
[address] Transitions Interview     2019    Patient: Lynette Ahumada Patient : 1935   MRN: [id number]  Reason for Admission: Altered mental status [R41.82]  RARS: Readmission Risk Score: 42    Met with: [place] for inpatient Care Transition interview. Identified self/role. Explained BPCI-A program and beneficiary letter, verbalized understanding. Patient in BPCI bundle for Dx 871 - Sepsis.     Readmission Risk  Patient Active Problem List   Diagnosis    Postural dizziness with near syncope    DM2 (diabetes mellitus, type 2) ([address]    Hypertension    Pacemaker    Peripheral vascular disease (HCC)    Lactic acidosis    Iron deficiency anemia    CAD (coronary artery disease)    Heart failure with preserved ejection fraction (HCC)    GERD (gastroesophageal reflux disease)    Dyslipidemia    Lymphoma (HCC)    Osteoarthritis of both knees    Left atrial dilation    GLORIA (acute kidney injury) ([address]    Frequent falls    Physical deconditioning    Altered awareness, transient    Fever    Sepsis (HCC)    Pancytopenia (HCC)    Altered mental status    Hepatomegaly    Urinary retention    Hematuria    Acute kidney injury superimposed on chronic kidney disease (HCC)    Hyperkalemia    Hypercalcemia    Calculus of gallbladder without cholecystitis without obstruction    Syncope and collapse    Non-intractable vomiting with nausea    Gallbladder dilatation    Nonintractable headache    Osteoarthritis of multiple joints    Acute on chronic diastolic (congestive) heart failure (HCC)    S/P placement of cardiac pacemaker    Chronic kidney disease, stage 3 (HCC)    Severe malnutrition (HCC)    Dysphagia    Urinary retention    Hyperkalemia    Hyponatremia    Lactic acidosis    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)    Urinary tract infection associated with indwelling urethral catheter ([address]    Other acute kidney failure ([address]  Suprapubic catheter ([facility]    Acute postoperative pain    BPH with obstruction/lower urinary tract symptoms    Neurogenic bladder    Pneumonitis    Low bicarbonate    Acute metabolic encephalopathy    Hypokalemia       Inpatient Assessment  Care Transitions Summary    Care Transitions Inpatient Review  Medication Review  Are you able to afford your medications?:  Yes  How often do you have difficulty taking your medications?:  I always take them as prescribed. Housing Review  Are you an active caregiver in your home?:  No  Social Support  Do you have a ?:  No  Do you have a [facility]?:  No  Durable Medical Equipment  Patient DME:  Clifton Reyes, Wheelchair, Other  Other Patient DME:  Kristina Sung  Patient Home Equipment:  Oxygen  Functional Review  Ability to seek help/take action for Emergent/Urgent situations i.e. fire, crime, inclement weather or health crisis. :  Dependent  Ability handle personal hygiene needs (bathing/dressing/grooming):  Needs Assistance  Ability to manage medications:  Dependent  Ability to prepare food:  Dependent  Ability to maintain home (clean home, laundry):  Dependent  Ability to drive and/or has transportation:  Dependent  Ability to do shopping:  Dependent  Ability to manage finances:  Dependent  Is patient able to live independently?:  No  Hearing and Vision  Visual Impairment:  Visual impairment (Glasses/contacts)  Hearing Impairment:  Hard of hearing  Care Transitions Interventions  No Identified Needs       Patient presented to the ED on 03/27/2019 with report of SOB, chest pain, and fever. Patient is a long-term resident at [facility]. Staff reported Sp02 89% on 2L and new onset AMS. PMH includes CAD, DM, HTN, HLD, GERD, PVD, CKD, and anemia. In ED, patient's rectal temperature 104.9 and hemoglobin 9.9 gm/dL (dropped to 7.4 gm/dl on 03/29/2019). Patient admitted for aspiration pneumonia, UTI, AMS, and GI bleed.      Consults with Gastroenterology, Nephrology, and Infectious Disease. Case management, social work, and PT/OT following. Patient plans to return to [facility] upon discharge. Patient has a suprapubic catheter and is on 2L continuous oxygen. Patient ambulates with a rolling walker and requires assistance with ADL's. Patient denies additional needs or concerns at this time. Business card with contact information for CTC provided. Patient understands CTC will contact [facility] staff for BPCI-A follow-up. Follow Up  Future Appointments   Date Time Provider Mona Kaufman   4/11/2019  9:30 AM Dinah Dolye MD [address] [facility] Urology [address]   4/16/2019 11:30 AM STR FLUORO ROOM 4 STRZ RAD STR Radiolog   4/18/2019  1:00 PM Rodney Rule, APRN - CNP AFLGASL AFL Gastroen   5/1/2019  1:30 PM SCHEDULE, SRPS PACER NURSE SRP[facility] PACER [address]   5/6/2019  1:40 PM Preeti Marte MD ES Kidney [address]   6/18/2019  1:30 PM GARRETT Quintero CNP SRPX CHF [address]   9/25/2019  1:15 PM Edu Bernabe PA-C [facility] Heart [address]   11/12/2019 10:15 AM GARRETT Estrella CNP [address] [place] [address] Urology [facility] Kristina Maintenance  There are no preventive care reminders to display for this patient.     Marlene Pro RN  Care Transition Coordinator  (Q) [phone]  (D) [phone]

## 2019-04-02 NOTE — PLAN OF CARE
Problem: Falls - Risk of:  Goal: Will remain free from falls  Description  Will remain free from falls  8/7/1366 [id number] by Alejandro Escalera RN  Outcome: Ongoing  Note:   No falls this shift. Safety interventions maintained with bed alarm on, room door open, hourly rounds, and call light within reach. Problem: Risk for Impaired Skin Integrity  Goal: Tissue integrity - skin and mucous membranes  Description  Structural intactness and normal physiological function of skin and  mucous membranes. 1/6/1573 1567 by Alejandro Escalera RN  Outcome: Ongoing  Note:   No signs of new skin breakdown this shift. Skin warm, dry, intact. Patient being turned with pillow support Q2H with heels elevated off bed. Abi care being done PRN as patient is having loose incontinent stools. Problem: Pain:  Goal: Pain level will decrease  Description  Pain level will decrease  2/4/6291 6968 by Alejandro Escalera RN  Outcome: Ongoing  Note:   Patient denies pain this shift. Problem: DISCHARGE BARRIERS  Goal: Patient's continuum of care needs are met  3/9/0813 3798 by Alejandro Escalera RN  Outcome: Ongoing  Note:   No new discharge needs voiced from patient at this time. Patient expected to be discharged back to [facility] [place] when appropriate. Care plan reviewed with patient. Patient verbalize understanding of the plan of care and contribute to goal setting.

## 2019-04-02 NOTE — CARE COORDINATION
4/2/19, 12:02 PM    Discharge plan discussed by  and . Discharge plan reviewed with patient/ family. Patient/ family verbalize understanding of discharge plan and are in agreement with plan. Understanding was demonstrated using the teach back method. Services After Discharge  Services At/After Discharge: Nursing Services([facility])   IMM Letter  IMM Letter given to Patient/Family/Significant other/Guardian/POA/by[de-identified]   IMM Letter date given[de-identified] 04/02/19  IMM Letter time given[de-identified] [phone]    Patient is a possible discharge today by [facility] to [facility]. Patient is long term. GILLIAN provided the phone number to the nurse on blue envelope to complete the discharge. GILLIAN updated the ECF of the discharge, spoke to Mitchel.

## 2019-04-02 NOTE — PLAN OF CARE
Problem: Falls - Risk of:  Goal: Will remain free from falls  Description  Will remain free from falls  4/2/2019 1349 by Awa Ortez RN  Outcome: Met This Shift  Note:   No falls this shift. Safety interventions maintained. 7/5/4966 1333 by Bethanie Camargo RN  Outcome: Ongoing  Note:   No falls this shift. Safety interventions maintained with bed alarm on, room door open, hourly rounds, and call light within reach. Goal: Absence of physical injury  Description  Absence of physical injury  Outcome: Met This Shift  Note:   No falls reported this shift, call light in reach for pt, side rails up x 2, appropriate bed in room. Problem: Pain:  Goal: Pain level will decrease  Description  Pain level will decrease  4/2/2019 1349 by Awa Ortez RN  Outcome: Met This Shift  Note:   Pt denies pain throughout shift  3/4/7085 4221 by Bethanie Camargo RN  Outcome: Ongoing  Note:   Patient denies pain this shift. Problem: Risk for Impaired Skin Integrity  Goal: Tissue integrity - skin and mucous membranes  Description  Structural intactness and normal physiological function of skin and  mucous membranes. 4/2/2019 1349 by Awa Ortez RN  Outcome: Ongoing  Note:   No new reddened areas noted or new skin breakdown noted. Pt turned every 2 hours with legs elevated on pillows. 8/0/1298 5567 by Bethanie Camargo RN  Outcome: Ongoing  Note:   No signs of new skin breakdown this shift. Skin warm, dry, intact. Patient being turned with pillow support Q2H with heels elevated off bed. Abi care being done PRN as patient is having loose incontent stools. Assistance with turns/ambulation provided PRN. Continue to monitor. Problem: DISCHARGE BARRIERS  Goal: Patient's continuum of care needs are met  4/2/2019 1349 by Awa Ortez RN  Outcome: Ongoing  Note:   Pt is possibly being discharged tomorrow to nursing home. PT states they will be up in the am to evaluate and treat.   Pt agreeable to this plan  4/2/2019 7009 by Trinity Mendoza RN  Outcome: Ongoing  Note:   No new discharge needs voiced from patient at this time. Patient expected to be discharged back to [facility] [address] home when appropriate.        Problem: Pain:  Goal: Control of acute pain  Description  Control of acute pain  Outcome: Completed  Goal: Control of chronic pain  Description  Control of chronic pain  Outcome: Completed

## 2019-04-03 NOTE — PROGRESS NOTES
Progress note: Infectious diseases    Patient - Didi Glover,  Age - 80 y.o.    - 1935      Room Number - 6E-56/056-A   MRN -  [id number]   Acct # - [de-identified]  Date of Admission -  3/27/2019  7:13 PM    SUBJECTIVE:   No new issues. he is going to [facility] today  He has no urinary symptoms  Has dry cough  OBJECTIVE   VITALS    height is 5' 9\" (1.753 m) and weight is 188 lb (85.3 kg). His oral temperature is 97.6 °F (36.4 °C). His blood pressure is 114/60 and his pulse is 74. His respiration is 18 and oxygen saturation is 96%. Wt Readings from Last 3 Encounters:   19 188 lb (85.3 kg)   19 182 lb (82.6 kg)   19 184 lb 4.8 oz (83.6 kg)       I/O (24 Hours)    Intake/Output Summary (Last 24 hours) at 4/3/2019 1803  Last data filed at 4/3/2019 1257  Gross per 24 hour   Intake 415 ml   Output 2175 ml   Net -1760 ml       General Appearance  Awake, alert, oriented,  Sick looking. HEENT - normocephalic, atraumatic, slighlty pale conjunctiva,  anicteric sclera  Neck - Supple, no mass  Lungs -  Bilateral  air entry, +rhonchi,    Cardiovascular - Heart sounds are normal.    Abdomen - soft, not distended, nontender,   Neurologic -oriented.   Skin - No bruising or bleeding  Extremities - No edema, no cyanosis, clubbing     MEDICATIONS:      torsemide  20 mg Oral Daily    amoxicillin-clavulanate  1 tablet Oral 2 times per day    potassium replacement protocol   Other RX Placeholder    enoxaparin  40 mg Subcutaneous Daily    potassium chloride  20 mEq Oral Daily    pantoprazole  40 mg Intravenous Daily    amLODIPine  10 mg Oral Daily    aspirin  81 mg Oral Daily    atorvastatin  20 mg Oral Nightly    clopidogrel  75 mg Oral Daily    docusate sodium  100 mg Oral BID    doxepin  3 mg Oral Nightly    famotidine  20 mg Oral Daily    ferrous sulfate  325 mg Oral Lunch    gabapentin  100 mg Oral TID  isosorbide mononitrate  30 mg Oral Daily    metoprolol succinate  25 mg Oral Daily    ranolazine  1,000 mg Oral BID    tamsulosin  0.4 mg Oral Daily    vitamin C  500 mg Oral Nightly    sodium chloride flush  10 mL Intravenous 2 times per day    insulin lispro  0-6 Units Subcutaneous TID WC    insulin lispro  0-3 Units Subcutaneous Nightly      dextrose       phenol, metoprolol, hydrALAZINE, meclizine, sodium chloride flush, glucose, dextrose, glucagon (rDNA), dextrose, ipratropium-albuterol, acetaminophen      LABS:     CBC:   Recent Labs     04/02/19  0537 04/03/19  0602   WBC 7.7 6.7   HGB 7.9* 7.9*    166     BMP:    Recent Labs     04/01/19  0417 04/01/19  1830 04/02/19  0537 04/03/19  0602     --  141 137   K 3.1* 3.5 3.4* 3.7   *  --  110 105   CO2 16*  --  18* 18*   BUN 14  --  10 8   CREATININE 1.0  --  1.1 1.2   GLUCOSE 191*  --  154* 130*     Calcium:  Recent Labs     04/03/19  0602   CALCIUM 8.4*     Ionized Calcium:No results for input(s): IONCA in the last 72 hours. Magnesium:  Recent Labs     04/03/19  0602   MG 1.7     Phosphorus:  No results for input(s): PHOS in the last 72 hours. BNP:No results for input(s): BNP in the last 72 hours.   Glucose:  Recent Labs     04/02/19 2007 04/03/19  1148 04/03/19  1628   POCGLU 225* 222* 159*         Problem list of patient:     Patient Active Problem List   Diagnosis Code    Postural dizziness with near syncope R42, R55    DM2 (diabetes mellitus, type 2) ([facility] E11.9    Hypertension I10    Pacemaker Z95.0    Peripheral vascular disease ([facility] [address] I73.9    Lactic acidosis E87.2    Iron deficiency anemia D50.9    CAD (coronary artery disease) I25.10    Heart failure with preserved ejection fraction (HCC) I50.30    GERD (gastroesophageal reflux disease) K21.9    Dyslipidemia E78.5    Lymphoma (HCC) C85.90    Osteoarthritis of both knees M17.0    Left atrial dilation I51.7    Acute kidney injury ([address] N17.9    Frequent falls R29.6    Physical deconditioning R53.81    Altered awareness, transient R40.4    Fever R50.9    Sepsis ([facility]) A41.9    Pancytopenia ([facility]) D61.818    Altered mental status R41.82    Hepatomegaly R16.0    Urinary retention R33.9    Hematuria R31.9    Acute kidney injury superimposed on chronic kidney disease ([facility]) N17.9, N18.9    Hyperkalemia E87.5    Hypercalcemia E83.52    Calculus of gallbladder without cholecystitis without obstruction K80.20    Syncope and collapse R55    Non-intractable vomiting with nausea R11.2    Gallbladder dilatation K82.8    Nonintractable headache R51    Elevated troponin R74.8    Osteoarthritis of multiple joints M15.9    Acute on chronic combined systolic and diastolic CHF (congestive heart failure) ([facility]) I50.43    S/P placement of cardiac pacemaker Z95.0    Chronic kidney disease, stage 3 ([facility]) N18.3    Severe malnutrition ([facility]) E43    Dysphagia R13.10    Urinary retention R33.9    Hyperkalemia E87.5    Hyponatremia E87.1    Lactic acidosis E87.2    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 ([facility]) T83.89XA, N36.8    E. coli UTI N39.0, B96.20    Other acute kidney failure ([facility]) N17.8    Suprapubic catheter ([facility]) Z93.59    Acute postoperative pain G89.18    BPH with obstruction/lower urinary tract symptoms N40.1, N13.8    Neurogenic bladder N31.9    Pneumonitis J18.9    Low bicarbonate E87.8    Acute metabolic encephalopathy Q70.34    Hypokalemia E87.6    Acute respiratory failure with hypoxia ([facility]) J96.01    Aspiration pneumonia due to gastric secretions ([facility]) J69.0    Acute pulmonary edema ([facility]) J81.0    Infectious encephalopathy G93.49, B99.9    Diarrhea R19.7    Normocytic anemia D64.9    Other hyperlipidemia E78.49         ASSESSMENT/PLAN   · Sepsis: treated  ·  aspiration pneumonia: he has completed treatment  · GLORIA  · CHF  · Advised on aspiration precautions.     Celestine Sebastian MD, FACP 4/3/2019 6:03 PM

## 2019-04-03 NOTE — PROGRESS NOTES
dextrose       Meds:    torsemide  20 mg Oral Daily    amoxicillin-clavulanate  1 tablet Oral 2 times per day    potassium replacement protocol   Other RX Placeholder    enoxaparin  40 mg Subcutaneous Daily    potassium chloride  20 mEq Oral Daily    pantoprazole  40 mg Intravenous Daily    amLODIPine  10 mg Oral Daily    aspirin  81 mg Oral Daily    atorvastatin  20 mg Oral Nightly    clopidogrel  75 mg Oral Daily    docusate sodium  100 mg Oral BID    doxepin  3 mg Oral Nightly    famotidine  20 mg Oral Daily    ferrous sulfate  325 mg Oral Lunch    gabapentin  100 mg Oral TID    isosorbide mononitrate  30 mg Oral Daily    metoprolol succinate  25 mg Oral Daily    ranolazine  1,000 mg Oral BID    tamsulosin  0.4 mg Oral Daily    vitamin C  500 mg Oral Nightly    sodium chloride flush  10 mL Intravenous 2 times per day    insulin lispro  0-6 Units Subcutaneous TID WC    insulin lispro  0-3 Units Subcutaneous Nightly     Meds prn: phenol, metoprolol, hydrALAZINE, meclizine, sodium chloride flush, glucose, dextrose, glucagon (rDNA), dextrose, ipratropium-albuterol, acetaminophen       Impression and Plan:  1. GLORIA on CKD III: CKD at baseline at this time. Creat is stable. Avoid nephrotoxins. 2. Hypokalemia: improving, will replace with additional K-dur 40 meq po one dose  3. Low serum bicarbonate: ABG revealed mostly respiratory alkalosis on previous ABG  4. Pneumonia:  on antibiotics, ID following  5. Hx SP catheter drainage  6. AMS: improved  7. Chronic diastolic CHF with mild systolic dysfunction: continue with loop diuretics. Continue with K-dur. 8. Gram Negative UTI  9. Aspiration pneumonia  10.  S/p Eso dilation    D/w Patient  If discharged see me in 4 weeks with Bibiana Herbert MD  [facility]

## 2019-04-03 NOTE — CARE COORDINATION
Discharge Planning Update:    Discussed with Dr. Pj Logan, she states pt should be able to be discharged today back to the [facility].  Marylene Loser, SW.   Electronically signed by Asif Santos RN on 4/3/2019 at 3:05 PM

## 2019-04-03 NOTE — DISCHARGE SUMMARY
Hospital Medicine Discharge Summary      Patient:  Eric Aldana  YOB: 1935  MRN: [id number]   PCP: Eddy Koehler MD       Acct: [de-identified]     [facility] Date: 3/27/2019     Discharge Date: 4/3/2019     Admitting Physician: Patrice Mendenhall DO  Discharge Physician: Sanam Whittaker MD     Discharge Diagnoses     DISCHARGE DIAGNOSES:  1. Sepsis, acute respiratory failure with hypoxia, with aspiration pneumonia and acute pulmonary edema- acute on chronic combined systolic and diastolic CHF  2. E. Coli in urine  3. Infectious encephalopathy related to #1  4. Esophageal dysphagia  5. Elevated troponin, thought to be related to demand ischemia  6. Hypokalemia, resolved  7. Diarrhea, likely antibiotic related diarrhea, resolving C diff negative  8. Acute kidney injury  9. Chronic normocytic anemia  10. Chronic co-morbidities:  1. PVD s/p (SFA/popliteal artery stent)  2. Non hodgkin lymphoma  3. CAD s/p CABG  4. Chronic normocytic anemia  5. Essential hypertension  6. Hyperlipididemia  7. GERD  8. Esophageal dysphagia s/p EG dilatation 2/2019  9. Neurogenic bladder with chronic indwelling suprapubic catheter  10. 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 Hospital course     Eric Aldana is a 80 y.o. male admitted to [facility] on 3/27/2019 from his nursing home on 3/27 with symptoms of shortness of breath, decreased O2 saturation and with altered mental status. 1. Sepsis, acute respiratory failure with hypoxia, thought to be related to aspiration pneumonia and pulmonary edema  · SIRS on admission with leukocytosis, tachycardia and tachypnea. anemia  · Hemoglobin stable. · Discharge hemoglobin 7.9  10. Chronic co-morbidities:  1. PVD s/p (SFA/popliteal artery stent)  2. Non hodgkin lymphoma  3. CAD s/p CABG  4. Chronic normocytic anemia  5. Essential hypertension  6. Hyperlipididemia  7. GERD  8. Esophageal dysphagia s/p EG dilatation 2/2019  9. Neurogenic bladder with chronic indwelling suprapubic catheter  10. Chronic combined systolic and diastolic CHF(EF 29-09%)   11. DM Type 2        Discharge Medications      Harrison Villarreal   Home Medication Instructions OQR:[id number]    Printed on:04/03/19 5143   Medication Information                      acetaminophen (TYLENOL) 500 MG tablet  Take by mouth every 8 hours as needed for Pain 2 tab             amLODIPine (NORVASC) 10 MG tablet  Take 1 tablet by mouth daily             aspirin 81 MG tablet  Take 81 mg by mouth daily             atorvastatin (LIPITOR) 20 MG tablet  Take 20 mg by mouth daily             clopidogrel (PLAVIX) 75 MG tablet  Take 75 mg by mouth daily             clotrimazole-betamethasone (LOTRISONE) 1-0.05 % cream  Apply topically Daily to foreskin and every 8 hours as needed             Dextromethorphan-guaiFENesin  MG/5ML SYRP  Take 10 mLs by mouth every 6 hours as needed for Cough             doxepin (SILENOR) 3 MG TABS tablet  Take 3 mg by mouth nightly             famotidine (PEPCID) 20 MG tablet  Take 20 mg by mouth daily             ferrous sulfate 325 (65 Fe) MG tablet  Take 1 tablet by mouth Daily with lunch             gabapentin (NEURONTIN) 100 MG capsule  Take 100 mg by mouth 3 times daily. Nubia Siddiqui              glipiZIDE (GLUCOTROL) 5 MG tablet  Take 5 mg by mouth daily             ipratropium-albuterol (DUONEB) 0.5-2.5 (3) MG/3ML SOLN nebulizer solution  Inhale 3 mLs into the lungs every 4 hours as needed for Shortness of Breath             isosorbide mononitrate (IMDUR) 30 MG extended release tablet  Take 1 tablet by mouth daily             LACTOBACILLUS PO  Take by mouth daily             linagliptin (TRADJENTA) 5 MG tablet  Take 5 mg by mouth daily             magnesium hydroxide (EQL MILK OF MAGNESIA) 400 MG/5ML suspension  Take by mouth daily as needed for Constipation             magnesium oxide (MAG-OX) 400 MG tablet  Take 400 mg by mouth 2 times daily              meclizine (ANTIVERT) 25 MG tablet  Take 25 mg by mouth every 12 hours as needed              metoprolol succinate (TOPROL XL) 25 MG extended release tablet  Take 1 tablet by mouth daily             Multiple Vitamins-Minerals (MULTIVITAMIN ADULTS 50+ PO)  Take 1 tablet by mouth daily              ondansetron (ZOFRAN) 4 MG tablet  Take 4 mg by mouth every 8 hours as needed for Nausea or Vomiting             pantoprazole (PROTONIX) 40 MG tablet  Take 40 mg by mouth 2 times daily             polyethylene glycol (GLYCOLAX) powder  Take 17 g by mouth daily as needed              polyvinyl alcohol (LIQUIFILM TEARS) 1.4 % ophthalmic solution  Place 1 drop into both eyes every 4 hours as needed              potassium chloride (KLOR-CON M) 20 MEQ TBCR extended release tablet  Take 1 tablet by mouth daily             RANEXA 1000 MG extended release tablet  TAKE 1 TABLET BY MOUTH TWICE DAILY             tamsulosin (FLOMAX) 0.4 MG capsule  Take 1 capsule by mouth daily             torsemide (DEMADEX) 20 MG tablet  Take 1 tablet by mouth daily             traMADol (ULTRAM) 50 MG tablet  Take 50 mg by mouth every 4 hours as needed for Pain.              Trolamine Salicylate (ASPERCREME EX)  Apply topically daily in AM to bilateral knees             vitamin C (ASCORBIC ACID) 500 MG tablet  Take 500 mg by mouth nightly                  Physical Examination     Vitals:  Vitals:    04/03/19 0820 04/03/19 0851 04/03/19 1144 04/03/19 1529   BP: 113/64 113/64 118/68 114/60   Pulse: 80 80 75 74   Resp: 20  18 18   Temp: 98.1 °F (36.7 °C)  98 °F (36.7 °C) 97.6 °F (36.4 °C)   TempSrc: Oral  Oral Oral   SpO2: 96%  98% 96%   Weight: Height:           Weight: Weight: 188 lb (85.3 kg)     24 hour intake/output:    Intake/Output Summary (Last 24 hours) at 4/3/2019 1633  Last data filed at 4/3/2019 1257  Gross per 24 hour   Intake 415 ml   Output 2175 ml   Net -1760 ml         General appearance:  No apparent distress. HEENT: Normocephalic. Extraocular motion intact. Conjunctivae clear. Oral mucosa moist w/o erythema or exudate. Neck: Supple. No JVD. No carotid bruits. Trachea midline. Cardiovascular:  RRR w/ normal S1/S2. No murmurs, rubs or gallops. Respiratory: Clear to auscultation except for mildly decreased breath sounds at the lung bases. Abdomen: Soft, non-tender, non-distended with normal bowel sounds. Musculoskeletal:  Full ROM without deformity. Neurologic:  No focal sensory/motor deficits. Cranial nerves: II-XII intact. Hard of hearing  Lymphatic: No cervical lymphadenopathy. Psychiatric:  Alert and oriented to purpose and place. Vascular:  Bilateral lower extremity edema. Genitourinary:  Suprapubic catheter with dressing around insertion site- does not appear to be infected. Shabazz catheter with zeferino urine. Skin: No visible rashes or lesions. Labs     Labs: For convenience and continuity at follow-up the following most recent labs are provided:      CBC:    Lab Results   Component Value Date    WBC 6.7 04/03/2019    HGB 7.9 04/03/2019    HCT 24.2 04/03/2019     04/03/2019       Renal:    Lab Results   Component Value Date     04/03/2019    K 3.7 04/03/2019    K 4.9 03/28/2019     04/03/2019    CO2 18 04/03/2019    BUN 8 04/03/2019    CREATININE 1.2 04/03/2019    CALCIUM 8.4 04/03/2019    PHOS 2.9 03/30/2019           Radiology     Radiology:     Ct Head Wo Contrast    Result Date: 3/27/2019  PROCEDURE: CT HEAD WO CONTRAST CLINICAL INFORMATION: AMS. COMPARISON: September 2, 2018 TECHNIQUE: Noncontrast 5 mm axial images were obtained through the brain.  All CT scans at this facility use dose modulation, iterative reconstruction, and/or weight-based dosing when appropriate to reduce radiation dose to as low as reasonably achievable. FINDINGS: There are no fractures. There is redemonstration of diffuse atrophy. Hypodensity changes in the periventricular white matter are similar to prior study are consistent with chronic small vessel disease. There is no acute hemorrhage or large vessel infarct. Carotid atherosclerotic changes are present. Stable atrophy with chronic small vessel disease. **This report has been created using voice recognition software. It may contain minor errors which are inherent in voice recognition technology. ** Final report electronically signed by Dr. Solomon Otero on 3/27/2019 9:59 PM    Ct Chest Wo Contrast    Result Date: 3/29/2019  PROCEDURE: CT CHEST WO CONTRAST CLINICAL INFORMATION: SHORTNESS OF BREATH COMPARISON: 8/18/2018 TECHNIQUE:  CT chest without contrast. Coronal and sagittal reformatted images were rendered. All CT scans at this facility use dose modulation, iterative reconstruction, and/or weight-based dosing when appropriate to reduce radiation dose to as low as reasonably achievable. FINDINGS: The central airways appear patent. Small bilateral pleural effusions are demonstrated. Bibasilar right greater than left consolidative airspace disease is present. Patchy groundglass opacities are demonstrated within the perihilar regions bilaterally. No pneumothorax is seen. There is mild lymphadenopathy demonstrated within the mediastinum. The largest lymph node is located within the subcarinal region measuring 1.8 x 1.3 cm. There is also an enlarged lymph node within the right paratracheal region measuring 1.7 x 1.3 cm on axial image 17. There is a mildly prominent prevascular rounded lymph node measuring 1 cm in diameter on axial image 28. No axillary lymphadenopathy is seen. No supraclavicular lymphadenopathy is demonstrated.  Limited evaluation of the upper abdomen demonstrates cholelithiasis. There is mild distal esophageal wall thickening of indeterminate etiology. There is a right-sided pacemaker present. No acute osseous findings are demonstrated. 1. Small bilateral pleural effusions. 2. Bibasilar consolidative right greater than left atelectasis or pneumonia. Recommend follow-up to document resolution. 3. Patchy ground glass opacities in a perihilar distribution is demonstrated within the lungs bilaterally. This may represent pulmonary edema versus sequela from an inflammatory or infectious pneumonitis. 4. Mild mediastinal lymphadenopathy. This is indeterminate and may be reactive in nature. However, recommend follow-up to document resolution. 5. Mild distal esophageal wall thickening of indeterminate etiology. This may be related to sequela from reflux esophagitis. **This report has been created using voice recognition software. It may contain minor errors which are inherent in voice recognition technology. ** Final report electronically signed by Dr. Dallas Mullen on 3/29/2019 9:55 AM    Us Renal Complete    Result Date: 3/29/2019  PROCEDURE: US RENAL COMPLETE CLINICAL INFORMATION: 60-year-old male with renal failure. COMPARISON: No prior study. TECHNIQUE: Grayscale and color images were obtained of both kidneys. FINDINGS: RIGHT KIDNEY - 9.2 x 3.9 x 4.0 cm Resistive Index - 0.65 Cortical Thickness - 1.1 cm LEFT KIDNEY - 11.6 x 5.1 x 5.6 cm Resistive Index - 0.66 Cortical Thickness - 1.6 cm There is normal echogenicity of the renal parenchyma bilaterally. The right kidney is asymmetrically smaller than the left. There is no thinning of the renal cortex. There is no hydronephrosis. No stones are noted. No mass lesions are noted. A suprapubic catheter is in place in the urinary bladder is decompressed. The right kidney is slightly asymmetrically smaller than the left. **This report has been created using voice recognition software.  It may contain minor errors which are inherent in voice recognition technology. ** Final report electronically signed by Dr Carolyn Rucker on 3/29/2019 2:05 PM    Ct Abdomen Pelvis W Iv Contrast Additional Contrast? Radiologist Recommendation    Result Date: 3/27/2019  PROCEDURE: CT ABDOMEN PELVIS W IV CONTRAST CLINICAL INFORMATION: Diffuse abdominal tenderness . COMPARISON: November 19, 2018 TECHNIQUE: 5 mm axial CT images were obtained through the abdomen and pelvis after the administration of intravenous and oral contrast. Coronal and sagittal reconstructions were obtained. All CT scans at this facility use dose modulation, iterative reconstruction, and/or weight-based dosing when appropriate to reduce radiation dose to as low as reasonably achievable. FINDINGS:  There is been interval increase of bilateral infiltrates compatible with worsening pneumonitis. Correlate with aspiration changes although distribution is random involving bilateral lung bases and right middle lobe and left lingular upper lobe segments. There is a small right effusion present. The cardiac chambers remain mildly enlarged. Pacing leads are present. The liver,  and spleen are negative. Small gallstone is present near the neck of the gallbladder. The pancreas is limited detail with partial  fat replacement. There is bilateral atrophy of the kidneys without obstructive uropathy. There is contrast within the colon producing artifact. There is a percutaneous suprapubic catheter within the bladder decompressing the lumen limiting evaluation. The small bowel is nondistended. There is a right inguinal hernia with mild fat retention. Degenerative skeletal changes are present with chronic disc and endplate changes most prevalent at L2-3 in the lumbar segment of the spine. There is no aneurysm seen     1. Bilateral infiltrates compatible with pneumonitis increasing compared to the prior study. 2. Cholelithiasis.  3. Stable mild renal atrophy **This report has been transverse colon. Interstitial infiltrates in the mid and lower lungs bilaterally either representing pulmonary edema or an atypical viral-type pneumonia. **This report has been created using voice recognition software. It may contain minor errors which are inherent in voice recognition technology. ** Final report electronically signed by Dr. Ester Harrison on 3/27/2019 9:05 PM    Fl Modified Barium Swallow W Video    Result Date: 3/29/2019  PROCEDURE: FL MODIFIED BARIUM SWALLOW W VIDEO CLINICAL INFORMATION: r/o pharyngeal dysfunction . COMPARISON: Attempted esophagram 3/21/2019 TECHNIQUE: Fluoroscopy was provided to speech therapy to evaluate the patient's swallowing mechanism Imaging was done lateral projection. Radiologist was available for the examination. Patient was administered barium of various consistencies by the speech therapist. Fluoroscopic time: Two minutes 18 seconds. Images: 19 FINDINGS: Patient demonstrated aspiration with nectar thick and thin consistencies and eventual aspiration of solids secondary to residue. Aspiration with nectar thick and thin consistencies. For recommendations and additional comments please see the speech therapy report **This report has been created using voice recognition software. It may contain minor errors which are inherent in voice recognition technology. ** Final report electronically signed by Dr. Sherif Schreiber on 3/29/2019 2:38 PM        [address]  IP CONSULT TO INFECTIOUS DISEASES  IP CONSULT TO SOCIAL WORK  IP CONSULT TO NEPHROLOGY  IP CONSULT TO GI    Discharge Instructions     Patient Instructions:    Discharge lab work: None. CT chest w/o contrast recommended in 4 weeks.  CBC in 1 week  Activity: As tolerated  Diet: DIET DYSPHAGIA II MECHANICALLY ALTERED; No Drinking Straw   May return to work/school:        Follow-up visits     Delores Burgess MD  [address] [facility]  [address] , [address] [place] [address]. [address]  [phone]      As needed         Code status at time of discharge     Full Code       Time Spent on discharge is more than 39 minutes in the examination, evaluation, counseling and review of medications and discharge plan. Signed: Thank you Thelma Blount MD for the opportunity to be involved in this patient's care.     Electronically signed by Becky Arshad MD on 4/3/2019 at 4:33 PM

## 2019-04-03 NOTE — PLAN OF CARE
Problem: Falls - Risk of:  Goal: Will remain free from falls  Description  Will remain free from falls  4/3/2019 0249 by Carrie Barragan RN  Outcome: Ongoing  Note:   Free from falls this shift, safety precautions maintain. Bed alarm on, call light within reach, nonskid footwear on when ambulating      Problem: Risk for Impaired Skin Integrity  Goal: Tissue integrity - skin and mucous membranes  Description  Structural intactness and normal physiological function of skin and  mucous membranes. 4/3/2019 0249 by Carrie Barragan RN  Outcome: Ongoing  Note:   Mucous membrane pink and moist, no new tissue integrity this shift      Problem: Pain:  Goal: Pain level will decrease  Description  Pain level will decrease  4/3/2019 0249 by Carrie Barragan RN  Outcome: Ongoing  Note:   Denies pain this shift. Prn medication available if needed      Problem: DISCHARGE BARRIERS  Goal: Patient's continuum of care needs are met  4/3/2019 0249 by Carrie Barragan RN  Outcome: Ongoing  Note:   No discharge barriers noted this shift. Pt to be discharge to nursing home when available. Care plan reviewed with patient. Patient verbalize understanding of the plan of care and contribute to goal setting.

## 2019-04-03 NOTE — CARE COORDINATION
4/3/19, 3:00 PM    DISCHARGE BARRIERS      Spoke with [facility] admissions about possible discharge today. Facility can accept. Transfer packet and contact information on chart.

## 2019-04-03 NOTE — PROGRESS NOTES
[address] [address]  INPATIENT SPEECH THERAPY  [facility]    TIME   SLP Individual Minutes  Time In: 1130  Time Out: 1140  Minutes: 10  Timed Code Treatment Minutes: 0 Minutes       [x]Daily Note  []Progress Note  []Discharge Note    Date: 4/3/2019  Patient Name: Martín Delgado        MRN: [id number]    : 1935  (80 y.o.)  Gender: male   Primary Provider: Federico Sepulveda DO  Admitting Diagnosis:  Altered Mental Status   Secondary Diagnosis: Dysphagia   Precautions: Aspiration   Swallowing Status/Diet: Dysphagia I with thin liquids   Swallowing Strategies: Upright position   DATE of last MBS:  3/31  Pain:  0/10    Subjective:Pt seen at bedside. Alert and cooperative. RN reports dislike of puree diet with cough noted 1x with pill administration    SHORT TERM GOAL #1:  Goal 1: Pt will tolerate puree and thin diet (with avoidance of straws) without s/s of aspiration to ensure safe and adequate nutrition and hydration  INTERVENTIONS: See goal 2    SHORT TERM GOAL #2:  Goal 2: Pt will tolerate trials of advanced solids without s/s of aspiration 10/10 times to advance pt to previous diet level. INTERVENTIONS: Trial sandip cracker 15x with pt demonstrating functional mastication and oral bolus formation. Thin liquids by cup as liquid wash 6x. Cough noted 1x at the end of po trial.  Cough was dry with no change in vocal quality noted. Based on improved performance this date, recommend advancing diet to Dysphagia II and thin liquids with ongoing avoidance of straws. SHORT TERM GOAL #3:  Goal 3: Monitor pulmonary status closely and repeat instrumental assessment as needed. INTERVENTIONS: RN reports continued \"wheezing and rhonchi . \"  Pt remains on room air    ASSESSMENT:  Assessment: [x]Progressing towards goals          []Not Progressing towards goals       Patient Tolerance of Treatment:   [x]Tolerated well []Tolerated fair []Required rest breaks []Fatigued  Plan for Next Session: monitor Dysphagia II solids  Education:  Learner:  [x]Patient          []Significant Other          []Other  Education provided regarding:  [x]Goals and POC   [x]Diet and swallowing precautions    []Home Exercise Program  [x]Progress and/or discharge information  Method of Education:  [x]Discussion          [x]Demonstration          []Handout          []Other  Evaluation of Education:   [x]Verbalized understanding   []Demonstrates without assistance  [x]Demonstrates with assistance  [x]Needs further instruction     []No evidence of learning                  [x]No family present      Plan: [x]Continue with current plan of care    []Modify current plan of care as follows:    []Discharge patient    Discharge Location:    Services/Supervision Recommended:     [x]Patient continues to require treatment by a licensed therapist to address functional deficits as outlined in the established plan of care.     Nahid Zaragoza M.S. [id number]

## 2019-04-15 NOTE — TELEPHONE ENCOUNTER
[address] [place] [address] and sadie De Los Santos of [facility] Response to Communication Note scanned in Epic 4-15-19. Please call them and tell them to not change SP cath, Dr. Musa Yoo will exchange at next office visit.  Thanks      Jaguar Leader    ----- Message -----   From: Jeremy Randolph   Sent: 4/15/2019   8:00 AM   To: GARRETT James CNP   Subject: Scan                                               The image below was scanned by Jeremy Randolph [[id number]] on 4/15/2019 at 8:00 AM to the following: Milad Nichole [[id number]]:        Also Faxed for patient's chart [phone]

## 2019-04-19 PROBLEM — J18.9 PNEUMONIA DUE TO INFECTIOUS ORGANISM: Status: ACTIVE | Noted: 2019-01-01

## 2019-04-19 PROBLEM — R41.82 ALTERED MENTAL STATE: Status: ACTIVE | Noted: 2019-01-01

## 2019-04-19 PROBLEM — I95.9 HYPOTENSION: Status: ACTIVE | Noted: 2019-01-01

## 2019-04-19 NOTE — ED NOTES
On entrance to room pt is much more alert. He is asking clearly what is going on. When asked where he is he states [facility]. He knows who the president is and what year it is. He could not answer his birthday though.      Quyen Perez RN  04/19/19 0157

## 2019-04-19 NOTE — PROGRESS NOTES
buttocks)  · Current Nutrition Therapies:  · Oral Diet Orders: (Carb Control)   · Oral Diet intake: Unable to assess  · Oral Nutrition Supplement (ONS) Orders: Wound Healing Oral Supplement(Fantasma BID)  · ONS intake: Unable to assess  · Anthropometric Measures:  · Ht: 5' 9\" (175.3 cm)   · Current Body Wt: 184 lb 3.2 oz (83.6 kg)(4/19/198 +1 LUE, +2 LE edema)  · Admission Body Wt: 184 lb 3.2 oz (83.6 kg)(4/19/19 +1 LUE, +2 LE edema)  · Usual Body Wt: (~ 162# prior to going to [facility] 9/2018, recently ~ 175# per pt and cousin, Abraham Nowak. Per EMR: 4/25/18: 165#, 6/5/18: 171#6. 4oz, 9/26/18: 166#3. 2oz, 1/7/19: 176#3.2 oz, 3/18/19: 184#4. 8oz)  · % Weight Change:  ,  10.4% gain in the last year, in part true weight and likely fluid/edema weight gain as well  · Ideal Body Wt: 160 lb (72.6 kg),   · BMI Classification: BMI 25.0 - 29.9 Overweight    Nutrition Interventions:   Modify current diet, Start ONS(Added dysphagia III to assist with chewing/swallowing.)  Continued Inpatient Monitoring, Education Completed, Coordination of Care(Encouraged oral intake. CHF diet and fluid recommendations reviewed with pt, handout provided as well 4/19/19)    Nutrition Evaluation:   · Evaluation: Goals set   · Goals: Pt will consume 75% or more of meals to assist with wound healing during LOS.     · Monitoring: Meal Intake, Supplement Intake, Diet Tolerance, Wound Healing, Weight, Pertinent Labs, Chewing/Swallowing, Monitor Bowel Function      Electronically signed by Samantha Stephens RD, LD on 4/19/19 at 11:01 AM    Contact Number: [phone]

## 2019-04-19 NOTE — PROGRESS NOTES
Pharmacy Medication History Note      List of current medications patient is taking is complete. Source of information: [facility] List    Changes made to medication list:  Medications removed (include reason, ex. therapy complete or physician discontinued):  Removed Meclizine (not on med list)    Medications added/doses adjusted (per med list): Added tramadol 50 mg PO Q4H PRN pain   Adjusted milk of magnesia from take by mouth PRN to take 30 mL daily PRN  Adjusted lactobacillus from take by mouth to take 1 capsule by mouth daily  Adjusted Lotrisone from daily and Q8H PRN to only Q8H PRN redness/cracking  Adjusted trolamine to trolamine 10% lotion  Adjusted Tylenol from take by mouth to 1,000 mg PO Q8H PRN    Other notes (ex. Recent course of antibiotics, Coumadin dosing):  Denies use of other OTC or herbal medications.     Electronically signed by Torey Hughes, [address] [place] [address] on 4/19/2019 at 9:05 AM

## 2019-04-19 NOTE — PLAN OF CARE
Problem: Nutrition  Goal: Optimal nutrition therapy  Outcome: Ongoing   Nutrition Problem: Increased nutrient needs  Intervention: Food and/or Nutrient Delivery: Modify current diet, Start ONS(Added dysphagia III to assist with chewing/swallowing.)  Nutritional Goals: Pt will consume 75% or more of meals to assist with wound healing during LOS.

## 2019-04-19 NOTE — PROGRESS NOTES
status presents to be at baseline     ORAL MECHANISM EVALUATION:         Comments:  Facial / Labial [x]WFL [] Impaired []DNT    Lingual []WFL [x] Impaired []DNT Limited ROM and coordination    Dentition [x]WFL [] Impaired []DNT    Velum []WFL [] Impaired [x]DNT    Vocal Quality [x]WFL [] Impaired []DNT    Sensation [x]WFL [] Impaired []DNT    Cough []WFL [] Impaired [x]DNT    Other: []WFL [] Impaired []DNT    Other: []WFL [] Impaired []DNT        PATIENT WAS EVALUATED USING:  Thin via cup and straw, puree, hard solids     ORAL PHASE: [] WFL  [x] Impaired   [] Loss of bolus from lips [] Impaired AP movement [] Pocketing Left   [] Pocketing Right  [] Lingual Pumping  [x] Impaired Mastication   [] Reduced Bolus Formation [] Impaired Oral Initiation    [] OTHER:    PHARYNGEAL PHASE: [x] WFL: Pharyngeal phase appears WFLs, but cannot completely rule out pharyngeal phase deficits from a bedside swallow evaluation alone. [] Impaired   [] Delayed Swallow  [] Decreased Hyolaryngeal Elevation [] Audible Swallow   [] Suspected Pharyngeal Residue due to spontaneous multiple swallow. [] OTHER:    SIGNS AND SYMPTOMS OF LARYNGEAL PENETRATION / ASPIRATION:  [x] NO sign/symptoms of aspiration evident with this evaluation, but cannot rule out silent aspiration. [] Throat Clear  [] Immediate Cough [] Delayed Cough [] Wet Vocal Quality  [] Change in Pulmonary Status  [] OTHER:    IMPRESSIONS: Patient presents with mild oral dysphagia as evidence by very mild difficulty masticating hard solids. Patient with hx of dysphagia with varying diet levels within previous hospital admissions and per chart review at [facility], largely d/t fluctuating mental status. Patient also with hx of GI f/u. Patient completed PO trials of thin liquids via cup and straw, puree consistencies and hard, dry cracker demonstrating with functional mastication, bolus formation, timely swallow, effective oral clearance, no s/s of aspiration.   Recommend continuation

## 2019-04-19 NOTE — CARE COORDINATION
4/19/19, 8:56 AM      Estefany Morocho       Admitted from: ED 4/18/2019/ [address] [facility] day: 0   Location: 4A-08/008-A Reason for admit: Altered mental state [R41.82] Status: IP  Admit order signed?: no. Sticky note left for physician. PMH:  has a past medical history of Acute kidney failure ([address] Anemia, ASHD (arteriosclerotic heart disease), Blood circulation, collateral, CAD (coronary artery disease), Cardiomegaly, Chronic diastolic (congestive) heart failure ([address] Chronic kidney disease, DM2 (diabetes mellitus, type 2) ([address] Dyslipidemia, Falls, GERD (gastroesophageal reflux disease), Heart failure with preserved ejection fraction ([address] Hyperlipidemia, Hypertension, Iron deficiency anemia, Left atrial dilation, Lymphoma (HCC), Malaise, Nonintractable headache, Osteoarthritis of both knees, Pacemaker, and PVD (peripheral vascular disease) ([address] Procedure: none  Pertinent abnormal Imaging:CT Abdomen and Pelvis WO Contrast   Impression:        No acute inflammatory or infectious process in the abdomen or pelvis. No evidence of bowel obstruction. Moderate to large amount stool throughout the colon. No urinary tract calculi. No hydroureteronephrosis. Gallstones in the gallbladder. No biliary dilatation. Additional stable findings as detailed above. CT Chest WO Contrast  Diffuse bilateral alveolar interstitial infiltrates as discussed above, probably representing atypical pneumonia with possible superimposed pulmonary edema. Right lower lobe consolidation likely representing atelectasis. Small left and minimal right pleural effusions. Stable mild subcarinal adenopathy. CT Head WO Contrast    Impression:         No acute ischemic infarct, hemorrhage, or mass effect. 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 Aging changes as discussed above.   Stable appearance of the brain compared to the previous study as detailed above.           Medications:  Scheduled Meds:   sodium chloride flush  10 mL N/A  Potential Assistance Purchasing Medications:  No  Does patient want to participate in local refill/ meds to beds program?  No  Type of Home Care Services:  None  Patient expects to be discharged to:  [facility]  Expected Discharge date:  04/23/19  Follow Up Appointment: Best Day/ Time: Monday AM    Discharge Plan: Met with Lorie Bull. He currently resides at [facility]. Plan is to return at discharge. SW following.      Evaluation: yes

## 2019-04-19 NOTE — PROGRESS NOTES
Per Dr Charlyn Litten, no interventions at this time. OK to resume diet suggested by speech therapy. Dysphagia III diet with thin liquids.

## 2019-04-19 NOTE — ED PROVIDER NOTES
[facility]  eMERGENCY dEPARTMENT eNCOUnter          CHIEF COMPLAINT       Chief Complaint   Patient presents with    Altered Mental Status       Nurses Notes reviewed and I agree except as noted inthe HPI. HISTORY OF PRESENT ILLNESS    Ce Howell is a 80 y.o. male who presents to the Emergency Department by EMS from [address] [place] [address]. The patient has a history of CAD, CABG, hypertension, hyperlipidemia, CHF, diabetes, CKD, anemia, GERD, and PVD. Patient has a pacemaker placed. According to EMS, they were called for an altered mental status. Per NH report Patient has reportedly been very weak over the past 24 to 48 hours and has not been acting himself  , a change from baseline . Patient is a full code. He is not in any acute distress. Patient arouses to verbal stimuli and is otherwise in and out of sleep. The HPI is limited. Patient is unable to give history at this time . Aidan Webb REVIEW OF SYSTEMS     Review of Systems   Unable to perform ROS: Mental status change     PAST MEDICAL HISTORY    has a past medical history of Acute kidney failure ([address] Anemia, ASHD (arteriosclerotic heart disease), Blood circulation, collateral, CAD (coronary artery disease), Cardiomegaly, Chronic diastolic (congestive) heart failure (HCC), Chronic kidney disease, DM2 (diabetes mellitus, type 2) ([address] Dyslipidemia, Falls, GERD (gastroesophageal reflux disease), Heart failure with preserved ejection fraction ([address] Hyperlipidemia, Hypertension, Iron deficiency anemia, Left atrial dilation, Lymphoma (HCC), Malaise, Nonintractable headache, Osteoarthritis of both knees, Pacemaker, and PVD (peripheral vascular disease) ([address] SURGICAL HISTORY      has a past surgical history that includes Coronary artery bypass graft; pacemaker placement (01/12/2018);  Cardiac surgery; vascular surgery; Cardiac catheterization (02/19/2018); pr dilation of salivary duct (N/A, 11/23/2018); Upper gastrointestinal endoscopy (Left, 2/14/2019); Upper gastrointestinal endoscopy (Left, 2/14/2019); suprapubic catheter placement (N/A, 2/28/2019); and egd colonoscopy (N/A, 3/30/2019). CURRENT MEDICATIONS       Previous Medications    ACETAMINOPHEN (TYLENOL) 500 MG TABLET    Take by mouth every 8 hours as needed for Pain 2 tab    AMLODIPINE (NORVASC) 10 MG TABLET    Take 1 tablet by mouth daily    ASPIRIN 81 MG TABLET    Take 81 mg by mouth daily    ATORVASTATIN (LIPITOR) 20 MG TABLET    Take 20 mg by mouth daily    CLOPIDOGREL (PLAVIX) 75 MG TABLET    Take 75 mg by mouth daily    CLOTRIMAZOLE-BETAMETHASONE (LOTRISONE) 1-0.05 % CREAM    Apply topically Daily to foreskin and every 8 hours as needed    DEXTROMETHORPHAN-GUAIFENESIN  MG/5ML SYRP    Take 10 mLs by mouth every 6 hours as needed for Cough    DOXEPIN (SILENOR) 3 MG TABS TABLET    Take 3 mg by mouth nightly    FAMOTIDINE (PEPCID) 20 MG TABLET    Take 20 mg by mouth daily    FERROUS SULFATE 325 (65 FE) MG TABLET    Take 1 tablet by mouth Daily with lunch    GABAPENTIN (NEURONTIN) 100 MG CAPSULE    Take 100 mg by mouth 3 times daily. Ernestine Garcia     GLIPIZIDE (GLUCOTROL) 5 MG TABLET    Take 5 mg by mouth daily    IPRATROPIUM-ALBUTEROL (DUONEB) 0.5-2.5 (3) MG/3ML SOLN NEBULIZER SOLUTION    Inhale 3 mLs into the lungs every 4 hours as needed for Shortness of Breath    ISOSORBIDE MONONITRATE (IMDUR) 30 MG EXTENDED RELEASE TABLET    Take 1 tablet by mouth daily    LACTOBACILLUS PO    Take by mouth daily    LINAGLIPTIN (TRADJENTA) 5 MG TABLET    Take 5 mg by mouth daily    MAGNESIUM HYDROXIDE (EQL MILK OF MAGNESIA) 400 MG/5ML SUSPENSION    Take by mouth daily as needed for Constipation    MAGNESIUM OXIDE (MAG-OX) 400 MG TABLET    Take 400 mg by mouth 2 times daily     MECLIZINE (ANTIVERT) 25 MG TABLET    Take 25 mg by mouth every 12 hours as needed     METOPROLOL SUCCINATE (TOPROL XL) 25 MG EXTENDED RELEASE TABLET    Take 1 tablet by mouth daily MULTIPLE VITAMINS-MINERALS (MULTIVITAMIN ADULTS 50+ PO)    Take 1 tablet by mouth daily     ONDANSETRON (ZOFRAN) 4 MG TABLET    Take 4 mg by mouth every 8 hours as needed for Nausea or Vomiting    PANTOPRAZOLE (PROTONIX) 40 MG TABLET    Take 40 mg by mouth 2 times daily    POLYETHYLENE GLYCOL (GLYCOLAX) POWDER    Take 17 g by mouth daily as needed     POLYVINYL ALCOHOL (LIQUIFILM TEARS) 1.4 % OPHTHALMIC SOLUTION    Place 1 drop into both eyes every 4 hours as needed     POTASSIUM CHLORIDE (KLOR-CON M) 20 MEQ TBCR EXTENDED RELEASE TABLET    Take 1 tablet by mouth daily    RANEXA 1000 MG EXTENDED RELEASE TABLET    TAKE 1 TABLET BY MOUTH TWICE DAILY    TAMSULOSIN (FLOMAX) 0.4 MG CAPSULE    Take 1 capsule by mouth daily    TORSEMIDE (DEMADEX) 20 MG TABLET    Take 1 tablet by mouth daily    TROLAMINE SALICYLATE (ASPERCREME EX)    Apply topically daily in AM to bilateral knees    VITAMIN C (ASCORBIC ACID) 500 MG TABLET    Take 500 mg by mouth nightly        ALLERGIES     has No Known Allergies. FAMILY HISTORY     indicated that his mother is . He indicated that his father is . family history is not on file. SOCIAL HISTORY      reports that he has never smoked. He has never used smokeless tobacco. He reports that he does not drink alcohol or use drugs. PHYSICAL EXAM     INITIAL VITALS:  rectal temperature is 97.5 °F (36.4 °C). His blood pressure is 111/52 (abnormal) and his pulse is 70. His respiration is 19 and oxygen saturation is 99%. Physical Exam   Constitutional: He is sleeping. Patient arouses to verbal stimuli   HENT:   Head: Normocephalic and atraumatic. Eyes: Conjunctivae are normal.   Neck: Normal range of motion. Cardiovascular: Normal rate and regular rhythm. Patient has a pacemaker   Pulmonary/Chest: Effort normal.   Surgical scar from bypass surgery   Abdominal: Soft. There is no tenderness.    Genitourinary:   Genitourinary Comments: Patient has a suprapubic catheter Musculoskeletal: Normal range of motion. Right lower leg: He exhibits edema. Left lower leg: He exhibits edema. Neurological: He is disoriented. Skin: Skin is warm and dry. DIFFERENTIAL DIAGNOSIS:       DIAGNOSTIC RESULTS     EKG: All EKG's are interpreted by the Emergency Department Physician who either signs or Co-signs this chart in the absence of acardiologist.    Vent. Rate: 67 bpm  UT interval: 288 ms  QRS duration:196 ms  QTc: 560 ms  P-R-T axes: 3, -88, 80  Atrial-sensed ventricular-paced rhythm with prolonged AV conduction. Abnormal ECG. No STEMI    RADIOLOGY: non-plain film images(s) such as CT, Ultrasound and MRI are read by the radiologist.    CT Head WO Contrast   Final Result      No acute ischemic infarct, hemorrhage, or mass effect. 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 Aging changes as discussed above. Stable appearance of the brain compared to the previous study as detailed above. **This report has been created using voice recognition software. It may contain minor errors which are inherent in voice recognition technology. **      Final report electronically signed by Dr. Ariadna Ratliff on 4/19/2019 12:47 AM      CT ABDOMEN PELVIS WO CONTRAST Additional Contrast? None   Final Result   No acute inflammatory or infectious process in the abdomen or pelvis. No evidence of bowel obstruction. Moderate to large amount stool throughout the colon. No urinary tract calculi. No hydroureteronephrosis. Gallstones in the gallbladder. No biliary dilatation. Additional stable findings as detailed above. **This report has been created using voice recognition software. It may contain minor errors which are inherent in voice recognition technology. **      Final report electronically signed by Dr. Ariadna Ratliff on 4/19/2019 1:08 AM      CT CHEST WO CONTRAST   Final Result    Diffuse bilateral alveolar interstitial infiltrates as discussed above, probably accurately records my words and actions.     Layla Garcia MD 4/18/19 1:26 AM                      Layla Garcia MD  04/19/19 0662

## 2019-04-19 NOTE — ED TRIAGE NOTES
Pt in from nursing home with c/o AMS status that has progressively been worse throughout day. His normal is he can ambulate with a wheelchair and he can talk. Nursing home states he felt like he had a fever but he was 98F. He has not been responsive for EMS. He has eye opening to verbal stimuli. He has a chronic trujillo in place.

## 2019-04-19 NOTE — DISCHARGE INSTR - COC
Urinary retention R33.9    Hyperkalemia E87.5    Hyponatremia E87.1    Lactic acidosis E87.2    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 ([facility]) T83.89XA, N36.8    E. coli UTI N39.0, B96.20    Other acute kidney failure (HCC) N17.8    Suprapubic catheter ([facility]) Z93.59    Acute postoperative pain G89.18    BPH with obstruction/lower urinary tract symptoms N40.1, N13.8    Neurogenic bladder N31.9    Pneumonitis J18.9    Low bicarbonate E87.8    Acute metabolic encephalopathy Y51.34    Hypokalemia E87.6    Acute respiratory failure with hypoxia ([facility]) J96.01    Aspiration pneumonia due to gastric secretions ([facility]) J69.0    Acute pulmonary edema ([facility]) J81.0    Infectious encephalopathy G93.49, B99.9    Diarrhea R19.7    Normocytic anemia D64.9    Other hyperlipidemia E78.49    Altered mental state R41.82    Hypotension I95.9    Pneumonia due to infectious organism J18.9       Isolation/Infection:   Isolation          No Isolation            Nurse Assessment:  Last Vital Signs: /61   Pulse 82   Temp 98.3 °F (36.8 °C) (Oral)   Resp 17   Ht 5' 9\" (1.753 m)   Wt 184 lb 3.2 oz (83.6 kg)   SpO2 97%   BMI 27.20 kg/m²     Last documented pain score (0-10 scale):    Last Weight:   Wt Readings from Last 1 Encounters:   04/19/19 184 lb 3.2 oz (83.6 kg)     Mental Status:  disoriented, oriented and alert    IV Access:  - None    Nursing Mobility/ADLs:  Walking   Assisted  Transfer  Assisted  Bathing  Assisted  Dressing  Assisted  Toileting  Assisted  Feeding  Assisted  Med Admin  Dependent  Med Delivery   whole    Wound Care Documentation and Therapy:  Wound 11/20/18 Other (Comment) 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 (Active)   Number of days: 149       Wound 03/28/19 Buttocks DTI vs Chronic Discoloration (Active)   Wound Deep tissue/Injury 4/19/2019  2:17 AM   Number of days: 22        Elimination:  Continence:   · Bowel:  Yes  · Bladder: suprapubic  Urinary Catheter: suprapubic from prior to admission   Colostomy/Ileostomy/Ileal Conduit: No       Date of Last BM: 4/20/2019    Intake/Output Summary (Last 24 hours) at 4/19/2019 1052  Last data filed at 4/19/2019 0359  Gross per 24 hour   Intake 207.47 ml   Output --   Net 207.47 ml     I/O last 3 completed shifts: In: 207.5 [I.V.:207.5]  Out: -     Safety Concerns:     Fall risk    Impairments/Disabilities:      None    Nutrition Therapy:  Current Nutrition Therapy:   - Oral Diet:  Dysphagia 3 advanced, carb control    Routes of Feeding: Oral  Liquids: Thin Liquids  Daily Fluid Restriction: no  Last Modified Barium Swallow with Video (Video Swallowing Test): not done    Treatments at the Time of Hospital Discharge:   Respiratory Treatments: none  Oxygen Therapy:  is not on home oxygen therapy. Ventilator:    - No ventilator support    Rehab Therapies: Physical Therapy, Occupational Therapy and Speech/Language Therapy  Weight Bearing Status/Restrictions: No weight bearing restirctions  Other Medical Equipment (for information only, NOT a DME order):  walker and hospital bed  Other Treatments: none    Patient's personal belongings (please select all that are sent with patient):  None    RN SIGNATURE:  Electronically signed by Chelsea Restrepo RN on 4/20/19 at 3:34 PM    CASE MANAGEMENT/SOCIAL WORK SECTION    Inpatient Status Date: 4/19/20109    Readmission Risk Assessment Score:  Readmission Risk              Risk of Unplanned Readmission:        51           Discharging to Facility/ Agency   · Name: [facility]   · [address] [place], [address] [facility]  · Phone:[phone]  · Fax:[phone]    Dialysis Facility (if applicable)   · Name:  · Address:  · Dialysis Schedule:  · Phone:  · Fax:    / signature: Electronically signed by LAURIE Kingston on 4/19/19 at 11:06 AM    PHYSICIAN SECTION    Prognosis: Fair    Condition at Discharge: Stable    Rehab

## 2019-04-19 NOTE — ED NOTES
Bed: 011A  Expected date: 4/18/19  Expected time: 11:26 PM  Means of arrival: [facility]  Comments:     Jamil Graf RN  04/18/19 0040

## 2019-04-19 NOTE — PROGRESS NOTES
Pharmacy Renal Adjustment    Sydni Gilbert is a 80 y.o. male. Pharmacy renally adjust the following medications per P&T approved policy: Lovenox    Recent Labs     04/18/19 2357 04/19/19 0318   BUN 31* 30*       Recent Labs     04/18/19 2357 04/19/19 0318   CREATININE 2.2* 2.0*       Estimated Creatinine Clearance: 28 mL/min (A) (based on SCr of 2 mg/dL (H)).   Calculated CrCl:    Height:   Ht Readings from Last 1 Encounters:   04/19/19 5' 9\" (1.753 m)     Weight:  Wt Readings from Last 1 Encounters:   04/19/19 184 lb 3.2 oz (83.6 kg)       Plan: Adjustments based on renal function:          Decrease Lovenox 40 mg daily to 30 mg daily     Nobie Hamman, PharmD  4/19/2019  7:07 AM

## 2019-04-19 NOTE — PLAN OF CARE
Problem: Falls - Risk of:  Goal: Will remain free from falls  Description  Will remain free from falls  Note:   Fall precautions initiated this shift. Nonskid socks on, 3/4 side rails raised, bed alarm on, call light within reach. No falls this shift. Problem: Risk for Impaired Skin Integrity  Goal: Tissue integrity - skin and mucous membranes  Description  Structural intactness and normal physiological function of skin and  mucous membranes. Note:   Pt turned and repositioned every 2 hours with pillow support. Heels elevated off of bed. DTI noted on coccyx, scattered bruising and abrasions, blanchable redness on BLE and heels. Problem: Discharge Planning:  Goal: Discharged to appropriate level of care  Description  Discharged to appropriate level of care  Note:   Pt from [facility]. Problem: Airway Clearance - Ineffective:  Goal: Ability to maintain a clear airway will improve  Description  Ability to maintain a clear airway will improve  Note:   Able to maintain clear airway. Care plan reviewed with patient. Patient verbalize understanding of the plan of care and contribute to goal setting.

## 2019-04-20 NOTE — PROGRESS NOTES
97%   BMI 27.20 kg/m²     Intake/Output Summary (Last 24 hours) at 4/20/2019 1429  Last data filed at 4/20/2019 1423  Gross per 24 hour   Intake 2838.86 ml   Output 4625 ml   Net -1786.14 ml     General appearance: alert and cooperative with exam  Lungs: clear to auscultation bilaterally  Heart: regular rate and rhythm, S1, S2 normal, no murmur, click, rub or gallop  Abdomen: soft, non-tender; bowel sounds normal; no masses,  no organomegaly  Extremities: extremities normal, atraumatic, no cyanosis or edema    Assessment and Plan:   1. Dysphagia, stable now , no chaoking,   2. Refuse PEG for now   3.  Stable for discharge       Follow up in GI Clinic after discharge in 2 week(s)    Patient Active Problem List:     Postural dizziness with near syncope     DM2 (diabetes mellitus, type 2) (HCC)     Hypertension     Pacemaker     Peripheral vascular disease (HCC)     Lactic acidosis     Iron deficiency anemia     CAD (coronary artery disease)     Heart failure with preserved ejection fraction (HCC)     GERD (gastroesophageal reflux disease)     Dyslipidemia     Lymphoma ([address]     Osteoarthritis of both knees     Left atrial dilation     GLORIA (acute kidney injury) ([address]     Frequent falls     Physical deconditioning     Altered awareness, transient     Fever     Sepsis ([address]     Pancytopenia (HCC)     Altered mental status     Hepatomegaly     Urinary retention     Hematuria     Acute kidney injury superimposed on chronic kidney disease (HCC)     Hyperkalemia     Hypercalcemia     Calculus of gallbladder without cholecystitis without obstruction     Syncope and collapse     Non-intractable vomiting with nausea     Gallbladder dilatation     Nonintractable headache     Elevated troponin     Osteoarthritis of multiple joints     Acute on chronic combined systolic and diastolic CHF (congestive heart failure) ([address]     S/P placement of cardiac pacemaker     Chronic kidney disease, stage 3 ([address]     Severe malnutrition ([address] Dysphagia     Urinary retention     Hyperkalemia     Hyponatremia     Lactic acidosis     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 coli UTI     Other acute kidney failure (HCC)     Suprapubic catheter (HCC)     Acute postoperative pain     BPH with obstruction/lower urinary tract symptoms     Neurogenic bladder     Pneumonitis     Low bicarbonate     Acute metabolic encephalopathy     Hypokalemia     Acute respiratory failure with hypoxia (HCC)     Aspiration pneumonia due to gastric secretions (HCC)     Acute pulmonary edema (HCC)     Infectious encephalopathy     Diarrhea     Normocytic anemia     Other hyperlipidemia     Altered mental state     Hypotension     Pneumonia due to infectious organism      Shaquille Veliz MD

## 2019-04-20 NOTE — CONSULTS
[address] [place] [address]                                  CONSULTATION    PATIENT NAME: Jonnathan Love              :        1935  MED REC NO:   [id number]                           ROOM:       0008  ACCOUNT NO:   [de-identified]                           ADMIT DATE: 2019  PROVIDER:     MARY Conde DATE:  2019    HISTORY OF PRESENT ILLNESS:  The patient known to the practice, admitted  with altered mental status as well as nausea and vomit. The patient at  the time of admission was confused. At the time of my evaluation was  doing fine. At this time, he was alert, oriented. He is an 70-year-old  male, pleasant. He was at the nursing home, was more confused. He has  nausea and vomit. The vomit does not contain any coffee-ground material  or blood. There were food particles; basically, the patient vomited. The patient admitted slight difficulty to swallow. Sometimes choking  while swallowing and coughing intermittent. No hematemesis at this  time. He lost no weight. Appetite is fine. Slight epigastric  discomfort, not really severe. He has normal bowel movement. He is not  aware of any change in bowel movement. He has not seen the last bowel  movement color. He has no blood with the stool. He is well known with a history of dysphagia, intermittent. He had  upper endoscopy and dilation done more than one time; all helped him,  but they never resolved the problems completely. He still continues to  vomit. He does not want a PEG tube placed at this time. He is alert  and oriented at the time of evaluation, doing great.     PAST MEDICAL HISTORY:  Significant for recurrent pneumonia, likely  aspiration; dysphagia; acute kidney injury, resolved; coronary artery  disease, stable; hypertension; open heart surgery in the past;  cardiomegaly; chronic diastolic congestive heart failure; chronic kidney  disease; dyslipidemia; gastric reflux; iron deficiency anemia; left  atrial dilation; osteoarthritis; angioplasty in the past.    SOCIAL HISTORY:  No smoking. No drug abuse. He used to work in the  _____ industry. FAMILY HISTORY:  No GI malignancy. MEDICATIONS:  He is on Ranexa, Lotrisone cream, Lasix, Protonix tablet  at this time, insulin, Neurontin, Imdur, potassium supplements, Norvasc,  Pepcid, Flomax, Lipitor, Plavix, aspirin, iron supplement, DuoNeb. PHYSICAL EXAMINATION:  GENERAL:  Pleasant, alert, oriented, comfortable, not short of breath at  the time of evaluation. Not using accessory muscles, very pleasant. VITAL SIGNS:  Blood pressure is 118/59, respirations 18, pulse 80,  temperature 97.7, afebrile since admission. HEENT:  Head atraumatic. Sclerae anicteric. Pupils are round and  reactive to light and accommodation with normal extraocular muscular  movements. Conjunctivae normal.  Oral cavity:  Normal.  No lesions. NECK:  Supple. CHEST:  Normal.  Scarred anterior chest wall. CARDIOVASCULAR:  S1 and S2.  ABDOMEN:  Suprapubic catheter seen. No organomegaly. No stigmata of  chronic liver disease. EXTREMITIES:  No clubbing. No cyanosis. DIAGNOSTIC DATA:  Previous endoscopy done with no complications. Recurrent speech therapy, last time in 2000. IMPRESSION:  1. Altered mental status, unlikely GI related , likely metabolic. 2.  Dysphagia, relatively stable. He is deteriorating very slowly. He  does not want the PEG tube at this time; he might restart at home. At  this time, there is really no medication for that. He might need to be  supervised during feeding process. Thank you for allowing me to participate in this patient's care. We  discussed the long-term care with Thuy Renee, his cousin, to treat him with  palliative care.         Rhett Ribeiro M.D.    D: 04/19/2019 21:24:58       T: 04/19/2019 23:14:26     [email or web address]  Job#: [id number]     Doc#: [id number]    CC:  Vadim Pastor M.D.

## 2019-04-20 NOTE — PLAN OF CARE
Problem: Falls - Risk of:  Goal: Will remain free from falls  Description  Will remain free from falls  Outcome: Ongoing  Note:   Patient free of falls. Call light within reach. Bed in lowest position. Nonskid socks on. Bed alarm on. Hourly rounding continues. Patient uses call light for needs. Problem: Risk for Impaired Skin Integrity  Goal: Tissue integrity - skin and mucous membranes  Description  Structural intactness and normal physiological function of skin and  mucous membranes. Outcome: Ongoing  Note:   Ongoing assessment & interventions provided throughout shift. Skin assessments provided. Abnormals noted in flowsheet. Encouraging/assisting patient to turn as needed. Use of pillow support provided. Problem: Discharge Planning:  Goal: Discharged to appropriate level of care  Description  Discharged to appropriate level of care  Outcome: Ongoing  Note:   Plans to be sent to [facility] upon discharge. They are holding his bed for him. Problem: Serum Glucose Level - Abnormal:  Goal: Ability to maintain appropriate glucose levels will improve to within specified parameters  Description  Ability to maintain appropriate glucose levels will improve to within specified parameters  Outcome: Ongoing  Note:   Monitoring blood glucose AC/HS/PRN. Insulin given as ordered by physician. Care plan reviewed with patient. Patient verbalize understanding of the plan of care and contribute to goal setting.

## 2019-04-20 NOTE — DISCHARGE SUMMARY
added. Make sure you understand how and when to take each. * This list has 2 medication(s) that are the same as other medications prescribed for you. Read the directions carefully, and ask your doctor or other care provider to review them with you.             CONTINUE taking these medications    acetaminophen 500 MG tablet  Commonly known as:  TYLENOL     amLODIPine 10 MG tablet  Commonly known as:  NORVASC  Take 1 tablet by mouth daily     aspirin 81 MG tablet     atorvastatin 20 MG tablet  Commonly known as:  LIPITOR     clopidogrel 75 MG tablet  Commonly known as:  PLAVIX     clotrimazole-betamethasone 1-0.05 % cream  Commonly known as:  LOTRISONE     Dextromethorphan-guaiFENesin  MG/5ML Syrp     doxepin 3 MG Tabs tablet  Commonly known as:  SILENOR     EQL MILK OF MAGNESIA 400 MG/5ML suspension  Generic drug:  magnesium hydroxide     famotidine 20 MG tablet  Commonly known as:  PEPCID     ferrous sulfate 325 (65 Fe) MG tablet     gabapentin 100 MG capsule  Commonly known as:  NEURONTIN     glipiZIDE 5 MG tablet  Commonly known as:  GLUCOTROL     ipratropium-albuterol 0.5-2.5 (3) MG/3ML Soln nebulizer solution  Commonly known as:  DUONEB  Inhale 3 mLs into the lungs every 4 hours as needed for Shortness of Breath     isosorbide mononitrate 30 MG extended release tablet  Commonly known as:  IMDUR  Take 1 tablet by mouth daily     magnesium oxide 400 MG tablet  Commonly known as:  MAG-OX     metoprolol succinate 25 MG extended release tablet  Commonly known as:  TOPROL XL  Take 1 tablet by mouth daily     MULTIVITAMIN ADULTS 50+ PO     ondansetron 4 MG tablet  Commonly known as:  ZOFRAN     pantoprazole 40 MG tablet  Commonly known as:  PROTONIX     polyethylene glycol powder  Commonly known as:  GLYCOLAX     polyvinyl alcohol 1.4 % ophthalmic solution  Commonly known as:  LIQUIFILM TEARS     potassium chloride 20 MEQ Tbcr extended release tablet  Commonly known as:  KLOR-CON M  Take 1 tablet by mouth daily     RANEXA 1000 MG extended release tablet  Generic drug:  ranolazine  TAKE 1 TABLET BY MOUTH TWICE DAILY     tamsulosin 0.4 MG capsule  Commonly known as:  FLOMAX  Take 1 capsule by mouth daily     torsemide 20 MG tablet  Commonly known as:  DEMADEX  Take 1 tablet by mouth daily     TRADJENTA 5 MG tablet  Generic drug:  linagliptin     traMADol 50 MG tablet  Commonly known as:  ULTRAM     Trolamine Salicylate 10 % Lotn  Commonly known as:  ASPERCREME     vitamin C 500 MG tablet  Commonly known as:  ASCORBIC ACID           Where to Get Your Medications      Information about where to get these medications is not yet available    Ask your nurse or doctor about these medications  · amoxicillin-clavulanate 500-125 MG per tablet  · doxycycline hyclate 100 MG tablet  · PROBIOTIC ACIDOPHILUS Tabs       Diet:  DIET CARB CONTROL; Dysphagia III Advanced    REVIEW OF SYSTEMS:   Unable to perform given AMS. PHYSICAL EXAM:    BP (!) 107/57   Pulse 80   Temp 97.7 °F (36.5 °C) (Oral)   Resp 12   Ht 5' 9\" (1.753 m)   Wt 184 lb 3.2 oz (83.6 kg)   SpO2 98%   BMI 27.20 kg/m²     General appearance:  No apparent distress, appears stated age and cooperative. HEENT:  Normal cephalic, atraumatic without obvious deformity. Pupils equal, round, and reactive to light. Extra ocular muscles intact. Conjunctivae/corneas clear. Neck: Supple, with full range of motion. No jugular venous distention. Trachea midline. Respiratory:  Normal respiratory effort. Clear to auscultation, bilaterally without Rales/Wheezes/Rhonchi. Cardiovascular:  Regular rate and rhythm with normal S1/S2 without murmurs, rubs or gallops. Pacemaker. Abdomen: Soft, non-tender, non-distended with normal bowel sounds. Musculoskeletal:  No clubbing, cyanosis, LE edema bilaterally. Full range of motion without deformity. Skin: Skin color, texture, turgor normal.  No rashes or lesions. Surgical scar midline.   Neurologic:  Neurovascularly intact without any focal sensory/motor deficits. Cranial nerves: II-XII intact, grossly non-focal.    Capillary Refill: Brisk,< 3 seconds   Peripheral Pulses: +2 palpable, equal bilaterally       Labs:     Recent Labs     04/18/19 2357   WBC 6.1   HGB 9.0*   HCT 27.0*        Recent Labs     04/18/19  2357 04/19/19  0318 04/20/19  0322    133* 136   K 5.0 4.8 4.2    102 102   CO2 22* 20* 22*   BUN 31* 30* 24*   CREATININE 2.2* 2.0* 1.6*   CALCIUM 8.7 8.3* 8.3*     Recent Labs     04/18/19 2357   AST 14   ALT 8*   BILITOT 0.4   ALKPHOS 73     Recent Labs     04/18/19 2357   INR 0.94     No results for input(s): Yolanda Nicolas in the last 72 hours. Urinalysis:      Lab Results   Component Value Date    NITRU NEGATIVE 04/19/2019    WBCUA 2-4 04/19/2019    BACTERIA NONE 04/19/2019    RBCUA 0-2 04/19/2019    BLOODU NEGATIVE 04/19/2019    SPECGRAV 1.012 02/23/2019    GLUCOSEU NEGATIVE 04/19/2019       Intake & Output:  I/O last 3 completed shifts: In: 2838.9 [P.O.:600; I.V.:2238.9]  Out: 4625 [Urine:4625]  No intake/output data recorded. Radiology:     EKG:  I have reviewed the EKG with the following interpretation:     \"Atrial-sensed ventricular-paced rhythm with prolonged AV conduction  Abnormal ECG  When compared with ECG of 27-MAR-2019 19:23,  Electronic ventricular pacemaker has replaced Sinus rhythm  Ventricular rate has decreased BY  34 BPM\"    CXR: I have reviewed the CXR with the following interpretation:       CT Head WO Contrast   Final Result      No acute ischemic infarct, hemorrhage, or mass effect. 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 Aging changes as discussed above. Stable appearance of the brain compared to the previous study as detailed above. **This report has been created using voice recognition software. It may contain minor errors which are inherent in voice recognition technology. **      Final report electronically signed by Dr. Christopher Murillo on 4/19/2019 12:47 AM CT ABDOMEN PELVIS WO CONTRAST Additional Contrast? None   Final Result   No acute inflammatory or infectious process in the abdomen or pelvis. No evidence of bowel obstruction. Moderate to large amount stool throughout the colon. No urinary tract calculi. No hydroureteronephrosis. Gallstones in the gallbladder. No biliary dilatation. Additional stable findings as detailed above. **This report has been created using voice recognition software. It may contain minor errors which are inherent in voice recognition technology. **      Final report electronically signed by Dr. Chavo Amaya on 4/19/2019 1:08 AM      CT CHEST WO CONTRAST   Final Result    Diffuse bilateral alveolar interstitial infiltrates as discussed above, probably representing atypical pneumonia with possible superimposed pulmonary edema. Right lower lobe consolidation likely representing atelectasis. Small left and minimal right pleural effusions. Stable mild subcarinal adenopathy. **This report has been created using voice recognition software. It may contain minor errors which are inherent in voice recognition technology. **      Final report electronically signed by Dr. Chavo Amaya on 4/19/2019 12:59 AM           DVT prophylaxis: Lovenox    Code Status: Full Code      PT/OT Eval Status: on case     [address] Problems    Diagnosis Date Noted    Altered mental state [R41.82] 04/19/2019    Hypotension [I95.9] 04/19/2019    Pneumonia due to infectious organism [J18.9] 04/19/2019    GLORIA (acute kidney injury) ([facility] [N17.9] 06/09/2018    DM2 (diabetes mellitus, type 2) ([facility] [E11.9]      No sepsis  PLAN:    Acute metabolic and some neuropathy secondary to pneumonia-back to baseline mentation     1. GLORIA   - Almost back to baseline creatinine with IV fluid    2. Atypical Pneumonia  3. -Stop IV antibiotics will change to doxycycline and Augmentin at discharge     4.  DM II   - Continue home medications    5. Hypotensive  - Resolved with IV fluid  Benign essential tremors    Spoke to the neurologist okay for discharge from his standpoint    Stable for nursing home    Discharge time 35 minutes    Thank you Eddy Koehler MD for the opportunity to be involved in this patient's care.     Electronically signed by Rashad Navarro MD on 4/20/2019 at 6:09 PM

## 2019-04-20 NOTE — PROGRESS NOTES
report called to lima convolescent LACP here to take patient to [facility] packet sent with patient

## 2019-05-01 NOTE — TELEPHONE ENCOUNTER
Babs Murcia from [facility] has been informed and understands and states that the patient is doing ok from what she knows and states that there are no changes right now and that she will call if needed  Babs Murcia will note that blood work needs to be repeated in 1 week results to be faxed to the office

## 2019-05-01 NOTE — TELEPHONE ENCOUNTER
----- Message from Patrick Sharp MD sent at 4/30/2019  5:30 PM EDT -----  Repeat BMP 1 week  How is he doing?

## 2019-05-01 NOTE — PROGRESS NOTES
PT OF ALEX PÉREZ  [employer] DUAL PACEMAKER CHECK IN OFFICE BATTERY 13 YRS REMAINING  A PACED 29%  V PACED 69%  PT PRESENTS IN ASVS  ATRIAL THRESHOLD 0.8 @ 0.4  VENT THRESHOLD 1 @ 0.4  P WAVES 3.7  RV WAVES 14.9  ATRIAL IMPEDENCE 653  VENT IMPEDENCE 653  ATRIAL AMPLITUDE PROGRAMMED AT 2.5 @ 0.4  VENT AMPLITUDE PROGRAMMED AT 2.5 @ 0.4  DDDR   PT HAD AN EPISODE OF PMT

## 2019-05-06 NOTE — PROGRESS NOTES
[facility]    [address]   [facility], [address]  [phone]  Progress Note  5/6/2019 2:21 PM      Pt Name:    Rhesa Carrel  YOB: 1935  Primary Care Physician:  Danielle Andres MD     Chief Complaint:   Chief Complaint   Patient presents with    Follow-up     ckd iii        Background Information/Interval History:   81 yo hx CKd III with creat of 1.6, DM, HTN, CAD s/p CABG, dyslipidemia, hx non-Hodgkins lymphoma, pacemaker, preserved LV EF who was recently admitted at [facility] after he was sent from [facility] for evaluation of penile pain, discoloration of urine and hematuria. He is not a very good historian. He had cystoscopy by urology which revealed BPH with trabeculations. He recently underwent SP catheter placement. He is here for follow-up. He feels well. Very hard of hearing. He has SP catheter in place. Again recently admitted with pneumonia and urinary infection. Doing okay now. Here with his POA. He has no new complaints. No chest pain. No shortness of breath. He is on demadex, Toprol and Potassium. He is on norvasc as well.       Past History:  Past Medical History:   Diagnosis Date    Acute kidney failure ([address]     Anemia     ASHD (arteriosclerotic heart disease)     Blood circulation, collateral     CAD (coronary artery disease)     Cardiomegaly     Chronic diastolic (congestive) heart failure (HCC)     Chronic kidney disease     DM2 (diabetes mellitus, type 2) (HCC)     Dyslipidemia     Falls     GERD (gastroesophageal reflux disease)     Heart failure with preserved ejection fraction (HCC)     Hyperlipidemia     Hypertension     Iron deficiency anemia     Left atrial dilation     severe    Lymphoma (HCC)     non hodgkin    Malaise     Nonintractable headache     Osteoarthritis of both knees     Pacemaker 1/25/2018    [employer] DUAL PACEMAKER INSERTED ON 01/  PVD (peripheral vascular disease) ([address]      Past Surgical History: Procedure Laterality Date    CARDIAC CATHETERIZATION  02/19/2018    CARDIAC SURGERY      CORONARY ARTERY BYPASS GRAFT      EGD COLONOSCOPY N/A 3/30/2019    EGD ESOPHAGOGASTRODUODENOSCOPY DILATATION performed by Heron Tolentino MD at [address]  01/12/2018    [facility]    NH DILATION OF SALIVARY DUCT N/A 11/23/2018    EGD DILATION SAVORY performed by Heron Tolentino MD at [address] N/A 2/28/2019    SUPRAPUBIC CATHETER PLACEMENT performed by Lex Santiago MD at [facility] Left 2/14/2019    EGD DILATION SAVORY performed by Heron Tolentino MD at [address] [facility]    UPPER GASTROINTESTINAL ENDOSCOPY Left 2/14/2019    EGD BIOPSY performed by Heron Tolentino MD at [facility]    VASCULAR SURGERY          VITALS:  BP (!) 105/56 (Site: Left Upper Arm, Position: Sitting, Cuff Size: Medium Adult)   Pulse 73   Wt 182 lb (82.6 kg)   SpO2 100%   BMI 26.88 kg/m²   Wt Readings from Last 3 Encounters:   05/06/19 182 lb (82.6 kg)   04/19/19 184 lb 3.2 oz (83.6 kg)   04/11/19 180 lb (81.6 kg)     Body mass index is 26.88 kg/m². General Appearance: alert and cooperative with exam, appears comfortable, no distress  Oral: moist oral mucus membranes  Neck: No jugular venous distention  Lungs: Air entry B/L, no crackles or rales, no use of accessory muscles  Heart: S1, S2 heard  GI: soft, non-tender, no guarding  Extremities: No sig LE edema     Medications:  Current Outpatient Medications   Medication Sig Dispense Refill    Trolamine Salicylate (ASPERCREME) 10 % LOTN Apply topically daily To both knees topically in the morning for pain      traMADol (ULTRAM) 50 MG tablet Take 50 mg by mouth every 4 hours as needed for Pain.       isosorbide mononitrate (IMDUR) 30 MG extended release tablet Take 1 tablet by mouth daily 30 tablet 3    metoprolol succinate (TOPROL XL) 25 MG extended release tablet Take 1 tablet by extended release tablet TAKE 1 TABLET BY MOUTH TWICE DAILY 180 tablet 0    amLODIPine (NORVASC) 10 MG tablet Take 1 tablet by mouth daily 30 tablet 11    aspirin 81 MG tablet Take 81 mg by mouth daily      atorvastatin (LIPITOR) 20 MG tablet Take 20 mg by mouth daily      clopidogrel (PLAVIX) 75 MG tablet Take 75 mg by mouth daily      glipiZIDE (GLUCOTROL) 5 MG tablet Take 5 mg by mouth daily      magnesium oxide (MAG-OX) 400 MG tablet Take 400 mg by mouth 2 times daily        No current facility-administered medications for this visit. Laboratory & Diagnostics:  US nov 2018: R 9.1, L 11.1, thickened bladder wall  Jan 2019: K 4.2, Creat 1.7    April 2019: Na 131, K 4.3, creat 1.5, ca 8.5  Feb 2019: Hb 10.9     Impression/Plan:   1. CKD III with recent GLORIA: resolved. Creat is back at baseline. Now at 1.5 which is baseline. 2. Met acidosis: improved. Bicarb is 23.   3. Urinary retention/BPH: s/p SP catheter placement  4. HTN: stable on bystolic, norvasc. He is also on demadex. Potassium is okay. 5. Chronic mild systolic dysfunction: on diuretics. Overall stable. Clinically not in heart failure. 6. Hyponatremia: sodium is 129, Advised fluid restriction. Check sodium in 1 week. On loop diuretics. Will repeat sodium and based on repeat level will advise further.      Plan of care reviewed with his Truman Ayala [employer] [facility], cousin). Orders Placed This Encounter   Procedures    Basic Metabolic Panel    Magnesium    Sodium     Return in about 6 months (around 11/6/2019).     Preeti Marte MD  [facility]

## 2019-05-10 NOTE — TELEPHONE ENCOUNTER
----- Message from Preeti Marte MD sent at 5/9/2019 10:45 PM EDT -----  Sodium is okay now  pls repeat in 2 weeks.

## 2019-05-10 NOTE — TELEPHONE ENCOUNTER
Nayana Solders has been informed and understands   Gretchen to have labs done in 2 weeks and will fax the results

## 2019-05-13 NOTE — PROGRESS NOTES
file     Active member of club or organization: Not on file     Attends meetings of clubs or organizations: Not on file     Relationship status: Not on file    Intimate partner violence:     Fear of current or ex partner: Not on file     Emotionally abused: Not on file     Physically abused: Not on file     Forced sexual activity: Not on file   Other Topics Concern    Not on file   Social History Narrative    Not on file       No family history on file.     Past Surgical History:   Procedure Laterality Date    CARDIAC CATHETERIZATION  02/19/2018    CARDIAC SURGERY      CORONARY ARTERY BYPASS GRAFT      EGD COLONOSCOPY N/A 3/30/2019    EGD ESOPHAGOGASTRODUODENOSCOPY DILATATION performed by Cletis Goodpasture, MD at [address]  01/12/2018    [facility]    NC DILATION OF SALIVARY DUCT N/A 11/23/2018    EGD DILATION SAVORY performed by Cletis Goodpasture, MD at [address] N/A 2/28/2019    SUPRAPUBIC CATHETER PLACEMENT [facility] Horner [facility] Left 2/14/2019    EGD DILATION SAVORY performed by Cletis Goodpasture, MD at [address] Left 2/14/2019    EGD BIOPSY performed by Cletis Goodpasture, MD at [address] Endoscopy    VASCULAR SURGERY         No Known Allergies      Current Outpatient Medications:     Trolamine Salicylate (ASPERCREME) 10 % LOTN, Apply topically daily To both knees topically in the morning for pain, Disp: , Rfl:     traMADol (ULTRAM) 50 MG tablet, Take 50 mg by mouth every 4 hours as needed for Pain., Disp: , Rfl:     isosorbide mononitrate (IMDUR) 30 MG extended release tablet, Take 1 tablet by mouth daily, Disp: 30 tablet, Rfl: 3    metoprolol succinate (TOPROL XL) 25 MG extended release tablet, Take 1 tablet by mouth daily, Disp: 30 tablet, Rfl: 3    potassium chloride (KLOR-CON M) 20 MEQ TBCR extended release tablet, Take 1 tablet by mouth daily, needed , Disp: , Rfl:     Multiple Vitamins-Minerals (MULTIVITAMIN ADULTS 50+ PO), Take 1 tablet by mouth daily , Disp: , Rfl:     RANEXA 1000 MG extended release tablet, TAKE 1 TABLET BY MOUTH TWICE DAILY, Disp: 180 tablet, Rfl: 0    amLODIPine (NORVASC) 10 MG tablet, Take 1 tablet by mouth daily, Disp: 30 tablet, Rfl: 11    aspirin 81 MG tablet, Take 81 mg by mouth daily, Disp: , Rfl:     atorvastatin (LIPITOR) 20 MG tablet, Take 20 mg by mouth daily, Disp: , Rfl:     clopidogrel (PLAVIX) 75 MG tablet, Take 75 mg by mouth daily, Disp: , Rfl:     glipiZIDE (GLUCOTROL) 5 MG tablet, Take 5 mg by mouth daily, Disp: , Rfl:     magnesium oxide (MAG-OX) 400 MG tablet, Take 400 mg by mouth 2 times daily , Disp: , Rfl:     Review of Systems   Constitutional: Negative for chills and fever. HENT: Negative for ear pain and facial swelling. Eyes: Negative for pain and redness. Respiratory: Negative for chest tightness and shortness of breath. Cardiovascular: Negative for chest pain and leg swelling. Gastrointestinal: Negative for abdominal pain and nausea. Endocrine: Negative for cold intolerance and heat intolerance. Genitourinary: Negative for decreased urine volume and hematuria. Musculoskeletal: Negative for back pain and joint swelling. Skin: Positive for rash (abdomen). Negative for color change. Allergic/Immunologic: Negative for environmental allergies and food allergies. Neurological: Negative for dizziness and headaches. Hematological: Bruises/bleeds easily (due to medication). /62   Ht 5' 9\" (1.753 m) Comment: stated  Wt 180 lb (81.6 kg) Comment: stated  BMI 26.58 kg/m²     Objective:   Physical Exam   Constitutional: He is oriented to person, place, and time. Vital signs are normal. He appears well-developed and well-nourished. He is cooperative. No distress. HENT:   Head: Normocephalic and atraumatic.    Mouth/Throat: Oropharynx is clear and moist and mucous membranes are normal. No oropharyngeal exudate. Eyes: Pupils are equal, round, and reactive to light. EOM are normal. Right eye exhibits no discharge. Left eye exhibits no discharge. No scleral icterus. Neck: Trachea normal. No JVD present. No tracheal deviation present. Pulmonary/Chest: Effort normal. No respiratory distress. He has no wheezes. Abdominal: Soft. He exhibits no distension. There is no tenderness. There is no rebound and no CVA tenderness. Musculoskeletal: He exhibits no edema or tenderness. Lymphadenopathy:        Right: No supraclavicular adenopathy present. Left: No supraclavicular adenopathy present. Neurological: He is alert and oriented to person, place, and time. No cranial nerve deficit. Skin: Skin is warm and dry. He is not diaphoretic. Psychiatric: He has a normal mood and affect. His behavior is normal.   Nursing note and vitals reviewed. Labs    No results found for this visit on 05/13/19. Lab Results   Component Value Date    CREATININE 1.4 05/08/2019    BUN 22 05/08/2019     05/24/2019    K 4.6 05/08/2019     05/08/2019    CO2 26 05/08/2019         After explaining what I would be doing, the balloon on the indwelling SP catheter was deflated. The catheter was removed and immediately a cathter one size larger was replaced. The balloon in the previous catheter had been filled with 8 cc of water and the balloon in the new catheter was also filled with 8 cc of water. The catheter was irrigated and fluid flushed in and out without difficulty. Hari tolerated the procedure well and there were no complications. There was also proud flesh. I used silver nitrate to cauterize this. He tolerated this portion of the procedure well. Assessment:       Diagnosis Orders   1. Neurogenic bladder     2. Proud flesh  MS CHEMICAL CAUTERIZATION OF GRANULATION TISSUE       Mr. Aida Varma presents today in follow-up for Neurogenic bladder [N31.9].     He is here for SP tube change. It was upsized today. He also had proud flesh and this was chemically cauterized with silver nitrate. Plan:        In 6 weeks for his next SP tube change and upsize.

## 2019-05-13 NOTE — PROGRESS NOTES
Alprazolam refill request from the patient. Patient last seen 04/23/2018 with labs, and next appt. scheduled for 00/00/0000. Silver nitrate sticks were used to proud flesh.

## 2019-05-14 NOTE — DISCHARGE SUMMARY
[facility] THERAPY  [facility] RADIOLOGY  Modified Barium Swallow               [x] [facility]   [] Acute Care [] Transitional Care Unit [] IP Rehab    Date: 2019  Patient Name: Ruby Mccracken      CSN: [id number]   : 1935  (80 y.o.)  Gender: male   Referring Physician:  Shakeel Otero CNP  Diagnosis: Oropharyngeal dysphagia; esophageal stricture; aspiration   Secondary Diagnosis:  Dysphagia   History of Present Illness/Injury: Patient is a current resident at [facility]. Patient was hospitalized in 2019 and treated for pneumonia. Patient completed MBS during hospitalization on 3/29/19 with recommendations for NPO status. Patient completed a clinical swallow evaluation during a separate hospitalization on 19 with recommendations for a dysphagia 3 diet and thin liquids. Patient has history of EGD with dilation. Patient reports occasional coughing during PO intake. ST consulted to complete MBS to further evaluate swallow function and determine safest, least restrictive diet. has a past medical history of Acute kidney failure ([address] Anemia, ASHD (arteriosclerotic heart disease), Blood circulation, collateral, CAD (coronary artery disease), Cardiomegaly, Chronic diastolic (congestive) heart failure ([address] Chronic kidney disease, DM2 (diabetes mellitus, type 2) ([address] Dyslipidemia, Falls, GERD (gastroesophageal reflux disease), Heart failure with preserved ejection fraction ([address] Hyperlipidemia, Hypertension, Iron deficiency anemia, Left atrial dilation, Lymphoma (HCC), Malaise, Nonintractable headache, Osteoarthritis of both knees, Pacemaker, and PVD (peripheral vascular disease) ([address]   Pain:  0/10     Current Diet: Mechanical soft diet with thin liquids     Respiratory Status: [x] Independent    Behavioral Observation: [x] Alert [x] Oriented     PATIENT WAS EVALUATED USING:  Thin liquid, puree, soft solid, coarse solid     ORAL PREPARATION Material enters the airway, contacts the vocal folds, and is not ejected from the airway  [] 6 = Material enters the airway, passes below the vocal folds, and is ejected into the larynx or out of the airway  [] 7 = Material enters the airway, passes below the vocal folds, and is not ejected from the trachea despite effort  [] 8 = Material enters the airway, passes below the vocal folds, and no effort is made to eject    ESOPHAGEAL PHASE:   Concerns for retroflow of material back into pyriform sinuses from esophagus noted, resulting in pooling of residual in pyriform sinuses that required cue to clear via multiple swallows     ATTEMPTED TECHNIQUES:      Comments:  [x]Small bolus size [x] Effective [] Not Effective Cued use of small sips was effective in improving overall bolus control and eliminating airway compromise    []Straw [] Effective [] Not Effective    [x]Cup [x] Effective [] Not Effective    []Chin tuck [] Effective [] Not Effective    []Head Turn [] Effective [] Not Effective    []Spoon presentations [] Effective [] Not Effective    [] Volitional cough [] Effective [] Not Effective    [] Spontaneous cough [] Effective [] Not Effective    [x]OTHER: Multiple swallows  [x] Effective [] Not Effective Effective in clearing the majority of pharyngeal residue      DIAGNOSTIC IMPRESSIONS:  The patient presents with mild oral dysphagia and mild-moderate pharyngeal dysphagia as outlined above. Decreased tongue base traction with overall reduced pharyngeal stripping of bolus resulted in min-mod residue in the valleculae. Reduced hyolaryngeal elevation and anterior excursion resulted in reduced epiglottic inversion and residue in pyriform sinuses. Suspected retroflow of material from the upper esophagus back into pyriform sinuses also contributed to min-mod amount of residual in the pyriform sinuses after the swallow. Majority of pharyngeal residue was effectively cleared with cued use of double swallow. Premature bolus spillage with underlying delayed swallow reflex and reduced airway protection resulted in laryngeal penetration of trace amount of thin liquid material in 1/8 trials of thin liquid when taking a moderately sized drink that did not spontaneously clear. Cued use of small sips was effective in eliminating airway compromise. The patient is at an elevated risk for aspiration in an uncontrolled setting and will require supervision during meals for carryover use of strategies as well as close pulmonary monitoring. DIET RECOMMENDATIONS:  Mechanical soft diet with thin liquids     STRATEGIES:   [x] Full upright position  [x] Small bite/sip   [x] No Straw   [x] Multiple Swallow    [x] Pulmonary monitoring   [x] Supervision     [x] Monitor for fatigue  [x] OTHER: Slow rate, sit upright for 30-45 minutes following meals     EDUCATION:   Learner: [x]Patient [] Significant other [] Son/Daughter [] Parent     [x] Other: Cousin    Education: [x] Reviewed results and recommendations of this evaluation     [x] Reviewed diet and strategies     [x] Reviewed signs, symptoms and risk of aspiration     [] Demonstrated how to thick liquid appropriately. [] Reviewed goals and Plan of Care     [] OTHER:   Method: [x] Discussion [x] Demonstration [] Hand-out     [x] OTHER: [facility] unit    Evaluation of Education:     [x] Verbalizes understanding [x] Demonstrates with assistance     [] Demonstrates without assistance [x]Needs further instruction     [] No evidence of learning  [] Family not present      PLAN / TREATMENT RECOMMENDATIONS:  [x] No further speech therapy services indicated at this facility. Recommend continuation of speech therapy plan of care at [facility].         TIME IN: 1135  TIME OUT: 3655  UNTIMED TREATMENT: 32  TIMED TREATMENT: 0  TOTAL TIME: 32 minutes            Luis Fontanez [address], [address]

## 2019-05-14 NOTE — PROGRESS NOTES
After explaining what I would be doing, the balloon on the indwelling SP catheter was deflated. The catheter was removed and immediately a cathter one size larger, [address], was replaced. The balloon in the previous catheter had been filled with 8 cc of water and the balloon in the new catheter was also filled with 8 cc of water. The catheter was irrigated and fluid flushed in and out without difficulty. Hari tolerated the procedure well and there were no complications.

## 2019-05-24 NOTE — TELEPHONE ENCOUNTER
Thaddeus Addison from [facility] states she was supposed to report to us if his weight went over 185, today it was 191.2 but pt is having no symptoms and feels fine.

## 2019-05-29 NOTE — TELEPHONE ENCOUNTER
Erica Perez from [facility] has been informed and understands   Sign order to be faxed to attn katlyn [phone]

## 2019-06-14 PROBLEM — N17.9 ACUTE KIDNEY INJURY SUPERIMPOSED ON CKD (HCC): Status: ACTIVE | Noted: 2019-01-01

## 2019-06-14 PROBLEM — N18.9 ACUTE KIDNEY INJURY SUPERIMPOSED ON CKD (HCC): Status: ACTIVE | Noted: 2019-01-01

## 2019-06-14 NOTE — ED PROVIDER NOTES
[facility]  eMERGENCY dEPARTMENT eNCOUnter          CHIEF COMPLAINT       Chief Complaint   Patient presents with    Rectal Bleeding       Nurses Notes reviewed and I agree except as noted in the HPI. HISTORY OF PRESENT ILLNESS    Starr Gould is a 80 y.o. male who presents to the Emergency Department via EMS for the evaluation of rectal bleeding. [facility] reports the patient having \"coffee ground\" stool and that he seemed weaker than normal today. EMS reports patient having low hemoglobin. Patient denies having blood in his urine, stool, vomit, or cough. He also denies having nausea, vomiting, or diarrhea. No further complaints at initial encounter. The HPI was provided by the patient. REVIEW OF SYSTEMS     Review of Systems   Constitutional: Negative for appetite change, chills, fatigue and fever. HENT: Negative for congestion, ear pain, rhinorrhea and sore throat. Eyes: Negative for discharge, redness and visual disturbance. Respiratory: Negative for cough, shortness of breath and wheezing. Cardiovascular: Negative for chest pain, palpitations and leg swelling. Gastrointestinal: Positive for blood in stool (per Nursing Home, patient denies). Negative for abdominal pain, diarrhea, nausea and vomiting. Genitourinary: Negative for decreased urine volume, difficulty urinating, dysuria and hematuria. Musculoskeletal: Negative for arthralgias, back pain, joint swelling and neck pain. Skin: Negative for pallor and rash. Allergic/Immunologic: Negative for environmental allergies. Neurological: Positive for weakness (per Nursing Home). Negative for dizziness, syncope, light-headedness, numbness and headaches. Hematological: Negative for adenopathy. Psychiatric/Behavioral: Negative for confusion and suicidal ideas. The patient is not nervous/anxious.         PAST MEDICAL HISTORY    has a past medical history of Acute kidney failure ([facility] Anemia, ASHD (arteriosclerotic heart disease), Blood circulation, collateral, CAD (coronary artery disease), Cardiomegaly, Chronic diastolic (congestive) heart failure ([facility] Chronic kidney disease, DM2 (diabetes mellitus, type 2) ([facility] Dyslipidemia, Falls, GERD (gastroesophageal reflux disease), Heart failure with preserved ejection fraction ([facility] Hyperlipidemia, Hypertension, Iron deficiency anemia, Left atrial dilation, Lymphoma (HCC), Malaise, Nonintractable headache, Osteoarthritis of both knees, Pacemaker, and PVD (peripheral vascular disease) ([facility] SURGICAL HISTORY    has a past surgical history that includes Coronary artery bypass graft; pacemaker placement (01/12/2018); Cardiac surgery; vascular surgery; Cardiac catheterization (02/19/2018); pr dilation of salivary duct (N/A, 11/23/2018); Upper gastrointestinal endoscopy (Left, 2/14/2019); Upper gastrointestinal endoscopy (Left, 2/14/2019); suprapubic catheter placement (N/A, 2/28/2019); and egd colonoscopy (N/A, 3/30/2019).     CURRENT MEDICATIONS       Current Discharge Medication List      CONTINUE these medications which have NOT CHANGED    Details   bumetanide (BUMEX) 1 MG tablet Take 1 mg by mouth daily      isosorbide dinitrate (ISORDIL) 30 MG tablet Take 30 mg by mouth daily      Trolamine Salicylate (ASPERCREME) 10 % LOTN Apply topically daily To both knees topically in the morning for pain      traMADol (ULTRAM) 50 MG tablet Take 50 mg by mouth every 4 hours as needed for Pain.      metoprolol succinate (TOPROL XL) 25 MG extended release tablet Take 1 tablet by mouth daily  Qty: 30 tablet, Refills: 3      potassium chloride (KLOR-CON M) 20 MEQ TBCR extended release tablet Take 1 tablet by mouth daily  Qty: 60 tablet, Refills: 3      torsemide (DEMADEX) 20 MG tablet Take 1 tablet by mouth daily  Qty: 30 tablet, Refills: 3      ipratropium-albuterol (DUONEB) 0.5-2.5 (3) MG/3ML SOLN nebulizer solution Inhale 3 mLs into the SCREEN #1   ANION GAP   OSMOLALITY   MRSA BY PCR   ANION GAP   ANION GAP   TYPE AND SCREEN       EMERGENCY DEPARTMENT COURSE:   Vitals:    Vitals:    06/16/19 0012 06/16/19 0830 06/16/19 1200 06/16/19 1515   BP: (!) 121/56 (!) 148/65 (!) 104/50 (!) 118/50   Pulse: 75 81 92 94   Resp: 18 16 18 24   Temp: 97.6 °F (36.4 °C) 98.2 °F (36.8 °C) 99.5 °F (37.5 °C) 99.1 °F (37.3 °C)   TempSrc: Oral  Oral Oral   SpO2: 95% 93% 92% 92%   Weight:       Height:           5:26 PM: The patient was seen and evaluated. MDM:  The patient was seen and evaluated within the ED today with rectal bleeding. Within the department, I observed the patient's vital signs to be within acceptable range. On exam, I appreciated external hemorrhoid, and dry oral mucosa. Laboratory work showed the patient to have a negative occult stool. His hgb is 8.5. I observed the patient's condition to remain stable during the duration of the stay. I explained my proposed course of treatment to the patient, who was amenable to my treatment decisions. They were admitted under the care of Dr. Simona Li (internist)    CRITICAL CARE:   none    CONSULTS:  Dr. Simona Li (internist)    PROCEDURES:  none     FINAL IMPRESSION      1. Acute renal failure with other specified pathological kidney lesion superimposed on chronic kidney disease, unspecified CKD stage ([facility]    2. Rectal bleeding    3. External hemorrhoids    4. Normocytic anemia    5.  Metabolic encephalopathy          DISPOSITION/PLAN   admit    PATIENT REFERRED TO:  Khalif Macedo MD  [address] [place] [address] [address]    Schedule an appointment as soon as possible for a visit   As needed    Martha Whittington MD  [place]  [phone]            DISCHARGE MEDICATIONS:  Current Discharge Medication List          (Please note that portions of this note were completed with a voice recognition program.  Efforts were made to edit the dictations but occasionally words are

## 2019-06-15 NOTE — H&P
02/19/2018    CARDIAC SURGERY      CORONARY ARTERY BYPASS GRAFT      EGD COLONOSCOPY N/A 3/30/2019    EGD ESOPHAGOGASTRODUODENOSCOPY DILATATION performed by Lee Root MD at [address] [facility]  01/12/2018    [facility]    MS DILATION OF SALIVARY DUCT N/A 11/23/2018    EGD DILATION SAVORY performed by Lee Root MD at [address] N/A 2/28/2019    SUPRAPUBIC CATHETER PLACEMENT performed by Maryjane Olson MD at [facility] Left 2/14/2019    EGD DILATION SAVORY performed by Lee Root MD at [facility]   [facility] ENDOSCOPY Left 2/14/2019    EGD BIOPSY performed by Lee Root MD at [address] [facility]    VASCULAR SURGERY         Medications Prior to Admission:      Prior to Admission medications    Medication Sig Start Date End Date Taking? Authorizing Provider   Trolamine Salicylate (ASPERCREME) 10 % LOTN Apply topically daily To both knees topically in the morning for pain    Historical Provider, MD   traMADol (ULTRAM) 50 MG tablet Take 50 mg by mouth every 4 hours as needed for Pain.     Historical Provider, MD   isosorbide mononitrate (IMDUR) 30 MG extended release tablet Take 1 tablet by mouth daily 4/4/19   Verna Burnham MD   metoprolol succinate (TOPROL XL) 25 MG extended release tablet Take 1 tablet by mouth daily 4/4/19   Verna uBrnham MD   potassium chloride (KLOR-CON M) 20 MEQ TBCR extended release tablet Take 1 tablet by mouth daily 4/4/19   Verna Burnham MD   torsemide (DEMADEX) 20 MG tablet Take 1 tablet by mouth daily 4/4/19   Verna Burnham MD   ipratropium-albuterol (DUONEB) 0.5-2.5 (3) MG/3ML SOLN nebulizer solution Inhale 3 mLs into the lungs every 4 hours as needed for Shortness of Breath 4/3/19   Verna Burnham MD   doxepin (SILENOR) 3 MG TABS tablet Take 3 mg by mouth nightly    Historical Provider, MD   ferrous sulfate 325 (65 Fe) MG tablet Take 1 tablet by mouth 4/17/18   Dewayne Olivera MD   aspirin 81 MG tablet Take 81 mg by mouth daily    Historical Provider, MD   atorvastatin (LIPITOR) 20 MG tablet Take 20 mg by mouth daily    Historical Provider, MD   clopidogrel (PLAVIX) 75 MG tablet Take 75 mg by mouth daily    Historical Provider, MD   glipiZIDE (GLUCOTROL) 5 MG tablet Take 5 mg by mouth daily    Historical Provider, MD   magnesium oxide (MAG-OX) 400 MG tablet Take 400 mg by mouth 2 times daily     Historical Provider, MD       Allergies:  Patient has no known allergies. Social History:      The patient currently lives Nursing Facility    TOBACCO:   reports that he has never smoked. He has never used smokeless tobacco.  ETOH:   reports that he does not drink alcohol. Family History:      Reviewed in detail. Positive as follows:    History reviewed. No pertinent family history. REVIEW OF SYSTEMS:   14 point review of system performed, pertinent positives as noted in the HPI, all other systems negative/at baseline. PHYSICAL EXAM:    BP (!) 124/54   Pulse 71   Temp 98.5 °F (36.9 °C)   Resp 20   Ht 5' 9\" (1.753 m)   Wt 180 lb (81.6 kg)   SpO2 100%   BMI 26.58 kg/m²     General appearance:  No apparent distress, appears stated age and cooperative. HEENT:  Normal cephalic, atraumatic without obvious deformity. Pupils equal, round, and reactive to light. Extra ocular muscles intact. Conjunctivae/corneas clear. Neck: Supple, with full range of motion. No jugular venous distention. Trachea midline. Respiratory:  Normal respiratory effort. Expiratory wheezes. Cardiovascular:  Distant heart sounds. Regular rate and rhythm with normal S1/S2 without murmurs, rubs or gallops. Abdomen: Soft, non-tender, non-distended with normal bowel sounds. Musculoskeletal: Bilateral 2+ pitting edema. No clubbing, cyanosis. Full range of motion without deformity. Skin: Skin color, texture, turgor normal.  No rashes or lesions.   Neurologic:  Neurovascularly intact

## 2019-06-15 NOTE — PROGRESS NOTES
Patient admitted to 5e room 53. Alert but sleepy. IV fluids running to gravity. Supra pubic catheter in place. Bag changed upon arrival.  Depends removed and stool cleaned off patient.

## 2019-06-15 NOTE — PROGRESS NOTES
06/14/19  1726   AST 20   ALT 12   BILIDIR <0.2   BILITOT 0.6   ALKPHOS 90     Recent Labs     06/14/19  1726   INR 1.20*     No results for input(s): CKTOTAL, TROPONINI in the last 72 hours.     Urinalysis:      Lab Results   Component Value Date    NITRU NEGATIVE 04/19/2019    WBCUA 2-4 04/19/2019    BACTERIA NONE 04/19/2019    RBCUA 0-2 04/19/2019    BLOODU NEGATIVE 04/19/2019    SPECGRAV 1.012 02/23/2019    GLUCOSEU NEGATIVE 04/19/2019       Radiology:  No orders to display       Diet: DIET CARDIAC; Carb Control: 5 carb choices (75 gms)/meal    DVT prophylaxis: [] Lovenox                                 [] SCDs                                 [] SQ Heparin                                 [] Encourage ambulation           [] Already on Anticoagulation     Disposition:    [] Home       [] TCU       [] Rehab       [] Psych       [] SNF       [] [place]       [] Other-    Code Status: Limited    PT/OT Eval Status:     Assessment/Plan:    Anticipated Discharge in :    Active Hospital Problems    Diagnosis Date Noted    Acute kidney injury superimposed on CKD ([facility] [N17.9, N18.9] 06/14/2019    Suprapubic catheter ([facility] [Z93.59]     Heart failure with preserved ejection fraction ([facility] [I50.30]     Peripheral vascular disease ([address] [I73.9] 02/16/2018    DM2 (diabetes mellitus, type 2) ([facility] [E11.9]      GLORIA with CKD 3  Likely prerenal/dehydration  Cr 2.2 on admission, baseline of 1.6-1.8  improving  Continue IVF  Avoid nephrotoxic medications  BMP in AM    Acute metabolic Encephalopathy  Improved  Likely due to dehydration and GLORIA    Dehydration  Due to poor oral intake  Continue gentle hydration    Dysphagia  Seen by GI in the past  Hx of EGD with dilation  GI consult    Black Tarry Stool  Fecal occult negative  Patient currently taking 325 mg Fe daily    Iron Deficiency Anemia  Hgb 8.1, baseline 7.9-9.0  No active bleeding noted  Occult negative  Iron studies, consider Venofer infusion    Chronic

## 2019-06-15 NOTE — PROGRESS NOTES
Pharmacy Medication History Note      List of current medications patient is taking is complete. Source of information: [facility]    Changes made to medication list:  Medications removed (include reason, ex. therapy complete or physician discontinued):  None    Medications added/doses adjusted:  Bumetanide 1 mg 6/12-6/16 for CHF in addition to torsemide  Isosorbide mononitrate 30 mg changed to isosorbide dinitrate 20 mg per [facility]  Acetaminophen 1000 mg changed to 500 mg     Other notes (ex. Recent course of antibiotics, Coumadin dosing):  Denies use of other OTC or herbal medications.         Electronically signed by Kathrin Marquez, [facility] on 6/14/2019 at 8:38 PM

## 2019-06-15 NOTE — ED NOTES
Pt. Resting in bed with even and unlabored respirations. Pt. Is resting with his eyes closed. Pt. States pain is a 0/10 at this time. Fluids started. Pt. Updated about plan for admission aand treatment. Family at bedside. Pt. Has no further concerns, questions or needs at this time. Call light within reach.         Ariel Ash RN  06/14/19 2025

## 2019-06-15 NOTE — FLOWSHEET NOTE
06/15/19 1408   Provider Notification   Reason for Communication Review case   Provider Name [facility]   Provider Notification Physician   Method of Communication Secure Message   Response Waiting for response   Notification Time [phone]     I noticed you ordered chem sticks ACHS, but no sliding scale insulin, just wanted to be sure if that was your intention

## 2019-06-16 NOTE — PLAN OF CARE
Problem: Falls - Risk of:  Goal: Will remain free from falls  Description  Will remain free from falls  6/15/2019 2232 by Raulito Patterson RN  Outcome: Met This Shift  Note:   Patient free from falls this shift. Patient fall risk r/t. Continues on falling star program, siderails upx2-3, bed alarm on, call light in reach, bed in low and locked position. Hourly checks preformed, potty, position, pain, pathway and possessions assessed. Continuing to monitor. 6/15/2019 1023 by Emeka Salinas RN  Note:   Free from falls. Increased weakness, have not attempted yet to get out of bed. Problem: Falls - Risk of:  Goal: Absence of physical injury  Description  Absence of physical injury  Outcome: Met This Shift  Note:   Patient free from injury/harm this shift. Complying well with medical devices and following safety precautions. Fall risk continues to be assessed. Fall precautions in place, 5 P's used to provide safe environment. Bed in low and locked position, call light in reach, side rails upx2-3. Needs being met. Continuing to monitor. Problem: Risk for Impaired Skin Integrity  Goal: Tissue integrity - skin and mucous membranes  Description  Structural intactness and normal physiological function of skin and  mucous membranes. 6/15/2019 2232 by Raulito Patterson RN  Outcome: Met This Shift  Note:   Pt is being turned every 2 hrs with pillow support no breakdown noted. 6/15/2019 1023 by Emeka Salinas RN  Note:   DTI to coccyx. Allevyn on sacrum. Turned every 2 hours with pillow support. Protective cream on scrotal wound. Problem: Daily Care:  Goal: Daily care needs are met  Description  Daily care needs are met  Outcome: Met This Shift  Note:   Patient assessed as  complete assist to accomplish ADLs. Patient voicing needs appropriately. Encouraging and promoting as much  independence as possible per patient ability and assisting with ADLS and hygiene needs as needed.  Skin and mucous membranes

## 2019-06-16 NOTE — PROGRESS NOTES
Hospitalist Progress Note    Patient:  Gaby Muñoz      Unit/Bed:5E-53/053-A    YOB: 1935    MRN: [id number]       Acct: [de-identified]     PCP: Janna Loredo MD    Date of Admission: 6/14/2019    Chief Complaint:  Generalized weakness    Hospital Course:   80 y.o. male who presented to [address] [place] [address] from nursing facility. According to ED note, nursing facility states \"patient weaker than normal today and has had tarry stools\". He states that he has been feeling sick and tired in the past few days. He also stated that he has been experiencing dysphagia, but denies nausea or vomiting. He is oriented to name and place, however, unable to tell the current year. Creatinine elevated. Admitted for GLORIA, IVF started. GI consulted for dysphagia. Has a hx of EGD with dilation in the past.    Subjective:   Patient in bed, comfortable, alert but not oriented. VS stable, afebrile. He denies any complaints. Patient has poor oral intake, barely touches food.       Medications:  Reviewed    Infusion Medications    dextrose       Scheduled Medications    iron sucrose  200 mg Intravenous Q24H    lidocaine   Topical Daily    vitamin C  500 mg Oral Nightly    tamsulosin  0.4 mg Oral Daily    ranolazine  1,000 mg Oral BID    potassium chloride  20 mEq Oral Daily    pantoprazole  40 mg Oral BID    therapeutic multivitamin-minerals  1 tablet Oral Daily    metoprolol succinate  25 mg Oral Daily    linagliptin  5 mg Oral Daily    lactobacillus  1 capsule Oral Daily    isosorbide dinitrate  30 mg Oral Daily    glipiZIDE  5 mg Oral Daily    gabapentin  100 mg Oral TID    famotidine  20 mg Oral Daily    doxepin  3 mg Oral Nightly    clopidogrel  75 mg Oral Daily    atorvastatin  20 mg Oral Daily    aspirin  81 mg Oral Daily    amLODIPine  10 mg Oral Daily    sodium chloride flush  10 mL Intravenous 2 times per day    magnesium oxide  400 mg Oral BID    pneumococcal 13-valent conjugate  0.5 mL Intramuscular Once     PRN Meds: glucose, dextrose, glucagon (rDNA), dextrose, traMADol, polyethylene glycol, ipratropium-albuterol, guaiFENesin-dextromethorphan, acetaminophen, sodium chloride flush, potassium chloride **OR** potassium alternative oral replacement **OR** potassium chloride, magnesium hydroxide, ondansetron, glycerin-hypromellose-      Intake/Output Summary (Last 24 hours) at 6/16/2019 1721  Last data filed at 6/16/2019 1456  Gross per 24 hour   Intake 1625 ml   Output 1475 ml   Net 150 ml       Diet:  DIET DYSPHAGIA II MECHANICALLY ALTERED; Carb Control: 5 carb choices (75 gms)/meal; No Drinking Straw    Exam:  BP (!) 118/50   Pulse 94   Temp 99.1 °F (37.3 °C) (Oral)   Resp 24   Ht 5' 9\" (1.753 m)   Wt 180 lb (81.6 kg)   SpO2 92%   BMI 26.58 kg/m²     General appearance: No apparent distress, appears stated age and cooperative. Oriented x 2  HEENT: Pupils equal, round, and reactive to light. Conjunctivae/corneas clear. Neck: Supple, with full range of motion. No jugular venous distention. Trachea midline. Respiratory:  Normal respiratory effort. Clear to auscultation, bilaterally without Rales/Wheezes/Rhonchi. Cardiovascular: Regular rate and rhythm with normal S1/S2 without murmurs, rubs or gallops. Abdomen: Soft, non-tender, non-distended with normal bowel sounds. Musculoskeletal: passive and active ROM x 4 extremities. Skin: Skin color, texture, turgor normal.  No rashes or lesions. Neurologic:  Neurovascularly intact without any focal sensory/motor deficits.  Cranial nerves: II-XII intact, grossly non-focal.  Psychiatric: Alert and oriented x2, thought content appropriate, normal insight  Capillary Refill: Brisk,< 3 seconds   Peripheral Pulses: +2 palpable, equal bilaterally       Labs:   Recent Labs     06/14/19  1726 06/15/19  0508 06/16/19  0434   WBC 7.8 8.2 8.3   HGB 8.5* 8.1* 7.2*   HCT 25.3* 24.9* 22.4*    210 200     Recent Labs 06/14/19  1726 06/15/19  0508 06/16/19  0434    144 140   K 4.4 4.5 4.3    107 107   CO2 24 22* 21*   BUN 35* 32* 31*   CREATININE 2.2* 1.7* 1.7*   CALCIUM 9.1 8.7 8.4*     Recent Labs     06/14/19  1726   AST 20   ALT 12   BILIDIR <0.2   BILITOT 0.6   ALKPHOS 90     Recent Labs     06/14/19  1726   INR 1.20*     No results for input(s): CKTOTAL, TROPONINI in the last 72 hours.     Urinalysis:      Lab Results   Component Value Date    NITRU NEGATIVE 04/19/2019    WBCUA 2-4 04/19/2019    BACTERIA NONE 04/19/2019    RBCUA 0-2 04/19/2019    BLOODU NEGATIVE 04/19/2019    SPECGRAV 1.012 02/23/2019    GLUCOSEU NEGATIVE 04/19/2019       Radiology:  No orders to display       Diet: DIET DYSPHAGIA II MECHANICALLY ALTERED; Carb Control: 5 carb choices (75 gms)/meal; No Drinking Straw    DVT prophylaxis: [] Lovenox                                 [] SCDs                                 [] SQ Heparin                                 [] Encourage ambulation           [] Already on Anticoagulation     Disposition:    [] Home       [] TCU       [] Rehab       [] Psych       [] SNF       [] [place]       [] Other-    Code Status: Limited    PT/OT Eval Status:     Assessment/Plan:    Anticipated Discharge in :    Active Hospital Problems    Diagnosis Date Noted    Acute kidney injury superimposed on CKD ([facility] [N17.9, N18.9] 06/14/2019    [facility] [Z93.59]     Heart failure with preserved ejection fraction ([facility] [I50.30]     Peripheral vascular disease ([facility] [I73.9] 02/16/2018    DM2 (diabetes mellitus, type 2) ([facility] [E11.9]      GLORIA with CKD 3  Likely prerenal/dehydration  Cr 2.2 on admission, baseline of 1.6-1.8  improving  Continue IVF  Avoid nephrotoxic medications  BMP in AM    Acute metabolic Encephalopathy  Improved  Likely due to dehydration and GLORIA  Check UA and CXR to rule out infectious process    Dehydration  Due to poor oral intake  Continue gentle

## 2019-06-17 PROBLEM — N17.9 ACUTE KIDNEY INJURY (HCC): Status: ACTIVE | Noted: 2019-01-01

## 2019-06-17 NOTE — PROGRESS NOTES
[address] [place] [address]  INPATIENT PHYSICAL THERAPY  EVALUATION  [facility] 5E - 5E-53/053-A    Time In: 0720  Time Out: 2839  Timed Code Treatment Minutes: 8 Minutes  Minutes: 24          Date: 2019  Patient Name: Daquan Blanchard,  Gender:  male        MRN: [id number]  : 1935  (80 y.o.)      Referring Practitioner: Lieutenant Mann MD  Diagnosis: Acute kidney injury superimposed on CKD  Additional Pertinent Hx: 80 y.o. male who presented to [address] [place] [address] from nursing facility. According to ED note, nursing facility states \"patient weaker than normal today and has had tarry stools\". He states that he has been feeling sick and tired in the past few days. He also stated that he has been experiencing dysphagia, but denies nausea or vomiting. Creatinine elevated. Admitted for GLORIA, IVF started. GI consulted for dysphagia. Has a hx of EGD with dilation in the past.  GI recommending PEG tube placement, pt/family declining.       Past Medical History:   Diagnosis Date    Acute kidney failure (HCC)     Anemia     ASHD (arteriosclerotic heart disease)     Blood circulation, collateral     CAD (coronary artery disease)     Cardiomegaly     Chronic diastolic (congestive) heart failure (HCC)     Chronic kidney disease     DM2 (diabetes mellitus, type 2) (HCC)     Dyslipidemia     Falls     GERD (gastroesophageal reflux disease)     Heart failure with preserved ejection fraction (HCC)     Hyperlipidemia     Hypertension     Iron deficiency anemia     Left atrial dilation     severe    Lymphoma (HCC)     non hodgkin    Malaise     Nonintractable headache     Osteoarthritis of both knees     Pacemaker 2018    [employer] DUAL PACEMAKER INSERTED ON D (peripheral vascular disease) ([facility]      Past Surgical History:   Procedure Laterality Date    CARDIAC CATHETERIZATION  2018    CARDIAC SURGERY      CORONARY ARTERY BYPASS GRAFT      EGD COLONOSCOPY N/A

## 2019-06-17 NOTE — PROGRESS NOTES
tasks and functional mobility and to ensure safe transition to the next level of care and return to PLOF. Performance deficits / Impairments: Decreased functional mobility , Decreased ADL status, Decreased endurance, Decreased strength, Decreased balance, Decreased safe awareness, Decreased cognition  Prognosis: Fair  REQUIRES OT FOLLOW UP: Yes    Treatment Initiated: Treatment and education initiated within context of evaluation. Evaluation time included review of current medical information, gathering information related to past medical, social and functional history, completion of standardized testing, formal and informal observation of tasks, assessment of data and development of plan of care and goals. Treatment time included skilled education and facilitation of tasks to increase safety and independence with ADL's for improved functional independence and quality of life.     Discharge Recommendations:  ECF with OT    Patient Education:  Patient Education: OT role, poc, goals, safety, ADLs, reorientation, t/f training  Barriers to Learning: cognition    Equipment Recommendations:  Equipment Needed: No  Other: defer    Plan:  Times per week: 3-5x  Current Treatment Recommendations: Strengthening, Balance Training, Functional Mobility Training, Endurance Training, Self-Care / ADL, Patient/Caregiver Education & Training, Safety Education & Training, Cognitive Reorientation    Goals:  Patient goals : None stated  Short term goals  Time Frame for Short term goals: 2 weeks  Short term goal 1: OTR to assess mobility  Short term goal 2: Pt to complete ADL routine with no greater than Mod A x1 for inc indep with self cares  Short term goal 3: Pt to demonstrate dynamic sitting balance reaching outside BOBBY with no greater than SBA for inc indep with ADL routine  Short term goal 4: Pt to tolerate standing greater than 2 mins with no greater than min A x2 for inc participation in toileting and clothing mangment  Short

## 2019-06-17 NOTE — PROGRESS NOTES
Hospitalist Progress Note    Patient:  Alycia [facility]      Unit/Bed:5E-53/053-A    YOB: 1935    MRN: [id number]       Acct: [de-identified]     PCP: Erica Melendrez MD    Date of Admission: 6/14/2019    Chief Complaint:  Generalized weakness    Hospital Course:   80 y.o. male who presented to [address] [place] [address] from nursing facility. According to ED note, nursing facility states \"patient weaker than normal today and has had tarry stools\". He states that he has been feeling sick and tired in the past few days. He also stated that he has been experiencing dysphagia, but denies nausea or vomiting. He is oriented to name and place, however, unable to tell the current year. Creatinine elevated. Admitted for GLORIA, IVF started. GI consulted for dysphagia. Has a hx of EGD with dilation in the past.    Dr. Xiomara Skinner wanted to do an EGD but family declined. They are not open to the possibility of PEG tube. 6/16 Given IV venofer for severe anemia due to IRIS. CXR showed right lower lobe pneumonic infiltrate. Wbc started to go up, started on Rocephin. Concern for possible aspiration. Speech therapy eval.     Subjective:   Patient in bed, comfortable, alert but not oriented. VS stable, afebrile. Patient has poor oral intake, barely touches food. Coughs/choking when eating and talking, may be a component of aspiration.      Medications:  Reviewed    Infusion Medications    sodium chloride 100 mL/hr at 06/17/19 1032    dextrose       Scheduled Medications    iron sucrose  200 mg Intravenous Q24H    cefTRIAXone (ROCEPHIN) IV  1 g Intravenous Q24H    lidocaine   Topical Daily    vitamin C  500 mg Oral Nightly    tamsulosin  0.4 mg Oral Daily    ranolazine  1,000 mg Oral BID    potassium chloride  20 mEq Oral Daily    pantoprazole  40 mg Oral BID    therapeutic multivitamin-minerals  1 tablet Oral Daily    metoprolol succinate  25 mg Oral Daily    linagliptin  5 mg Oral Daily    lactobacillus intact without any focal sensory/motor deficits. Cranial nerves: II-XII intact, grossly non-focal.  Psychiatric: Alert and oriented x2, thought content appropriate, normal insight  Capillary Refill: Brisk,< 3 seconds   Peripheral Pulses: +2 palpable, equal bilaterally       Labs:   Recent Labs     06/15/19  0508 06/16/19  0434 06/17/19  0458   WBC 8.2 8.3 11.6*   HGB 8.1* 7.2* 7.6*   HCT 24.9* 22.4* 23.9*    200 237     Recent Labs     06/15/19  0508 06/16/19  0434 06/17/19  0458    140 142   K 4.5 4.3 5.0    107 111   CO2 22* 21* 21*   BUN 32* 31* 33*   CREATININE 1.7* 1.7* 2.0*   CALCIUM 8.7 8.4* 8.6     Recent Labs     06/14/19  1726 06/17/19  0458   AST 20 15   ALT 12 13   BILIDIR <0.2  --    BILITOT 0.6 0.6   ALKPHOS 90 87     Recent Labs     06/14/19  1726   INR 1.20*     No results for input(s): CKTOTAL, TROPONINI in the last 72 hours. Urinalysis:      Lab Results   Component Value Date    NITRU POSITIVE 06/16/2019    WBCUA > 200 06/16/2019    BACTERIA MODERATE 06/16/2019    RBCUA 0-2 06/16/2019    BLOODU SMALL 06/16/2019    SPECGRAV 1.020 06/16/2019    GLUCOSEU NEGATIVE 04/19/2019       Radiology:  XR CHEST STANDARD (2 VW)   Final Result   Right lower lobe pneumonic type infiltrates bilateral pleural effusions. **This report has been created using voice recognition software. It may contain minor errors which are inherent in voice recognition technology. **      Final report electronically signed by Dr. Wilda Hahn on 6/16/2019 6:40 PM          Diet: DIET DYSPHAGIA II MECHANICALLY ALTERED; Carb Control: 5 carb choices (75 gms)/meal; No Drinking Straw    DVT prophylaxis: [] Lovenox                                 [] SCDs                                 [] SQ Heparin                                 [] Encourage ambulation           [] Already on Anticoagulation     Disposition:    [] Home       [] TCU       [] Rehab       [] Psych       [] SNF       [] [address] [place] [address]

## 2019-06-17 NOTE — PROGRESS NOTES
0915: Pt resting in chair with eyes closed and is not awakened. No distress noted at this time of visit. No family present. Brief conversation with Mikaela Oglesby RN and she will call when family here.

## 2019-06-18 PROBLEM — J69.0 ASPIRATION PNEUMONIA (HCC): Status: ACTIVE | Noted: 2019-01-01

## 2019-06-18 NOTE — FLOWSHEET NOTE
06/18/19 1245   Encounter Summary   Services provided to: Patient   Referral/Consult From: Nursing Supervisor/Manager   Support System Family members   Continue Visiting Yes  (6/18)   Complexity of Encounter High   Length of Encounter 45 minutes   Crisis   Type Emotional distress   Assessment Approachable   Intervention Discussed illness/injury and it's impact; Discussed death; End of life care;Prayer   Outcome Less anxious, less agitated;Receptive; Expressed gratitude     Assessment: This staff was called by the House Supervisor to assist in conversation with the family regarding the patient's end of life decisions, regarding his care. This 80 yr old patient has elected to Limit his Code Status and the family was planing to move the patient home with Hospice Care. Patient's nurse placed another consult to Hospice. If consult is completed the patient will move to another floor within the hospital possibly more equipped to deal with end of life procedures. Plan: Continued support should remain available for the family concerns. Dolores Hu was called to obtain anointing for the patient, since he is at end of life.

## 2019-06-18 NOTE — FLOWSHEET NOTE
Pt was actively dying. He was not able to respond to me but his cousin in the room did. The family wishes to respect pt's wishes for comfort care and no aggressive treatment. Pt will be in hospice soon, the nurse reported. He was anointed. 06/18/19 1358   Encounter Summary   Services provided to: Patient and family together   Referral/Consult From: Nurse   Support System Family members   Place of [address] [place] [address] Visiting Yes  (6/18)   Complexity of Encounter Moderate   Length of Encounter 15 minutes   Routine   Type Follow up   Assessment Approachable;Calm; Hopeful   Intervention Nurtured hope;Prayer;[place];Empowerment;Sustaining presence/ Ministry of presence   Outcome Acceptance;Expressed gratitude;Encouraged; Hopeful;Receptive   Sacraments   Sacrament of Sick-Anointing Anointed

## 2019-06-18 NOTE — DISCHARGE INSTR - COC
MD at [address] [facility] N/A 2/28/2019    SUPRAPUBIC CATHETER PLACEMENT performed by Lizeth Charles MD at [address] [facility] [address] Left 2/14/2019    EGD DILATION SAVORY performed by Mariangel Caraballo MD at [facility]   [facility] ENDOSCOPY Left 2/14/2019    EGD BIOPSY performed by Mariangel [facility]aballo MD at [facility]    VASCULAR SURGERY         Immunization History: There is no immunization history for the selected administration types on file for this patient.     Active Problems:  Patient Active Problem List   Diagnosis Code    Postural dizziness with near syncope R42, R55    DM2 (diabetes mellitus, type 2) ([facility] [address] E11.9    Hypertension I10    Pacemaker Z95.0    Peripheral vascular disease ([facility] I73.9    Lactic acidosis E87.2    Iron deficiency anemia D50.9    CAD (coronary artery disease) I25.10    Heart failure with preserved ejection fraction (HCC) I50.30    GERD (gastroesophageal reflux disease) K21.9    Dyslipidemia E78.5    Lymphoma ([facility]) C85.90    Osteoarthritis of both knees M17.0    Left atrial dilation I51.7    GLORIA (acute kidney injury) ([facility] [address] N17.9    Frequent falls R29.6    Physical deconditioning R53.81    Altered awareness, transient R40.4    Fever R50.9    Sepsis ([facility]) A41.9    Pancytopenia ([facility]) D61.818    Altered mental status R41.82    Hepatomegaly R16.0    Urinary retention R33.9    Hematuria R31.9    Acute kidney injury superimposed on chronic kidney disease (HCC) N17.9, N18.9    Hyperkalemia E87.5    Hypercalcemia E83.52    Calculus of gallbladder without cholecystitis without obstruction K80.20    Syncope and collapse R55    Non-intractable vomiting with nausea R11.2    Gallbladder dilatation K82.8    Nonintractable headache R51    Osteoarthritis of multiple joints M15.9    Acute on chronic combined systolic and diastolic CHF (congestive heart failure) ([facility]) I50.43    S/P placement of cardiac

## 2019-06-18 NOTE — CARE COORDINATION
6/18/19, 12:18 PM      Margocullenanastasia Najera day: 1  Location: [address] [place] [address]- Reason for admit: Acute kidney injury superimposed on CKD ([facility] [N17.9, N18.9]  Acute kidney injury ([facility] [address] [N17.9]   Treatment Plan of Care: Hospitalist following. Nephro consult. Final urine culture pending, continues on IV merrem. Repeat CXR ordered. Labs planned for morning. Procal 0.32. PT/OT. Family does not wish to proceed with EGD and peg tube. Palliative care following. PCP: Karina Granado MD  Readmission Risk Score: 46%  Discharge Plan: Return to [facility] when stable. Per physician documentation, possible hospice if no improvement.
06/17/19 1032    dextrose        Pertinent Info/Orders/Treatment Plan: Pt with coughing, dysphagia and aspiration with drinking. GI consulted for EGD/Peg but family do not want a PEG tube. Palliative care consulted, pt is a limited code. Diet: DIET DYSPHAGIA II MECHANICALLY ALTERED; Carb Control: 5 carb choices (75 gms)/meal; No Drinking Straw   Vital Signs: BP (!) 118/54   Pulse 82   Temp 97.9 °F (36.6 °C) (Axillary)   Resp 22   Ht 5' 9\" (1.753 m)   Wt 180 lb (81.6 kg)   SpO2 94%   BMI 26.58 kg/m²   PCP: Kimberley Love MD  Readmission: no  Readmission Risk Score: 48%    Discharge Planning  Current Residence:  Long-Term Acute Care  Living Arrangements:  Alone   Support Systems:  Family Members  Current Services PTA:     Potential Assistance Needed:  N/A  Potential Assistance Purchasing Medications:  No  Does patient want to participate in local refill/ meds to beds program?     Type of Home Care Services:  None  Patient expects to be discharged to:  [facility]  Expected Discharge date:  06/17/19  Follow Up Appointment: Best Day/ Time: Monday AM    Discharge Plan: [facility] from [facility].    Evaluation: yes

## 2019-06-25 NOTE — DISCHARGE SUMMARY
started to go up, started on Rocephin. Concern for possible aspiration. Speech therapy eval.      6/17 Discussed with Ad Thomson, and wants to continue medical treatment at this time, no \"aggressive\" measures such as EGD and PEG tube placement. Will continue antibiotics for now. Considering hospice if no improvement noted. 6/18 Patient started having respiratory distress with O2 saturaton of 60%, patient unresponsive, BP 76/50. Respiratory tech at bedside. Patient was placed O2 mask, no improvement noted, changed to non-rebreather, suction of clear to whitish secretions. ABG showed uncompensated respiratory acidosis. Ordered BIPAP, and talked to the Ad Thomson, on the phone, and said that he does not want any BIPAP and want to [facility] him comfortable\". Clarified if he wants to start hospice care, and he said yes. Nurse also verified separately. Hospice consulted. Comfort care started. Patient will be transferred to Inpatient Hospice service. Exam:     Vitals:  Vitals:    06/17/19 2000 06/17/19 2153 06/18/19 0010 06/18/19 1242   BP: 110/60  118/68 (!) 80/42   Pulse: 79  78 61   Resp: 18  18 18   Temp: 98.2 °F (36.8 °C)  97.8 °F (36.6 °C) 95.7 °F (35.4 °C)   TempSrc: Axillary  Oral Axillary   SpO2: 93% 92%  (!) 60%   Weight:       Height:         Weight: Weight: 180 lb (81.6 kg)     24 hour intake/output:  No intake or output data in the 24 hours ending 06/25/19 1557      General appearance:  Respiratory distress, unresponsive  HEENT:  Normal cephalic, atraumatic without obvious deformity. Pupils equal, round, and reactive to light. Extra ocular muscles intact. Conjunctivae/corneas clear. Neck: Supple, with full range of motion. No jugular venous distention. Trachea midline. Respiratory:  Diffused rhonchi, bilateral  Cardiovascular:  Regular rate and rhythm with normal S1/S2 without murmurs, rubs or gallops. Abdomen: Soft, non-tender, non-distended with normal bowel sounds.   Musculoskeletal:  No clubbing,

## 2021-08-06 NOTE — PROGRESS NOTES
Pt transferred to room 6e56 from . Pt assisted into bed. Belongings place in cupboard. Patient oriented to room, unit and plan of care.   Denies needs at this time 06-Aug-2021 20:21

## 2021-12-20 NOTE — DISCHARGE INSTR - DIET
 Dysphagia level 2 diet   Good nutrition is important when healing from an illness, injury, or surgery. Follow any nutrition recommendations given to you during your hospital stay.  If you were given an oral nutrition supplement while in the hospital, continue to take this supplement at home. You can take it with meals, in-between meals, and/or before bedtime. These supplements can be purchased at most local grocery stores, pharmacies, and chain super-stores.  If you have any questions about your diet or nutrition, call the hospital and ask for the dietitian. - - -

## 2022-11-02 NOTE — CONSULTS
GRAFT      EGD COLONOSCOPY N/A 3/30/2019    EGD ESOPHAGOGASTRODUODENOSCOPY DILATATION performed by Layla Garcia MD at [address]  01/12/2018    [facility]    [place] DILATION OF SALIVARY DUCT N/A 11/23/2018    EGD DILATION SAVORY performed by Layla Garcia MD at [address] [facility] N/A 2/28/2019    SUPRAPUBIC CATHETER PLACEMENT performed by Tati Harrison MD at [address] Left 2/14/2019    EGD DILATION SAVORY performed by Layla Garcia MD at [address] [facility] ENDOSCOPY Left 2/14/2019    EGD BIOPSY performed by Layla Garcia MD at [address] [facility]    VASCULAR SURGERY         Allergies:    No Known Allergies     Current Medications:     sodium chloride flush 0.9 % injection 10 mL 2 times per day   sodium chloride flush 0.9 % injection 10 mL PRN   magnesium hydroxide (MILK OF MAGNESIA) 400 MG/5ML suspension 30 mL Daily PRN   ondansetron (ZOFRAN) injection 4 mg Q6H PRN   amLODIPine (NORVASC) tablet 10 mg Daily   aspirin chewable tablet 81 mg Daily   atorvastatin (LIPITOR) tablet 20 mg Daily   clopidogrel (PLAVIX) tablet 75 mg Daily   clotrimazole-betamethasone (LOTRISONE) cream BID   guaiFENesin-dextromethorphan (ROBITUSSIN DM) 100-10 MG/5ML syrup 10 mL Q6H PRN   doxepin (SILENOR) tablet 3 mg Nightly   ferrous sulfate tablet 325 mg Lunch   famotidine (PEPCID) tablet 20 mg Daily   gabapentin (NEURONTIN) capsule 100 mg TID   isosorbide mononitrate (IMDUR) extended release tablet 30 mg Daily   ipratropium-albuterol (DUONEB) nebulizer solution 3 mL Q4H PRN   meclizine (ANTIVERT) tablet 25 mg Q12H PRN   magnesium oxide (MAG-OX) tablet 400 mg BID   metoprolol succinate (TOPROL XL) extended release tablet 25 mg Daily   ondansetron (ZOFRAN) tablet 4 mg Q8H PRN   pantoprazole (PROTONIX) tablet 40 mg BID   potassium chloride (KLOR-CON M) extended release tablet 20 mEq Daily   ranolazine (RANEXA) extended Child with asthma and chest pain Decadron and continue albuterol at home. EKG WNL. carotids upstroke normal bilaterally, no carotid bruits. There is no LAP in the neck. There is no thyroid enlargement. Neurological -   Cranial [id number]:   Cranial nerve II: Normal. There is full visual fields  Cranial nerve III: Pupils: equal, round, reactive to light   Cranial nerves III, IV, VI: Extraocular Movements: intact   Cranial nerve V: Facial sensation: intact   Cranial nerve VII:Facial strength: intact   Cranial nerve VIII: Hearing: intact   Cranial nerve IX: Palate Elevation intact bilaterally  Cranial nerve XI: Shoulder shrug intact bilaterally  Cranial nerve XII: Tongue midline   neck supple without rigidity    Motor exam is 5/5 in the upper and lower extremities except right foot drop 3/5 (this is chronic per patient. . Normal muscle tone. There is no muscle atrophy. There is no muscle fasciculation. Sensory is intact   Coordination: finger to nose intact  Gait and station not tested: pt stated that he can't walk for 2 years    Abnormal movement none. Skin - no rashes or lesions  Superficial temporal artery pulses are normal.   Musculoskeletal: Has no hand arthritis, no limitation of ROM in any of the four extremities. no joint tenderness, deformity or swelling. There is no leg edema. The Heart was regular in rate and rhythm. No heart murmur  Chest- Clear  Abdomen: Soft, Intact bowel sounds. Labs:    CBC: Recent Labs     04/18/19  2357   WBC 6.1   HGB 9.0*      MCV 92.5   MCH 30.8   MCHC 33.3     CMP:  Recent Labs     04/18/19 2357 04/19/19  0318    133*   K 5.0 4.8    102   CO2 22* 20*   BUN 31* 30*   CREATININE 2.2* 2.0*   LABGLOM 29* 32*   GLUCOSE 150* 183*   CALCIUM 8.7 8.3*     Liver: Recent Labs     04/18/19  2357   AST 14   ALT 8*   ALKPHOS 73   PROT 7.2   LABALBU 3.1*   BILITOT 0.4     MRI BRAIN:  No results found for this or any previous visit. No results found for this or any previous visit. No results found for this or any previous visit.   No results or any previous visit. Results for orders placed during the hospital encounter of 04/18/19   CT Head WO Contrast    Narrative PROCEDURE: CT HEAD WO CONTRAST    CLINICAL INFORMATION: altered mental status. COMPARISON: Noncontrast brain CT dated 3/27/2019    TECHNIQUE: Noncontrast 5 mm axial images were obtained through the brain. Sagittal and coronal reconstructions were obtained and reviewed. All CT scans at this facility use dose modulation, iterative reconstruction, and/or weight-based dosing when appropriate to reduce radiation dose to as low as reasonably achievable. FINDINGS:    Brain: There is no acute ischemic infarct, hemorrhage, midline shift, mass, or mass effect. Mild deep white matter small vessel chronic ischemic changes are noted. There is an old infarct in the right corona radiata deep white matter. There is age appropriate cortical atrophy. Ventricles: The ventricles, cisterns and cortical sulci are enlarged concordant with the moderate degree of age-appropriate atrophy. No obstructive hydrocephalus. Vascular: There are atherosclerotic calcifications in the cavernous internal carotid arteries. Skull base/calvarium: Unremarkable  Soft tissues: Unremarkable  Intraorbital contents: Unremarkable  Sinuses: Unremarkable  Mastoids: Unremarkable        Impression No acute ischemic infarct, hemorrhage, or mass effect. 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 Aging changes as discussed above. Stable appearance of the brain compared to the previous study as detailed above. **This report has been created using voice recognition software. It may contain minor errors which are inherent in voice recognition technology. **    Final report electronically signed by Dr. Sushma Herrera on 4/19/2019 12:47 AM         We reviewed the patient records and available information in the EHR       Impression:    Active Problems:    DM2 (diabetes mellitus, type 2) ([facility] [address]    GLORIA (acute kidney injury) ([facility] [facility] Altered mental state    Hypotension    Pneumonia due to infectious organism  Resolved Problems:    * No resolved hospital problems. *    79 yo old man with altered mental status for 2 days, only labs abnormal is a small UTI and diffuse pneumonia on the CT chest. Patient also has base line benign essential tremor. Recommendations:  - his altered mental status is due to ongoing infectious process, patient likely has some baseline mild cognitive impairment given his history, therefore, even a small infection can tip him over to make him confused  - con't abx treatment, choice defer to primary team  - patient tremor is benign essential tremor, his tremor is very mild now, therefore, no medical is needed, his tremor was worse in the last 2 days likely due to infection exacerbating it.  - no further neurological workup at this time, patient improved significantly                                              1. Case was discussed with primary service. 2. All questions were answered. It was my pleasure to evaluate Rhesa Carrel today. Please call with questions.       Electronically signed by Alber Baird MD on 4/19/2019 at 10:05 Debbie Goodpasture, MD  Attending Neurologist/Neurointensivist

## (undated) DEVICE — DRAINBAG,ANTI-REFLUX TOWER,L/F,2000ML,LL: Brand: MEDLINE

## (undated) DEVICE — TUBING IV STOPCOCK 48 CM 3 W

## (undated) DEVICE — LINER SUCT CANSTR 1500CC SEMI RIG W/ POR HYDROPHOBIC SHUT

## (undated) DEVICE — CONMED SCOPE SAVER BITE BLOCK, 20X27 MM: Brand: SCOPE SAVER

## (undated) DEVICE — YANKAUER,BULB TIP,W/O VENT,RIGID,STERILE: Brand: MEDLINE

## (undated) DEVICE — SET LNR RED GRN W/ BASE CLEANASCOPE

## (undated) DEVICE — CATHETER ETER IV 22GA L1IN POLYUR STR RADPQ INTROCAN SFTY

## (undated) DEVICE — GOWN,SIRUS,NON REINFRCD,LARGE,SET IN SL: Brand: MEDLINE

## (undated) DEVICE — GLOVE ORANGE PI 7   MSG9070

## (undated) DEVICE — GAUZE,SPONGE,8"X4",12PLY,XRAY,STRL,LF: Brand: MEDLINE

## (undated) DEVICE — SPONGE DRN W4XL4IN RAYON/POLYESTER 6 PLY NONWOVEN PRECUT

## (undated) DEVICE — SOLUTION IV 1000ML 0.45% SOD CHL PH 5 INJ USP VIAFLX PLAS

## (undated) DEVICE — IV START KIT: Brand: MEDLINE INDUSTRIES, INC.

## (undated) DEVICE — GLOVE ORANGE PI 7 1/2   MSG9075

## (undated) DEVICE — CHLORAPREP 26ML ORANGE

## (undated) DEVICE — INTRODUCER CATH 16FR ORNG SUPRPUB PLAS SHRP TIP BVL TRCR

## (undated) DEVICE — CONNECTOR TBNG AUX H2O JET DISP FOR OLY 160/180 SER

## (undated) DEVICE — NEEDLE SPNL 22GA L3.5IN BLK HUB S STL REG WALL FIT STYL W/

## (undated) DEVICE — SET ADMIN 25ML L117IN PMP MOD CK VLV RLER CLMP 2 SMRTSITE

## (undated) DEVICE — INTENDED FOR TISSUE SEPARATION, AND OTHER PROCEDURES THAT REQUIRE A SHARP SURGICAL BLADE TO PUNCTURE OR CUT.: Brand: BARD-PARKER ® CARBON RIB-BACK BLADES

## (undated) DEVICE — ENDO KIT: Brand: MEDLINE INDUSTRIES, INC.

## (undated) DEVICE — SYRINGE MED 10ML LUERLOCK TIP W/O SFTY DISP

## (undated) DEVICE — COVER ARMBRD W13XL28.5IN IMPERV BLU FOR OP RM

## (undated) DEVICE — 4-PORT MANIFOLD: Brand: NEPTUNE 2